# Patient Record
Sex: MALE | Race: WHITE | NOT HISPANIC OR LATINO | Employment: OTHER | URBAN - METROPOLITAN AREA
[De-identification: names, ages, dates, MRNs, and addresses within clinical notes are randomized per-mention and may not be internally consistent; named-entity substitution may affect disease eponyms.]

---

## 2017-01-13 RX ORDER — DIPHENHYDRAMINE HCL 25 MG
50 TABLET ORAL ONCE AS NEEDED
Status: DISCONTINUED | OUTPATIENT
Start: 2017-01-16 | End: 2017-01-19 | Stop reason: HOSPADM

## 2017-01-13 RX ORDER — DIPHENHYDRAMINE HCL 25 MG
50 TABLET ORAL EVERY 6 HOURS PRN
Status: DISCONTINUED | OUTPATIENT
Start: 2017-01-16 | End: 2017-01-13

## 2017-01-13 RX ORDER — ACETAMINOPHEN 325 MG/1
650 TABLET ORAL EVERY 4 HOURS PRN
Status: DISCONTINUED | OUTPATIENT
Start: 2017-01-16 | End: 2017-01-19 | Stop reason: HOSPADM

## 2017-01-13 RX ORDER — SODIUM CHLORIDE 9 MG/ML
20 INJECTION, SOLUTION INTRAVENOUS CONTINUOUS
Status: DISCONTINUED | OUTPATIENT
Start: 2017-01-16 | End: 2017-01-19 | Stop reason: HOSPADM

## 2017-01-16 ENCOUNTER — HOSPITAL ENCOUNTER (OUTPATIENT)
Dept: INFUSION CENTER | Facility: CLINIC | Age: 72
Discharge: HOME | End: 2017-01-16
Payer: MEDICARE

## 2017-01-16 RX ORDER — ACETAMINOPHEN 325 MG/1
650 TABLET ORAL EVERY 4 HOURS PRN
Status: DISCONTINUED | OUTPATIENT
Start: 2017-01-17 | End: 2017-01-20 | Stop reason: HOSPADM

## 2017-01-16 RX ORDER — SODIUM CHLORIDE 9 MG/ML
20 INJECTION, SOLUTION INTRAVENOUS CONTINUOUS
Status: DISCONTINUED | OUTPATIENT
Start: 2017-01-17 | End: 2017-01-20 | Stop reason: HOSPADM

## 2017-01-16 RX ORDER — DIPHENHYDRAMINE HCL 25 MG
50 TABLET ORAL ONCE AS NEEDED
Status: DISCONTINUED | OUTPATIENT
Start: 2017-01-17 | End: 2017-01-20 | Stop reason: HOSPADM

## 2017-01-17 ENCOUNTER — HOSPITAL ENCOUNTER (OUTPATIENT)
Dept: INFUSION CENTER | Facility: CLINIC | Age: 72
Discharge: HOME | End: 2017-01-17
Payer: MEDICARE

## 2017-01-17 VITALS
HEART RATE: 75 BPM | SYSTOLIC BLOOD PRESSURE: 152 MMHG | TEMPERATURE: 98.6 F | RESPIRATION RATE: 20 BRPM | DIASTOLIC BLOOD PRESSURE: 73 MMHG

## 2017-01-17 PROCEDURE — 96366 THER/PROPH/DIAG IV INF ADDON: CPT

## 2017-01-17 PROCEDURE — 96365 THER/PROPH/DIAG IV INF INIT: CPT

## 2017-01-17 PROCEDURE — 96361 HYDRATE IV INFUSION ADD-ON: CPT

## 2017-01-17 RX ORDER — ACETAMINOPHEN 325 MG/1
650 TABLET ORAL EVERY 4 HOURS PRN
Status: DISCONTINUED | OUTPATIENT
Start: 2017-01-18 | End: 2017-01-18

## 2017-01-17 RX ORDER — DIPHENHYDRAMINE HCL 25 MG
50 TABLET ORAL ONCE AS NEEDED
Status: DISCONTINUED | OUTPATIENT
Start: 2017-01-18 | End: 2017-01-18

## 2017-01-17 RX ORDER — SODIUM CHLORIDE 9 MG/ML
20 INJECTION, SOLUTION INTRAVENOUS CONTINUOUS
Status: DISCONTINUED | OUTPATIENT
Start: 2017-01-18 | End: 2017-01-18

## 2017-01-17 RX ADMIN — IMMUNE GLOBULIN (HUMAN) 26 G: 10 INJECTION INTRAVENOUS; SUBCUTANEOUS at 11:42

## 2017-01-17 RX ADMIN — SODIUM CHLORIDE 20 ML/HR: 900 INJECTION, SOLUTION INTRAVENOUS at 11:43

## 2017-01-17 RX ADMIN — SODIUM CHLORIDE 500 ML: 0.9 INJECTION, SOLUTION INTRAVENOUS at 11:05

## 2017-01-17 NOTE — PLAN OF CARE
Problem: Potential for Falls  Goal: Patient will remain free of falls  INTERVENTIONS:  - Assess patient frequently for physical needs  - Identify cognitive and physical deficits and behaviors that affect risk of falls    - [place] fall precautions as indicated by assessment   - Educate patient/family on patient safety including physical limitations  - Instruct patient to call for assistance with activity based on assessment  - Modify environment to reduce risk of injury  - Consider OT/PT consult to assist with strengthening/mobility   Outcome: Progressing

## 2017-01-18 ENCOUNTER — HOSPITAL ENCOUNTER (OUTPATIENT)
Dept: INFUSION CENTER | Facility: CLINIC | Age: 72
Discharge: HOME | End: 2017-01-18
Payer: MEDICARE

## 2017-01-18 VITALS
HEART RATE: 80 BPM | SYSTOLIC BLOOD PRESSURE: 148 MMHG | DIASTOLIC BLOOD PRESSURE: 66 MMHG | RESPIRATION RATE: 20 BRPM | TEMPERATURE: 97.4 F

## 2017-01-18 PROCEDURE — 96366 THER/PROPH/DIAG IV INF ADDON: CPT

## 2017-01-18 PROCEDURE — 96365 THER/PROPH/DIAG IV INF INIT: CPT

## 2017-01-18 RX ORDER — SODIUM CHLORIDE 9 MG/ML
20 INJECTION, SOLUTION INTRAVENOUS CONTINUOUS
Status: DISCONTINUED | OUTPATIENT
Start: 2017-01-19 | End: 2017-01-22 | Stop reason: HOSPADM

## 2017-01-18 RX ORDER — PREDNISONE 1 MG/1
7.5 TABLET ORAL DAILY
Status: ON HOLD | COMMUNITY
End: 2017-11-12 | Stop reason: ALTCHOICE

## 2017-01-18 RX ORDER — LISINOPRIL 10 MG/1
40 TABLET ORAL DAILY
COMMUNITY
End: 2020-01-01 | Stop reason: HOSPADM

## 2017-01-18 RX ORDER — ACETAMINOPHEN 325 MG/1
650 TABLET ORAL EVERY 4 HOURS PRN
Status: DISCONTINUED | OUTPATIENT
Start: 2017-01-18 | End: 2017-01-21 | Stop reason: HOSPADM

## 2017-01-18 RX ORDER — DIPHENHYDRAMINE HCL 25 MG
50 TABLET ORAL ONCE AS NEEDED
Status: DISCONTINUED | OUTPATIENT
Start: 2017-01-19 | End: 2017-01-22 | Stop reason: HOSPADM

## 2017-01-18 RX ORDER — DIPHENHYDRAMINE HCL 25 MG
50 TABLET ORAL ONCE AS NEEDED
Status: DISCONTINUED | OUTPATIENT
Start: 2017-01-18 | End: 2017-01-21 | Stop reason: HOSPADM

## 2017-01-18 RX ORDER — ACETAMINOPHEN 325 MG/1
650 TABLET ORAL EVERY 4 HOURS PRN
Status: DISCONTINUED | OUTPATIENT
Start: 2017-01-19 | End: 2017-01-22 | Stop reason: HOSPADM

## 2017-01-18 RX ORDER — SODIUM CHLORIDE 9 MG/ML
20 INJECTION, SOLUTION INTRAVENOUS CONTINUOUS
Status: DISCONTINUED | OUTPATIENT
Start: 2017-01-18 | End: 2017-01-21 | Stop reason: HOSPADM

## 2017-01-18 RX ADMIN — SODIUM CHLORIDE 500 ML: 0.9 INJECTION, SOLUTION INTRAVENOUS at 08:50

## 2017-01-18 RX ADMIN — SODIUM CHLORIDE 20 ML/HR: 0.9 INJECTION, SOLUTION INTRAVENOUS at 09:22

## 2017-01-18 RX ADMIN — IMMUNE GLOBULIN (HUMAN) 26 G: 10 INJECTION INTRAVENOUS; SUBCUTANEOUS at 09:28

## 2017-01-18 NOTE — PROGRESS NOTES
Patient tolerated day 2 of IVIG without complication  IV site left in place for day 3 of treatment tomorrow  No signs of redness, tenderness, or swelling

## 2017-01-18 NOTE — PLAN OF CARE
Problem: Potential for Falls  Goal: Patient will remain free of falls  INTERVENTIONS:  - Assess patient frequently for physical needs  - Identify cognitive and physical deficits and behaviors that affect risk of falls  - [place] fall precautions as indicated by assessment   - Educate patient/family on patient safety including physical limitations  - Instruct patient to call for assistance with activity based on assessment  - Modify environment to reduce risk of injury  - Consider OT/PT consult to assist with strengthening/mobility   Outcome: Progressing    Problem: SAFETY ADULT  Goal: Patient will remain free of falls  INTERVENTIONS:  - Assess patient frequently for physical needs  - Identify cognitive and physical deficits and behaviors that affect risk of falls  - [place] fall precautions as indicated by assessment   - Educate patient/family on patient safety including physical limitations  - Instruct patient to call for assistance with activity based on assessment  - Modify environment to reduce risk of injury  - Consider OT/PT consult to assist with strengthening/mobility   Outcome: Progressing    Problem: Knowledge Deficit  Goal: Patient/family/caregiver demonstrates understanding of disease process, treatment plan, medications, and discharge instructions  Complete learning assessment and assess knowledge base    Interventions:  - Provide teaching at level of understanding  - Provide teaching via preferred learning methods  Outcome: Progressing

## 2017-01-18 NOTE — PROGRESS NOTES
Patient arrived to [facility] with IV site in place from yesterdays treatment  No signs of redness, tenderness or swelling

## 2017-01-19 ENCOUNTER — HOSPITAL ENCOUNTER (OUTPATIENT)
Dept: INFUSION CENTER | Facility: CLINIC | Age: 72
Discharge: HOME | End: 2017-01-19
Payer: MEDICARE

## 2017-01-19 VITALS
TEMPERATURE: 97.5 F | HEART RATE: 77 BPM | SYSTOLIC BLOOD PRESSURE: 140 MMHG | DIASTOLIC BLOOD PRESSURE: 80 MMHG | OXYGEN SATURATION: 97 % | RESPIRATION RATE: 18 BRPM

## 2017-01-19 PROCEDURE — 96365 THER/PROPH/DIAG IV INF INIT: CPT

## 2017-01-19 PROCEDURE — 96366 THER/PROPH/DIAG IV INF ADDON: CPT

## 2017-01-19 RX ORDER — DIPHENHYDRAMINE HCL 25 MG
50 TABLET ORAL ONCE
Status: DISCONTINUED | OUTPATIENT
Start: 2017-01-20 | End: 2017-01-23 | Stop reason: HOSPADM

## 2017-01-19 RX ORDER — SODIUM CHLORIDE 9 MG/ML
20 INJECTION, SOLUTION INTRAVENOUS CONTINUOUS
Status: DISCONTINUED | OUTPATIENT
Start: 2017-01-20 | End: 2017-01-23 | Stop reason: HOSPADM

## 2017-01-19 RX ORDER — ACETAMINOPHEN 325 MG/1
650 TABLET ORAL EVERY 4 HOURS PRN
Status: DISCONTINUED | OUTPATIENT
Start: 2017-01-20 | End: 2017-01-23 | Stop reason: HOSPADM

## 2017-01-19 RX ADMIN — IMMUNE GLOBULIN (HUMAN) 26 G: 10 INJECTION INTRAVENOUS; SUBCUTANEOUS at 09:48

## 2017-01-19 RX ADMIN — SODIUM CHLORIDE 20 ML/HR: 0.9 INJECTION, SOLUTION INTRAVENOUS at 09:00

## 2017-01-19 NOTE — PROGRESS NOTES
Pt completed infusion without iincident -- ptrequested to have PIV left in for rerturn tomorrow  Ptdcdstable and ambulatory

## 2017-01-20 ENCOUNTER — HOSPITAL ENCOUNTER (OUTPATIENT)
Dept: INFUSION CENTER | Facility: CLINIC | Age: 72
Discharge: HOME | End: 2017-01-20
Payer: MEDICARE

## 2017-01-20 VITALS
DIASTOLIC BLOOD PRESSURE: 87 MMHG | HEART RATE: 72 BPM | SYSTOLIC BLOOD PRESSURE: 143 MMHG | RESPIRATION RATE: 18 BRPM | TEMPERATURE: 97.9 F

## 2017-01-20 PROCEDURE — 96365 THER/PROPH/DIAG IV INF INIT: CPT

## 2017-01-20 PROCEDURE — 96361 HYDRATE IV INFUSION ADD-ON: CPT

## 2017-01-20 PROCEDURE — 96366 THER/PROPH/DIAG IV INF ADDON: CPT

## 2017-01-20 RX ADMIN — IMMUNE GLOBULIN (HUMAN) 26 G: 10 INJECTION INTRAVENOUS; SUBCUTANEOUS at 09:20

## 2017-01-20 RX ADMIN — SODIUM CHLORIDE 500 ML: 0.9 INJECTION, SOLUTION INTRAVENOUS at 08:38

## 2017-01-20 RX ADMIN — SODIUM CHLORIDE 20 ML/HR: 0.9 INJECTION, SOLUTION INTRAVENOUS at 09:19

## 2017-01-23 ENCOUNTER — ALLSCRIPTS OFFICE VISIT (OUTPATIENT)
Dept: OTHER | Facility: OTHER | Age: 72
End: 2017-01-23

## 2017-01-23 DIAGNOSIS — G70.00 MYASTHENIA GRAVIS WITHOUT (ACUTE) EXACERBATION (HCC): ICD-10-CM

## 2017-05-03 ENCOUNTER — TRANSCRIBE ORDERS (OUTPATIENT)
Dept: LAB | Facility: CLINIC | Age: 72
End: 2017-05-03

## 2017-05-03 ENCOUNTER — APPOINTMENT (OUTPATIENT)
Dept: LAB | Facility: CLINIC | Age: 72
End: 2017-05-03
Payer: MEDICARE

## 2017-05-03 DIAGNOSIS — G70.00 MYASTHENIA GRAVIS WITHOUT (ACUTE) EXACERBATION (HCC): ICD-10-CM

## 2017-05-03 LAB
ALBUMIN SERPL BCP-MCNC: 3.8 G/DL (ref 3.5–5)
ALP SERPL-CCNC: 86 U/L (ref 46–116)
ALT SERPL W P-5'-P-CCNC: 19 U/L (ref 12–78)
ANION GAP SERPL CALCULATED.3IONS-SCNC: 12 MMOL/L (ref 4–13)
AST SERPL W P-5'-P-CCNC: 28 U/L (ref 5–45)
BASOPHILS # BLD AUTO: 0.04 THOUSANDS/ΜL (ref 0–0.1)
BASOPHILS NFR BLD AUTO: 1 % (ref 0–1)
BILIRUB SERPL-MCNC: 0.6 MG/DL (ref 0.2–1)
BUN SERPL-MCNC: 17 MG/DL (ref 5–25)
CALCIUM SERPL-MCNC: 9.4 MG/DL (ref 8.3–10.1)
CHLORIDE SERPL-SCNC: 105 MMOL/L (ref 100–108)
CO2 SERPL-SCNC: 25 MMOL/L (ref 21–32)
CREAT SERPL-MCNC: 1.1 MG/DL (ref 0.6–1.3)
EOSINOPHIL # BLD AUTO: 0.08 THOUSAND/ΜL (ref 0–0.61)
EOSINOPHIL NFR BLD AUTO: 1 % (ref 0–6)
ERYTHROCYTE [DISTWIDTH] IN BLOOD BY AUTOMATED COUNT: 13.8 % (ref 11.6–15.1)
GFR SERPL CREATININE-BSD FRML MDRD: >60 ML/MIN/1.73SQ M
GLUCOSE SERPL-MCNC: 97 MG/DL (ref 65–140)
HCT VFR BLD AUTO: 40.1 % (ref 36.5–49.3)
HGB BLD-MCNC: 12.1 G/DL (ref 12–17)
LYMPHOCYTES # BLD AUTO: 1.12 THOUSANDS/ΜL (ref 0.6–4.47)
LYMPHOCYTES NFR BLD AUTO: 19 % (ref 14–44)
MCH RBC QN AUTO: 24.6 PG (ref 26.8–34.3)
MCHC RBC AUTO-ENTMCNC: 30.2 G/DL (ref 31.4–37.4)
MCV RBC AUTO: 82 FL (ref 82–98)
MONOCYTES # BLD AUTO: 0.48 THOUSAND/ΜL (ref 0.17–1.22)
MONOCYTES NFR BLD AUTO: 8 % (ref 4–12)
NEUTROPHILS # BLD AUTO: 4.19 THOUSANDS/ΜL (ref 1.85–7.62)
NEUTS SEG NFR BLD AUTO: 71 % (ref 43–75)
PLATELET # BLD AUTO: 181 THOUSANDS/UL (ref 149–390)
PMV BLD AUTO: 10.4 FL (ref 8.9–12.7)
POTASSIUM SERPL-SCNC: 4.7 MMOL/L (ref 3.5–5.3)
PROT SERPL-MCNC: 6.9 G/DL (ref 6.4–8.2)
RBC # BLD AUTO: 4.92 MILLION/UL (ref 3.88–5.62)
SODIUM SERPL-SCNC: 142 MMOL/L (ref 136–145)
WBC # BLD AUTO: 5.91 THOUSAND/UL (ref 4.31–10.16)

## 2017-05-03 PROCEDURE — 36415 COLL VENOUS BLD VENIPUNCTURE: CPT

## 2017-05-03 PROCEDURE — 80053 COMPREHEN METABOLIC PANEL: CPT

## 2017-05-03 PROCEDURE — 85025 COMPLETE CBC W/AUTO DIFF WBC: CPT

## 2017-05-15 ENCOUNTER — ALLSCRIPTS OFFICE VISIT (OUTPATIENT)
Dept: OTHER | Facility: OTHER | Age: 72
End: 2017-05-15

## 2017-05-15 ENCOUNTER — TRANSCRIBE ORDERS (OUTPATIENT)
Dept: ADMINISTRATIVE | Facility: HOSPITAL | Age: 72
End: 2017-05-15

## 2017-05-15 DIAGNOSIS — I50.42 CHRONIC COMBINED SYSTOLIC AND DIASTOLIC HEART FAILURE (HCC): ICD-10-CM

## 2017-05-15 DIAGNOSIS — I65.29 OCCLUSION AND STENOSIS OF UNSPECIFIED CAROTID ARTERY: ICD-10-CM

## 2017-05-15 DIAGNOSIS — G70.00 MYASTHENIA GRAVIS WITHOUT (ACUTE) EXACERBATION (HCC): ICD-10-CM

## 2017-05-15 DIAGNOSIS — I25.10 ATHEROSCLEROTIC HEART DISEASE OF NATIVE CORONARY ARTERY WITHOUT ANGINA PECTORIS: ICD-10-CM

## 2017-05-15 DIAGNOSIS — I25.5 ISCHEMIC CARDIOMYOPATHY: ICD-10-CM

## 2017-05-15 DIAGNOSIS — E78.5 HYPERLIPIDEMIA: ICD-10-CM

## 2017-05-15 DIAGNOSIS — I65.29 STENOSIS OF CAROTID ARTERY, UNSPECIFIED LATERALITY: Primary | ICD-10-CM

## 2017-05-15 DIAGNOSIS — I10 ESSENTIAL (PRIMARY) HYPERTENSION: ICD-10-CM

## 2017-05-22 ENCOUNTER — ALLSCRIPTS OFFICE VISIT (OUTPATIENT)
Dept: OTHER | Facility: OTHER | Age: 72
End: 2017-05-22

## 2017-05-29 DIAGNOSIS — I50.42 CHRONIC COMBINED SYSTOLIC AND DIASTOLIC HEART FAILURE (HCC): ICD-10-CM

## 2017-06-01 ENCOUNTER — APPOINTMENT (OUTPATIENT)
Dept: LAB | Facility: CLINIC | Age: 72
End: 2017-06-01
Payer: MEDICARE

## 2017-06-01 ENCOUNTER — HOSPITAL ENCOUNTER (OUTPATIENT)
Dept: NON INVASIVE DIAGNOSTICS | Facility: CLINIC | Age: 72
Discharge: HOME | End: 2017-06-01
Payer: MEDICARE

## 2017-06-01 DIAGNOSIS — E78.5 HYPERLIPIDEMIA: ICD-10-CM

## 2017-06-01 DIAGNOSIS — I50.42 CHRONIC COMBINED SYSTOLIC AND DIASTOLIC HEART FAILURE (HCC): ICD-10-CM

## 2017-06-01 DIAGNOSIS — I65.29 OCCLUSION AND STENOSIS OF UNSPECIFIED CAROTID ARTERY: ICD-10-CM

## 2017-06-01 DIAGNOSIS — I25.5 ISCHEMIC CARDIOMYOPATHY: ICD-10-CM

## 2017-06-01 DIAGNOSIS — I10 ESSENTIAL (PRIMARY) HYPERTENSION: ICD-10-CM

## 2017-06-01 DIAGNOSIS — I25.10 ATHEROSCLEROTIC HEART DISEASE OF NATIVE CORONARY ARTERY WITHOUT ANGINA PECTORIS: ICD-10-CM

## 2017-06-01 LAB
ANION GAP SERPL CALCULATED.3IONS-SCNC: 12 MMOL/L (ref 4–13)
BUN SERPL-MCNC: 17 MG/DL (ref 5–25)
CALCIUM SERPL-MCNC: 9.5 MG/DL (ref 8.3–10.1)
CHLORIDE SERPL-SCNC: 105 MMOL/L (ref 100–108)
CO2 SERPL-SCNC: 26 MMOL/L (ref 21–32)
CREAT SERPL-MCNC: 1.21 MG/DL (ref 0.6–1.3)
GFR SERPL CREATININE-BSD FRML MDRD: 58.9 ML/MIN/1.73SQ M
GLUCOSE SERPL-MCNC: 96 MG/DL (ref 65–140)
POTASSIUM SERPL-SCNC: 3.7 MMOL/L (ref 3.5–5.3)
SODIUM SERPL-SCNC: 143 MMOL/L (ref 136–145)

## 2017-06-01 PROCEDURE — 80048 BASIC METABOLIC PNL TOTAL CA: CPT

## 2017-06-01 PROCEDURE — 93306 TTE W/DOPPLER COMPLETE: CPT

## 2017-06-01 PROCEDURE — 36415 COLL VENOUS BLD VENIPUNCTURE: CPT

## 2017-09-28 ENCOUNTER — APPOINTMENT (OUTPATIENT)
Dept: LAB | Facility: CLINIC | Age: 72
End: 2017-09-28
Payer: MEDICARE

## 2017-09-28 ENCOUNTER — ALLSCRIPTS OFFICE VISIT (OUTPATIENT)
Dept: OTHER | Facility: OTHER | Age: 72
End: 2017-09-28

## 2017-09-28 DIAGNOSIS — I50.42 CHRONIC COMBINED SYSTOLIC AND DIASTOLIC HEART FAILURE (HCC): ICD-10-CM

## 2017-09-28 LAB
ANION GAP SERPL CALCULATED.3IONS-SCNC: 8 MMOL/L (ref 4–13)
BUN SERPL-MCNC: 18 MG/DL (ref 5–25)
CALCIUM SERPL-MCNC: 9 MG/DL (ref 8.3–10.1)
CHLORIDE SERPL-SCNC: 105 MMOL/L (ref 100–108)
CO2 SERPL-SCNC: 28 MMOL/L (ref 21–32)
CREAT SERPL-MCNC: 1.17 MG/DL (ref 0.6–1.3)
GFR SERPL CREATININE-BSD FRML MDRD: 61 ML/MIN/1.73SQ M
GLUCOSE SERPL-MCNC: 106 MG/DL (ref 65–140)
POTASSIUM SERPL-SCNC: 4 MMOL/L (ref 3.5–5.3)
SODIUM SERPL-SCNC: 141 MMOL/L (ref 136–145)

## 2017-09-28 PROCEDURE — 36415 COLL VENOUS BLD VENIPUNCTURE: CPT

## 2017-09-28 PROCEDURE — 80048 BASIC METABOLIC PNL TOTAL CA: CPT

## 2017-10-31 NOTE — PROGRESS NOTES
Assessment  Assessed    1  Carotid artery stenosis, asymptomatic (433 10) (I65 29)   2  Chronic combined systolic and diastolic CHF (congestive heart failure) (428 42,428 0)   (I50 42)   3  Coronary artery disease (414 00) (I25 10)   4  Hyperlipidemia (272 4) (E78 5)   5  Hypertension (401 9) (I10)   6  Ischemic cardiomyopathy (414 8) (I25 5)    Plan  Carotid artery stenosis, asymptomatic, Health Maintenance, Hypertension    · EKG/ECG- POC; Status:Complete;   Done: [id number]   Perform: In Office; [phone]; Ordered; For:Carotid artery stenosis, asymptomatic, Health Maintenance, Hypertension; Ordered By:Nic Xavier;  Chronic combined systolic and diastolic CHF (congestive heart failure)    · Follow-up visit in 6 months Evaluation and Treatment  Follow-up  Status: Complete   Done: 53EXM4052   Ordered; For: Chronic combined systolic and diastolic CHF (congestive heart failure); Ordered By: Barb Holloway Performed:  Due: 59KQM6699; Last Updated By: Deepti Barrios; 9/28/2017 3:44:31 PM   · (1) BASIC METABOLIC PROFILE; Status:Complete;   Done: 13QKB3682 02:58PM   MMT:[id number]; Ordered; For:Chronic combined systolic and diastolic CHF (congestive heart failure); Ordered By:Yolande Xavier;    Discussion/Summary  Cardiology Discussion Summary Free Text Note Form [facility]:   Columba Christensen returns to see me at the [facility] office  Today he is stable  is much better  artery disease - 3 vessel CAD diagnosed in 2013, no intervention at that time  Medical therapy was prescribed and his ischemic cardiomyopathy at 45% improved  No echo suggest preserved LVEF again but with LVH  - previously controlled in the setting of high-dose atorvastatin therapy  Chief Complaint  Chief Complaint Free Text Note Form: Patient is here for 3 mo f/u  Patient c/o Sob with exertion, occ Swelling in LE, EKG today  Chief Complaint Chronic Condition [facility]: Patient is here today for follow up of chronic conditions described in HPI        History of Present Illness  Cardiology HPI Free Text Note Form [facility]: Lazara Pulido has multivessel CAD, was reffered for CABG in the past but not felt a good candidate and he refused  he has MG  He has HL  He is on statin Rx with excellent lipid control  His HTN is under much better control  His volume sttaus has improved  No significant edema today  Active Problems  Problems    1  Acute gastric ulcer (531 30) (K25 3)   2  Carotid artery stenosis, asymptomatic (433 10) (I65 29)   3  Chronic combined systolic and diastolic CHF (congestive heart failure) (428 42,428 0)   (I50 42)   4  Coronary artery disease (414 00) (I25 10)   5  Dysarthria (784 51) (R47 1)   6  Dysphagia (787 20) (R13 10)   7  Dysuria (788 1) (R30 0)   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)   9  Hyperlipidemia (272 4) (E78 5)   10  Hypertension (401 9) (I10)   11  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)   12  Ischemic cardiomyopathy (414 8) (I25 5)   13  Midline low back pain without sciatica (724 2) (M54 5)   14  Myasthenia gravis (358 00) (G70 00)   15  Pain around PEG tube site (948 44,671 55) (E66 949S)    Past Medical History  Problems    1  Old myocardial infarction (12) (I25 2)  Active Problems And Past Medical History Reviewed: The active problems and past medical history were reviewed and updated today  Surgical History  Problems    1  History of Percutaneous Injection Of Extremity Pseudoaneurysm   2  History of Previous Balloon Angioplasty   3  History of Previous Stent Placement    Family History  Mother    1  Family history of Prior Myocardial Infarction  Father    2  Family history of Prior Myocardial Infarction    Social History  Problems    · Being A Social Drinker   · Denied: History of Drug Use   · Former smoker (V15 82) (F32 103)   · Marital History - Currently   Social History Reviewed: The social history was reviewed and updated today  The social history was reviewed and is unchanged  Current Meds   1   Aspirin 81 MG TABS; Take 1 tablet daily; Therapy: (Recorded:31Oct2016) to Recorded   2  Furosemide 40 MG Oral Tablet; TAKE 1 TABLET DAILY  Requested for: 88VKW0231; Last   Rx:[id number] Ordered   3  Isosorbide Mononitrate ER 30 MG Oral Tablet Extended Release 24 Hour; TAKE 1   TABLET DAILY; Therapy: (Romario Motley) to Recorded   4  Klor-Con M20 20 MEQ Oral Tablet Extended Release; TAKE 1 TABLET DAILY; Therapy: (Romario Motley) to Recorded   5  Levothyroxine Sodium 50 MCG Oral Tablet; TAKE 1 TABLET DAILY; Therapy: (Recorded:12Oct2016) to Recorded   6  Lipitor 80 MG Oral Tablet; TAKE 1 TABLET DAILY; Therapy: (Recorded:12Oct2016) to Recorded   7  Lisinopril 10 MG Oral Tablet; TAKE 1 TABLET TWICE DAILY; Therapy: 51QHX3446 to (Evaluate:11Mar2018)  Requested for: 78VJF3821; Last   Rx:49Chn8884 Ordered   8  Metoprolol Tartrate 25 MG Oral Tablet; TAKE 1 TABLET TWICE DAILY; Therapy: (Romario Motley) to Recorded   9  Mycophenolate Mofetil 250 MG Oral Capsule; TAKE 3 CAPSULES BY MOUTH TWICE   DAILY   increased dose; Therapy: 99MMD4912 to (Annette Warner)  Requested for: 07JBN9686; Last   Rx:07Mar2017 Ordered   10  Pantoprazole Sodium 40 MG Oral Tablet Delayed Release; Take 1 tablet twice daily; Therapy: 11NSL9664 to (Gavino Leavitt)  Requested for: 11YHW1845; Last    Rx:17Rri3711 Ordered  Medication List Reviewed: The medication list was reviewed and updated today  Allergies  Medication    1  No Known Drug Allergies    Vitals  Vital Signs    Recorded: 51WVW5861 02:15PM   Heart Rate 60, Apical   Systolic 454, LUE, Sitting   Diastolic 72, LUE, Sitting   Height 5 ft 9 in   Weight 145 lb 5 oz   BMI Calculated 21 46   BSA Calculated 1 8     Physical Exam    Constitutional   General appearance: No acute distress, well appearing and well nourished  Eyes   Conjunctiva and Sclera examination: Conjunctiva pink, sclera anicteric      Ears, Nose, Mouth, and Throat - Oropharynx: Clear, nares are clear, mucous membranes are moist    Neck   Neck and thyroid: Normal, supple, trachea midline, no thyromegaly  Pulmonary   Respiratory effort: No increased work of breathing or signs of respiratory distress  Auscultation of lungs: Clear to auscultation  Cardiovascular   Auscultation of heart: Normal rate and rhythm, normal S1 and S2, no murmurs  Carotid pulses: Normal, 2+ bilaterally  Pedal pulses: Normal, 2+ bilaterally  -- 1+ bilateral pitting edema  Musculoskeletal Gait and station: Normal gait  Skin - Skin and subcutaneous tissue: Normal without rashes or lesions  Skin is warm and well perfused, normal turgor  Neurologic - Cranial nerves: II - XII intact  Psychiatric - Orientation to person, place, and time: Normal -- Mood and affect: Normal       Results/Data  (1) BASIC METABOLIC PROFILE 10GCS4336 02:58PM Daisy VILLA Order Number: [id number]     Test Name Result Flag Reference   GLUCOSE,RANDM 106 mg/dL     If the patient is fasting, the ADA then defines impaired fasting glucose as > 100 mg/dL and diabetes as > or equal to 123 mg/dL  Specimen collection should occur prior to Sulfasalazine administration due to the potential for falsely depressed results  Specimen collection should occur prior to Sulfapyridine administration due to the potential for falsely elevated results  SODIUM 141 mmol/L  136-145   POTASSIUM 4 0 mmol/L  3 5-5 3   CHLORIDE 105 mmol/L  100-108   CARBON DIOXIDE 28 mmol/L  21-32   ANION GAP (CALC) 8 mmol/L  4-13   BLOOD UREA NITROGEN 18 mg/dL  5-25   CREATININE 1 17 mg/dL  0 60-1 30   Standardized to IDMS reference method   CALCIUM 9 0 mg/dL  8 3-10 1   eGFR 61 ml/min/1 73sq [facility] Disease Education Program recommendations are as follows:  GFR calculation is accurate only with a steady state creatinine  Chronic Kidney disease less than 60 ml/min/1 73 sq  meters  Kidney failure less than 15 ml/min/1 73 sq  meters       Diagnostic Studies Reviewed Cardio:   Echocardiogram/RODRIGUEZ: LVEF 65%, mid inferior wall akinesis  ECG Report: Sinus rhythm, cannot rule out inferior or anterior infarct  (1) BASIC METABOLIC PROFILE 81GIA5044 12:29PM Rafi Zepeda Order Number: [id number]     Test Name Result Flag Reference   GLUCOSE,RANDM 96 mg/dL     If the patient is fasting, the ADA then defines impaired fasting glucose as > 100 mg/dL and diabetes as > or equal to 123 mg/dL  SODIUM 143 mmol/L  136-145   POTASSIUM 3 7 mmol/L  3 5-5 3   CHLORIDE 105 mmol/L  100-108   CARBON DIOXIDE 26 mmol/L  21-32   ANION GAP (CALC) 12 mmol/L  4-13   BLOOD UREA NITROGEN 17 mg/dL  5-25   CREATININE 1 21 mg/dL  0 60-1 30   Standardized to IDMS reference method   CALCIUM 9 5 mg/dL  8 3-10 1   eGFR Non-African American 58 9 ml/min/1 73sq [facility] Disease Education Program recommendations are as follows:  GFR calculation is accurate only with a steady state creatinine  Chronic Kidney disease less than 60 ml/min/1 73 sq  meters  Kidney failure less than 15 ml/min/1 73 sq  meters  Future Appointments    Date/Time Provider Specialty Site   11/17/2017 10:40 AM SUMANTH Rubio   Cardiology [address]   11/27/2017 01:00 PM Fara Councilman, DO Neurology [address] [facility]     Signatures   Electronically signed by : Negrito Abrams MD; Oct 30 2017 10:49PM EST                       (Author)

## 2017-11-01 ENCOUNTER — GENERIC CONVERSION - ENCOUNTER (OUTPATIENT)
Dept: OTHER | Facility: OTHER | Age: 72
End: 2017-11-01

## 2017-11-12 ENCOUNTER — HOSPITAL ENCOUNTER (INPATIENT)
Facility: HOSPITAL | Age: 72
LOS: 4 days | Discharge: HOME HEALTH | DRG: 394 | End: 2017-11-16
Attending: EMERGENCY MEDICINE | Admitting: SURGERY
Payer: MEDICARE

## 2017-11-12 ENCOUNTER — APPOINTMENT (EMERGENCY)
Dept: CT IMAGING | Facility: HOSPITAL | Age: 72
DRG: 394 | End: 2017-11-12
Payer: MEDICARE

## 2017-11-12 DIAGNOSIS — Z86.69 HX OF MYASTHENIA GRAVIS: ICD-10-CM

## 2017-11-12 DIAGNOSIS — I10 ESSENTIAL HYPERTENSION: ICD-10-CM

## 2017-11-12 DIAGNOSIS — K56.600 SMALL BOWEL OBSTRUCTION, PARTIAL (HCC): Primary | ICD-10-CM

## 2017-11-12 LAB
ALBUMIN SERPL BCP-MCNC: 4 G/DL (ref 3.5–5)
ALP SERPL-CCNC: 93 U/L (ref 46–116)
ALT SERPL W P-5'-P-CCNC: 17 U/L (ref 12–78)
ANION GAP SERPL CALCULATED.3IONS-SCNC: 11 MMOL/L (ref 4–13)
APTT PPP: 30 SECONDS (ref 23–35)
AST SERPL W P-5'-P-CCNC: 15 U/L (ref 5–45)
BASOPHILS # BLD AUTO: 0.02 THOUSANDS/ΜL (ref 0–0.1)
BASOPHILS NFR BLD AUTO: 0 % (ref 0–1)
BILIRUB SERPL-MCNC: 0.7 MG/DL (ref 0.2–1)
BUN SERPL-MCNC: 18 MG/DL (ref 5–25)
CALCIUM SERPL-MCNC: 9.6 MG/DL (ref 8.3–10.1)
CHLORIDE SERPL-SCNC: 103 MMOL/L (ref 100–108)
CO2 SERPL-SCNC: 28 MMOL/L (ref 21–32)
CREAT SERPL-MCNC: 1.1 MG/DL (ref 0.6–1.3)
EOSINOPHIL # BLD AUTO: 0.02 THOUSAND/ΜL (ref 0–0.61)
EOSINOPHIL NFR BLD AUTO: 0 % (ref 0–6)
ERYTHROCYTE [DISTWIDTH] IN BLOOD BY AUTOMATED COUNT: 13.6 % (ref 11.6–15.1)
GFR SERPL CREATININE-BSD FRML MDRD: 66 ML/MIN/1.73SQ M
GLUCOSE SERPL-MCNC: 105 MG/DL (ref 65–140)
HCT VFR BLD AUTO: 38.8 % (ref 36.5–49.3)
HGB BLD-MCNC: 12.2 G/DL (ref 12–17)
INR PPP: 0.97 (ref 0.86–1.16)
LACTATE SERPL-SCNC: 1.1 MMOL/L (ref 0.5–2)
LIPASE SERPL-CCNC: 74 U/L (ref 73–393)
LYMPHOCYTES # BLD AUTO: 0.98 THOUSANDS/ΜL (ref 0.6–4.47)
LYMPHOCYTES NFR BLD AUTO: 12 % (ref 14–44)
MCH RBC QN AUTO: 26.2 PG (ref 26.8–34.3)
MCHC RBC AUTO-ENTMCNC: 31.4 G/DL (ref 31.4–37.4)
MCV RBC AUTO: 83 FL (ref 82–98)
MONOCYTES # BLD AUTO: 0.55 THOUSAND/ΜL (ref 0.17–1.22)
MONOCYTES NFR BLD AUTO: 7 % (ref 4–12)
NEUTROPHILS # BLD AUTO: 6.93 THOUSANDS/ΜL (ref 1.85–7.62)
NEUTS SEG NFR BLD AUTO: 81 % (ref 43–75)
PLATELET # BLD AUTO: 202 THOUSANDS/UL (ref 149–390)
PMV BLD AUTO: 10 FL (ref 8.9–12.7)
POTASSIUM SERPL-SCNC: 3.9 MMOL/L (ref 3.5–5.3)
PROT SERPL-MCNC: 6.9 G/DL (ref 6.4–8.2)
PROTHROMBIN TIME: 13.2 SECONDS (ref 12.1–14.4)
RBC # BLD AUTO: 4.66 MILLION/UL (ref 3.88–5.62)
SODIUM SERPL-SCNC: 142 MMOL/L (ref 136–145)
WBC # BLD AUTO: 8.5 THOUSAND/UL (ref 4.31–10.16)

## 2017-11-12 PROCEDURE — 96360 HYDRATION IV INFUSION INIT: CPT

## 2017-11-12 PROCEDURE — 85610 PROTHROMBIN TIME: CPT | Performed by: EMERGENCY MEDICINE

## 2017-11-12 PROCEDURE — 36415 COLL VENOUS BLD VENIPUNCTURE: CPT | Performed by: EMERGENCY MEDICINE

## 2017-11-12 PROCEDURE — 83690 ASSAY OF LIPASE: CPT | Performed by: EMERGENCY MEDICINE

## 2017-11-12 PROCEDURE — 85730 THROMBOPLASTIN TIME PARTIAL: CPT | Performed by: EMERGENCY MEDICINE

## 2017-11-12 PROCEDURE — 74177 CT ABD & PELVIS W/CONTRAST: CPT

## 2017-11-12 PROCEDURE — 83605 ASSAY OF LACTIC ACID: CPT | Performed by: EMERGENCY MEDICINE

## 2017-11-12 PROCEDURE — 99285 EMERGENCY DEPT VISIT HI MDM: CPT

## 2017-11-12 PROCEDURE — 80053 COMPREHEN METABOLIC PANEL: CPT | Performed by: EMERGENCY MEDICINE

## 2017-11-12 PROCEDURE — 85025 COMPLETE CBC W/AUTO DIFF WBC: CPT | Performed by: EMERGENCY MEDICINE

## 2017-11-12 RX ORDER — ONDANSETRON 2 MG/ML
4 INJECTION INTRAMUSCULAR; INTRAVENOUS ONCE AS NEEDED
Status: DISCONTINUED | OUTPATIENT
Start: 2017-11-12 | End: 2017-11-12 | Stop reason: SDUPTHER

## 2017-11-12 RX ORDER — PREDNISOLONE ACETATE 10 MG/ML
1 SUSPENSION/ DROPS OPHTHALMIC 4 TIMES DAILY
Status: DISCONTINUED | OUTPATIENT
Start: 2017-11-12 | End: 2017-11-16 | Stop reason: HOSPADM

## 2017-11-12 RX ORDER — ISOSORBIDE MONONITRATE 30 MG/1
30 TABLET, EXTENDED RELEASE ORAL DAILY
Status: DISCONTINUED | OUTPATIENT
Start: 2017-11-13 | End: 2017-11-16 | Stop reason: HOSPADM

## 2017-11-12 RX ORDER — PANTOPRAZOLE SODIUM 40 MG/1
40 INJECTION, POWDER, FOR SOLUTION INTRAVENOUS
Status: DISCONTINUED | OUTPATIENT
Start: 2017-11-13 | End: 2017-11-16 | Stop reason: HOSPADM

## 2017-11-12 RX ORDER — HEPARIN SODIUM 5000 [USP'U]/ML
5000 INJECTION, SOLUTION INTRAVENOUS; SUBCUTANEOUS EVERY 8 HOURS SCHEDULED
Status: DISCONTINUED | OUTPATIENT
Start: 2017-11-12 | End: 2017-11-16 | Stop reason: HOSPADM

## 2017-11-12 RX ORDER — PANTOPRAZOLE SODIUM 40 MG/1
40 TABLET, DELAYED RELEASE ORAL DAILY
Status: DISCONTINUED | OUTPATIENT
Start: 2017-11-13 | End: 2017-11-12 | Stop reason: SDUPTHER

## 2017-11-12 RX ORDER — DEXTROSE, SODIUM CHLORIDE, AND POTASSIUM CHLORIDE 5; .45; .15 G/100ML; G/100ML; G/100ML
100 INJECTION INTRAVENOUS CONTINUOUS
Status: DISCONTINUED | OUTPATIENT
Start: 2017-11-12 | End: 2017-11-13

## 2017-11-12 RX ORDER — MYCOPHENOLATE MOFETIL 250 MG/1
750 CAPSULE ORAL 2 TIMES DAILY
Status: DISCONTINUED | OUTPATIENT
Start: 2017-11-12 | End: 2017-11-16 | Stop reason: HOSPADM

## 2017-11-12 RX ORDER — SODIUM CHLORIDE, SODIUM LACTATE, POTASSIUM CHLORIDE, CALCIUM CHLORIDE 600; 310; 30; 20 MG/100ML; MG/100ML; MG/100ML; MG/100ML
100 INJECTION, SOLUTION INTRAVENOUS CONTINUOUS
Status: DISCONTINUED | OUTPATIENT
Start: 2017-11-12 | End: 2017-11-13

## 2017-11-12 RX ORDER — LEVOTHYROXINE SODIUM 0.05 MG/1
50 TABLET ORAL DAILY
Status: DISCONTINUED | OUTPATIENT
Start: 2017-11-13 | End: 2017-11-16 | Stop reason: HOSPADM

## 2017-11-12 RX ORDER — OFLOXACIN 3 MG/ML
1 SOLUTION/ DROPS OPHTHALMIC 4 TIMES DAILY
Status: DISCONTINUED | OUTPATIENT
Start: 2017-11-12 | End: 2017-11-16 | Stop reason: HOSPADM

## 2017-11-12 RX ORDER — ONDANSETRON 2 MG/ML
4 INJECTION INTRAMUSCULAR; INTRAVENOUS EVERY 4 HOURS PRN
Status: DISCONTINUED | OUTPATIENT
Start: 2017-11-12 | End: 2017-11-16 | Stop reason: HOSPADM

## 2017-11-12 RX ORDER — LISINOPRIL 20 MG/1
40 TABLET ORAL DAILY
Status: DISCONTINUED | OUTPATIENT
Start: 2017-11-13 | End: 2017-11-16 | Stop reason: HOSPADM

## 2017-11-12 RX ADMIN — METOPROLOL TARTRATE 25 MG: 25 TABLET ORAL at 21:43

## 2017-11-12 RX ADMIN — SODIUM CHLORIDE, SODIUM LACTATE, POTASSIUM CHLORIDE, AND CALCIUM CHLORIDE 100 ML/HR: .6; .31; .03; .02 INJECTION, SOLUTION INTRAVENOUS at 16:17

## 2017-11-12 RX ADMIN — SODIUM CHLORIDE 500 ML: 0.9 INJECTION, SOLUTION INTRAVENOUS at 14:32

## 2017-11-12 RX ADMIN — OFLOXACIN 1 DROP: 3 SOLUTION/ DROPS OPHTHALMIC at 21:46

## 2017-11-12 RX ADMIN — MYCOPHENOLATE MOFETIL 750 MG: 250 CAPSULE ORAL at 18:15

## 2017-11-12 RX ADMIN — ONDANSETRON 4 MG: 2 INJECTION INTRAMUSCULAR; INTRAVENOUS at 22:05

## 2017-11-12 RX ADMIN — PREDNISOLONE ACETATE 1 DROP: 10 SUSPENSION/ DROPS OPHTHALMIC at 21:46

## 2017-11-12 RX ADMIN — HEPARIN SODIUM 5000 UNITS: 5000 INJECTION, SOLUTION INTRAVENOUS; SUBCUTANEOUS at 21:44

## 2017-11-12 RX ADMIN — DEXTROSE, SODIUM CHLORIDE, AND POTASSIUM CHLORIDE 100 ML/HR: 5; .45; .15 INJECTION INTRAVENOUS at 18:09

## 2017-11-12 RX ADMIN — IOHEXOL 100 ML: 350 INJECTION, SOLUTION INTRAVENOUS at 15:10

## 2017-11-12 NOTE — ED PROVIDER NOTES
History  Chief Complaint   Patient presents with    Abdominal Pain     "bowel problems"  pt has colostomy  pt has pain and nausea with vomiting  pt unable to eat  78-year-old male presents to the emergency department for evaluation of abdominal pain  Patient's symptoms started last evening with nausea and progressed throughout the night with vomiting and dry heaving  Patient has pain in the upper abdomen  Patient has a history of perforated diverticulitis and had a colostomy 3 years ago  This was not reversed due to patient having other complicated medical problems  Patient reports normal filling of his colostomy bag  Denies fevers or chills  No chest pain or shortness of breath  P         History provided by:  Patient, spouse and medical records   used: No    Abdominal Pain   Pain location: Upper abdomen  Pain quality: cramping and dull    Pain radiates to:  Does not radiate  Pain severity:  Severe  Onset quality:  Gradual  Duration:  1 day  Timing:  Intermittent  Progression:  Waxing and waning  Chronicity:  New  Context: previous surgery    Context: not laxative use and not recent illness    Relieved by:  Nothing  Worsened by:  Nothing  Ineffective treatments:  None tried  Associated symptoms: anorexia, nausea and vomiting    Associated symptoms: no chest pain, no constipation, no cough, no diarrhea, no dysuria, no fever, no melena and no sore throat    Risk factors: multiple surgeries    Risk factors: no recent hospitalization        Prior to Admission Medications   Prescriptions Last Dose Informant Patient Reported? Taking? Calcium Citrate-Vitamin D (CALCIUM CITRATE +D PO) 11/12/2017 at Unknown time  Yes Yes   Sig: Take 1 tablet by mouth  aspirin 81 MG tablet 11/11/2017 at Unknown time  Yes Yes   Sig: Take 81 mg by mouth daily  atorvastatin (LIPITOR) 80 mg tablet 11/12/2017 at Unknown time  Yes Yes   Sig: Take 80 mg by mouth daily     furosemide (LASIX) 20 mg tablet 11/11/2017 at Unknown time  Yes Yes   Sig: Take 40 mg by mouth daily     isosorbide dinitrate (ISORDIL) 30 mg tablet 11/12/2017 at Unknown time  Yes Yes   Sig: Take 30 mg by mouth daily  levothyroxine 25 mcg tablet   Yes No   Sig: Take 50 mcg by mouth daily  lisinopril (ZESTRIL) 10 mg tablet 11/12/2017 at Unknown time  Yes Yes   Sig: Take 40 mg by mouth daily     metoprolol tartrate (LOPRESSOR) 25 mg tablet 11/12/2017 at Unknown time  Yes Yes   Sig: Take 25 mg by mouth every 12 (twelve) hours     mycophenolate (CELLCEPT) 250 mg capsule 11/12/2017 at Unknown time  Yes Yes   Sig: Take 750 mg by mouth 2 (two) times a day  3 tabs bid    pantoprazole (PROTONIX) 40 mg tablet 11/11/2017 at Unknown time  Yes Yes   Sig: Take 40 mg by mouth daily     potassium chloride (KLOR-CON) 20 mEq packet 11/12/2017 at Unknown time  Yes Yes   Sig: Take 20 mEq by mouth daily  Facility-Administered Medications: None       Past Medical History:   Diagnosis Date    Cardiac disease     Carotid stenosis     [facility])     Compression fracture of lumbar vertebra (HCC)     Coronary artery disease     Disease of thyroid gland     Diverticulitis     Hernia, diaphragmatic, without obstruction     Hyperlipidemia     Hypertension     Inguinal hernia     Right    Ischemic cardiomyopathy     MI (myocardial infarction)     Myasthenia gravis ([facility] [address] )        Past Surgical History:   Procedure Laterality Date    ANGIOPLASTY / STENTING FEMORAL      CATARACT EXTRACTION      COLON SURGERY      colostomy    COLOSTOMY      ESOPHAGOGASTRODUODENOSCOPY N/A 3/29/2016    Procedure: ESOPHAGOGASTRODUODENOSCOPY (EGD); Surgeon: Colleen Orozco MD;  [facility]; Service:     GASTROSTOMY TUBE PLACEMENT N/A 3/29/2016    Procedure: PEG CHANGE-LOW PROFILE TUBE ;  Surgeon: Colleen Orozco MD;  [facility];   Service:     LAPAROTOMY N/A 5/17/2016    Procedure: LAPAROTOMY EXPLORATORY; RESSECTION OF GASTRO-CUTANEOUS FISTULA ;  Surgeon: Soheila Quintanilla DO;  [facility];  Service:     PEG TUBE PLACEMENT      PEG TUBE REMOVAL      [place] ESOPHAGOGASTRODUODENOSCOPY TRANSORAL DIAGNOSTIC N/A 5/12/2016    Procedure: ESOPHAGOGASTRODUODENOSCOPY w 12-6 gc clip,anchor ;  Surgeon: Elio Worthy MD;  [facility]; Service: Gastroenterology       Family History   Problem Relation Age of Onset    Heart disease Mother     Hypertension Mother      I have reviewed and agree with the history as documented  Social History   Substance Use Topics    Smoking status: Former Smoker     Quit date: 2/4/1973    Smokeless tobacco: Never Used    Alcohol use Yes      Comment: 1 per month        Review of Systems   Constitutional: Positive for appetite change  Negative for fever  HENT: Negative for sore throat  Respiratory: Negative for cough and chest tightness  Cardiovascular: Positive for leg swelling  Negative for chest pain and palpitations  Gastrointestinal: Positive for abdominal pain, anorexia, nausea and vomiting  Negative for constipation, diarrhea and melena  Genitourinary: Negative for dysuria and flank pain  Musculoskeletal: Negative for back pain  All other systems reviewed and are negative        Physical Exam  ED Triage Vitals   Temperature Pulse Respirations Blood Pressure SpO2   11/12/17 1253 11/12/17 1252 11/12/17 1252 11/12/17 1252 11/12/17 1252   97 6 °F (36 4 °C) 70 18 (!) 185/83 98 %      Temp Source Heart Rate Source Patient Position - Orthostatic VS BP Location FiO2 (%)   11/12/17 1252 11/12/17 1252 11/12/17 1252 11/12/17 1252 --   Oral Monitor Sitting Left arm       Pain Score       11/12/17 1252       8           Orthostatic Vital Signs  Vitals:    11/13/17 2312 11/14/17 0754 11/14/17 1525 11/14/17 2107   BP: 132/65 168/72 160/75 165/78   Pulse: 65 60 68    Patient Position - Orthostatic VS: Lying Lying Lying Lying       Physical Exam   Constitutional: He is oriented to person, place, and time  Vital signs are normal  He appears well-developed and well-nourished  HENT:   Head: Normocephalic and atraumatic  Eyes: Conjunctivae and EOM are normal  Pupils are equal, round, and reactive to light  Neck: Normal range of motion  Neck supple  Cardiovascular: Normal rate, regular rhythm, normal heart sounds and intact distal pulses  Pulmonary/Chest: Effort normal and breath sounds normal  No accessory muscle usage  No respiratory distress  He exhibits no tenderness  Abdominal: Soft  Normal appearance  He exhibits no distension  Bowel sounds are decreased  There is tenderness in the right upper quadrant, epigastric area and periumbilical area  There is no rebound and no guarding  A hernia is present  Hernia confirmed positive in the right inguinal area  Musculoskeletal: Normal range of motion  He exhibits no edema, tenderness or deformity  Lymphadenopathy:     He has no cervical adenopathy  Neurological: He is alert and oriented to person, place, and time  He has normal strength and normal reflexes  Coordination normal    Skin: Skin is warm, dry and intact  No rash noted  Psychiatric: He has a normal mood and affect  His behavior is normal  Judgment and thought content normal    Nursing note and vitals reviewed        ED Medications  Medications   isosorbide mononitrate (IMDUR) 24 hr tablet 30 mg (30 mg Oral Given 11/14/17 0911)   levothyroxine tablet 50 mcg (50 mcg Oral Given 11/14/17 0623)   metoprolol tartrate (LOPRESSOR) tablet 25 mg (25 mg Oral Given 11/14/17 0910)   mycophenolate (CELLCEPT) capsule 750 mg (750 mg Oral Given 11/14/17 1758)   lisinopril (ZESTRIL) tablet 40 mg (40 mg Oral Given 11/14/17 0910)   pantoprazole (PROTONIX) injection 40 mg (40 mg Intravenous Given 11/14/17 0910)   ondansetron (ZOFRAN) injection 4 mg (4 mg Intravenous Given 11/12/17 8835)   HYDROmorphone (DILAUDID) 1 mg/mL injection 0 5 mg (0 5 mg Intravenous Given 11/14/17 7431)   heparin (porcine) subcutaneous injection 5,000 Units (5,000 Units Subcutaneous Not Given 11/14/17 1430)   prednisoLONE acetate (PRED FORTE) 1 % ophthalmic suspension 1 drop (1 drop Right Eye Given 11/14/17 1757)   ofloxacin (OCUFLOX) 0 3 % ophthalmic solution 1 drop (1 drop Right Eye Given 11/14/17 1758)   lactated ringers infusion (100 mL/hr Intravenous New Bag 11/14/17 1658)   sodium chloride 0 9 % bolus 500 mL (0 mL Intravenous Stopped 11/12/17 1545)   iohexol (OMNIPAQUE) 350 MG/ML injection (MULTI-DOSE) 100 mL (100 mL Intravenous Given 11/12/17 1510)       Diagnostic Studies  Results Reviewed     Procedure Component Value Units Date/Time    Lactic acid, plasma [[id number]]  (Normal) Collected:  11/12/17 1428    Lab Status:  Final result Specimen:  Blood from Arm, Left Updated:  11/12/17 1458     LACTIC ACID 1 1 mmol/L     Narrative:         Result may be elevated if tourniquet was used during collection  Comprehensive metabolic panel [[id number]] Collected:  11/12/17 1428    Lab Status:  Final result Specimen:  Blood from Arm, Left Updated:  11/12/17 1452     Sodium 142 mmol/L      Potassium 3 9 mmol/L      Chloride 103 mmol/L      CO2 28 mmol/L      Anion Gap 11 mmol/L      BUN 18 mg/dL      Creatinine 1 10 mg/dL      Glucose 105 mg/dL      Calcium 9 6 mg/dL      AST 15 U/L      ALT 17 U/L      Alkaline Phosphatase 93 U/L      Total Protein 6 9 g/dL      Albumin 4 0 g/dL      Total Bilirubin 0 70 mg/dL      eGFR 66 ml/min/1 73sq m     Narrative:         National Kidney Disease Education Program recommendations are as follows:  GFR calculation is accurate only with a steady state creatinine  Chronic Kidney disease less than 60 ml/min/1 73 sq  meters  Kidney failure less than 15 ml/min/1 73 sq  meters      Lipase [[id number]]  (Normal) Collected:  11/12/17 1428    Lab Status:  Final result Specimen:  Blood from Arm, Left Updated:  11/12/17 1452     Lipase 74 u/L     Protime-INR [[id number]]  (Normal) Collected:  11/12/17 1428 Lab Status:  Final result Specimen:  Blood from Arm, Left Updated:  11/12/17 1448     Protime 13 2 seconds      INR 0 97    APTT [[id number]]  (Normal) Collected:  11/12/17 1428    Lab Status:  Final result Specimen:  Blood from Arm, Left Updated:  11/12/17 1448     PTT 30 seconds     Narrative: Therapeutic Heparin Range = 60-90 seconds    CBC and differential [[id number]]  (Abnormal) Collected:  11/12/17 1428    Lab Status:  Final result Specimen:  Blood from Arm, Left Updated:  11/12/17 1438     WBC 8 50 Thousand/uL      RBC 4 66 Million/uL      Hemoglobin 12 2 g/dL      Hematocrit 38 8 %      MCV 83 fL      MCH 26 2 (L) pg      MCHC 31 4 g/dL      RDW 13 6 %      MPV 10 0 fL      Platelets 587 Thousands/uL      Neutrophils Relative 81 (H) %      Lymphocytes Relative 12 (L) %      Monocytes Relative 7 %      Eosinophils Relative 0 %      Basophils Relative 0 %      Neutrophils Absolute 6 93 Thousands/µL      Lymphocytes Absolute 0 98 Thousands/µL      Monocytes Absolute 0 55 Thousand/µL      Eosinophils Absolute 0 02 Thousand/µL      Basophils Absolute 0 02 Thousands/µL                  XR abdomen obstruction series   Final Result by Pola Marino MD (11/14 0943)         1  Large right inguinal/scrotal hernia containing numerous segments of air-filled, mildly distended bowel  2   Persistently dilated air-filled loops of bowel in the central upper pelvis, similar to the prior CT, compatible with small bowel obstruction or perhaps partial or intermittent small bowel obstruction     Please refer to prior CT  Workstation performed: [id number]         CT abdomen pelvis with contrast   Final Result by Clemente De Leon MD (11/12 8864)      Small bowel dilatation proximal to the parastomal hernia suggesting some degree of partial obstruction  Large nonobstructing right inguinal hernia           Workstation performed: [id number]                    Procedures  Procedures       Phone Contacts  ED Phone Contact    ED Course  ED Course                                MDM  Number of Diagnoses or Management Options  Small bowel obstruction, partial: new and requires workup     Amount and/or Complexity of Data Reviewed  Clinical lab tests: ordered and reviewed  Tests in the radiology section of CPT®: ordered and reviewed  Decide to obtain previous medical records or to obtain history from someone other than the patient: yes  Obtain history from someone other than the patient: yes  Discuss the patient with other providers: yes    Patient Progress  Patient progress: stable    CritCare Time    Disposition  Final diagnoses:   Small bowel obstruction, partial     Time reflects when diagnosis was documented in both MDM as applicable and the Disposition within this note     Time User Action Codes Description Comment    11/12/2017  3:59 PM Mary Lou Lagunas Add [K56 600] Small bowel obstruction, partial     11/12/2017  5:23 PM Duaine Quill Add [I10] Essential hypertension     11/12/2017  5:23 PM Duaine Quill Modify [I10] Essential hypertension     11/12/2017  5:23 PM Terri Ruiz Add [Z86 69] Hx of myasthenia gravis       ED Disposition     ED Disposition Condition Comment    Admit  Case was discussed with Dr Khari James and the patient's admission status was agreed to be Admission Status: inpatient status to the service of Dr Khari James   Follow-up Information    None       Current Discharge Medication List      CONTINUE these medications which have NOT CHANGED    Details   aspirin 81 MG tablet Take 81 mg by mouth daily  atorvastatin (LIPITOR) 80 mg tablet Take 80 mg by mouth daily  Calcium Citrate-Vitamin D (CALCIUM CITRATE +D PO) Take 1 tablet by mouth  furosemide (LASIX) 20 mg tablet Take 40 mg by mouth daily        isosorbide dinitrate (ISORDIL) 30 mg tablet Take 30 mg by mouth daily        lisinopril (ZESTRIL) 10 mg tablet Take 40 mg by mouth daily        metoprolol tartrate (LOPRESSOR) 25 mg tablet Take 25 mg by mouth every 12 (twelve) hours        mycophenolate (CELLCEPT) 250 mg capsule Take 750 mg by mouth 2 (two) times a day  3 tabs bid       pantoprazole (PROTONIX) 40 mg tablet Take 40 mg by mouth daily        potassium chloride (KLOR-CON) 20 mEq packet Take 20 mEq by mouth daily  levothyroxine 25 mcg tablet Take 50 mcg by mouth daily  No discharge procedures on file      ED Provider  Electronically Signed by           Dionne Monae DO  11/14/17 9986

## 2017-11-12 NOTE — PLAN OF CARE
Problem: Potential for Falls  Goal: Patient will remain free of falls  INTERVENTIONS:  - Assess patient frequently for physical needs  -  Identify cognitive and physical deficits and behaviors that affect risk of falls    -  [place] fall precautions as indicated by assessment   - Educate patient/family on patient safety including physical limitations  - Instruct patient to call for assistance with activity based on assessment  - Modify environment to reduce risk of injury  - Consider OT/PT consult to assist with strengthening/mobility   Outcome: Progressing      Problem: PAIN - ADULT  Goal: Verbalizes/displays adequate comfort level or baseline comfort level  Interventions:  - Encourage patient to monitor pain and request assistance  - Assess pain using appropriate pain scale  - Administer analgesics based on type and severity of pain and evaluate response  - Implement non-pharmacological measures as appropriate and evaluate response  - Consider cultural and social influences on pain and pain management  - Notify physician/advanced practitioner if interventions unsuccessful or patient reports new pain  Outcome: Progressing

## 2017-11-13 PROBLEM — K56.600 PARTIAL SMALL BOWEL OBSTRUCTION (HCC): Status: ACTIVE | Noted: 2017-11-13

## 2017-11-13 LAB
ANION GAP SERPL CALCULATED.3IONS-SCNC: 8 MMOL/L (ref 4–13)
BASOPHILS # BLD AUTO: 0.02 THOUSANDS/ΜL (ref 0–0.1)
BASOPHILS NFR BLD AUTO: 0 % (ref 0–1)
BUN SERPL-MCNC: 15 MG/DL (ref 5–25)
CALCIUM SERPL-MCNC: 8.8 MG/DL (ref 8.3–10.1)
CHLORIDE SERPL-SCNC: 104 MMOL/L (ref 100–108)
CO2 SERPL-SCNC: 25 MMOL/L (ref 21–32)
CREAT SERPL-MCNC: 0.94 MG/DL (ref 0.6–1.3)
EOSINOPHIL # BLD AUTO: 0.04 THOUSAND/ΜL (ref 0–0.61)
EOSINOPHIL NFR BLD AUTO: 1 % (ref 0–6)
ERYTHROCYTE [DISTWIDTH] IN BLOOD BY AUTOMATED COUNT: 13.7 % (ref 11.6–15.1)
GFR SERPL CREATININE-BSD FRML MDRD: 80 ML/MIN/1.73SQ M
GLUCOSE SERPL-MCNC: 121 MG/DL (ref 65–140)
HCT VFR BLD AUTO: 38.3 % (ref 36.5–49.3)
HGB BLD-MCNC: 11.9 G/DL (ref 12–17)
LYMPHOCYTES # BLD AUTO: 1.18 THOUSANDS/ΜL (ref 0.6–4.47)
LYMPHOCYTES NFR BLD AUTO: 16 % (ref 14–44)
MCH RBC QN AUTO: 26 PG (ref 26.8–34.3)
MCHC RBC AUTO-ENTMCNC: 31.1 G/DL (ref 31.4–37.4)
MCV RBC AUTO: 84 FL (ref 82–98)
MONOCYTES # BLD AUTO: 0.6 THOUSAND/ΜL (ref 0.17–1.22)
MONOCYTES NFR BLD AUTO: 8 % (ref 4–12)
NEUTROPHILS # BLD AUTO: 5.58 THOUSANDS/ΜL (ref 1.85–7.62)
NEUTS SEG NFR BLD AUTO: 75 % (ref 43–75)
PLATELET # BLD AUTO: 188 THOUSANDS/UL (ref 149–390)
PMV BLD AUTO: 9.7 FL (ref 8.9–12.7)
POTASSIUM SERPL-SCNC: 4 MMOL/L (ref 3.5–5.3)
RBC # BLD AUTO: 4.57 MILLION/UL (ref 3.88–5.62)
SODIUM SERPL-SCNC: 137 MMOL/L (ref 136–145)
WBC # BLD AUTO: 7.42 THOUSAND/UL (ref 4.31–10.16)

## 2017-11-13 PROCEDURE — 85025 COMPLETE CBC W/AUTO DIFF WBC: CPT | Performed by: STUDENT IN AN ORGANIZED HEALTH CARE EDUCATION/TRAINING PROGRAM

## 2017-11-13 PROCEDURE — C9113 INJ PANTOPRAZOLE SODIUM, VIA: HCPCS | Performed by: SURGERY

## 2017-11-13 PROCEDURE — 80048 BASIC METABOLIC PNL TOTAL CA: CPT | Performed by: STUDENT IN AN ORGANIZED HEALTH CARE EDUCATION/TRAINING PROGRAM

## 2017-11-13 RX ORDER — SODIUM CHLORIDE, SODIUM LACTATE, POTASSIUM CHLORIDE, CALCIUM CHLORIDE 600; 310; 30; 20 MG/100ML; MG/100ML; MG/100ML; MG/100ML
100 INJECTION, SOLUTION INTRAVENOUS CONTINUOUS
Status: DISCONTINUED | OUTPATIENT
Start: 2017-11-13 | End: 2017-11-15

## 2017-11-13 RX ADMIN — PREDNISOLONE ACETATE 1 DROP: 10 SUSPENSION/ DROPS OPHTHALMIC at 21:48

## 2017-11-13 RX ADMIN — OFLOXACIN 1 DROP: 3 SOLUTION/ DROPS OPHTHALMIC at 08:14

## 2017-11-13 RX ADMIN — SODIUM CHLORIDE, POTASSIUM CHLORIDE, SODIUM LACTATE AND CALCIUM CHLORIDE 100 ML/HR: 600; 310; 30; 20 INJECTION, SOLUTION INTRAVENOUS at 08:18

## 2017-11-13 RX ADMIN — METOPROLOL TARTRATE 25 MG: 25 TABLET ORAL at 21:46

## 2017-11-13 RX ADMIN — HEPARIN SODIUM 5000 UNITS: 5000 INJECTION, SOLUTION INTRAVENOUS; SUBCUTANEOUS at 21:46

## 2017-11-13 RX ADMIN — HEPARIN SODIUM 5000 UNITS: 5000 INJECTION, SOLUTION INTRAVENOUS; SUBCUTANEOUS at 13:54

## 2017-11-13 RX ADMIN — OFLOXACIN 1 DROP: 3 SOLUTION/ DROPS OPHTHALMIC at 21:48

## 2017-11-13 RX ADMIN — HYDROMORPHONE HYDROCHLORIDE 0.5 MG: 1 INJECTION, SOLUTION INTRAMUSCULAR; INTRAVENOUS; SUBCUTANEOUS at 21:05

## 2017-11-13 RX ADMIN — OFLOXACIN 1 DROP: 3 SOLUTION/ DROPS OPHTHALMIC at 17:12

## 2017-11-13 RX ADMIN — METOPROLOL TARTRATE 25 MG: 25 TABLET ORAL at 08:12

## 2017-11-13 RX ADMIN — PREDNISOLONE ACETATE 1 DROP: 10 SUSPENSION/ DROPS OPHTHALMIC at 08:14

## 2017-11-13 RX ADMIN — HEPARIN SODIUM 5000 UNITS: 5000 INJECTION, SOLUTION INTRAVENOUS; SUBCUTANEOUS at 06:47

## 2017-11-13 RX ADMIN — MYCOPHENOLATE MOFETIL 750 MG: 250 CAPSULE ORAL at 08:12

## 2017-11-13 RX ADMIN — MYCOPHENOLATE MOFETIL 750 MG: 250 CAPSULE ORAL at 17:11

## 2017-11-13 RX ADMIN — ISOSORBIDE MONONITRATE 30 MG: 30 TABLET, EXTENDED RELEASE ORAL at 08:11

## 2017-11-13 RX ADMIN — DEXTROSE, SODIUM CHLORIDE, AND POTASSIUM CHLORIDE 100 ML/HR: 5; .45; .15 INJECTION INTRAVENOUS at 02:30

## 2017-11-13 RX ADMIN — LISINOPRIL 40 MG: 20 TABLET ORAL at 08:39

## 2017-11-13 RX ADMIN — PANTOPRAZOLE SODIUM 40 MG: 40 INJECTION, POWDER, FOR SOLUTION INTRAVENOUS at 08:13

## 2017-11-13 RX ADMIN — PREDNISOLONE ACETATE 1 DROP: 10 SUSPENSION/ DROPS OPHTHALMIC at 17:12

## 2017-11-13 RX ADMIN — PREDNISOLONE ACETATE 1 DROP: 10 SUSPENSION/ DROPS OPHTHALMIC at 12:48

## 2017-11-13 RX ADMIN — HYDROMORPHONE HYDROCHLORIDE 0.5 MG: 1 INJECTION, SOLUTION INTRAMUSCULAR; INTRAVENOUS; SUBCUTANEOUS at 15:33

## 2017-11-13 RX ADMIN — LEVOTHYROXINE SODIUM 50 MCG: 50 TABLET ORAL at 08:12

## 2017-11-13 RX ADMIN — OFLOXACIN 1 DROP: 3 SOLUTION/ DROPS OPHTHALMIC at 12:48

## 2017-11-13 RX ADMIN — SODIUM CHLORIDE, POTASSIUM CHLORIDE, SODIUM LACTATE AND CALCIUM CHLORIDE 100 ML/HR: 600; 310; 30; 20 INJECTION, SOLUTION INTRAVENOUS at 18:28

## 2017-11-13 NOTE — H&P
H&P Exam - General Surgery   Kamila Stuart 67 y o  male MRN: [id number]  Unit/Bed#: -01 Encounter: [id number]    Assessment/Plan     Assessment:  72M w/significant PMH including 3 vessel CAD, myasthenia gravis (on cellcept), perforated diverticulitis s/p Villeda's procedure 2014, now presents w/abd pain, nausea/vomiting, and decrease in colostomy output  Seen to have pSBO on CTAP  Plan:  - NPO, NGT if vomiting  - LR @ 100  - home meds, including cellcept  - prn pain control/anti emetic  - PT/OT  - SQH/SCDs      History of Present Illness     HPI:  Kamila Stuart is a 67 y o  male who presents with, abdominal pain, nausea/vomiting, and decreased colostomy output  Patient states that symptoms started 2d ago, on 11/11  He describes the abdominal pain as a crampy periumbilical pain that worsens with movement  His last stool from his ostomy was on 11/11  He denies any other symptoms at home including fevers/chills, lightheadedness, dizziness, chest pain/palpitations, shortness of breath, blood in stool  Past medical history is significant for perforated diverticulitis status post Padmini's procedure in 2014  In 2016 the patient did have a gastric perforation which was repaired  He also has a history of myasthenia gravis for which he is maintained on CellCept and prednisone; he follows up regularly with his neurologist   CT scan 11/12 showed small bowel dilatation proximal to parastomal hernia suggesting partial small bowel obstruction  Last episode of nausea/vomiting was yesterday afternoon; this morning the patient denies nausea and says overall pain is improved  He still is not having any ostomy function  Review of Systems   Constitutional: Negative for chills and fever  Eyes: Negative for visual disturbance  Respiratory: Negative for cough and shortness of breath  Cardiovascular: Negative for chest pain and palpitations  Gastrointestinal: Positive for abdominal pain, nausea and vomiting  Negative for abdominal distention and blood in stool  Genitourinary: Negative for difficulty urinating and dysuria  Skin: Negative  Neurological: Negative for dizziness, light-headedness and headaches  Historical Information   Past Medical History:   Diagnosis Date    Cardiac disease     Carotid stenosis     CHF (congestive heart failure) (HCC)     Compression fracture of lumbar vertebra (HCC)     Coronary artery disease     Disease of thyroid gland     Diverticulitis     Hernia, diaphragmatic, without obstruction     Hyperlipidemia     Hypertension     Inguinal hernia     Right    Ischemic cardiomyopathy     MI (myocardial infarction)     [facility]asthenia gravis ([facility] [address] )      Past Surgical History:   Procedure Laterality Date    ANGIOPLASTY / STENTING FEMORAL      CATARACT EXTRACTION      COLON SURGERY      colostomy    COLOSTOMY      ESOPHAGOGASTRODUODENOSCOPY N/A 3/29/2016    Procedure: ESOPHAGOGASTRODUODENOSCOPY (EGD); Surgeon: Melvin Duque MD;  [facility]; Service:     GASTROSTOMY TUBE PLACEMENT N/A 3/29/2016    Procedure: PEG CHANGE-LOW PROFILE TUBE ;  Surgeon: Melvin Duque MD;  [facility]; Service:     LAPAROTOMY N/A 5/17/2016    Procedure: LAPAROTOMY EXPLORATORY; RESSECTION OF GASTRO-CUTANEOUS FISTULA ;  Surgeon: Jaycee Whitley DO;  [facility];  Service:     PEG TUBE PLACEMENT      PEG TUBE REMOVAL      [place] ESOPHAGOGASTRODUODENOSCOPY TRANSORAL DIAGNOSTIC N/A 5/12/2016    Procedure: ESOPHAGOGASTRODUODENOSCOPY w 12-6 gc clip,anchor ;  Surgeon: Melvin Duque MD;  [facility];   Service: Gastroenterology     Social History   History   Alcohol Use    Yes     Comment: 1 per month     History   Drug Use No     History   Smoking Status    Former Smoker    Quit date: 2/4/1973   Smokeless Tobacco    Never Used     Family History: non-contributory    Meds/Allergies   all medications and allergies reviewed  No Known Allergies    Objective   First Vitals:   Blood Pressure: (!) 185/83 (11/12/17 1252)  Pulse: 70 (11/12/17 1252)  Temperature: 97 6 °F (36 4 °C) (11/12/17 1253)  Temp Source: Oral (11/12/17 1252)  Respirations: 18 (11/12/17 1252)  Height: 5' 9" (175 3 cm) (11/12/17 1633)  Weight - Scale: 68 kg (150 lb) (11/12/17 1252)  SpO2: 98 % (11/12/17 1252)    Current Vitals:   Blood Pressure: 160/81 (11/12/17 2143)  Pulse: 73 (11/12/17 2143)  Temperature: 98 4 °F (36 9 °C) (11/12/17 2143)  Temp Source: Oral (11/12/17 1633)  Respirations: 18 (11/12/17 1633)  Height: 5' 9" (175 3 cm) (11/12/17 1633)  Weight - Scale: 67 8 kg (149 lb 7 6 oz) (11/12/17 1633)  SpO2: 95 % (11/12/17 1633)      Intake/Output Summary (Last 24 hours) at 11/13/17 0714  Last data filed at 11/13/17 0230   Gross per 24 hour   Intake             1335 ml   Output              400 ml   Net              935 ml       Invasive Devices     Peripheral Intravenous Line            Peripheral IV 11/12/17 Left Antecubital less than 1 day          Drain            Colostomy  -- days    Colostomy  days                Physical Exam   Constitutional: He is oriented to person, place, and time  He appears well-developed and well-nourished  No distress  HENT:   Head: Normocephalic and atraumatic  Eyes: EOM are normal  Pupils are equal, round, and reactive to light  Neck: No JVD present  Cardiovascular: Normal rate, regular rhythm, normal heart sounds and intact distal pulses  Exam reveals no gallop and no friction rub  No murmur heard  Pulmonary/Chest: Effort normal and breath sounds normal  No respiratory distress  Abdominal: Soft  He exhibits no distension  There is no tenderness  There is no rebound and no guarding  Ostomy with no output in bag, stoma pink   Neurological: He is alert and oriented to person, place, and time  No cranial nerve deficit  Skin: Skin is warm and dry  He is not diaphoretic         Lab Results:   CBC:   Lab Results   Component Value Date    WBC 8 50 11/12/2017    HGB 12 2 11/12/2017    HCT 38 8 11/12/2017    MCV 83 11/12/2017     11/12/2017    MCH 26 2 (L) 11/12/2017    MCHC 31 4 11/12/2017    RDW 13 6 11/12/2017    MPV 10 0 11/12/2017   , CMP:   Lab Results   Component Value Date     11/12/2017    K 3 9 11/12/2017     11/12/2017    CO2 28 11/12/2017    ANIONGAP 11 11/12/2017    BUN 18 11/12/2017    CREATININE 1 10 11/12/2017    GLUCOSE 105 11/12/2017    CALCIUM 9 6 11/12/2017    AST 15 11/12/2017    ALT 17 11/12/2017    ALKPHOS 93 11/12/2017    PROT 6 9 11/12/2017    ALBUMIN 4 0 11/12/2017    BILITOT 0 70 11/12/2017    EGFR 66 11/12/2017     Imaging: I have personally reviewed pertinent reports  11/12 CTAP:  EKG, Pathology, and Other Studies: I have personally reviewed pertinent reports  Code Status: Level 1 - Full Code  Advance Directive and Living Will:      Power of :    POLST:      Counseling / Coordination of Care  Total floor / unit time spent today 30 minutes  Greater than 50% of total time was spent with the patient and / or family counseling and / or coordination of care  Lavell Rasmussen

## 2017-11-13 NOTE — CASE MANAGEMENT
Initial Clinical Review    Admission: Date/Time/Statement: 11/12/17 @ 1600     Orders Placed This Encounter   Procedures    Inpatient Admission (expected length of stay for this patient is greater than two midnights)     Standing Status:   Standing     Number of Occurrences:   1     Order Specific Question:   Admitting Physician     Answer:   RADHA Zavala [[id number]]     Order Specific Question:   Level of Care     Answer:   Med Surg [[id number]]     Order Specific Question:   Estimated length of stay     Answer:   More than 2 Midnights     Order Specific Question:   Certification     Answer:   I certify that inpatient services are medically necessary for this patient for a duration of greater than two midnights  See H&P and MD Progress Notes for additional information about the patient's course of treatment  ED: Date/Time/Mode of Arrival:   ED Arrival Information     Expected Arrival Acuity Means of Arrival Escorted By Service Admission Type    - 11/12/2017 12:23 Urgent Walk-In Family [facility] Surgery-General Urgent    Arrival Complaint    abdominal pain          Chief Complaint:   Chief Complaint   Patient presents with    Abdominal Pain     "bowel problems"  pt has colostomy  pt has pain and nausea with vomiting  pt unable to eat  History of Illness:  67 y o  male who presents with abdominal pain, nausea/vomiting, and decreased colostomy output  Patient states that symptoms started 2d ago, on 11/11  He describes the abdominal pain as a crampy periumbilical pain that worsens with movement  His last stool from his ostomy was on 11/11  He denies any other symptoms at home including fevers/chills, lightheadedness, dizziness, chest pain/palpitations, shortness of breath, blood in stool  Past medical history is significant for perforated diverticulitis status post Padmini's procedure in 2014  In 2016 the patient did have a gastric perforation which was repaired    He also has a history of myasthenia gravis for which he is maintained on CellCept and prednisone; he follows up regularly with his neurologist   CT scan 11/12 showed small bowel dilatation proximal to parastomal hernia suggesting partial small bowel obstruction  Last episode of nausea/vomiting was yesterday afternoon; this morning the patient denies nausea and says overall pain is improved  He still is not having any ostomy function  ED Vital Signs:   ED Triage Vitals   Temperature Pulse Respirations Blood Pressure SpO2   11/12/17 1253 11/12/17 1252 11/12/17 1252 11/12/17 1252 11/12/17 1252   97 6 °F (36 4 °C) 70 18 (!) 185/83 98 %      Temp Source Heart Rate Source Patient Position - Orthostatic VS BP Location FiO2 (%)   11/12/17 1252 11/12/17 1252 11/12/17 1252 11/12/17 1252 --   Oral Monitor Sitting Left arm       Pain Score       11/12/17 1252       8        Wt Readings from Last 1 Encounters:   11/12/17 67 8 kg (149 lb 7 6 oz)       Vital Signs:  11/12 0701  11/13 0700 11/13 0701  11/13 1551  Most Recent     Temperature (°F) 97 698 5 98 4  98 4 (36 9)    Pulse 6277 58  58    Respirations 1618 18  18    Blood Pressure 140/75186/82 173/77176/82  176/82    SpO2 (%) 95100 98  98        Abnormal Labs/Diagnostic Test Results:   CT a/p -- Small bowel dilatation proximal to the parastomal hernia suggesting some degree of partial obstruction  Large nonobstructing right inguinal hernia      ED Treatment:   Medication Administration from 11/12/2017 1223 to 11/12/2017 1622       Date/Time Order Dose Route Action Action by Comments                11/12/2017 1432 sodium chloride 0 9 % bolus 500 mL 500 mL Intravenous [address] [facility], [address] [place] [address]      11/12/2017 1510 iohexol (OMNIPAQUE) 350 MG/ML injection (MULTI-DOSE) 100 mL 100 mL Intravenous Given Kev Ponce Jr       11/12/2017 1617 lactated ringers infusion 100 mL/hr Intravenous New Bag Stephen Calderon RN           Past Medical/Surgical History:   Past Medical History:   Diagnosis Date    Cardiac disease     Carotid stenosis     CHF (congestive heart failure) (HCC)     Compression fracture of lumbar vertebra (HCC)     Coronary artery disease     Disease of thyroid gland     Diverticulitis     Hernia, diaphragmatic, without obstruction     Hyperlipidemia     Hypertension     Inguinal hernia     Ischemic cardiomyopathy     MI (myocardial infarction)     Myasthenia gravis ([facility] [address] )        Admitting Diagnosis: Abdominal pain [R10 9]  Small bowel obstruction, partial [K56 600]    Age/Sex: 67 y o  male    Assessment/Plan:   Assessment:  72M w/significant PMH including 3 vessel CAD, myasthenia gravis (on cellcept), perforated diverticulitis s/p Villeda's procedure 2014, now presents w/abd pain, nausea/vomiting, and decrease in colostomy output   Seen to have pSBO on CTAP      Plan:  - NPO, NGT if vomiting  - LR @ 100  - home meds, including cellcept  - prn pain control/anti emetic  - PT/OT  - SQH/SCDs      Admission Orders:  M/S unit  Npo  NGT if pt vomits  Ostomy care  Monitor I&O's  Up as tolerated  venodynes b/l le    Scheduled Meds:   heparin (porcine) 5,000 Units Subcutaneous Q8H [address]   isosorbide mononitrate 30 mg Oral Daily   levothyroxine 50 mcg Oral Daily   lisinopril 40 mg Oral Daily   metoprolol tartrate 25 mg Oral Q12H [address]   mycophenolate 750 mg Oral BID   ofloxacin 1 drop Right Eye 4x Daily   pantoprazole 40 mg Intravenous Q24H [address]   prednisoLONE acetate 1 drop Right Eye 4x Daily     Continuous Infusions:   lactated ringers 100 mL/hr Last Rate: 100 mL/hr (11/13/17 0818)     PRN Meds: HYDROmorphone    ondansetron

## 2017-11-13 NOTE — PROGRESS NOTES
Patient c/o nausea, vomitted 100 cc green bile  Zofran given as ordered  Dr Khari James notified  Order obtained to place NG tube if patient continues to vomit  Will continue to monitor

## 2017-11-14 ENCOUNTER — APPOINTMENT (INPATIENT)
Dept: RADIOLOGY | Facility: HOSPITAL | Age: 72
DRG: 394 | End: 2017-11-14
Payer: MEDICARE

## 2017-11-14 PROCEDURE — 74022 RADEX COMPL AQT ABD SERIES: CPT

## 2017-11-14 PROCEDURE — C9113 INJ PANTOPRAZOLE SODIUM, VIA: HCPCS | Performed by: SURGERY

## 2017-11-14 RX ADMIN — OFLOXACIN 1 DROP: 3 SOLUTION/ DROPS OPHTHALMIC at 13:30

## 2017-11-14 RX ADMIN — PREDNISOLONE ACETATE 1 DROP: 10 SUSPENSION/ DROPS OPHTHALMIC at 09:11

## 2017-11-14 RX ADMIN — HYDROMORPHONE HYDROCHLORIDE 0.5 MG: 1 INJECTION, SOLUTION INTRAMUSCULAR; INTRAVENOUS; SUBCUTANEOUS at 04:19

## 2017-11-14 RX ADMIN — HEPARIN SODIUM 5000 UNITS: 5000 INJECTION, SOLUTION INTRAVENOUS; SUBCUTANEOUS at 06:23

## 2017-11-14 RX ADMIN — PREDNISOLONE ACETATE 1 DROP: 10 SUSPENSION/ DROPS OPHTHALMIC at 13:30

## 2017-11-14 RX ADMIN — MYCOPHENOLATE MOFETIL 750 MG: 250 CAPSULE ORAL at 09:11

## 2017-11-14 RX ADMIN — LEVOTHYROXINE SODIUM 50 MCG: 50 TABLET ORAL at 06:23

## 2017-11-14 RX ADMIN — OFLOXACIN 1 DROP: 3 SOLUTION/ DROPS OPHTHALMIC at 17:58

## 2017-11-14 RX ADMIN — HEPARIN SODIUM 5000 UNITS: 5000 INJECTION, SOLUTION INTRAVENOUS; SUBCUTANEOUS at 21:42

## 2017-11-14 RX ADMIN — PANTOPRAZOLE SODIUM 40 MG: 40 INJECTION, POWDER, FOR SOLUTION INTRAVENOUS at 09:10

## 2017-11-14 RX ADMIN — METOPROLOL TARTRATE 25 MG: 25 TABLET ORAL at 09:10

## 2017-11-14 RX ADMIN — PREDNISOLONE ACETATE 1 DROP: 10 SUSPENSION/ DROPS OPHTHALMIC at 21:42

## 2017-11-14 RX ADMIN — MYCOPHENOLATE MOFETIL 750 MG: 250 CAPSULE ORAL at 17:58

## 2017-11-14 RX ADMIN — PREDNISOLONE ACETATE 1 DROP: 10 SUSPENSION/ DROPS OPHTHALMIC at 17:57

## 2017-11-14 RX ADMIN — LISINOPRIL 40 MG: 20 TABLET ORAL at 09:10

## 2017-11-14 RX ADMIN — SODIUM CHLORIDE, POTASSIUM CHLORIDE, SODIUM LACTATE AND CALCIUM CHLORIDE 100 ML/HR: 600; 310; 30; 20 INJECTION, SOLUTION INTRAVENOUS at 04:15

## 2017-11-14 RX ADMIN — ISOSORBIDE MONONITRATE 30 MG: 30 TABLET, EXTENDED RELEASE ORAL at 09:11

## 2017-11-14 RX ADMIN — OFLOXACIN 1 DROP: 3 SOLUTION/ DROPS OPHTHALMIC at 09:11

## 2017-11-14 RX ADMIN — OFLOXACIN 1 DROP: 3 SOLUTION/ DROPS OPHTHALMIC at 21:42

## 2017-11-14 RX ADMIN — SODIUM CHLORIDE, POTASSIUM CHLORIDE, SODIUM LACTATE AND CALCIUM CHLORIDE 100 ML/HR: 600; 310; 30; 20 INJECTION, SOLUTION INTRAVENOUS at 16:58

## 2017-11-14 RX ADMIN — METOPROLOL TARTRATE 25 MG: 25 TABLET ORAL at 21:42

## 2017-11-14 NOTE — CONSULTS
Inpatient Medical Consultation - [facility] Internal Medicine    Patient Information: Lilian Castillo 67 y o  male MRN: [id number]  Unit/Bed#: -01 Encounter: [id number]  PCP: Yuliana Cuellar MD  Date of Admission:  11/12/2017  Date of Consultation: 11/14/17    Inpatient consult to Internal Medicine  Consult performed by: Charley Donaldson ordered by: Evangelist Sánchez        Reason For Consultation:     Hypertension, history of myasthenia gravis, medical management    of Present Illness:    Lilian Castillo is a 67 y o  male who is originally admitted to the general surgery service on 11/12/2017 due to partial small bowel obstruction  He presented to the ED on 11/13/17 with nausea vomiting, abdominal pain and no output from colostomy since 11/11/17  He has a history of perforated diverticulitis status post Padmini's procedure in 2014  CT abdomen/pelvis showed partial small bowel obstruction  He was admitted to the surgical service and is being treated conservatively  His nausea, vomiting and abdominal pain have resolved and his colostomy showed output this afternoon  We are consulted for medical management  No fever or chills  No chest pain or shortness of breath  Review of Systems:    Review of Systems   Gastrointestinal: Positive for abdominal pain, nausea and vomiting  No colostomy output   All other systems reviewed and are negative        Past Medical and Surgical History:     Past Medical History:   Diagnosis Date    Cardiac disease     Carotid stenosis     CHF (congestive heart failure) (HCC)     Compression fracture of lumbar vertebra (HCC)     Coronary artery disease     Disease of thyroid gland     Diverticulitis     Hernia, diaphragmatic, without obstruction     Hyperlipidemia     Hypertension     Inguinal hernia     Right    Ischemic cardiomyopathy     MI (myocardial infarction)     Myasthenia gravis ([facility] [address] )        Past Surgical History:   Procedure Laterality Date    ANGIOPLASTY / STENTING FEMORAL      CATARACT EXTRACTION      COLON SURGERY      colostomy    COLOSTOMY      ESOPHAGOGASTRODUODENOSCOPY N/A 3/29/2016    Procedure: ESOPHAGOGASTRODUODENOSCOPY (EGD); Surgeon: Melvin Duque MD;  [facility]; Service:     GASTROSTOMY TUBE PLACEMENT N/A 3/29/2016    Procedure: PEG CHANGE-LOW PROFILE TUBE ;  Surgeon: Melvin Duque MD;  [facility]; Service:     LAPAROTOMY N/A 5/17/2016    Procedure: LAPAROTOMY EXPLORATORY; RESSECTION OF GASTRO-CUTANEOUS FISTULA ;  Surgeon: Jaycee Whitley DO;  [facility];  Service:     PEG TUBE PLACEMENT      PEG TUBE REMOVAL      NM ESOPHAGOGASTRODUODENOSCOPY TRANSORAL DIAGNOSTIC N/A 5/12/2016    Procedure: ESOPHAGOGASTRODUODENOSCOPY w 12-6 gc clip,anchor ;  Surgeon: Melvin Duque MD;  [facility]; Service: Gastroenterology       Meds/Allergies:    all medications and allergies reviewed    Allergies: No Known Allergies  History:     Marital Status: /Civil Union    Substance Use History:   History   Alcohol Use    Yes     Comment: 1 per month     History   Smoking Status    Former Smoker    Quit date: 2/4/1973   Smokeless Tobacco    Never Used     History   Drug Use No       Family History:    Family History   Problem Relation Age of Onset    Heart disease Mother     Hypertension Mother        Physical Exam:     Vitals:   Blood Pressure: 160/75 (11/14/17 1525)  Pulse: 68 (11/14/17 1525)  Temperature: 98 3 °F (36 8 °C) (11/14/17 1525)  Temp Source: Oral (11/14/17 1525)  Respirations: 18 (11/14/17 1525)  Height: 5' 9" (175 3 cm) (11/12/17 1633)  Weight - Scale: 67 8 kg (149 lb 7 6 oz) (11/12/17 1633)  SpO2: 98 % (11/14/17 1525)    Physical Exam   Constitutional: He is oriented to person, place, and time  HENT:   Head: Atraumatic  Eyes: EOM are normal    Neck: Neck supple  No JVD present  No tracheal deviation present  No thyromegaly present     Cardiovascular: Normal rate, regular rhythm and normal heart sounds  Pulmonary/Chest: Effort normal and breath sounds normal  No respiratory distress  He has no wheezes  He has no rales  Abdominal: Soft  He exhibits no distension  There is no tenderness  There is no rebound  Brown stool in the colostomy bag   Musculoskeletal: He exhibits no edema  Neurological: He is alert and oriented to person, place, and time  Skin: Skin is warm and dry  Psychiatric: He has a normal mood and affect  Lab Results: I Have Reviewed All Lab Data Below:      Results from last 7 days  Lab Units 11/13/17  0836   WBC Thousand/uL 7 42   HEMOGLOBIN g/dL 11 9*   HEMATOCRIT % 38 3   PLATELETS Thousands/uL 188   NEUTROS PCT % 75   LYMPHS PCT % 16   MONOS PCT % 8   EOS PCT % 1       Results from last 7 days  Lab Units 11/13/17  0836 11/12/17  1428   SODIUM mmol/L 137 142   POTASSIUM mmol/L 4 0 3 9   CHLORIDE mmol/L 104 103   CO2 mmol/L 25 28   BUN mg/dL 15 18   CREATININE mg/dL 0 94 1 10   CALCIUM mg/dL 8 8 9 6   TOTAL PROTEIN g/dL  --  6 9   BILIRUBIN TOTAL mg/dL  --  0 70   ALK PHOS U/L  --  93   ALT U/L  --  17   AST U/L  --  15   GLUCOSE RANDOM mg/dL 121 105       Results from last 7 days  Lab Units 11/12/17  1428   INR  0 97       * Additional Pertinent Lab Tests Reviewed: Geetha Purdy Admission Reviewed    Imaging: I have personally reviewed pertinent reports  Xr Abdomen Obstruction Series    Result Date: 11/14/2017  Narrative: OBSTRUCTION SERIES INDICATION:  Abdominal pain  Small bowel obstruction  Abdominal pain for last few days  History of colostomy  COMPARISON: 11/17/2015; 11/12/2017 VIEWS:  (Supine, erect abdomen and upright chest) IMAGES:  4 FINDINGS: There are a few top normal to mildly dilated air-filled loops of small bowel in the left lower quadrant    Additionally there are abnormally dilated loops of air-filled small bowel projecting below the pubic symphysis into the scrotum compatible with a large hernia sac containing segments of bowel   Colostomy noted in the left lower quadrant  A paucity of colonic gas noted  No free air beneath the hemidiaphragms  Atherosclerotic calcifications noted  Moderate to advanced spondylotic changes noted  Examination of the chest reveals a enlarged cardiomediastinal silhouette  Aorta atherosclerotic  Costophrenic sulcus blunting of the left compatible with a small effusion  Impression: 1  Large right inguinal/scrotal hernia containing numerous segments of air-filled, mildly distended bowel  2   Persistently dilated air-filled loops of bowel in the central upper pelvis, similar to the prior CT, compatible with small bowel obstruction or perhaps partial or intermittent small bowel obstruction     Please refer to prior CT  Workstation performed: [id number]     Ct Abdomen Pelvis With Contrast    Result Date: 11/12/2017  Narrative: CT ABDOMEN AND PELVIS WITH IV CONTRAST INDICATION:  Abdominal pain  COMPARISON: 7/7/2016  TECHNIQUE:  CT examination of the abdomen and pelvis was performed  Reformatted images were created in axial, sagittal, and coronal planes  Radiation dose length product (DLP) for this visit:  440 mGy-cm   This examination, like all CT scans performed in the [facility], was performed utilizing techniques to minimize radiation dose exposure, including the use of iterative reconstruction and automated exposure control  IV Contrast:  100 mL of iohexol (OMNIPAQUE)     Enteric Contrast:  Enteric contrast was not administered  FINDINGS: ABDOMEN LOWER CHEST:  No significant abnormalities identified in the lower chest  LIVER/BILIARY TREE:  Unremarkable  GALLBLADDER:  No calcified gallstones  No pericholecystic inflammatory change  SPLEEN:  Unremarkable  PANCREAS:  Unremarkable  ADRENAL GLANDS:  Unremarkable  KIDNEYS/URETERS:  Unremarkable  No hydronephrosis  STOMACH AND BOWEL:  Patient is status post partial colectomy and left lower quadrant colostomy    Small bowel is dilated proximal to the parastomal hernia suggesting some degree of partial small bowel obstruction  APPENDIX:  No findings to suggest appendicitis  ABDOMINOPELVIC CAVITY:  No ascites or free intraperitoneal air  No lymphadenopathy  VESSELS:  Unremarkable for patient's age  PELVIS REPRODUCTIVE ORGANS:  Unremarkable for patient's age  URINARY BLADDER:  Unremarkable  ABDOMINAL WALL/INGUINAL REGIONS:  Large nonobstructing right inguinal hernia is again identified  OSSEOUS STRUCTURES:  Unchanged multilevel thoracolumbar compression fractures are noted  Impression: Small bowel dilatation proximal to the parastomal hernia suggesting some degree of partial obstruction  Large nonobstructing right inguinal hernia  Workstation performed: [id number]       Assessment/Plan:  1  Partial small bowel obstruction - improved  Treatment per surgery  NPO with sips with meds  2  CAD - ct BB, Imdur  3  Chronic combined systolic/diastolic CHF - EF 99%  Was on lasix 40 mg daily - held due to hypovolemia  Ct BB, lisinopril  Possibly restart Lasix on 11/15  4  Myasthenia gravis - ct Mycophenolate 750 mg BID  5  Recent right eye cataract surgery - ct home eye drops  6  Hypothyroidism - ct levothyroxine 50 mcg  7  H/o gastric ulcer - PPI      VTE Prophylaxis: Heparin  / sequential compression device     Counseling / Coordination of Care Time: 45 minutes  Greater than 50% of total time spent on patient counseling and coordination of care      ** Please Note: Dragon 360 Dictation speech to text software may have been used in the creation of this document **

## 2017-11-14 NOTE — PROGRESS NOTES
Progress Note -Surgery PA  Michael Estrada 67 y o  male MRN: [id number]  Unit/Bed#: -01 Encounter: [id number]    ASSESSMENT/PLAN:  Problem List     * (Principal)Partial small bowel obstruction       Assessment: N/V improved and does not have flatus or stool in ostomy bag     Plan:   Continue NPO and sips with meds  OOB and ambulate  IS   Pain meds PRN  IVF   SLIM consult pending     Chronic combined systolic and diastolic congestive heart failure in the setting of ischemic cardiomyopathy:                    Assessment: Evidenced by 6/1/2017 echo result of EF of 50% and grade 1 diastolic dysfunction  Plan:   Continuation of home meds lisinopril and Lopressor  VTE Pharmacologic Prophylaxis: Sequential compression device (Venodyne)  and Heparin    Subjective/Objective     Subjective:   No N/V  No abdominal pain  Denies flatus or stool in ostomy bag    Objective/Physical Exam: Blood pressure 132/65, pulse 65, temperature 98 4 °F (36 9 °C), temperature source Oral, resp  rate 18, height 5' 9" (1 753 m), weight 67 8 kg (149 lb 7 6 oz), SpO2 97 %  ,Body mass index is 22 07 kg/m²      General appearance: alert, appears stated age and cooperative  Heart: regular rate and rhythm, S1, S2 normal, no murmur, click, rub or gallop  Lungs: clear to auscultation bilaterally  Abdomen: soft and nontender, no stool or gas in ostomy bag  no BS  Neurological: normal without focal findings      Current Facility-Administered Medications:     heparin (porcine) subcutaneous injection 5,000 Units, 5,000 Units, Subcutaneous, Q8H [address], 5,000 Units at 11/14/17 0623 **AND** Platelet count, , , Once, Melissa Gallegos MD    HYDROmorphone (DILAUDID) 1 mg/mL injection 0 5 mg, 0 5 mg, Intravenous, Q1H PRN, Melissa Gallegos MD, 0 5 mg at 11/14/17 0784    isosorbide mononitrate (IMDUR) 24 hr tablet 30 mg, 30 mg, Oral, Daily, Melissa Gallegos MD, 30 mg at 11/13/17 6952    lactated ringers infusion, 100 mL/hr, Intravenous, Continuous, Shalini Glez MD, Last Rate: 100 mL/hr at 11/14/17 0415, 100 mL/hr at 11/14/17 0415    levothyroxine tablet 50 mcg, 50 mcg, Oral, Daily, Daniel Celestin MD, 50 mcg at 11/14/17 2361    lisinopril (ZESTRIL) tablet 40 mg, 40 mg, Oral, Daily, Daniel Celestin MD, 40 mg at 11/13/17 0839    metoprolol tartrate (LOPRESSOR) tablet 25 mg, 25 mg, Oral, Q12H [address], Daniel Celestin MD, 25 mg at 11/13/17 2146    mycophenolate (CELLCEPT) capsule 750 mg, 750 mg, Oral, BID, Daniel Celestin MD, 750 mg at 11/13/17 1711    ofloxacin (OCUFLOX) 0 3 % ophthalmic solution 1 drop, 1 drop, Right Eye, 4x Daily, Diaz Apple PA-C, 1 drop at 11/13/17 2148    ondansetron (ZOFRAN) injection 4 mg, 4 mg, Intravenous, Q4H PRN, Daniel Celestin MD, 4 mg at 11/12/17 2205    pantoprazole (PROTONIX) injection 40 mg, 40 mg, Intravenous, Q24H [address], Daniel Celestin MD, 40 mg at 11/13/17 0813    prednisoLONE acetate (PRED FORTE) 1 % ophthalmic suspension 1 drop, 1 drop, Right Eye, 4x Daily, Diaz Apple PA-C, 1 drop at 11/13/17 2148      Intake/Output Summary (Last 24 hours) at 11/14/17 0714  Last data filed at 11/14/17 0422   Gross per 24 hour   Intake                0 ml   Output              800 ml   Net             -800 ml

## 2017-11-15 PROCEDURE — C9113 INJ PANTOPRAZOLE SODIUM, VIA: HCPCS | Performed by: SURGERY

## 2017-11-15 RX ORDER — SODIUM CHLORIDE, SODIUM LACTATE, POTASSIUM CHLORIDE, CALCIUM CHLORIDE 600; 310; 30; 20 MG/100ML; MG/100ML; MG/100ML; MG/100ML
100 INJECTION, SOLUTION INTRAVENOUS CONTINUOUS
Status: DISCONTINUED | OUTPATIENT
Start: 2017-11-15 | End: 2017-11-16

## 2017-11-15 RX ORDER — FUROSEMIDE 40 MG/1
40 TABLET ORAL DAILY
Status: DISCONTINUED | OUTPATIENT
Start: 2017-11-15 | End: 2017-11-16 | Stop reason: HOSPADM

## 2017-11-15 RX ORDER — DEXTROSE, SODIUM CHLORIDE, AND POTASSIUM CHLORIDE 5; .45; .15 G/100ML; G/100ML; G/100ML
75 INJECTION INTRAVENOUS CONTINUOUS
Status: DISCONTINUED | OUTPATIENT
Start: 2017-11-15 | End: 2017-11-15

## 2017-11-15 RX ORDER — POTASSIUM CHLORIDE 750 MG/1
20 TABLET, EXTENDED RELEASE ORAL DAILY
Status: DISCONTINUED | OUTPATIENT
Start: 2017-11-15 | End: 2017-11-16 | Stop reason: HOSPADM

## 2017-11-15 RX ORDER — B-COMPLEX WITH VITAMIN C
1 TABLET ORAL
Status: DISCONTINUED | OUTPATIENT
Start: 2017-11-15 | End: 2017-11-16 | Stop reason: HOSPADM

## 2017-11-15 RX ORDER — ASPIRIN 81 MG/1
81 TABLET, CHEWABLE ORAL DAILY
Status: DISCONTINUED | OUTPATIENT
Start: 2017-11-15 | End: 2017-11-16 | Stop reason: HOSPADM

## 2017-11-15 RX ORDER — ATORVASTATIN CALCIUM 40 MG/1
80 TABLET, FILM COATED ORAL DAILY
Status: DISCONTINUED | OUTPATIENT
Start: 2017-11-15 | End: 2017-11-16 | Stop reason: HOSPADM

## 2017-11-15 RX ADMIN — SODIUM CHLORIDE, SODIUM LACTATE, POTASSIUM CHLORIDE, AND CALCIUM CHLORIDE 100 ML/HR: .6; .31; .03; .02 INJECTION, SOLUTION INTRAVENOUS at 21:42

## 2017-11-15 RX ADMIN — PREDNISOLONE ACETATE 1 DROP: 10 SUSPENSION/ DROPS OPHTHALMIC at 17:07

## 2017-11-15 RX ADMIN — OFLOXACIN 1 DROP: 3 SOLUTION/ DROPS OPHTHALMIC at 17:07

## 2017-11-15 RX ADMIN — OFLOXACIN 1 DROP: 3 SOLUTION/ DROPS OPHTHALMIC at 13:17

## 2017-11-15 RX ADMIN — SODIUM CHLORIDE, SODIUM LACTATE, POTASSIUM CHLORIDE, AND CALCIUM CHLORIDE 100 ML/HR: .6; .31; .03; .02 INJECTION, SOLUTION INTRAVENOUS at 10:29

## 2017-11-15 RX ADMIN — ISOSORBIDE MONONITRATE 30 MG: 30 TABLET, EXTENDED RELEASE ORAL at 08:55

## 2017-11-15 RX ADMIN — METOPROLOL TARTRATE 25 MG: 25 TABLET ORAL at 08:57

## 2017-11-15 RX ADMIN — MYCOPHENOLATE MOFETIL 750 MG: 250 CAPSULE ORAL at 17:06

## 2017-11-15 RX ADMIN — OFLOXACIN 1 DROP: 3 SOLUTION/ DROPS OPHTHALMIC at 21:41

## 2017-11-15 RX ADMIN — SODIUM CHLORIDE, POTASSIUM CHLORIDE, SODIUM LACTATE AND CALCIUM CHLORIDE 100 ML/HR: 600; 310; 30; 20 INJECTION, SOLUTION INTRAVENOUS at 03:13

## 2017-11-15 RX ADMIN — LEVOTHYROXINE SODIUM 50 MCG: 50 TABLET ORAL at 08:55

## 2017-11-15 RX ADMIN — POTASSIUM CHLORIDE 20 MEQ: 750 TABLET, EXTENDED RELEASE ORAL at 10:25

## 2017-11-15 RX ADMIN — METOPROLOL TARTRATE 25 MG: 25 TABLET ORAL at 21:38

## 2017-11-15 RX ADMIN — PREDNISOLONE ACETATE 1 DROP: 10 SUSPENSION/ DROPS OPHTHALMIC at 21:41

## 2017-11-15 RX ADMIN — PANTOPRAZOLE SODIUM 40 MG: 40 INJECTION, POWDER, FOR SOLUTION INTRAVENOUS at 09:00

## 2017-11-15 RX ADMIN — FUROSEMIDE 40 MG: 40 TABLET ORAL at 10:25

## 2017-11-15 RX ADMIN — PREDNISOLONE ACETATE 1 DROP: 10 SUSPENSION/ DROPS OPHTHALMIC at 08:55

## 2017-11-15 RX ADMIN — PREDNISOLONE ACETATE 1 DROP: 10 SUSPENSION/ DROPS OPHTHALMIC at 13:17

## 2017-11-15 RX ADMIN — LISINOPRIL 40 MG: 20 TABLET ORAL at 08:55

## 2017-11-15 RX ADMIN — ASPIRIN 81 MG 81 MG: 81 TABLET ORAL at 10:25

## 2017-11-15 RX ADMIN — MYCOPHENOLATE MOFETIL 750 MG: 250 CAPSULE ORAL at 08:55

## 2017-11-15 RX ADMIN — ATORVASTATIN CALCIUM 80 MG: 40 TABLET, FILM COATED ORAL at 21:38

## 2017-11-15 RX ADMIN — OYSTER SHELL CALCIUM WITH VITAMIN D 1 TABLET: 500; 200 TABLET, FILM COATED ORAL at 10:25

## 2017-11-15 RX ADMIN — OFLOXACIN 1 DROP: 3 SOLUTION/ DROPS OPHTHALMIC at 08:55

## 2017-11-15 NOTE — PROGRESS NOTES
Progress Note -Surgery PA  Terra Alex 67 y o  male MRN: [id number]  Unit/Bed#: -01 Encounter: [id number]    ASSESSMENT/PLAN:  Problem List     * (Principal)Partial small bowel obstruction       Assessment: now with bowel function  No N/V     Plan:   OOB and ambulate  IS   Clears diet today  Ok to restart home meds           VTE Pharmacologic Prophylaxis: Sequential compression device (Venodyne)  and Heparin    Subjective/Objective     Subjective:  " I am hungry"  Denies N/V  Occasional slight abd pain    Objective/Physical Exam: Blood pressure 148/76, pulse 68, temperature 98 4 °F (36 9 °C), temperature source Oral, resp  rate 18, height 5' 9" (1 753 m), weight 67 8 kg (149 lb 7 6 oz), SpO2 98 %  ,Body mass index is 22 07 kg/m²      General appearance: alert and no distress  Heart: regular rate and rhythm, S1, S2 normal, no murmur, click, rub or gallop  Lungs: clear to auscultation bilaterally  Abdomen: stool in ostomy bag, nontender, soft +BS  Neurological: normal without focal findings      Current Facility-Administered Medications:     heparin (porcine) subcutaneous injection 5,000 Units, 5,000 Units, Subcutaneous, Q8H [address], 5,000 Units at 11/14/17 2142 **AND** Platelet count, , , Once, Jose Dykes MD    HYDROmorphone (DILAUDID) 1 mg/mL injection 0 5 mg, 0 5 mg, Intravenous, Q1H PRN, Jose Dykes MD, 0 5 mg at 11/14/17 0419    isosorbide mononitrate (IMDUR) 24 hr tablet 30 mg, 30 mg, Oral, Daily, Jose Dykes MD, 30 mg at 11/14/17 0911    lactated ringers infusion, 100 mL/hr, Intravenous, Continuous, Chucho Nagy MD, Last Rate: 100 mL/hr at 11/15/17 0313, 100 mL/hr at 11/15/17 0313    levothyroxine tablet 50 mcg, 50 mcg, Oral, Daily, Jose Dykes MD, 50 mcg at 11/14/17 4313    lisinopril (ZESTRIL) tablet 40 mg, 40 mg, Oral, Daily, Jose Dykes MD, 40 mg at 11/14/17 0971    metoprolol tartrate (LOPRESSOR) tablet 25 mg, 25 mg, Oral, Q12H [address], Jose Dykes MD, 25 mg at 11/14/17 1233   mycophenolate (CELLCEPT) capsule 750 mg, 750 mg, Oral, BID, Eamon Terrell MD, 750 mg at 11/14/17 1758    ofloxacin (OCUFLOX) 0 3 % ophthalmic solution 1 drop, 1 drop, Right Eye, 4x Daily, Jhonny Paul PA-C, 1 drop at 11/14/17 2142    ondansetron (ZOFRAN) injection 4 mg, 4 mg, Intravenous, Q4H PRN, Eamon Terrell MD, 4 mg at 11/12/17 2205    pantoprazole (PROTONIX) injection 40 mg, 40 mg, Intravenous, Q24H [address], Eamon Terrell MD, 40 mg at 11/14/17 0910    prednisoLONE acetate (PRED FORTE) 1 % ophthalmic suspension 1 drop, 1 drop, Right Eye, 4x Daily, Jhonny Paul PA-C, 1 drop at 11/14/17 2142      Intake/Output Summary (Last 24 hours) at 11/15/17 0736  Last data filed at 11/15/17 0532   Gross per 24 hour   Intake             3260 ml   Output              800 ml   Net             2460 ml

## 2017-11-15 NOTE — PLAN OF CARE
PAIN - ADULT     Verbalizes/displays adequate comfort level or baseline comfort level Progressing        Potential for Falls     Patient will remain free of falls Progressing

## 2017-11-15 NOTE — PROGRESS NOTES
[facility] Progress Note  Patient: Malou Wise 67 y o  male   MRN: [id number]  PCP: Jacklyn Crowe MD  Unit/Bed#: MS [facility]-01 Encounter: [id number]  Date Of Visit: 11/15/17    Assessment/plan:  1  Partial small bowel obstruction - improving  Treatment per surgery  Diet advanced to clears with toast and crackers  2  CAD - ct BB, Imdur  3  Chronic combined systolic/diastolic CHF - EF 53%  Lasix 40 mg daily restarted  4  Myasthenia gravis - ct Mycophenolate 750 mg BID  5  Recent right eye cataract surgery - ct home eye drops  6  Hypothyroidism - ct levothyroxine 50 mcg  7  H/o gastric ulcer - PPI    VTE Pharmacologic Prophylaxis:   Pharmacologic: Heparin  Mechanical: Mechanical VTE prophylaxis in place  Time Spent for Care: 20 minutes  More than 50% of total time spent on counseling and coordination of care as described above  Current Length of Stay: 3 day(s)    Current Patient Status: Inpatient     Code Status: Level 1 - Full Code    Subjective:   Occasional abdominal pain  Frequent watery stools in colostomy  No nausea, vomiting    Objective:     Vitals:   Temp (24hrs), Av 2 °F (36 8 °C), Min:98 °F (36 7 °C), Max:98 4 °F (36 9 °C)    HR:  [61-70] 61  Resp:  [18] 18  BP: (148-169)/(76-84) 165/84  SpO2:  [97 %-98 %] 98 %  Body mass index is 22 07 kg/m²  Physical Exam:     Physical Exam   Constitutional: He is oriented to person, place, and time  HENT:   Head: Atraumatic  Eyes: EOM are normal    Neck: Neck supple  No JVD present  No tracheal deviation present  No thyromegaly present  Cardiovascular: Normal rate, regular rhythm and normal heart sounds  Pulmonary/Chest: Effort normal and breath sounds normal  No respiratory distress  He has no wheezes  He has no rales  Abdominal: Soft  Bowel sounds are normal  He exhibits no distension  There is no tenderness  Brown watery stools in colostomy bag   Musculoskeletal: He exhibits no edema     Neurological: He is alert and oriented to person, place, and time  Skin: Skin is warm and dry  Psychiatric: He has a normal mood and affect  Additional Data:     Labs:      Results from last 7 days  Lab Units 11/13/17  0836   WBC Thousand/uL 7 42   HEMOGLOBIN g/dL 11 9*   HEMATOCRIT % 38 3   PLATELETS Thousands/uL 188   NEUTROS PCT % 75   LYMPHS PCT % 16   MONOS PCT % 8   EOS PCT % 1       Results from last 7 days  Lab Units 11/13/17  0836 11/12/17  1428   SODIUM mmol/L 137 142   POTASSIUM mmol/L 4 0 3 9   CHLORIDE mmol/L 104 103   CO2 mmol/L 25 28   BUN mg/dL 15 18   CREATININE mg/dL 0 94 1 10   CALCIUM mg/dL 8 8 9 6   TOTAL PROTEIN g/dL  --  6 9   BILIRUBIN TOTAL mg/dL  --  0 70   ALK PHOS U/L  --  93   ALT U/L  --  17   AST U/L  --  15   GLUCOSE RANDOM mg/dL 121 105       Results from last 7 days  Lab Units 11/12/17  1428   INR  0 97       * I Have Reviewed All Lab Data Listed Above  * Additional Pertinent Lab Tests Reviewed: [facility]      Last 24 Hours Medication List:     aspirin 81 mg Oral Daily   atorvastatin 80 mg Oral Daily   calcium carbonate-vitamin D 1 tablet Oral Daily With Breakfast   furosemide 40 mg Oral Daily   heparin (porcine) 5,000 Units Subcutaneous Q8H [address]   isosorbide mononitrate 30 mg Oral Daily   levothyroxine 50 mcg Oral Daily   lisinopril 40 mg Oral Daily   metoprolol tartrate 25 mg Oral Q12H MIKEY   mycophenolate 750 mg Oral BID   ofloxacin 1 drop Right Eye 4x Daily   pantoprazole 40 mg Intravenous Q24H MIKEY   potassium chloride 20 mEq Oral Daily   prednisoLONE acetate 1 drop Right Eye 4x Daily        Today, Patient Was Seen By: Ana Herrera MD    ** Please Note: Dragon 360 Dictation voice to text software may have been used in the creation of this document   **

## 2017-11-16 VITALS
SYSTOLIC BLOOD PRESSURE: 150 MMHG | BODY MASS INDEX: 22.14 KG/M2 | WEIGHT: 149.47 LBS | DIASTOLIC BLOOD PRESSURE: 80 MMHG | HEART RATE: 51 BPM | HEIGHT: 69 IN | TEMPERATURE: 97.7 F | OXYGEN SATURATION: 96 % | RESPIRATION RATE: 18 BRPM

## 2017-11-16 PROCEDURE — C9113 INJ PANTOPRAZOLE SODIUM, VIA: HCPCS | Performed by: SURGERY

## 2017-11-16 RX ORDER — PREDNISOLONE ACETATE 10 MG/ML
1 SUSPENSION/ DROPS OPHTHALMIC 4 TIMES DAILY
Qty: 5 ML | Refills: 0 | Status: SHIPPED | OUTPATIENT
Start: 2017-11-16 | End: 2020-01-01 | Stop reason: HOSPADM

## 2017-11-16 RX ORDER — COLOSTOMY-ILEOST.SET,NONSTERIL 12"
EACH MISCELLANEOUS
Qty: 1 KIT | Refills: 4 | Status: SHIPPED | OUTPATIENT
Start: 2017-11-16

## 2017-11-16 RX ORDER — OFLOXACIN 3 MG/ML
1 SOLUTION/ DROPS OPHTHALMIC 4 TIMES DAILY
Qty: 5 ML | Refills: 0 | Status: SHIPPED | OUTPATIENT
Start: 2017-11-16 | End: 2020-01-01 | Stop reason: HOSPADM

## 2017-11-16 RX ADMIN — OFLOXACIN 1 DROP: 3 SOLUTION/ DROPS OPHTHALMIC at 12:52

## 2017-11-16 RX ADMIN — FUROSEMIDE 40 MG: 40 TABLET ORAL at 07:39

## 2017-11-16 RX ADMIN — OYSTER SHELL CALCIUM WITH VITAMIN D 1 TABLET: 500; 200 TABLET, FILM COATED ORAL at 07:41

## 2017-11-16 RX ADMIN — POTASSIUM CHLORIDE 20 MEQ: 750 TABLET, EXTENDED RELEASE ORAL at 08:59

## 2017-11-16 RX ADMIN — LISINOPRIL 40 MG: 20 TABLET ORAL at 07:38

## 2017-11-16 RX ADMIN — METOPROLOL TARTRATE 25 MG: 25 TABLET ORAL at 07:40

## 2017-11-16 RX ADMIN — OFLOXACIN 1 DROP: 3 SOLUTION/ DROPS OPHTHALMIC at 09:05

## 2017-11-16 RX ADMIN — OFLOXACIN 1 DROP: 3 SOLUTION/ DROPS OPHTHALMIC at 18:36

## 2017-11-16 RX ADMIN — PREDNISOLONE ACETATE 1 DROP: 10 SUSPENSION/ DROPS OPHTHALMIC at 18:36

## 2017-11-16 RX ADMIN — PANTOPRAZOLE SODIUM 40 MG: 40 INJECTION, POWDER, FOR SOLUTION INTRAVENOUS at 08:59

## 2017-11-16 RX ADMIN — LEVOTHYROXINE SODIUM 50 MCG: 50 TABLET ORAL at 06:07

## 2017-11-16 RX ADMIN — ASPIRIN 81 MG 81 MG: 81 TABLET ORAL at 08:59

## 2017-11-16 RX ADMIN — PREDNISOLONE ACETATE 1 DROP: 10 SUSPENSION/ DROPS OPHTHALMIC at 12:52

## 2017-11-16 RX ADMIN — SODIUM CHLORIDE, SODIUM LACTATE, POTASSIUM CHLORIDE, AND CALCIUM CHLORIDE 100 ML/HR: .6; .31; .03; .02 INJECTION, SOLUTION INTRAVENOUS at 06:12

## 2017-11-16 RX ADMIN — ISOSORBIDE MONONITRATE 30 MG: 30 TABLET, EXTENDED RELEASE ORAL at 07:38

## 2017-11-16 RX ADMIN — PREDNISOLONE ACETATE 1 DROP: 10 SUSPENSION/ DROPS OPHTHALMIC at 09:05

## 2017-11-16 RX ADMIN — MYCOPHENOLATE MOFETIL 750 MG: 250 CAPSULE ORAL at 08:58

## 2017-11-16 RX ADMIN — MYCOPHENOLATE MOFETIL 750 MG: 250 CAPSULE ORAL at 18:34

## 2017-11-16 NOTE — DISCHARGE SUMMARY
Discharge Summary - Arnulfo Mc 67 y o  male MRN: [id number]    Unit/Bed#: -01 Encounter: [id number]    Admission Date: 11/12/2017   Discharge Date: 11/16/17    Admitting Diagnosis:   Abdominal pain [R10 9]  Small bowel obstruction, partial [K56 600]    Discharge Diagnoses: Principal Problem:    Partial small bowel obstruction  Right inguinal hernia    Consultations: SLIM    Procedures Performed: none    Hospital Course: Arnulfo Mc is a 67 y o  male  With history of CAD, myasthenia gravis, s/p Villeda's procedure for a perforated diverticulum admitted for partial SBO  He present to ED complianing of abdominal pain, N/V and decreased output from his ostomy  Cat scan showed small bowel dilatation proximal to parastomal hernia suggesting partial small bowel obstruction  He was treated conservatively with NPO and IVF  No NGT was inserted  On the second hospital day his bowel function started to improve  Yesterday he was transitioned to a clear diet and today a surgical soft  He is tolerating diet, has stool and gas in ostomy and is cleared for discharge home  [facility] followed him medically due to him being NPO and having medications for stable heart failure, myasthenia gravis that needed to be transitioned  His blood pressure is better controlled since he is back on his home medications and SLIM has cleared him for discharge  He was given a prescription for ostomy supplies  He has been asked to follow up with Dr Diaz Browning for [place] reversal and right inguinal hernia repair if he would like  He was provided with Dr Shaun Plata phone number  Condition at Discharge: good     Discharge instructions/Information to patient and family:   See after visit summary for information provided to patient and family  Provisions for Follow-Up Care:  See after visit summary for information related to follow-up care and any pertinent home health orders        Disposition: Home    Planned Readmission: Yes, for [employer] reversal and right inguinal hernia repair    Discharge Statement   I spent 20 minutes discharging the patient  This time was spent on the day of discharge  I had direct contact with the patient on the day of discharge  Additional documentation is required if more than 30 minutes were spent on discharge  Discharge Medications:  See after visit summary for reconciled discharge medications provided to patient and family

## 2017-11-16 NOTE — PLAN OF CARE
GASTROINTESTINAL - ADULT     Minimal or absence of nausea and/or vomiting Progressing     Maintains or returns to baseline bowel function Progressing     Maintains adequate nutritional intake Progressing     Establish and maintain optimal ostomy function Progressing        PAIN - ADULT     Verbalizes/displays adequate comfort level or baseline comfort level Progressing        Potential for Falls     Patient will remain free of falls Progressing

## 2017-11-16 NOTE — PROGRESS NOTES
Progress Note - General Surgery   Thu Nichols 67 y o  male MRN: [id number]  Unit/Bed#: -01 Encounter: [id number]    Assessment:  72M w/pSBO and parastomal hernia    Plan:  - advance to surg soft diet  - continue IVF  - prn pain control  - home meds  - SQH/SCDs  - OOB/ambulate    Subjective/Objective   Subjective: no acute events overnight, having stomy output  Feels hungry, denies n/v/f/c  Objective:    Blood pressure (!) 179/86, pulse 76, temperature 97 8 °F (36 6 °C), temperature source Oral, resp  rate 18, height 5' 9" (1 753 m), weight 67 8 kg (149 lb 7 6 oz), SpO2 94 %  ,Body mass index is 22 07 kg/m²  I/O last 24 hours:   In: 1968 3 [I V :1968 3]  Out: 4446 [Urine:2575]    Invasive Devices     Peripheral Intravenous Line            Peripheral IV 11/12/17 Left Antecubital 3 days          Drain            Colostomy  -- days    Colostomy  days                Physical Exam:   NAD  Norm resp effort  RRR  Abd soft, NT/ND, ostomy with air and stool in bag, stoma pink and viable    Lab, Imaging and other studies:  Lab Results   Component Value Date    WBC 7 42 11/13/2017    HGB 11 9 (L) 11/13/2017    HCT 38 3 11/13/2017    MCV 84 11/13/2017     11/13/2017      Lab Results   Component Value Date    GLUCOSE 121 11/13/2017    CALCIUM 8 8 11/13/2017     11/13/2017    K 4 0 11/13/2017    CO2 25 11/13/2017     11/13/2017    BUN 15 11/13/2017    CREATININE 0 94 11/13/2017       VTE Pharmacologic Prophylaxis: Heparin  VTE Mechanical Prophylaxis: sequential compression device

## 2017-11-17 NOTE — PROGRESS NOTES
[facility] Progress Note  Patient: Malou Wise 67 y o  male   MRN: [id number]  PCP: Jacklyn Crowe MD  Unit/Bed#: [facility] Encounter: [id number]  Date Of Visit: 17    Assessment/plan:  1  Partial small bowel obstruction - improved  Good colostomy output  Diet advanced to surgical soft  Discharge today per surgery   2  CAD - ct BB, Imdur  3  Chronic combined systolic/diastolic CHF - EF 40%  Lasix 40 mg daily restarted  4  Myasthenia gravis - ct Mycophenolate 750 mg BID  5  Recent right eye cataract surgery - ct home eye drops  6  Hypothyroidism - ct levothyroxine 50 mcg  7  H/o gastric ulcer - PPI  8  HTN - BP increased this morning but improved now to 150/80  Discharge on home meds    VTE Pharmacologic Prophylaxis:   Pharmacologic: Heparin  Mechanical: Mechanical VTE prophylaxis in place  Discussions with Specialists or Other Care Team Provider: Discussed with surgery    Time Spent for Care: 20 minutes  More than 50% of total time spent on counseling and coordination of care as described above  Current Length of Stay: 4 day(s)    Current Patient Status: Inpatient     Code Status: Level 1 - Full Code    Subjective:   Normal colostomy output  No abdominal pain, nausea or vomiting  Objective:     Vitals:   Temp (24hrs), Av 8 °F (36 6 °C), Min:97 7 °F (36 5 °C), Max:97 8 °F (36 6 °C)    HR:  [51-76] 51  Resp:  [18] 18  BP: (150-198)/(76-86) 150/80  SpO2:  [94 %-96 %] 96 %  Body mass index is 22 07 kg/m²  Physical Exam:     Physical Exam   Constitutional: He is oriented to person, place, and time  HENT:   Head: Atraumatic  Eyes: EOM are normal    Neck: Neck supple  No JVD present  No tracheal deviation present  No thyromegaly present  Cardiovascular: Normal rate, regular rhythm and normal heart sounds  Pulmonary/Chest: Effort normal and breath sounds normal  No respiratory distress  He has no wheezes  He has no rales  Abdominal: Soft   Bowel sounds are normal  He exhibits no distension  There is no tenderness  There is no rebound  Colostomy with brown stools   Musculoskeletal: He exhibits no edema  Neurological: He is alert and oriented to person, place, and time  Skin: Skin is warm and dry  Psychiatric: He has a normal mood and affect  Additional Data:     Labs:      Results from last 7 days  Lab Units 11/13/17  0836   WBC Thousand/uL 7 42   HEMOGLOBIN g/dL 11 9*   HEMATOCRIT % 38 3   PLATELETS Thousands/uL 188   NEUTROS PCT % 75   LYMPHS PCT % 16   MONOS PCT % 8   EOS PCT % 1       Results from last 7 days  Lab Units 11/13/17  0836 11/12/17  1428   SODIUM mmol/L 137 142   POTASSIUM mmol/L 4 0 3 9   CHLORIDE mmol/L 104 103   CO2 mmol/L 25 28   BUN mg/dL 15 18   CREATININE mg/dL 0 94 1 10   CALCIUM mg/dL 8 8 9 6   TOTAL PROTEIN g/dL  --  6 9   BILIRUBIN TOTAL mg/dL  --  0 70   ALK PHOS U/L  --  93   ALT U/L  --  17   AST U/L  --  15   GLUCOSE RANDOM mg/dL 121 105       Results from last 7 days  Lab Units 11/12/17  1428   INR  0 97       * I Have Reviewed All Lab Data Listed Above  * Additional Pertinent Lab Tests Reviewed: [facility]      Last 24 Hours Medication List:     aspirin 81 mg Oral Daily   atorvastatin 80 mg Oral Daily   calcium carbonate-vitamin D 1 tablet Oral Daily With Breakfast   furosemide 40 mg Oral Daily   heparin (porcine) 5,000 Units Subcutaneous Q8H [address]   isosorbide mononitrate 30 mg Oral Daily   levothyroxine 50 mcg Oral Daily   lisinopril 40 mg Oral Daily   metoprolol tartrate 25 mg Oral Q12H MIKEY   mycophenolate 750 mg Oral BID   ofloxacin 1 drop Right Eye 4x Daily   pantoprazole 40 mg Intravenous Q24H MIKEY   potassium chloride 20 mEq Oral Daily   prednisoLONE acetate 1 drop Right Eye 4x Daily        Today, Patient Was Seen By: Nohemi Pederson MD    ** Please Note: Dragon 360 Dictation voice to text software may have been used in the creation of this document   **

## 2017-11-27 ENCOUNTER — ALLSCRIPTS OFFICE VISIT (OUTPATIENT)
Dept: OTHER | Facility: OTHER | Age: 72
End: 2017-11-27

## 2017-11-28 NOTE — PROGRESS NOTES
Assessment    1  Myasthenia gravis (358 00) (G70 00)    Plan  Myasthenia gravis    · Follow-up visit in 6 months Evaluation and Treatment  Follow-up  Status: Hold For -Scheduling  Requested for: [id number]   Ordered;Myasthenia gravis; Ordered By: Alena Frank Performed:  Due: [id number]    Discussion/Summary  Discussion Summary:   1  No bulbar symptoms and MG well controlled , cellcept 750mg bid , tapered off of prednisone ,calcuim bidf/u in 6mthspt to check with Gi and orthro for possible surgery,mg is stable and off prednisone he may need ivgg prior depending on extent of surgerylabs reviewed from 11/17     Chief Complaint  Chief Complaint Free Text Note Form: Patient present for follow up for MG      History of Present Illness  HPI: this is a 67 yo/o right handed male who presents in a follow up visit   he has generalized MG and is on CellCept 750 mg bid , no problems with speech and swallow   he is off of prednisone   he major questions today include possible surgery  He was admitted to [facility] in November 11/12 to 11/16/17 for a sbo   cmp and cbc were stable  No weakness in his legs   was diagnosed in 2013 after he presented with on 1/15/13 with complaints of slurred speech, difficulty swallowing pieces of food, neck weakness and problems chewing  he described problems keeping his head up associated with lower back pain  It is mild in the morning but progresses throughout the day  it began in the end of December/ Jan and did progress prompting an er visit   In the er he was noted to have elevated blood pressure and was admitted for a workup  He underwent normal MRI of brain, MRA with mild right carotid stenosis and atherosclerosis disease  After an extensive workup  Since his discharge he noted droopiness of the facial muscles, right eyelid ptosis and tearing of the right eye, progressive weakness of the neck muscles but no generalized fatigue, loss vision  Sob or double vision    his discharge he did return to work until Jan 31 2013 where he was asked to stop working and to be evaluated due to persistent difficulty with swallowing and speech  He was seen by his family physician today where his Blood pressure Meds were increased  He was started on Mention 60 mg po tid and Imuran 25 mg bid  The right sided ptosis and difficulty swallowing improved but 2 weeks after the initiation of Imuran he developed side effects on Imuran and it was stopped  The dose of Mention and prednisone was variable   he was being treated with frequent doses of IVGG  In October 2015, he developed difficulty with sob and was admitted to [facility] with pneumonia  He was started on IVGG for 5 treatments and improved  He was transferred to the floor where he was diagnosed with pleural effusions  He was transferred back to icu and treated with 7 treatments of plasmaphoresss  he was discharged on Tuesday nov 3 on Mestinon 180mg er qid, prednisone 40 mg daily and CellCept 750 mg bid   He was doing well till Friday nov 6 when the excessive secretions returned with inability to swallow and speech issues  no weakness or double vision The dose of prednisone was increased to 50 mg daily on November 5  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  The Mestinon was decreased but the sob persisted   IVGG was scheduled for the following week  By November 15, the sob worsened prompting another admission to [facility]  He was admitted to hospital, placed on Biped and started on IVGG with discontinuation of Mestinon  but with recent diagnosis of a compression fracture, and stomach ulcer  He was placed on Protonix 40 mg bid  He has persistent facial swelling, but no dysarthria, dysphagia or neck weakness  He was treated for a cellulitis with antibiotic and cardic cath was postponed   He was admitted in may of 2016 [facility] for abdominal pain and 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    prednisone has been decreased to 15 mg  he received IVGG in June and in august for 5 days with benefit in 2016   he is without bulbar complaints and is without shortness of breath   is more effective than plex    ivgg in 2016      Review of Systems  Neurological ROS:  Constitutional: recent weight loss-- and-- fatigue  HEENT:  no sinus problems, not feeling congested, no blurred vision, no dryness of the eyes, no eye pain, no hearing loss, no tinnitus, no mouth sores, no sore throat, no hoarseness, no dysphagia, no masses, no bleeding  Cardiovascular:  no chest pain or pressure, no palpitations present, the heart rate was not rapid or irregular, no swelling in the arms or legs, no poor circulation  Respiratory:  no unusual or persistant cough, no shortness of breath with or without exertion  Gastrointestinal: diarrhea-- and-- ostomy  Genitourinary:  no incontinence, no feelings of urinary urgency, no increase in frequency, no urinary hesitancy, no dysuria, no hematuria  Musculoskeletal: arthralgias  Integumentary  no masses, no rash, no skin lesions, no livedo reticularis  Psychiatric:  no anxiety, no depression, no mood swings, no psychiatric hospitalizations, no sleep problems  Endocrine  no unusual weight loss or gain, no excessive urination, no excessive thirst, no hair loss or gain, no hot or cold intolerance, no menstrual period change or irregularity, no loss of sexual ability or drive, no erection difficulty, no nipple discharge  Neurological Mental Status:  no confusion, no mood swings, no alteration or loss of consciousness, no difficulty expressing/understanding speech, no memory problems  Neurological Cranial Nerves:  no blurry or double vision, no loss of vision, no face drooping, no facial numbness or weakness, no taste or smell loss/changes, no hearing loss or ringing, no vertigo or dizziness, no dysphagia, no slurred speech  Neurological Motor findings include:  no tremor, no twitching, no cramping(pre/post exercise), no atrophy  Neurological Coordination: unsteadiness  Neurological Sensory:  no numbness, no pain, no tingling, does not fall when eyes closed or taking a shower  Neurological Gait:  no difficulty walking, not falling to one side, no sensation of being pushed, has not had falls  Active Problems    1  Acute gastric ulcer (531 30) (K25 3)   2  Carotid artery stenosis, asymptomatic (433 10) (I65 29)   3  Chronic combined systolic and diastolic CHF (congestive heart failure) (428 42,428 0) (I50 42)   4  Coronary artery disease (414 00) (I25 10)   5  Dysarthria (784 51) (R47 1)   6  Dysphagia (787 20) (R13 10)   7  Dysuria (788 1) (R30 0)   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)   9  Hyperlipidemia (272 4) (E78 5)   10  Hypertension (401 9) (I10)   11  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)   12  Ischemic cardiomyopathy (414 8) (I25 5)   13  Midline low back pain without sciatica (724 2) (M54 5)   14  Myasthenia gravis (358 00) (G70 00)   15  Pain around PEG tube site (464 66,021 67) (P33 181I)    Past Medical History  1  Old myocardial infarction (412) (I25 2)    Surgical History  1  History of Percutaneous Injection Of Extremity Pseudoaneurysm   2  History of Previous Balloon Angioplasty   3  History of Previous Stent Placement    Family History  Mother    1  Family history of Prior Myocardial Infarction  Father    2  Family history of Prior Myocardial Infarction    Social History     · Being A Social Drinker   · Denied: History of Drug Use   · Former smoker (V15 82) (A93 562)   · Marital History - Currently     Current Meds   1  Aspirin 81 MG TABS; Take 1 tablet daily; Therapy: (Recorded:31Oct2016) to Recorded   2  Furosemide 40 MG Oral Tablet; TAKE 1 TABLET DAILY  Requested for: 20EAY3148; Last Rx:[id number] Ordered   3  Isosorbide Mononitrate ER 30 MG Oral Tablet Extended Release 24 Hour; TAKE 1 TABLET DAILY; Therapy: (Kalpesh Vasquez) to Recorded   4   Klor-Con M20 20 MEQ Oral Tablet Extended Release; TAKE 1 TABLET DAILY; Therapy: ([facility]) to Recorded   5  Levothyroxine Sodium 50 MCG Oral Tablet; TAKE 1 TABLET DAILY; Therapy: (Recorded:12Oct2016) to Recorded   6  Lipitor 80 MG Oral Tablet; TAKE 1 TABLET DAILY; Therapy: (Recorded:12Oct2016) to Recorded   7  Lisinopril 10 MG Oral Tablet; TAKE 1 TABLET TWICE DAILY; Therapy: [id number] to (Evaluate:11Mar2018)  Requested for: 51KMC6301; Last Rx:16Mar2017 Ordered   8  Metoprolol Tartrate 25 MG Oral Tablet; TAKE 1 TABLET TWICE DAILY; Therapy: ([facility]) to Recorded   9  Mycophenolate Mofetil 250 MG Oral Capsule; TAKE 3 CAPSULES BY MOUTH TWICE DAILY increased dose; Therapy: [id number] to (Xena Ferrer)  Requested for: 35YYS1951; Last Rx:07Mar2017 Ordered   10  Pantoprazole Sodium 40 MG Oral Tablet Delayed Release; Take 1 tablet twice daily; Therapy: 35HBX4471 to (Omar Ahumada)  Requested for: 80AZT5676; Last  Rx:[id number] Ordered    Allergies    1  No Known Drug Allergies    Vitals  Signs   Recorded: 27Nov2017 01:08PM   Heart Rate: 74  Systolic: 175  Diastolic: 80  Height: 5 ft 9 in  Weight: 152 lb 1 oz  BMI Calculated: 22 46  BSA Calculated: 1 84    Physical Exam   Constitutional  General appearance: No acute distress, well appearing and well nourished  Musculoskeletal  Gait and station: Normal gait, stance and balance  Muscle strength: Normal strength throughout  -- neck flexion/ extension 5/5  Muscle tone: No atrophy, abnormal movements, flaccidity, cogwheeling or spasticity  -- strength normal, no change with sustained muscle activity  Involuntary movements: None observed  Neurologic  Orientation to person, place, and time: Normal    Attention span and concentration: Normal thought process and attention span  Fund of knowledge: Normal vocabulary with appropriate knowledge of current events and past history  2nd cranial nerve: Normal    3rd, 4th, and 6th cranial nerves: Normal  -- no ptosis , no fatigable weakness    5th cranial nerve: Normal    7th cranial nerve: Normal    9th cranial nerve: Abnormal   normal palate elevation,-- gag was not diminished on the right-- and-- gag was not diminished on the left  10th cranial nerve: Abnormal    11th cranial nerve: Normal    12th cranial nerve: Normal  -- no dysarthria  Sensation: Normal    Reflexes: Normal    Coordination: Normal    Cortical function: Normal    Judgment and insight: Abnormal   Judgment: impaired judgment  Mood and affect: Normal        Signatures   Electronically signed by :  Luke Arriola DO; Nov 27 2017  1:35PM EST                       (Author)

## 2018-01-12 VITALS
BODY MASS INDEX: 22.57 KG/M2 | DIASTOLIC BLOOD PRESSURE: 70 MMHG | HEART RATE: 70 BPM | RESPIRATION RATE: 14 BRPM | SYSTOLIC BLOOD PRESSURE: 138 MMHG | WEIGHT: 152.38 LBS | HEIGHT: 69 IN

## 2018-01-12 VITALS
BODY MASS INDEX: 21.52 KG/M2 | DIASTOLIC BLOOD PRESSURE: 72 MMHG | HEART RATE: 60 BPM | SYSTOLIC BLOOD PRESSURE: 136 MMHG | HEIGHT: 69 IN | WEIGHT: 145.31 LBS

## 2018-01-13 VITALS
SYSTOLIC BLOOD PRESSURE: 166 MMHG | HEART RATE: 82 BPM | WEIGHT: 154.06 LBS | HEIGHT: 69 IN | BODY MASS INDEX: 22.82 KG/M2 | DIASTOLIC BLOOD PRESSURE: 64 MMHG

## 2018-01-13 VITALS
WEIGHT: 146.38 LBS | HEART RATE: 60 BPM | DIASTOLIC BLOOD PRESSURE: 80 MMHG | HEIGHT: 69 IN | OXYGEN SATURATION: 98 % | SYSTOLIC BLOOD PRESSURE: 138 MMHG | RESPIRATION RATE: 14 BRPM | BODY MASS INDEX: 21.68 KG/M2

## 2018-01-13 VITALS
HEIGHT: 69 IN | WEIGHT: 152.06 LBS | DIASTOLIC BLOOD PRESSURE: 80 MMHG | SYSTOLIC BLOOD PRESSURE: 142 MMHG | HEART RATE: 74 BPM | BODY MASS INDEX: 22.52 KG/M2

## 2018-01-14 NOTE — RESULT NOTES
Message   Please inform him that biopsies from his stomach were negative for H  pylori  Please find out if his PEG site is still leaking  Verified Results  (1) TISSUE EXAM [id number] 10:04AM Jose Armando Mark     Test Name Result Flag Reference   LAB AP CASE REPORT (Report)     Surgical Pathology Report             Case: [id number]                   Authorizing Provider: Augustus Hahn MD      Collected:      03/29/2016 1004        Ordering Location:   [facility]    Received:      03/29/2016 5947 [facility]                               Pathologist:      Dav Cr MD                                 Specimen:  Stomach, gastric body r/o h pylori   LAB AP FINAL DIAGNOSIS      A  Gastric body (biopsy):    - Gastric oxyntic mucosa with no significant pathologic abnormality      - Immunostain for H  pylori (with appropriate positive control) is   negative  - No intestinal metaplasia, dysplasia or neoplasia identified  LAB AP NOTE      Intradepartmental consultation concurs with the diagnosis  Interpretation performed at [place] [id number]  LAB AP SURGICAL ADDITIONAL INFORMATION (Report)     These tests were developed and their performance characteristics   determined by [address] [place] [address] [facility] or [address] [place] [address]  They may not be cleared or approved by the [place]  Food and   Drug Administration  The FDA has determined that such clearance or   approval is not necessary  These tests are used for clinical purposes  They should not be regarded as investigational or for research  This   laboratory has been approved by [place] [id number], designated as a high-complexity   laboratory and is qualified to perform these tests  LAB AP GROSS DESCRIPTION (Report)     A  The specimen is received in formalin, labeled with the patient's name   and hospital number, and is designated gastric body biopsy   The   specimen consists of 3 tan-pink soft tissue fragments measuring 0 5   centimeters, 0 5 centimeters, and 0 7 centimeters in greatest dimension  Entirely submitted  One cassette  Note: The estimated total formalin fixation time based upon information   provided by the submitting clinician and the standard processing schedule   is 18 5 hours  MAS   LAB AP CLINICAL INFORMATION      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

## 2018-02-05 DIAGNOSIS — G70.00 MYASTHENIA GRAVIS (HCC): Primary | ICD-10-CM

## 2018-02-05 RX ORDER — MYCOPHENOLATE MOFETIL 250 MG/1
750 CAPSULE ORAL 2 TIMES DAILY
Qty: 540 CAPSULE | Refills: 3 | Status: SHIPPED | OUTPATIENT
Start: 2018-02-05 | End: 2018-11-19 | Stop reason: SDUPTHER

## 2018-04-23 ENCOUNTER — APPOINTMENT (OUTPATIENT)
Dept: LAB | Facility: CLINIC | Age: 73
End: 2018-04-23
Payer: MEDICARE

## 2018-04-23 ENCOUNTER — TRANSCRIBE ORDERS (OUTPATIENT)
Dept: LAB | Facility: CLINIC | Age: 73
End: 2018-04-23

## 2018-04-23 DIAGNOSIS — I10 ESSENTIAL HYPERTENSION, MALIGNANT: ICD-10-CM

## 2018-04-23 DIAGNOSIS — E03.9 MYXEDEMA HEART DISEASE: ICD-10-CM

## 2018-04-23 DIAGNOSIS — Z12.5 SPECIAL SCREENING FOR MALIGNANT NEOPLASM OF PROSTATE: ICD-10-CM

## 2018-04-23 DIAGNOSIS — E78.00 PURE HYPERCHOLESTEROLEMIA: ICD-10-CM

## 2018-04-23 DIAGNOSIS — I51.9 MYXEDEMA HEART DISEASE: ICD-10-CM

## 2018-04-23 DIAGNOSIS — I10 ESSENTIAL HYPERTENSION, MALIGNANT: Primary | ICD-10-CM

## 2018-04-23 LAB
ALBUMIN SERPL BCP-MCNC: 4.3 G/DL (ref 3.5–5)
ALP SERPL-CCNC: 96 U/L (ref 46–116)
ALT SERPL W P-5'-P-CCNC: 28 U/L (ref 12–78)
ANION GAP SERPL CALCULATED.3IONS-SCNC: 11 MMOL/L (ref 4–13)
AST SERPL W P-5'-P-CCNC: 20 U/L (ref 5–45)
BASOPHILS # BLD AUTO: 0.02 THOUSANDS/ΜL (ref 0–0.1)
BASOPHILS NFR BLD AUTO: 0 % (ref 0–1)
BILIRUB DIRECT SERPL-MCNC: 0.17 MG/DL (ref 0–0.2)
BILIRUB SERPL-MCNC: 0.6 MG/DL (ref 0.2–1)
BUN SERPL-MCNC: 24 MG/DL (ref 5–25)
CALCIUM SERPL-MCNC: 9 MG/DL (ref 8.3–10.1)
CHLORIDE SERPL-SCNC: 106 MMOL/L (ref 100–108)
CHOLEST SERPL-MCNC: 121 MG/DL (ref 50–200)
CO2 SERPL-SCNC: 26 MMOL/L (ref 21–32)
CREAT SERPL-MCNC: 0.96 MG/DL (ref 0.6–1.3)
EOSINOPHIL # BLD AUTO: 0.11 THOUSAND/ΜL (ref 0–0.61)
EOSINOPHIL NFR BLD AUTO: 2 % (ref 0–6)
ERYTHROCYTE [DISTWIDTH] IN BLOOD BY AUTOMATED COUNT: 15 % (ref 11.6–15.1)
GFR SERPL CREATININE-BSD FRML MDRD: 78 ML/MIN/1.73SQ M
GLUCOSE P FAST SERPL-MCNC: 96 MG/DL (ref 65–99)
HCT VFR BLD AUTO: 41.3 % (ref 36.5–49.3)
HDLC SERPL-MCNC: 49 MG/DL (ref 40–60)
HGB BLD-MCNC: 12.5 G/DL (ref 12–17)
LDLC SERPL CALC-MCNC: 57 MG/DL (ref 0–100)
LYMPHOCYTES # BLD AUTO: 1.49 THOUSANDS/ΜL (ref 0.6–4.47)
LYMPHOCYTES NFR BLD AUTO: 27 % (ref 14–44)
MCH RBC QN AUTO: 24.7 PG (ref 26.8–34.3)
MCHC RBC AUTO-ENTMCNC: 30.3 G/DL (ref 31.4–37.4)
MCV RBC AUTO: 82 FL (ref 82–98)
MONOCYTES # BLD AUTO: 0.42 THOUSAND/ΜL (ref 0.17–1.22)
MONOCYTES NFR BLD AUTO: 8 % (ref 4–12)
NEUTROPHILS # BLD AUTO: 3.47 THOUSANDS/ΜL (ref 1.85–7.62)
NEUTS SEG NFR BLD AUTO: 63 % (ref 43–75)
NONHDLC SERPL-MCNC: 72 MG/DL
PLATELET # BLD AUTO: 173 THOUSANDS/UL (ref 149–390)
PMV BLD AUTO: 10.3 FL (ref 8.9–12.7)
POTASSIUM SERPL-SCNC: 4.8 MMOL/L (ref 3.5–5.3)
PROT SERPL-MCNC: 7.4 G/DL (ref 6.4–8.2)
PSA SERPL-MCNC: 4.3 NG/ML (ref 0–4)
RBC # BLD AUTO: 5.07 MILLION/UL (ref 3.88–5.62)
SODIUM SERPL-SCNC: 143 MMOL/L (ref 136–145)
T4 FREE SERPL-MCNC: 0.95 NG/DL (ref 0.76–1.46)
TRIGL SERPL-MCNC: 77 MG/DL
TSH SERPL DL<=0.05 MIU/L-ACNC: 3.59 UIU/ML (ref 0.36–3.74)
WBC # BLD AUTO: 5.51 THOUSAND/UL (ref 4.31–10.16)

## 2018-04-23 PROCEDURE — 80061 LIPID PANEL: CPT

## 2018-04-23 PROCEDURE — 80048 BASIC METABOLIC PNL TOTAL CA: CPT

## 2018-04-23 PROCEDURE — 84439 ASSAY OF FREE THYROXINE: CPT

## 2018-04-23 PROCEDURE — G0103 PSA SCREENING: HCPCS

## 2018-04-23 PROCEDURE — 36415 COLL VENOUS BLD VENIPUNCTURE: CPT

## 2018-04-23 PROCEDURE — 85025 COMPLETE CBC W/AUTO DIFF WBC: CPT

## 2018-04-23 PROCEDURE — 80076 HEPATIC FUNCTION PANEL: CPT

## 2018-04-23 PROCEDURE — 84443 ASSAY THYROID STIM HORMONE: CPT

## 2018-05-14 ENCOUNTER — OFFICE VISIT (OUTPATIENT)
Dept: NEUROLOGY | Facility: CLINIC | Age: 73
End: 2018-05-14
Payer: MEDICARE

## 2018-05-14 VITALS
DIASTOLIC BLOOD PRESSURE: 80 MMHG | BODY MASS INDEX: 23.85 KG/M2 | HEIGHT: 69 IN | WEIGHT: 161 LBS | SYSTOLIC BLOOD PRESSURE: 142 MMHG | HEART RATE: 75 BPM

## 2018-05-14 DIAGNOSIS — G70.00 MYASTHENIA GRAVIS (HCC): Primary | ICD-10-CM

## 2018-05-14 PROCEDURE — 99213 OFFICE O/P EST LOW 20 MIN: CPT | Performed by: PSYCHIATRY & NEUROLOGY

## 2018-05-14 RX ORDER — AMLODIPINE BESYLATE 2.5 MG/1
2.5 TABLET ORAL DAILY
Refills: 5 | COMMUNITY
Start: 2018-04-28 | End: 2020-01-01 | Stop reason: HOSPADM

## 2018-05-14 NOTE — PROGRESS NOTES
Patient ID: Opal Corbett is a 67 y o  male  Assessment/Plan:    Myasthenia gravis ([address] )   MG is now stable ,  cellcept 750 mg bid      He need to d/w surgeons about pursuing surgery   They will contact me if any additional information is required    f/u in 6 mths     reviwed the labs , stable            Subjective:     this is a 66 yo/o right handed male who presents in a follow up visit   he has generalized MG and is on CellCept 750 mg bid , no problems with speech and swallow   he has been off of predinsone for months   he questions today include possible surgery  No side effect on cellcept   Recent cmp , cbc was normal  Last ivgg was in 2107             He was admitted to [facility] in November 11/12 to 11/16/17 for a sbo   cmp and cbc were stable  No weakness in his legs   He was diagnosed in 2013 after he presented with on 1/15/13 with complaints of slurred speech, difficulty swallowing pieces of food, neck weakness and problems chewing   [place] In the er he was noted to have elevated blood pressure and was admitted for a workup  He underwent normal MRI of brain, MRA with mild right carotid stenosis and atherosclerosis disease  After an extensive workup h Since his discharge he noted droopiness of the facial muscles, right eyelid ptosis and tearing of the right eye, progressive weakness of the neck muscles but no generalized fatigue, loss vision  Sob or double vision  his discharge he did return to work until Jan 31 2013 where he was asked to stop working and to be evaluated due to persistent difficulty with swallowing and speech  He was seen by his family physician today where his Blood pressure Meds were increased  He was started on Mention 60 mg po tid and Imuran 25 mg bid  The right sided ptosis and difficulty swallowing improved but 2 weeks after the initiation of Imuran he developed side effects on Imuran and it was stopped  The dose of Mention and prednisone was variable   he was being treated with frequent doses of IVGG  In October 2015, he developed difficulty with sob and was admitted to [facility] with pneumonia  He was started on IVGG for 5 treatments and improved  He was transferred to the floor where he was diagnosed with pleural effusions  He was transferred back to icu and treated with 7 treatments of plasmaphoresss  he was discharged on Tuesday nov 3 on Mestinon 180mg er qid, prednisone 40 mg daily and CellCept 750 mg bid   He was doing well till Friday nov 6 when the excessive secretions returned with inability to swallow and speech issues  no weakness or double vision The dose of prednisone was increased to 50 mg daily on November 5  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  He was admitted to hospital, placed on Biped and started on IVGG with discontinuation of Mestinon  but with recent diagnosis of a compression fracture, and stomach ulcer  He was placed on Protonix 40 mg bid  He has persistent facial swelling, but no dysarthria, dysphagia or neck weakness  He was treated for a cellulitis with antibiotic and cardic cath was postponed   He was admitted in may of 2016 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   prednisone has been decreased to 15 mg  he received IVGG in June and in august for 5 days with benefit in 2016   last ivgg was in 2017          he is without bulbar complaints and is without shortness of breath          IVGG was more effective than plex     i  Labs 4/23/18 cmp , cbc stable     He has gained weight           The following portions of the patient's history were reviewed and updated as appropriate:   He  has a past medical history of Cardiac disease; Carotid stenosis; CHF (congestive heart failure) ([address] ); Compression fracture of lumbar vertebra ([address] );  Coronary artery disease; Disease of thyroid gland; Diverticulitis; Hernia, diaphragmatic, without obstruction; Hyperlipidemia; Hypertension; Inguinal hernia; Ischemic cardiomyopathy; MI (myocardial infarction) ([facility] ); and Myasthenia gravis ([facility] )  He  has a past surgical history that includes Angioplasty / stenting femoral; PEG tube placement; Colostomy; PEG tube removal; Colon surgery; LAPAROTOMY (N/A, 5/17/2016); pr esophagogastroduodenoscopy transoral diagnostic (N/A, 5/12/2016); Gastrostomy tube placement (N/A, 3/29/2016); Esophagogastroduodenoscopy (N/A, 3/29/2016); and Cataract extraction  His family history includes Heart disease in his mother; Hypertension in his mother  He  reports that he quit smoking about 45 years ago  He has never used smokeless tobacco  He reports that he drinks alcohol  He reports that he does not use drugs  Current Outpatient Prescriptions   Medication Sig Dispense Refill    aspirin 81 MG tablet Take 81 mg by mouth daily   atorvastatin (LIPITOR) 80 mg tablet Take 80 mg by mouth daily   Calcium Citrate-Vitamin D (CALCIUM CITRATE +D PO) Take 1 tablet by mouth   furosemide (LASIX) 20 mg tablet Take 40 mg by mouth daily        isosorbide dinitrate (ISORDIL) 30 mg tablet Take 30 mg by mouth daily   levothyroxine 25 mcg tablet Take 50 mcg by mouth daily          lisinopril (ZESTRIL) 10 mg tablet Take 40 mg by mouth daily        metoprolol tartrate (LOPRESSOR) 25 mg tablet Take 25 mg by mouth every 12 (twelve) hours        mycophenolate (CELLCEPT) 250 mg capsule Take 3 capsules (750 mg total) by mouth 2 (two) times a day 3 tabs bid 540 capsule 3    ofloxacin (OCUFLOX) 0 3 % ophthalmic solution Administer 1 drop to the right eye 4 (four) times a day 5 mL 0    Ostomy Supplies (PREMIER COLOSTOMY/ILEOSTOMY) KIT Change as needed    Sensura 2 piece coloplast  2 1/4   Box of 5 1 kit 4    pantoprazole (PROTONIX) 40 mg tablet Take 40 mg by mouth daily        potassium chloride (KLOR-CON) 20 mEq packet Take 20 mEq by mouth daily   prednisoLONE acetate (PRED FORTE) 1 % ophthalmic suspension Administer 1 drop to the right eye 4 (four) times a day 5 mL 0    amLODIPine (NORVASC) 2 5 mg tablet Take 2 5 mg by mouth daily  5     No current facility-administered medications for this visit  He has No Known Allergies            Objective:    Blood pressure 142/80, pulse 75, height 5' 9" (1 753 m), weight 73 kg (161 lb)  Physical Exam   Constitutional: He appears well-developed  HENT:   Head: Normocephalic  Eyes: EOM are normal  Pupils are equal, round, and reactive to light  Neck: Normal range of motion  Neurological: He has normal strength  Gait normal    Psychiatric: His speech is normal        Neurological Exam    Mental Status  The patient is alert and oriented to person, place, time, and situation  He has no visuospatial neglect  His speech is normal  He has normal attention span and concentration  He has a normal fund of knowledge  Cranial Nerves    CN II: The patient's visual acuity and visual fields are normal   CN III, IV, VI: The patient's pupils are equally round and reactive to light and ocular movements are normal   CN V: The patient has normal facial sensation  CN VII:  The patient has symmetric facial movement  CN VIII:  The patient's hearing is normal   CN IX, X: The patient has symmetric palate movement and normal gag reflex  CN XI: The patient's shoulder shrug strength is normal   CN XII: The patient's tongue is midline without atrophy or fasciculations  No ptosis     Motor   His strength is 5/5 throughout all four extremities  Neck flexion/ extension 5/5     Sensory  The patient's sensation is to light touch and pinprick  Gait and Coordination  The patient has normal gait and station  He has normal right finger to nose and normal left finger to nose coordination  ROS:    Review of Systems   Constitutional: Positive for fatigue   Negative for appetite change and fever  HENT: Negative  Negative for hearing loss, tinnitus, trouble swallowing and voice change  Eyes: Negative  Negative for photophobia and pain  Respiratory: Negative  Negative for shortness of breath  Cardiovascular: Negative  Negative for palpitations  Gastrointestinal: Negative  Negative for nausea and vomiting  Endocrine: Negative  Negative for cold intolerance and heat intolerance  Genitourinary: Negative  Negative for dysuria, frequency and urgency  Musculoskeletal: Negative  Negative for myalgias and neck pain  Skin: Negative  Negative for rash  Neurological: Negative  Negative for dizziness, tremors, seizures, syncope, facial asymmetry, speech difficulty, weakness, light-headedness, numbness and headaches  Hematological: Negative  Does not bruise/bleed easily  Psychiatric/Behavioral: Negative  Negative for confusion, hallucinations and sleep disturbance

## 2018-05-14 NOTE — ASSESSMENT & PLAN NOTE
MG is now stable ,  cellcept 750 mg bid      He need to d/w surgeons about pursuing surgery    They will contact me if any additional information is required    f/u in 6 mths     reviwed the labs , stable

## 2018-10-20 DIAGNOSIS — I42.9 CARDIOMYOPATHY, UNSPECIFIED TYPE (HCC): Primary | ICD-10-CM

## 2018-10-22 RX ORDER — FUROSEMIDE 40 MG/1
TABLET ORAL
Qty: 40 TABLET | Refills: 6 | Status: SHIPPED | OUTPATIENT
Start: 2018-10-22 | End: 2020-01-01 | Stop reason: HOSPADM

## 2018-11-19 ENCOUNTER — OFFICE VISIT (OUTPATIENT)
Dept: NEUROLOGY | Facility: CLINIC | Age: 73
End: 2018-11-19
Payer: MEDICARE

## 2018-11-19 VITALS — WEIGHT: 171 LBS | SYSTOLIC BLOOD PRESSURE: 142 MMHG | DIASTOLIC BLOOD PRESSURE: 78 MMHG | BODY MASS INDEX: 25.25 KG/M2

## 2018-11-19 DIAGNOSIS — G70.00 MYASTHENIA GRAVIS (HCC): Primary | ICD-10-CM

## 2018-11-19 PROCEDURE — 99213 OFFICE O/P EST LOW 20 MIN: CPT | Performed by: PSYCHIATRY & NEUROLOGY

## 2018-11-19 RX ORDER — MYCOPHENOLATE MOFETIL 250 MG/1
750 CAPSULE ORAL 2 TIMES DAILY
Qty: 540 CAPSULE | Refills: 1 | Status: SHIPPED | OUTPATIENT
Start: 2018-11-19 | End: 2019-01-01 | Stop reason: SDUPTHER

## 2018-11-19 NOTE — PROGRESS NOTES
Patient ID: Monica Mejias is a 75 y o  male  Assessment/Plan:    Myasthenia gravis [facility])  This is a 78-year-old patient with antibody positive myasthenia gravis  This was diagnosed several years ago with the onset of bulbar symptoms  He is currently doing extremely well on CellCept monotherapy of 750 mg twice a day  His mg ADLs score was  0/ 24  We will continue him on the current dose  I have ordered repeat laboratory studies including acetylcholine receptor antibodies  I have asked him to return to our offices in six or seven months however he has any other questions or problems in the interim he is to call                   Diagnoses and all orders for this visit:    Myasthenia gravis ([facility] )  -     CBC and Platelet; Future  -     Comprehensive metabolic panel; Future  -     mycophenolate (CELLCEPT) 250 mg capsule; Take 3 capsules (750 mg total) by mouth 2 (two) times a day 3 tabs bid  -     Acetylcholine receptor, binding; Future  -     Acetylcholine receptor, blocking; Future  -     Acetylcholine receptor, modulating; Future         Subjective:     this is a 68 yo/o right handed male who presents in a follow up visit   he has antibody positive generalized MG and is on CellCept 750 mg bid ,he is doing well    no problems with dysathria, dysphagia difficulty swallowing of double vision or droopiness of his eyes  He denies any recent hospitalizations  No side effect on cellcept   Last laboratory studies were performed months ago    Last ivgg was in 2017  He was admitted to [facility] in November 11/12 to 11/16/17 for a small-bowel obstruction  He is no longer on prednisone or Mestinon  The past IVIG was more active than plasmapheresis  Today he was discussing about potential surgeries hernia and/or heart surgery  He does need to touch base with these physicians about potential future surgeries      Prior history :  He was diagnosed in 2013 after he presented with on 1/15/13 with complaints of slurred speech, difficulty swallowing pieces of food, neck weakness and problems chewing   [place] In the er he was noted to have elevated blood pressure and was admitted for a workup  He underwent normal MRI of brain, MRA with mild right carotid stenosis and atherosclerosis disease  After an extensive workup h Since his discharge he noted droopiness of the facial muscles, right eyelid ptosis and tearing of the right eye, progressive weakness of the neck muscles but no generalized fatigue, loss vision  Sob or double vision  his discharge he did return to work until Jan 31 2013 where he was asked to stop working and to be evaluated due to persistent difficulty with swallowing and speech  He was seen by his family physician today where his Blood pressure Meds were increased  He was started on Mention 60 mg po tid and Imuran 25 mg bid  The right sided ptosis and difficulty swallowing improved but 2 weeks after the initiation of Imuran he developed side effects on Imuran and it was stopped  The dose of Mention and prednisone was variable   he was being treated with frequent doses of IVGG  In October 2015, he developed difficulty with sob and was admitted to [facility] with pneumonia  He was started on IVGG for 5 treatments and improved  He was transferred to the floor where he was diagnosed with pleural effusions  He was transferred back to icu and treated with 7 treatments of plasmaphoresss  he was discharged on Tuesday nov 3 on Mestinon 180mg er qid, prednisone 40 mg daily and CellCept 750 mg bid   He was doing well till Friday nov 6 when the excessive secretions returned with inability to swallow and speech issues  no weakness or double vision The dose of prednisone was increased to 50 mg daily on November 5  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  The Mestinon was decreased but the sob persisted   IVGG was scheduled for the following week   By November 15, the sob worsened prompting another admission to [facility]  He was admitted to hospital, placed on Biped and started on IVGG with discontinuation of Mestinon  but with recent diagnosis of a compression fracture, and stomach ulcer  He was placed on Protonix 40 mg bid  He has persistent facial swelling, but no dysarthria, dysphagia or neck weakness  He was treated for a cellulitis with antibiotic and cardic cath was postponed   He was admitted in may of 2016 [facility] for abdominal pain and 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                  MG-adl  0/24             The following portions of the patient's history were reviewed and updated as appropriate:   He  has a past medical history of Cardiac disease; Carotid stenosis; CHF (congestive heart failure) ([facility] ); Compression fracture of lumbar vertebra ([facility] ); Coronary artery disease; Disease of thyroid gland; Diverticulitis; Hernia, diaphragmatic, without obstruction; Hyperlipidemia; Hypertension; Inguinal hernia; Ischemic cardiomyopathy; MI (myocardial infarction) ([facility] ); and Myasthenia gravis ([facility] )  He  has a past surgical history that includes Angioplasty / stenting femoral; PEG tube placement; Colostomy; PEG tube removal; Colon surgery; LAPAROTOMY (N/A, 5/17/2016); pr esophagogastroduodenoscopy transoral diagnostic (N/A, 5/12/2016); Gastrostomy tube placement (N/A, 3/29/2016); Esophagogastroduodenoscopy (N/A, 3/29/2016); and Cataract extraction  His family history includes Heart disease in his mother; Hypertension in his mother  He  reports that he quit smoking about 45 years ago  He has never used smokeless tobacco  He reports that he drinks alcohol  He reports that he does not use drugs  Current Outpatient Prescriptions   Medication Sig Dispense Refill    aspirin 81 MG tablet Take 81 mg by mouth daily   atorvastatin (LIPITOR) 80 mg tablet Take 80 mg by mouth daily   Calcium Citrate-Vitamin D (CALCIUM CITRATE +D PO) Take 1 tablet by mouth        furosemide (LASIX) 40 mg tablet TAKE 1 TABLET DAILY  40 tablet 6    isosorbide dinitrate (ISORDIL) 30 mg tablet Take 30 mg by mouth daily   levothyroxine 25 mcg tablet Take 50 mcg by mouth daily   metoprolol tartrate (LOPRESSOR) 25 mg tablet Take 25 mg by mouth every 12 (twelve) hours        mycophenolate (CELLCEPT) 250 mg capsule Take 3 capsules (750 mg total) by mouth 2 (two) times a day 3 tabs bid 540 capsule 1    Ostomy Supplies (PREMIER COLOSTOMY/ILEOSTOMY) KIT Change as needed    Sensura 2 piece coloplast  2 1/4   Box of 5 1 kit 4    amLODIPine (NORVASC) 2 5 mg tablet Take 2 5 mg by mouth daily  5    furosemide (LASIX) 20 mg tablet Take 40 mg by mouth daily        lisinopril (ZESTRIL) 10 mg tablet Take 40 mg by mouth daily        ofloxacin (OCUFLOX) 0 3 % ophthalmic solution Administer 1 drop to the right eye 4 (four) times a day (Patient not taking: Reported on 11/19/2018 ) 5 mL 0    pantoprazole (PROTONIX) 40 mg tablet Take 40 mg by mouth daily        potassium chloride (KLOR-CON) 20 mEq packet Take 20 mEq by mouth daily   prednisoLONE acetate (PRED FORTE) 1 % ophthalmic suspension Administer 1 drop to the right eye 4 (four) times a day (Patient not taking: Reported on 11/19/2018 ) 5 mL 0     No current facility-administered medications for this visit  He has No Known Allergies            Objective:    Blood pressure 142/78, weight 77 6 kg (171 lb)  Physical Exam   Constitutional: He appears well-developed  HENT:   Head: Normocephalic  Eyes: Pupils are equal, round, and reactive to light  Lids are normal    Neck: Normal range of motion  Cardiovascular: Normal rate  Neurological:   Reflex Scores:       Bicep reflexes are 1+ on the right side and 1+ on the left side  Patellar reflexes are 1+ on the right side and 1+ on the left side  Achilles reflexes are Tr on the right side and Tr on the left side  Psychiatric: He has a normal mood and affect         Neurological Exam  Mental Status Oriented to person, place, time and situation  Language is fluent with no aphasia  Attention and concentration are normal     Cranial Nerves  CN II: Visual acuity is normal  Visual fields full to confrontation  CN III, IV, VI: Extraocular movements intact bilaterally  Normal lids and orbits bilaterally  Pupils equal round and reactive to light bilaterally  CN V: Facial sensation is normal   CN VII: Full and symmetric facial movement  CN VIII: Hearing is normal   CN IX, X: Palate elevates symmetrically  Normal gag reflex  CN XI: Shoulder shrug strength is normal   CN XII: Tongue midline without atrophy or fasciculations  Flexion and neck extension were 5/5 feet  There is no fatigability with strength testing  There was no  ptosis and no double vision with superior gaze  There is no change was superior gaze in ptosis or eyelid size       Motor  Normal muscle bulk throughout  Strength is 5/5 in all four extremities except as noted  Sensory  Light touch is normal in upper and lower extremities  Temperature is normal in upper and lower extremities  Reflexes                                           Right                      Left  Biceps                                 1+                         1+  Patellar                                1+                         1+  Achilles                                Tr                         Tr  Plantar                           Downgoing                Downgoing    Coordination  Right: Finger-to-nose normal  Heel-to-shin normal   Left: Finger-to-nose normal  Heel-to-shin normal     Gait  Normal casual, toe, heel and tandem gait  ROS:    Review of Systems   Constitutional: Negative  Negative for appetite change and fever  HENT: Positive for trouble swallowing  Negative for hearing loss, tinnitus and voice change  Eyes: Negative  Negative for photophobia and pain  Respiratory: Negative  Negative for shortness of breath  Cardiovascular: Negative  Negative for palpitations  Gastrointestinal: Negative  Negative for nausea and vomiting  Endocrine: Negative  Negative for cold intolerance and heat intolerance  Genitourinary: Negative  Negative for dysuria, frequency and urgency  Musculoskeletal: Positive for back pain  Negative for myalgias and neck pain  Skin: Negative  Negative for rash  Neurological: Negative  Negative for dizziness, tremors, seizures, syncope, facial asymmetry, speech difficulty, weakness, light-headedness, numbness and headaches  Hematological: Negative  Does not bruise/bleed easily  Psychiatric/Behavioral: Negative  Negative for confusion, hallucinations and sleep disturbance

## 2018-11-19 NOTE — ASSESSMENT & PLAN NOTE
This is a 54-year-old patient with antibody positive myasthenia gravis  This was diagnosed several years ago with the onset of bulbar symptoms  He is currently doing extremely well on CellCept monotherapy of 750 mg twice a day  His mg ADLs score was  0/ 24  We will continue him on the current dose  I have ordered repeat laboratory studies including acetylcholine receptor antibodies     I have asked him to return to our offices in six or seven months however he has any other questions or problems in the interim he is to call

## 2019-01-01 ENCOUNTER — OFFICE VISIT (OUTPATIENT)
Dept: NEUROLOGY | Facility: CLINIC | Age: 74
End: 2019-01-01
Payer: MEDICARE

## 2019-01-01 VITALS
DIASTOLIC BLOOD PRESSURE: 62 MMHG | SYSTOLIC BLOOD PRESSURE: 122 MMHG | WEIGHT: 174.6 LBS | HEART RATE: 63 BPM | RESPIRATION RATE: 12 BRPM | BODY MASS INDEX: 25.78 KG/M2

## 2019-01-01 DIAGNOSIS — I42.9 CARDIOMYOPATHY, UNSPECIFIED TYPE (HCC): ICD-10-CM

## 2019-01-01 DIAGNOSIS — G70.00 MYASTHENIA GRAVIS (HCC): ICD-10-CM

## 2019-01-01 PROCEDURE — 99213 OFFICE O/P EST LOW 20 MIN: CPT | Performed by: PSYCHIATRY & NEUROLOGY

## 2019-01-01 RX ORDER — MYCOPHENOLATE MOFETIL 250 MG/1
750 CAPSULE ORAL 2 TIMES DAILY
Qty: 540 CAPSULE | Refills: 1 | Status: SHIPPED | OUTPATIENT
Start: 2019-01-01 | End: 2019-01-01

## 2019-01-01 RX ORDER — MYCOPHENOLATE MOFETIL 250 MG/1
CAPSULE ORAL
Qty: 540 CAPSULE | Refills: 1 | Status: SHIPPED | OUTPATIENT
Start: 2019-01-01 | End: 2020-01-01 | Stop reason: HOSPADM

## 2020-01-01 ENCOUNTER — HOSPITAL ENCOUNTER (INPATIENT)
Facility: HOSPITAL | Age: 75
LOS: 9 days | Discharge: NURSING, REHAB OR LONG-TERM CARE | DRG: 280 | End: 2020-03-07
Attending: INTERNAL MEDICINE | Admitting: INTERNAL MEDICINE
Payer: MEDICARE

## 2020-01-01 ENCOUNTER — TELEPHONE (OUTPATIENT)
Dept: OTHER | Facility: HOSPITAL | Age: 75
End: 2020-01-01

## 2020-01-01 ENCOUNTER — TELEPHONE (OUTPATIENT)
Dept: FAMILY MEDICINE CLINIC | Facility: CLINIC | Age: 75
End: 2020-01-01

## 2020-01-01 ENCOUNTER — ANTICOAG VISIT (OUTPATIENT)
Dept: CARDIOLOGY CLINIC | Facility: CLINIC | Age: 75
End: 2020-01-01

## 2020-01-01 ENCOUNTER — APPOINTMENT (INPATIENT)
Dept: RADIOLOGY | Facility: HOSPITAL | Age: 75
DRG: 871 | End: 2020-01-01
Payer: MEDICARE

## 2020-01-01 ENCOUNTER — PATIENT OUTREACH (OUTPATIENT)
Dept: CASE MANAGEMENT | Facility: HOSPITAL | Age: 75
End: 2020-01-01

## 2020-01-01 ENCOUNTER — TELEPHONE (OUTPATIENT)
Dept: UROLOGY | Facility: CLINIC | Age: 75
End: 2020-01-01

## 2020-01-01 ENCOUNTER — APPOINTMENT (INPATIENT)
Dept: RADIOLOGY | Facility: HOSPITAL | Age: 75
DRG: 813 | End: 2020-01-01
Payer: MEDICARE

## 2020-01-01 ENCOUNTER — EPISODE CHANGES (OUTPATIENT)
Dept: CASE MANAGEMENT | Facility: HOSPITAL | Age: 75
End: 2020-01-01

## 2020-01-01 ENCOUNTER — TELEPHONE (OUTPATIENT)
Dept: NEPHROLOGY | Facility: CLINIC | Age: 75
End: 2020-01-01

## 2020-01-01 ENCOUNTER — APPOINTMENT (OUTPATIENT)
Dept: LAB | Facility: CLINIC | Age: 75
End: 2020-01-01
Payer: COMMERCIAL

## 2020-01-01 ENCOUNTER — TELEPHONE (OUTPATIENT)
Dept: CARDIOLOGY CLINIC | Facility: CLINIC | Age: 75
End: 2020-01-01

## 2020-01-01 ENCOUNTER — APPOINTMENT (OUTPATIENT)
Dept: LAB | Facility: CLINIC | Age: 75
End: 2020-01-01
Payer: MEDICARE

## 2020-01-01 ENCOUNTER — APPOINTMENT (EMERGENCY)
Dept: CT IMAGING | Facility: HOSPITAL | Age: 75
DRG: 871 | End: 2020-01-01
Payer: MEDICARE

## 2020-01-01 ENCOUNTER — OFFICE VISIT (OUTPATIENT)
Dept: UROLOGY | Facility: CLINIC | Age: 75
End: 2020-01-01
Payer: MEDICARE

## 2020-01-01 ENCOUNTER — TRANSCRIBE ORDERS (OUTPATIENT)
Dept: LAB | Facility: CLINIC | Age: 75
End: 2020-01-01

## 2020-01-01 ENCOUNTER — TELEPHONE (OUTPATIENT)
Dept: UROLOGY | Facility: MEDICAL CENTER | Age: 75
End: 2020-01-01

## 2020-01-01 ENCOUNTER — APPOINTMENT (EMERGENCY)
Dept: RADIOLOGY | Facility: HOSPITAL | Age: 75
DRG: 280 | End: 2020-01-01
Payer: MEDICARE

## 2020-01-01 ENCOUNTER — TELEMEDICINE (OUTPATIENT)
Dept: NEPHROLOGY | Facility: CLINIC | Age: 75
End: 2020-01-01
Payer: MEDICARE

## 2020-01-01 ENCOUNTER — APPOINTMENT (INPATIENT)
Dept: NON INVASIVE DIAGNOSTICS | Facility: HOSPITAL | Age: 75
DRG: 280 | End: 2020-01-01
Attending: INTERNAL MEDICINE
Payer: MEDICARE

## 2020-01-01 ENCOUNTER — APPOINTMENT (EMERGENCY)
Dept: RADIOLOGY | Facility: HOSPITAL | Age: 75
DRG: 871 | End: 2020-01-01
Payer: MEDICARE

## 2020-01-01 ENCOUNTER — APPOINTMENT (EMERGENCY)
Dept: RADIOLOGY | Facility: HOSPITAL | Age: 75
DRG: 813 | End: 2020-01-01
Payer: MEDICARE

## 2020-01-01 ENCOUNTER — TELEMEDICINE (OUTPATIENT)
Dept: CARDIOLOGY CLINIC | Facility: CLINIC | Age: 75
End: 2020-01-01
Payer: MEDICARE

## 2020-01-01 ENCOUNTER — APPOINTMENT (INPATIENT)
Dept: NON INVASIVE DIAGNOSTICS | Facility: HOSPITAL | Age: 75
DRG: 280 | End: 2020-01-01
Payer: MEDICARE

## 2020-01-01 ENCOUNTER — HOSPITAL ENCOUNTER (INPATIENT)
Facility: HOSPITAL | Age: 75
LOS: 7 days | DRG: 280 | End: 2020-02-27
Attending: EMERGENCY MEDICINE | Admitting: INTERNAL MEDICINE
Payer: MEDICARE

## 2020-01-01 ENCOUNTER — HOSPITAL ENCOUNTER (INPATIENT)
Facility: HOSPITAL | Age: 75
LOS: 8 days | Discharge: HOME HEALTH | DRG: 813 | End: 2020-04-28
Attending: EMERGENCY MEDICINE | Admitting: INTERNAL MEDICINE
Payer: MEDICARE

## 2020-01-01 ENCOUNTER — TELEPHONE (OUTPATIENT)
Dept: OTHER | Facility: OTHER | Age: 75
End: 2020-01-01

## 2020-01-01 ENCOUNTER — APPOINTMENT (INPATIENT)
Dept: ULTRASOUND IMAGING | Facility: HOSPITAL | Age: 75
DRG: 871 | End: 2020-01-01
Payer: MEDICARE

## 2020-01-01 ENCOUNTER — APPOINTMENT (EMERGENCY)
Dept: CT IMAGING | Facility: HOSPITAL | Age: 75
DRG: 813 | End: 2020-01-01
Payer: MEDICARE

## 2020-01-01 ENCOUNTER — APPOINTMENT (INPATIENT)
Dept: CT IMAGING | Facility: HOSPITAL | Age: 75
DRG: 871 | End: 2020-01-01
Payer: MEDICARE

## 2020-01-01 ENCOUNTER — TELEMEDICINE (OUTPATIENT)
Dept: UROLOGY | Facility: CLINIC | Age: 75
End: 2020-01-01
Payer: MEDICARE

## 2020-01-01 ENCOUNTER — PROCEDURE VISIT (OUTPATIENT)
Dept: UROLOGY | Facility: CLINIC | Age: 75
End: 2020-01-01
Payer: MEDICARE

## 2020-01-01 ENCOUNTER — TELEMEDICINE (OUTPATIENT)
Dept: FAMILY MEDICINE CLINIC | Facility: CLINIC | Age: 75
End: 2020-01-01
Payer: MEDICARE

## 2020-01-01 ENCOUNTER — APPOINTMENT (INPATIENT)
Dept: NON INVASIVE DIAGNOSTICS | Facility: HOSPITAL | Age: 75
DRG: 813 | End: 2020-01-01
Payer: MEDICARE

## 2020-01-01 ENCOUNTER — HOSPITAL ENCOUNTER (INPATIENT)
Facility: HOSPITAL | Age: 75
LOS: 6 days | Discharge: HOME HEALTH | DRG: 871 | End: 2020-02-17
Attending: EMERGENCY MEDICINE | Admitting: HOSPITALIST
Payer: MEDICARE

## 2020-01-01 ENCOUNTER — APPOINTMENT (INPATIENT)
Dept: MRI IMAGING | Facility: HOSPITAL | Age: 75
DRG: 280 | End: 2020-01-01
Payer: MEDICARE

## 2020-01-01 ENCOUNTER — APPOINTMENT (INPATIENT)
Dept: RADIOLOGY | Facility: HOSPITAL | Age: 75
DRG: 280 | End: 2020-01-01
Payer: MEDICARE

## 2020-01-01 ENCOUNTER — HOSPITAL ENCOUNTER (INPATIENT)
Facility: HOSPITAL | Age: 75
LOS: 4 days | DRG: 871 | End: 2020-06-11
Attending: EMERGENCY MEDICINE | Admitting: INTERNAL MEDICINE
Payer: MEDICARE

## 2020-01-01 ENCOUNTER — OFFICE VISIT (OUTPATIENT)
Dept: CARDIOLOGY CLINIC | Facility: CLINIC | Age: 75
End: 2020-01-01
Payer: MEDICARE

## 2020-01-01 ENCOUNTER — TELEPHONE (OUTPATIENT)
Dept: NEUROLOGY | Facility: CLINIC | Age: 75
End: 2020-01-01

## 2020-01-01 ENCOUNTER — APPOINTMENT (INPATIENT)
Dept: ULTRASOUND IMAGING | Facility: HOSPITAL | Age: 75
DRG: 280 | End: 2020-01-01
Payer: MEDICARE

## 2020-01-01 ENCOUNTER — EPISODE CHANGES (OUTPATIENT)
Dept: CASE MANAGEMENT | Facility: OTHER | Age: 75
End: 2020-01-01

## 2020-01-01 VITALS
DIASTOLIC BLOOD PRESSURE: 66 MMHG | RESPIRATION RATE: 20 BRPM | TEMPERATURE: 97.9 F | WEIGHT: 173.28 LBS | SYSTOLIC BLOOD PRESSURE: 103 MMHG | HEART RATE: 98 BPM | HEIGHT: 69 IN | OXYGEN SATURATION: 96 % | BODY MASS INDEX: 25.67 KG/M2

## 2020-01-01 VITALS
OXYGEN SATURATION: 96 % | WEIGHT: 166.23 LBS | SYSTOLIC BLOOD PRESSURE: 101 MMHG | TEMPERATURE: 98.1 F | BODY MASS INDEX: 24.62 KG/M2 | DIASTOLIC BLOOD PRESSURE: 73 MMHG | HEIGHT: 69 IN | HEART RATE: 81 BPM | RESPIRATION RATE: 17 BRPM

## 2020-01-01 VITALS
RESPIRATION RATE: 16 BRPM | SYSTOLIC BLOOD PRESSURE: 156 MMHG | OXYGEN SATURATION: 96 % | WEIGHT: 160.4 LBS | DIASTOLIC BLOOD PRESSURE: 85 MMHG | BODY MASS INDEX: 23.69 KG/M2 | TEMPERATURE: 97.9 F | HEART RATE: 84 BPM

## 2020-01-01 VITALS
WEIGHT: 158 LBS | BODY MASS INDEX: 23.4 KG/M2 | DIASTOLIC BLOOD PRESSURE: 50 MMHG | SYSTOLIC BLOOD PRESSURE: 86 MMHG | HEIGHT: 69 IN | OXYGEN SATURATION: 97 % | HEART RATE: 76 BPM

## 2020-01-01 VITALS
HEART RATE: 80 BPM | BODY MASS INDEX: 23.33 KG/M2 | DIASTOLIC BLOOD PRESSURE: 60 MMHG | WEIGHT: 158 LBS | SYSTOLIC BLOOD PRESSURE: 90 MMHG

## 2020-01-01 VITALS
DIASTOLIC BLOOD PRESSURE: 86 MMHG | OXYGEN SATURATION: 99 % | SYSTOLIC BLOOD PRESSURE: 118 MMHG | TEMPERATURE: 97.7 F | RESPIRATION RATE: 20 BRPM | WEIGHT: 183.64 LBS | HEART RATE: 104 BPM | HEIGHT: 69 IN | BODY MASS INDEX: 27.2 KG/M2

## 2020-01-01 VITALS
SYSTOLIC BLOOD PRESSURE: 80 MMHG | BODY MASS INDEX: 23.99 KG/M2 | HEIGHT: 69 IN | WEIGHT: 162 LBS | DIASTOLIC BLOOD PRESSURE: 56 MMHG | TEMPERATURE: 97.6 F | HEART RATE: 78 BPM

## 2020-01-01 VITALS
HEIGHT: 69 IN | BODY MASS INDEX: 23.33 KG/M2 | HEART RATE: 119 BPM | DIASTOLIC BLOOD PRESSURE: 58 MMHG | SYSTOLIC BLOOD PRESSURE: 78 MMHG

## 2020-01-01 VITALS
BODY MASS INDEX: 25.78 KG/M2 | HEART RATE: 106 BPM | WEIGHT: 174.6 LBS | DIASTOLIC BLOOD PRESSURE: 56 MMHG | OXYGEN SATURATION: 89 % | RESPIRATION RATE: 29 BRPM | TEMPERATURE: 96.4 F | SYSTOLIC BLOOD PRESSURE: 94 MMHG

## 2020-01-01 VITALS
WEIGHT: 161 LBS | BODY MASS INDEX: 23.78 KG/M2 | HEART RATE: 79 BPM | DIASTOLIC BLOOD PRESSURE: 54 MMHG | SYSTOLIC BLOOD PRESSURE: 84 MMHG

## 2020-01-01 VITALS — WEIGHT: 155.8 LBS | TEMPERATURE: 97.1 F | HEIGHT: 69 IN | BODY MASS INDEX: 23.08 KG/M2

## 2020-01-01 DIAGNOSIS — I25.5 ISCHEMIC CARDIOMYOPATHY: ICD-10-CM

## 2020-01-01 DIAGNOSIS — I51.3 LV (LEFT VENTRICULAR) MURAL THROMBUS: Primary | ICD-10-CM

## 2020-01-01 DIAGNOSIS — N39.0 URINARY TRACT INFECTION WITH HEMATURIA, SITE UNSPECIFIED: Primary | ICD-10-CM

## 2020-01-01 DIAGNOSIS — D68.9 COAGULOPATHY (HCC): Chronic | ICD-10-CM

## 2020-01-01 DIAGNOSIS — N17.9 ACUTE RENAL FAILURE SUPERIMPOSED ON STAGE 3 CHRONIC KIDNEY DISEASE, UNSPECIFIED ACUTE RENAL FAILURE TYPE: Primary | ICD-10-CM

## 2020-01-01 DIAGNOSIS — N39.0 URINARY TRACT INFECTION WITH HEMATURIA, SITE UNSPECIFIED: ICD-10-CM

## 2020-01-01 DIAGNOSIS — N17.9 AKI (ACUTE KIDNEY INJURY) (HCC): Primary | ICD-10-CM

## 2020-01-01 DIAGNOSIS — N19 RENAL FAILURE: ICD-10-CM

## 2020-01-01 DIAGNOSIS — R31.9 URINARY TRACT INFECTION WITH HEMATURIA, SITE UNSPECIFIED: Primary | ICD-10-CM

## 2020-01-01 DIAGNOSIS — N39.0 UTI (URINARY TRACT INFECTION): Primary | ICD-10-CM

## 2020-01-01 DIAGNOSIS — R78.81 GRAM-NEGATIVE BACTEREMIA: ICD-10-CM

## 2020-01-01 DIAGNOSIS — I51.3 LV (LEFT VENTRICULAR) MURAL THROMBUS: ICD-10-CM

## 2020-01-01 DIAGNOSIS — N13.9 URINARY OBSTRUCTION: ICD-10-CM

## 2020-01-01 DIAGNOSIS — G47.00 INSOMNIA: ICD-10-CM

## 2020-01-01 DIAGNOSIS — I50.22 CHRONIC HFREF (HEART FAILURE WITH REDUCED EJECTION FRACTION) (HCC): Primary | ICD-10-CM

## 2020-01-01 DIAGNOSIS — R79.1 ELEVATED INR (INTERNATIONAL NORMALIZED RATIO) DUE TO PRIOR ANTICOAGULANT MEDICATION INGESTION: ICD-10-CM

## 2020-01-01 DIAGNOSIS — N17.9 ACUTE RENAL FAILURE, UNSPECIFIED ACUTE RENAL FAILURE TYPE (HCC): ICD-10-CM

## 2020-01-01 DIAGNOSIS — I50.22 CHRONIC SYSTOLIC CHF (CONGESTIVE HEART FAILURE), NYHA CLASS 3 (HCC): ICD-10-CM

## 2020-01-01 DIAGNOSIS — N39.0 URINARY TRACT INFECTION WITHOUT HEMATURIA, SITE UNSPECIFIED: Primary | ICD-10-CM

## 2020-01-01 DIAGNOSIS — E78.00 PURE HYPERCHOLESTEROLEMIA: ICD-10-CM

## 2020-01-01 DIAGNOSIS — N17.9 AKI (ACUTE KIDNEY INJURY) (HCC): ICD-10-CM

## 2020-01-01 DIAGNOSIS — E78.5 HYPERLIPIDEMIA LDL GOAL <70: ICD-10-CM

## 2020-01-01 DIAGNOSIS — I50.22 CHRONIC SYSTOLIC CONGESTIVE HEART FAILURE (HCC): ICD-10-CM

## 2020-01-01 DIAGNOSIS — B37.9 CANDIDA INFECTION: ICD-10-CM

## 2020-01-01 DIAGNOSIS — I95.9 HYPOTENSION: ICD-10-CM

## 2020-01-01 DIAGNOSIS — R33.9 URINARY RETENTION: ICD-10-CM

## 2020-01-01 DIAGNOSIS — N17.9 ACUTE RENAL FAILURE SUPERIMPOSED ON STAGE 3 CHRONIC KIDNEY DISEASE, UNSPECIFIED ACUTE RENAL FAILURE TYPE: ICD-10-CM

## 2020-01-01 DIAGNOSIS — G70.00 MYASTHENIA GRAVIS (HCC): ICD-10-CM

## 2020-01-01 DIAGNOSIS — R57.0 CARDIOGENIC SHOCK (HCC): Primary | ICD-10-CM

## 2020-01-01 DIAGNOSIS — I25.10 MULTIPLE VESSEL CORONARY ARTERY DISEASE: ICD-10-CM

## 2020-01-01 DIAGNOSIS — R77.8 ELEVATED TROPONIN: ICD-10-CM

## 2020-01-01 DIAGNOSIS — I21.4 NSTEMI (NON-ST ELEVATED MYOCARDIAL INFARCTION) (HCC): ICD-10-CM

## 2020-01-01 DIAGNOSIS — Z86.69 H/O MYASTHENIA GRAVIS: ICD-10-CM

## 2020-01-01 DIAGNOSIS — K31.6 GASTROCUTANEOUS FISTULA: ICD-10-CM

## 2020-01-01 DIAGNOSIS — K40.90 RIGHT INGUINAL HERNIA: ICD-10-CM

## 2020-01-01 DIAGNOSIS — R30.0 DYSURIA: ICD-10-CM

## 2020-01-01 DIAGNOSIS — E78.5 HYPERLIPIDEMIA, UNSPECIFIED HYPERLIPIDEMIA TYPE: ICD-10-CM

## 2020-01-01 DIAGNOSIS — Z86.73 OLD CEREBROVASCULAR ACCIDENT (CVA) WITHOUT LATE EFFECT: ICD-10-CM

## 2020-01-01 DIAGNOSIS — N17.9 ACUTE KIDNEY INJURY (HCC): ICD-10-CM

## 2020-01-01 DIAGNOSIS — N18.3 ACUTE RENAL FAILURE SUPERIMPOSED ON STAGE 3 CHRONIC KIDNEY DISEASE, UNSPECIFIED ACUTE RENAL FAILURE TYPE: Primary | ICD-10-CM

## 2020-01-01 DIAGNOSIS — I10 ESSENTIAL HYPERTENSION: Chronic | ICD-10-CM

## 2020-01-01 DIAGNOSIS — N18.30 STAGE 3 CHRONIC KIDNEY DISEASE (HCC): ICD-10-CM

## 2020-01-01 DIAGNOSIS — N18.3 ACUTE RENAL FAILURE SUPERIMPOSED ON STAGE 3 CHRONIC KIDNEY DISEASE, UNSPECIFIED ACUTE RENAL FAILURE TYPE: ICD-10-CM

## 2020-01-01 DIAGNOSIS — R33.9 URINARY RETENTION: Primary | ICD-10-CM

## 2020-01-01 DIAGNOSIS — E87.2 METABOLIC ACIDOSIS: ICD-10-CM

## 2020-01-01 DIAGNOSIS — R31.9 URINARY TRACT INFECTION WITH HEMATURIA, SITE UNSPECIFIED: ICD-10-CM

## 2020-01-01 DIAGNOSIS — R57.9 SHOCK (HCC): ICD-10-CM

## 2020-01-01 DIAGNOSIS — I50.43 ACUTE ON CHRONIC COMBINED SYSTOLIC AND DIASTOLIC HEART FAILURE (HCC): ICD-10-CM

## 2020-01-01 DIAGNOSIS — R06.00 DYSPNEA: Primary | ICD-10-CM

## 2020-01-01 DIAGNOSIS — I25.10 MULTIPLE VESSEL CORONARY ARTERY DISEASE: Primary | ICD-10-CM

## 2020-01-01 DIAGNOSIS — R34 ANURIA: ICD-10-CM

## 2020-01-01 DIAGNOSIS — I50.9 CHF (CONGESTIVE HEART FAILURE) (HCC): ICD-10-CM

## 2020-01-01 DIAGNOSIS — I10 ESSENTIAL HYPERTENSION: ICD-10-CM

## 2020-01-01 DIAGNOSIS — E78.2 MIXED HYPERLIPIDEMIA: Primary | ICD-10-CM

## 2020-01-01 DIAGNOSIS — N17.9 ACUTE RENAL FAILURE SUPERIMPOSED ON CHRONIC KIDNEY DISEASE, UNSPECIFIED CKD STAGE, UNSPECIFIED ACUTE RENAL FAILURE TYPE (HCC): ICD-10-CM

## 2020-01-01 DIAGNOSIS — S90.829A BLISTER OF FOOT: ICD-10-CM

## 2020-01-01 DIAGNOSIS — A41.9 SEVERE SEPSIS (HCC): ICD-10-CM

## 2020-01-01 DIAGNOSIS — N39.0 UTI (URINARY TRACT INFECTION): ICD-10-CM

## 2020-01-01 DIAGNOSIS — R65.20 SEVERE SEPSIS (HCC): ICD-10-CM

## 2020-01-01 DIAGNOSIS — N13.30 HYDRONEPHROSIS: ICD-10-CM

## 2020-01-01 DIAGNOSIS — K59.00 CONSTIPATION: ICD-10-CM

## 2020-01-01 DIAGNOSIS — N18.9 ACUTE RENAL FAILURE SUPERIMPOSED ON CHRONIC KIDNEY DISEASE, UNSPECIFIED CKD STAGE, UNSPECIFIED ACUTE RENAL FAILURE TYPE (HCC): ICD-10-CM

## 2020-01-01 DIAGNOSIS — A41.9 SEPSIS (HCC): ICD-10-CM

## 2020-01-01 DIAGNOSIS — L30.4 INTERTRIGO: ICD-10-CM

## 2020-01-01 DIAGNOSIS — R82.90 CLOUDY URINE: ICD-10-CM

## 2020-01-01 DIAGNOSIS — D64.9 ANEMIA, UNSPECIFIED TYPE: ICD-10-CM

## 2020-01-01 DIAGNOSIS — E03.9 HYPOTHYROID: ICD-10-CM

## 2020-01-01 DIAGNOSIS — R35.0 URINARY FREQUENCY: ICD-10-CM

## 2020-01-01 DIAGNOSIS — M54.50 LOW BACK PAIN, UNSPECIFIED BACK PAIN LATERALITY, UNSPECIFIED CHRONICITY, UNSPECIFIED WHETHER SCIATICA PRESENT: Primary | ICD-10-CM

## 2020-01-01 LAB
ABO GROUP BLD BPU: NORMAL
ABO GROUP BLD: NORMAL
ACANTHOCYTES BLD QL SMEAR: PRESENT
ALBUMIN SERPL BCP-MCNC: 2.3 G/DL (ref 3.5–5)
ALBUMIN SERPL BCP-MCNC: 2.5 G/DL (ref 3.5–5)
ALBUMIN SERPL BCP-MCNC: 2.6 G/DL (ref 3.5–5)
ALBUMIN SERPL BCP-MCNC: 2.8 G/DL (ref 3.5–5)
ALBUMIN SERPL BCP-MCNC: 3.6 G/DL (ref 3.5–5)
ALBUMIN SERPL BCP-MCNC: 3.9 G/DL (ref 3.5–5)
ALP SERPL-CCNC: 62 U/L (ref 46–116)
ALP SERPL-CCNC: 76 U/L (ref 46–116)
ALP SERPL-CCNC: 76 U/L (ref 46–116)
ALP SERPL-CCNC: 80 U/L (ref 46–116)
ALP SERPL-CCNC: 82 U/L (ref 46–116)
ALP SERPL-CCNC: 86 U/L (ref 46–116)
ALP SERPL-CCNC: 86 U/L (ref 46–116)
ALP SERPL-CCNC: 87 U/L (ref 46–116)
ALT SERPL W P-5'-P-CCNC: 10 U/L (ref 12–78)
ALT SERPL W P-5'-P-CCNC: 115 U/L (ref 12–78)
ALT SERPL W P-5'-P-CCNC: 21 U/L (ref 12–78)
ALT SERPL W P-5'-P-CCNC: 22 U/L (ref 12–78)
ALT SERPL W P-5'-P-CCNC: 29 U/L (ref 12–78)
ALT SERPL W P-5'-P-CCNC: 32 U/L (ref 12–78)
ALT SERPL W P-5'-P-CCNC: 33 U/L (ref 12–78)
ALT SERPL W P-5'-P-CCNC: 68 U/L (ref 12–78)
ANION GAP SERPL CALCULATED.3IONS-SCNC: 10 MMOL/L (ref 4–13)
ANION GAP SERPL CALCULATED.3IONS-SCNC: 11 MMOL/L (ref 4–13)
ANION GAP SERPL CALCULATED.3IONS-SCNC: 12 MMOL/L (ref 4–13)
ANION GAP SERPL CALCULATED.3IONS-SCNC: 12 MMOL/L (ref 4–13)
ANION GAP SERPL CALCULATED.3IONS-SCNC: 13 MMOL/L (ref 4–13)
ANION GAP SERPL CALCULATED.3IONS-SCNC: 14 MMOL/L (ref 4–13)
ANION GAP SERPL CALCULATED.3IONS-SCNC: 15 MMOL/L (ref 4–13)
ANION GAP SERPL CALCULATED.3IONS-SCNC: 16 MMOL/L (ref 4–13)
ANION GAP SERPL CALCULATED.3IONS-SCNC: 17 MMOL/L (ref 4–13)
ANION GAP SERPL CALCULATED.3IONS-SCNC: 17 MMOL/L (ref 4–13)
ANION GAP SERPL CALCULATED.3IONS-SCNC: 18 MMOL/L (ref 4–13)
ANION GAP SERPL CALCULATED.3IONS-SCNC: 19 MMOL/L (ref 4–13)
ANION GAP SERPL CALCULATED.3IONS-SCNC: 19 MMOL/L (ref 4–13)
ANION GAP SERPL CALCULATED.3IONS-SCNC: 20 MMOL/L (ref 4–13)
ANION GAP SERPL CALCULATED.3IONS-SCNC: 20 MMOL/L (ref 4–13)
ANION GAP SERPL CALCULATED.3IONS-SCNC: 24 MMOL/L (ref 4–13)
ANION GAP SERPL CALCULATED.3IONS-SCNC: 3 MMOL/L (ref 4–13)
ANION GAP SERPL CALCULATED.3IONS-SCNC: 4 MMOL/L (ref 4–13)
ANION GAP SERPL CALCULATED.3IONS-SCNC: 5 MMOL/L (ref 4–13)
ANION GAP SERPL CALCULATED.3IONS-SCNC: 6 MMOL/L (ref 4–13)
ANION GAP SERPL CALCULATED.3IONS-SCNC: 7 MMOL/L (ref 4–13)
ANION GAP SERPL CALCULATED.3IONS-SCNC: 8 MMOL/L (ref 4–13)
ANION GAP SERPL CALCULATED.3IONS-SCNC: 9 MMOL/L (ref 4–13)
APTT PPP: 100 SECONDS (ref 23–37)
APTT PPP: 103 SECONDS (ref 23–37)
APTT PPP: 120 SECONDS (ref 23–37)
APTT PPP: 175 SECONDS (ref 23–37)
APTT PPP: 29 SECONDS (ref 23–37)
APTT PPP: 30 SECONDS (ref 23–37)
APTT PPP: 32 SECONDS (ref 23–37)
APTT PPP: 35 SECONDS (ref 23–37)
APTT PPP: 35 SECONDS (ref 23–37)
APTT PPP: 41 SECONDS (ref 23–37)
APTT PPP: 41 SECONDS (ref 23–37)
APTT PPP: 44 SECONDS (ref 23–37)
APTT PPP: 46 SECONDS (ref 23–37)
APTT PPP: 47 SECONDS (ref 23–37)
APTT PPP: 55 SECONDS (ref 23–37)
APTT PPP: 59 SECONDS (ref 23–37)
APTT PPP: 59 SECONDS (ref 23–37)
APTT PPP: 60 SECONDS (ref 23–37)
APTT PPP: 60 SECONDS (ref 23–37)
APTT PPP: 61 SECONDS (ref 23–37)
APTT PPP: 62 SECONDS (ref 23–37)
APTT PPP: 63 SECONDS (ref 23–37)
APTT PPP: 65 SECONDS (ref 23–37)
APTT PPP: 67 SECONDS (ref 23–37)
APTT PPP: 70 SECONDS (ref 23–37)
APTT PPP: 72 SECONDS (ref 23–37)
APTT PPP: 73 SECONDS (ref 23–37)
APTT PPP: 74 SECONDS (ref 23–37)
APTT PPP: 77 SECONDS (ref 23–37)
APTT PPP: 77 SECONDS (ref 23–37)
APTT PPP: 79 SECONDS (ref 23–37)
APTT PPP: 80 SECONDS (ref 23–37)
APTT PPP: 82 SECONDS (ref 23–37)
APTT PPP: 82 SECONDS (ref 23–37)
APTT PPP: 86 SECONDS (ref 23–37)
APTT PPP: 88 SECONDS (ref 23–37)
APTT PPP: 89 SECONDS (ref 23–37)
APTT PPP: 89 SECONDS (ref 23–37)
APTT PPP: 90 SECONDS (ref 23–37)
APTT PPP: 91 SECONDS (ref 23–37)
APTT PPP: 99 SECONDS (ref 23–37)
APTT PPP: 99 SECONDS (ref 23–37)
ARTERIAL PATENCY WRIST A: NO
ARTERIAL PATENCY WRIST A: YES
ARTERIAL PATENCY WRIST A: YES
AST SERPL W P-5'-P-CCNC: 27 U/L (ref 5–45)
AST SERPL W P-5'-P-CCNC: 298 U/L (ref 5–45)
AST SERPL W P-5'-P-CCNC: 31 U/L (ref 5–45)
AST SERPL W P-5'-P-CCNC: 36 U/L (ref 5–45)
AST SERPL W P-5'-P-CCNC: 41 U/L (ref 5–45)
AST SERPL W P-5'-P-CCNC: 42 U/L (ref 5–45)
AST SERPL W P-5'-P-CCNC: 441 U/L (ref 5–45)
AST SERPL W P-5'-P-CCNC: 45 U/L (ref 5–45)
ATRIAL RATE: 103 BPM
ATRIAL RATE: 110 BPM
ATRIAL RATE: 136 BPM
ATRIAL RATE: 74 BPM
ATRIAL RATE: 84 BPM
ATRIAL RATE: 87 BPM
ATRIAL RATE: 87 BPM
BACTERIA BLD CULT: ABNORMAL
BACTERIA BLD CULT: ABNORMAL
BACTERIA BLD CULT: NORMAL
BACTERIA UR CULT: ABNORMAL
BACTERIA UR QL AUTO: ABNORMAL /HPF
BASE EX.OXY STD BLDV CALC-SCNC: 45.2 % (ref 60–80)
BASE EX.OXY STD BLDV CALC-SCNC: 48.5 % (ref 60–80)
BASE EX.OXY STD BLDV CALC-SCNC: 48.9 % (ref 60–80)
BASE EX.OXY STD BLDV CALC-SCNC: 49.9 % (ref 60–80)
BASE EX.OXY STD BLDV CALC-SCNC: 54.4 % (ref 60–80)
BASE EX.OXY STD BLDV CALC-SCNC: 54.6 % (ref 60–80)
BASE EX.OXY STD BLDV CALC-SCNC: 60.2 % (ref 60–80)
BASE EX.OXY STD BLDV CALC-SCNC: 63.4 % (ref 60–80)
BASE EX.OXY STD BLDV CALC-SCNC: 64.9 % (ref 60–80)
BASE EX.OXY STD BLDV CALC-SCNC: 65 % (ref 60–80)
BASE EX.OXY STD BLDV CALC-SCNC: 65.9 % (ref 60–80)
BASE EX.OXY STD BLDV CALC-SCNC: 66 % (ref 60–80)
BASE EX.OXY STD BLDV CALC-SCNC: 82 % (ref 60–80)
BASE EX.OXY STD BLDV CALC-SCNC: 85.3 % (ref 60–80)
BASE EX.OXY STD BLDV CALC-SCNC: 85.5 % (ref 60–80)
BASE EX.OXY STD BLDV CALC-SCNC: 91.8 % (ref 60–80)
BASE EXCESS BLDA CALC-SCNC: -10.3 MMOL/L
BASE EXCESS BLDA CALC-SCNC: -13.8 MMOL/L
BASE EXCESS BLDA CALC-SCNC: -15.5 MMOL/L
BASE EXCESS BLDA CALC-SCNC: -2.4 MMOL/L
BASE EXCESS BLDV CALC-SCNC: -11.1 MMOL/L
BASE EXCESS BLDV CALC-SCNC: -14.8 MMOL/L
BASE EXCESS BLDV CALC-SCNC: -16 MMOL/L
BASE EXCESS BLDV CALC-SCNC: -25.1 MMOL/L
BASE EXCESS BLDV CALC-SCNC: -4.6 MMOL/L
BASE EXCESS BLDV CALC-SCNC: -5.6 MMOL/L
BASE EXCESS BLDV CALC-SCNC: -6.3 MMOL/L
BASE EXCESS BLDV CALC-SCNC: -6.9 MMOL/L
BASE EXCESS BLDV CALC-SCNC: -6.9 MMOL/L
BASE EXCESS BLDV CALC-SCNC: -7.2 MMOL/L
BASE EXCESS BLDV CALC-SCNC: -9.6 MMOL/L
BASE EXCESS BLDV CALC-SCNC: 0.4 MMOL/L
BASE EXCESS BLDV CALC-SCNC: 11.1 MMOL/L
BASE EXCESS BLDV CALC-SCNC: 11.5 MMOL/L
BASE EXCESS BLDV CALC-SCNC: 3.9 MMOL/L
BASE EXCESS BLDV CALC-SCNC: 6.8 MMOL/L
BASOPHILS # BLD AUTO: 0.01 THOUSANDS/ΜL (ref 0–0.1)
BASOPHILS # BLD AUTO: 0.01 THOUSANDS/ΜL (ref 0–0.1)
BASOPHILS # BLD AUTO: 0.02 THOUSANDS/ΜL (ref 0–0.1)
BASOPHILS # BLD AUTO: 0.03 THOUSANDS/ΜL (ref 0–0.1)
BASOPHILS # BLD AUTO: 0.04 THOUSANDS/ΜL (ref 0–0.1)
BASOPHILS # BLD AUTO: 0.05 THOUSANDS/ΜL (ref 0–0.1)
BASOPHILS # BLD MANUAL: 0 THOUSAND/UL (ref 0–0.1)
BASOPHILS NFR BLD AUTO: 0 % (ref 0–1)
BASOPHILS NFR MAR MANUAL: 0 % (ref 0–1)
BILIRUB SERPL-MCNC: 0.23 MG/DL (ref 0.2–1)
BILIRUB SERPL-MCNC: 0.27 MG/DL (ref 0.2–1)
BILIRUB SERPL-MCNC: 0.42 MG/DL (ref 0.2–1)
BILIRUB SERPL-MCNC: 0.7 MG/DL (ref 0.2–1)
BILIRUB SERPL-MCNC: 0.85 MG/DL (ref 0.2–1)
BILIRUB SERPL-MCNC: 1.07 MG/DL (ref 0.2–1)
BILIRUB SERPL-MCNC: 1.36 MG/DL (ref 0.2–1)
BILIRUB SERPL-MCNC: 1.75 MG/DL (ref 0.2–1)
BILIRUB UR QL STRIP: ABNORMAL
BILIRUB UR QL STRIP: NEGATIVE
BLD GP AB SCN SERPL QL: NEGATIVE
BLD GP AB SCN SERPL QL: NEGATIVE
BODY TEMPERATURE: 97.6 DEGREES FEHRENHEIT
BPU ID: NORMAL
BUN SERPL-MCNC: 102 MG/DL (ref 5–25)
BUN SERPL-MCNC: 105 MG/DL (ref 5–25)
BUN SERPL-MCNC: 106 MG/DL (ref 5–25)
BUN SERPL-MCNC: 25 MG/DL (ref 5–25)
BUN SERPL-MCNC: 28 MG/DL (ref 5–25)
BUN SERPL-MCNC: 28 MG/DL (ref 5–25)
BUN SERPL-MCNC: 29 MG/DL (ref 5–25)
BUN SERPL-MCNC: 30 MG/DL (ref 5–25)
BUN SERPL-MCNC: 30 MG/DL (ref 5–25)
BUN SERPL-MCNC: 31 MG/DL (ref 5–25)
BUN SERPL-MCNC: 31 MG/DL (ref 5–25)
BUN SERPL-MCNC: 32 MG/DL (ref 5–25)
BUN SERPL-MCNC: 36 MG/DL (ref 5–25)
BUN SERPL-MCNC: 37 MG/DL (ref 5–25)
BUN SERPL-MCNC: 38 MG/DL (ref 5–25)
BUN SERPL-MCNC: 39 MG/DL (ref 5–25)
BUN SERPL-MCNC: 40 MG/DL (ref 5–25)
BUN SERPL-MCNC: 41 MG/DL (ref 5–25)
BUN SERPL-MCNC: 42 MG/DL (ref 5–25)
BUN SERPL-MCNC: 43 MG/DL (ref 5–25)
BUN SERPL-MCNC: 44 MG/DL (ref 5–25)
BUN SERPL-MCNC: 46 MG/DL (ref 5–25)
BUN SERPL-MCNC: 48 MG/DL (ref 5–25)
BUN SERPL-MCNC: 48 MG/DL (ref 5–25)
BUN SERPL-MCNC: 51 MG/DL (ref 5–25)
BUN SERPL-MCNC: 52 MG/DL (ref 5–25)
BUN SERPL-MCNC: 54 MG/DL (ref 5–25)
BUN SERPL-MCNC: 56 MG/DL (ref 5–25)
BUN SERPL-MCNC: 56 MG/DL (ref 5–25)
BUN SERPL-MCNC: 57 MG/DL (ref 5–25)
BUN SERPL-MCNC: 57 MG/DL (ref 5–25)
BUN SERPL-MCNC: 59 MG/DL (ref 5–25)
BUN SERPL-MCNC: 59 MG/DL (ref 5–25)
BUN SERPL-MCNC: 60 MG/DL (ref 5–25)
BUN SERPL-MCNC: 60 MG/DL (ref 5–25)
BUN SERPL-MCNC: 61 MG/DL (ref 5–25)
BUN SERPL-MCNC: 63 MG/DL (ref 5–25)
BUN SERPL-MCNC: 70 MG/DL (ref 5–25)
BUN SERPL-MCNC: 76 MG/DL (ref 5–25)
BUN SERPL-MCNC: 78 MG/DL (ref 5–25)
BUN SERPL-MCNC: 79 MG/DL (ref 5–25)
BUN SERPL-MCNC: 80 MG/DL (ref 5–25)
BUN SERPL-MCNC: 81 MG/DL (ref 5–25)
BUN SERPL-MCNC: 92 MG/DL (ref 5–25)
BUN SERPL-MCNC: 97 MG/DL (ref 5–25)
BURR CELLS BLD QL SMEAR: PRESENT
CA-I BLD-SCNC: 0.92 MMOL/L (ref 1.12–1.32)
CA-I BLD-SCNC: 0.97 MMOL/L (ref 1.12–1.32)
CA-I BLD-SCNC: 1.01 MMOL/L (ref 1.12–1.32)
CA-I BLD-SCNC: 1.06 MMOL/L (ref 1.12–1.32)
CALCIUM SERPL-MCNC: 7 MG/DL (ref 8.3–10.1)
CALCIUM SERPL-MCNC: 7 MG/DL (ref 8.3–10.1)
CALCIUM SERPL-MCNC: 7.1 MG/DL (ref 8.3–10.1)
CALCIUM SERPL-MCNC: 7.2 MG/DL (ref 8.3–10.1)
CALCIUM SERPL-MCNC: 7.2 MG/DL (ref 8.3–10.1)
CALCIUM SERPL-MCNC: 7.3 MG/DL (ref 8.3–10.1)
CALCIUM SERPL-MCNC: 7.4 MG/DL (ref 8.3–10.1)
CALCIUM SERPL-MCNC: 7.5 MG/DL (ref 8.3–10.1)
CALCIUM SERPL-MCNC: 7.6 MG/DL (ref 8.3–10.1)
CALCIUM SERPL-MCNC: 7.7 MG/DL (ref 8.3–10.1)
CALCIUM SERPL-MCNC: 7.7 MG/DL (ref 8.3–10.1)
CALCIUM SERPL-MCNC: 7.8 MG/DL (ref 8.3–10.1)
CALCIUM SERPL-MCNC: 7.9 MG/DL (ref 8.3–10.1)
CALCIUM SERPL-MCNC: 8 MG/DL (ref 8.3–10.1)
CALCIUM SERPL-MCNC: 8 MG/DL (ref 8.3–10.1)
CALCIUM SERPL-MCNC: 8.1 MG/DL (ref 8.3–10.1)
CALCIUM SERPL-MCNC: 8.2 MG/DL (ref 8.3–10.1)
CALCIUM SERPL-MCNC: 8.2 MG/DL (ref 8.3–10.1)
CALCIUM SERPL-MCNC: 8.3 MG/DL (ref 8.3–10.1)
CALCIUM SERPL-MCNC: 8.4 MG/DL (ref 8.3–10.1)
CALCIUM SERPL-MCNC: 8.5 MG/DL (ref 8.3–10.1)
CALCIUM SERPL-MCNC: 8.6 MG/DL (ref 8.3–10.1)
CALCIUM SERPL-MCNC: 8.7 MG/DL (ref 8.3–10.1)
CALCIUM SERPL-MCNC: 8.7 MG/DL (ref 8.3–10.1)
CALCIUM SERPL-MCNC: 8.8 MG/DL (ref 8.3–10.1)
CALCIUM SERPL-MCNC: 8.9 MG/DL (ref 8.3–10.1)
CALCIUM SERPL-MCNC: 9.2 MG/DL (ref 8.3–10.1)
CHLORIDE SERPL-SCNC: 100 MMOL/L (ref 100–108)
CHLORIDE SERPL-SCNC: 101 MMOL/L (ref 100–108)
CHLORIDE SERPL-SCNC: 102 MMOL/L (ref 100–108)
CHLORIDE SERPL-SCNC: 103 MMOL/L (ref 100–108)
CHLORIDE SERPL-SCNC: 104 MMOL/L (ref 100–108)
CHLORIDE SERPL-SCNC: 105 MMOL/L (ref 100–108)
CHLORIDE SERPL-SCNC: 106 MMOL/L (ref 100–108)
CHLORIDE SERPL-SCNC: 107 MMOL/L (ref 100–108)
CHLORIDE SERPL-SCNC: 108 MMOL/L (ref 100–108)
CHLORIDE SERPL-SCNC: 108 MMOL/L (ref 100–108)
CHLORIDE SERPL-SCNC: 109 MMOL/L (ref 100–108)
CHLORIDE SERPL-SCNC: 98 MMOL/L (ref 100–108)
CHLORIDE SERPL-SCNC: 99 MMOL/L (ref 100–108)
CHOLEST SERPL-MCNC: 95 MG/DL (ref 50–200)
CLARITY UR: ABNORMAL
CO2 SERPL-SCNC: 10 MMOL/L (ref 21–32)
CO2 SERPL-SCNC: 10 MMOL/L (ref 21–32)
CO2 SERPL-SCNC: 12 MMOL/L (ref 21–32)
CO2 SERPL-SCNC: 12 MMOL/L (ref 21–32)
CO2 SERPL-SCNC: 13 MMOL/L (ref 21–32)
CO2 SERPL-SCNC: 13 MMOL/L (ref 21–32)
CO2 SERPL-SCNC: 14 MMOL/L (ref 21–32)
CO2 SERPL-SCNC: 17 MMOL/L (ref 21–32)
CO2 SERPL-SCNC: 17 MMOL/L (ref 21–32)
CO2 SERPL-SCNC: 18 MMOL/L (ref 21–32)
CO2 SERPL-SCNC: 19 MMOL/L (ref 21–32)
CO2 SERPL-SCNC: 20 MMOL/L (ref 21–32)
CO2 SERPL-SCNC: 21 MMOL/L (ref 21–32)
CO2 SERPL-SCNC: 22 MMOL/L (ref 21–32)
CO2 SERPL-SCNC: 22 MMOL/L (ref 21–32)
CO2 SERPL-SCNC: 23 MMOL/L (ref 21–32)
CO2 SERPL-SCNC: 24 MMOL/L (ref 21–32)
CO2 SERPL-SCNC: 25 MMOL/L (ref 21–32)
CO2 SERPL-SCNC: 25 MMOL/L (ref 21–32)
CO2 SERPL-SCNC: 26 MMOL/L (ref 21–32)
CO2 SERPL-SCNC: 27 MMOL/L (ref 21–32)
CO2 SERPL-SCNC: 27 MMOL/L (ref 21–32)
CO2 SERPL-SCNC: 28 MMOL/L (ref 21–32)
CO2 SERPL-SCNC: 30 MMOL/L (ref 21–32)
CO2 SERPL-SCNC: 31 MMOL/L (ref 21–32)
CO2 SERPL-SCNC: 32 MMOL/L (ref 21–32)
CO2 SERPL-SCNC: 33 MMOL/L (ref 21–32)
CO2 SERPL-SCNC: 34 MMOL/L (ref 21–32)
CO2 SERPL-SCNC: 34 MMOL/L (ref 21–32)
CO2 SERPL-SCNC: 35 MMOL/L (ref 21–32)
CO2 SERPL-SCNC: 35 MMOL/L (ref 21–32)
CO2 SERPL-SCNC: 36 MMOL/L (ref 21–32)
CO2 SERPL-SCNC: 37 MMOL/L (ref 21–32)
CO2 SERPL-SCNC: 38 MMOL/L (ref 21–32)
CO2 SERPL-SCNC: 38 MMOL/L (ref 21–32)
COLOR UR: ABNORMAL
COLOR UR: ABNORMAL
COLOR UR: YELLOW
CORTIS SERPL-MCNC: 14 UG/DL
CORTIS SERPL-MCNC: 34.6 UG/DL
CORTIS SERPL-MCNC: 43.8 UG/DL
CREAT SERPL-MCNC: 1.06 MG/DL (ref 0.6–1.3)
CREAT SERPL-MCNC: 1.1 MG/DL (ref 0.6–1.3)
CREAT SERPL-MCNC: 1.14 MG/DL (ref 0.6–1.3)
CREAT SERPL-MCNC: 1.17 MG/DL (ref 0.6–1.3)
CREAT SERPL-MCNC: 1.19 MG/DL (ref 0.6–1.3)
CREAT SERPL-MCNC: 1.22 MG/DL (ref 0.6–1.3)
CREAT SERPL-MCNC: 1.24 MG/DL (ref 0.6–1.3)
CREAT SERPL-MCNC: 1.25 MG/DL (ref 0.6–1.3)
CREAT SERPL-MCNC: 1.25 MG/DL (ref 0.6–1.3)
CREAT SERPL-MCNC: 1.26 MG/DL (ref 0.6–1.3)
CREAT SERPL-MCNC: 1.26 MG/DL (ref 0.6–1.3)
CREAT SERPL-MCNC: 1.3 MG/DL (ref 0.6–1.3)
CREAT SERPL-MCNC: 1.32 MG/DL (ref 0.6–1.3)
CREAT SERPL-MCNC: 1.43 MG/DL (ref 0.6–1.3)
CREAT SERPL-MCNC: 1.43 MG/DL (ref 0.6–1.3)
CREAT SERPL-MCNC: 1.47 MG/DL (ref 0.6–1.3)
CREAT SERPL-MCNC: 1.55 MG/DL (ref 0.6–1.3)
CREAT SERPL-MCNC: 1.57 MG/DL (ref 0.6–1.3)
CREAT SERPL-MCNC: 1.58 MG/DL (ref 0.6–1.3)
CREAT SERPL-MCNC: 1.59 MG/DL (ref 0.6–1.3)
CREAT SERPL-MCNC: 1.7 MG/DL (ref 0.6–1.3)
CREAT SERPL-MCNC: 1.75 MG/DL (ref 0.6–1.3)
CREAT SERPL-MCNC: 1.75 MG/DL (ref 0.6–1.3)
CREAT SERPL-MCNC: 1.76 MG/DL (ref 0.6–1.3)
CREAT SERPL-MCNC: 1.83 MG/DL (ref 0.6–1.3)
CREAT SERPL-MCNC: 1.87 MG/DL (ref 0.6–1.3)
CREAT SERPL-MCNC: 1.94 MG/DL (ref 0.6–1.3)
CREAT SERPL-MCNC: 2 MG/DL (ref 0.6–1.3)
CREAT SERPL-MCNC: 2.01 MG/DL (ref 0.6–1.3)
CREAT SERPL-MCNC: 2.03 MG/DL (ref 0.6–1.3)
CREAT SERPL-MCNC: 2.04 MG/DL (ref 0.6–1.3)
CREAT SERPL-MCNC: 2.06 MG/DL (ref 0.6–1.3)
CREAT SERPL-MCNC: 2.15 MG/DL (ref 0.6–1.3)
CREAT SERPL-MCNC: 2.16 MG/DL (ref 0.6–1.3)
CREAT SERPL-MCNC: 2.39 MG/DL (ref 0.6–1.3)
CREAT SERPL-MCNC: 2.44 MG/DL (ref 0.6–1.3)
CREAT SERPL-MCNC: 2.74 MG/DL (ref 0.6–1.3)
CREAT SERPL-MCNC: 3.58 MG/DL (ref 0.6–1.3)
CREAT SERPL-MCNC: 4.92 MG/DL (ref 0.6–1.3)
CREAT SERPL-MCNC: 5.39 MG/DL (ref 0.6–1.3)
CREAT SERPL-MCNC: 6 MG/DL (ref 0.6–1.3)
CREAT SERPL-MCNC: 6.13 MG/DL (ref 0.6–1.3)
CREAT SERPL-MCNC: 6.23 MG/DL (ref 0.6–1.3)
CREAT SERPL-MCNC: 6.24 MG/DL (ref 0.6–1.3)
CREAT SERPL-MCNC: 6.31 MG/DL (ref 0.6–1.3)
CREAT SERPL-MCNC: 6.33 MG/DL (ref 0.6–1.3)
CREAT SERPL-MCNC: 6.34 MG/DL (ref 0.6–1.3)
CREAT SERPL-MCNC: 6.34 MG/DL (ref 0.6–1.3)
CREAT SERPL-MCNC: 6.36 MG/DL (ref 0.6–1.3)
CREAT SERPL-MCNC: 6.37 MG/DL (ref 0.6–1.3)
CREAT SERPL-MCNC: 6.4 MG/DL (ref 0.6–1.3)
CREAT SERPL-MCNC: 6.41 MG/DL (ref 0.6–1.3)
CREAT SERPL-MCNC: 6.46 MG/DL (ref 0.6–1.3)
CREAT SERPL-MCNC: 6.57 MG/DL (ref 0.6–1.3)
CREAT SERPL-MCNC: 7.01 MG/DL (ref 0.6–1.3)
CREAT UR-MCNC: 118 MG/DL
EOSINOPHIL # BLD AUTO: 0 THOUSAND/ΜL (ref 0–0.61)
EOSINOPHIL # BLD AUTO: 0.01 THOUSAND/ΜL (ref 0–0.61)
EOSINOPHIL # BLD AUTO: 0.03 THOUSAND/ΜL (ref 0–0.61)
EOSINOPHIL # BLD AUTO: 0.03 THOUSAND/ΜL (ref 0–0.61)
EOSINOPHIL # BLD AUTO: 0.06 THOUSAND/ΜL (ref 0–0.61)
EOSINOPHIL # BLD AUTO: 0.07 THOUSAND/ΜL (ref 0–0.61)
EOSINOPHIL # BLD AUTO: 0.09 THOUSAND/ΜL (ref 0–0.61)
EOSINOPHIL # BLD AUTO: 0.11 THOUSAND/ΜL (ref 0–0.61)
EOSINOPHIL # BLD AUTO: 0.16 THOUSAND/ΜL (ref 0–0.61)
EOSINOPHIL # BLD AUTO: 0.17 THOUSAND/ΜL (ref 0–0.61)
EOSINOPHIL # BLD AUTO: 0.2 THOUSAND/ΜL (ref 0–0.61)
EOSINOPHIL # BLD MANUAL: 0 THOUSAND/UL (ref 0–0.4)
EOSINOPHIL NFR BLD AUTO: 0 % (ref 0–6)
EOSINOPHIL NFR BLD AUTO: 1 % (ref 0–6)
EOSINOPHIL NFR BLD AUTO: 2 % (ref 0–6)
EOSINOPHIL NFR BLD MANUAL: 0 % (ref 0–6)
ERYTHROCYTE [DISTWIDTH] IN BLOOD BY AUTOMATED COUNT: 13.9 % (ref 11.6–15.1)
ERYTHROCYTE [DISTWIDTH] IN BLOOD BY AUTOMATED COUNT: 14 % (ref 11.6–15.1)
ERYTHROCYTE [DISTWIDTH] IN BLOOD BY AUTOMATED COUNT: 14.1 % (ref 11.6–15.1)
ERYTHROCYTE [DISTWIDTH] IN BLOOD BY AUTOMATED COUNT: 14.1 % (ref 11.6–15.1)
ERYTHROCYTE [DISTWIDTH] IN BLOOD BY AUTOMATED COUNT: 14.7 % (ref 11.6–15.1)
ERYTHROCYTE [DISTWIDTH] IN BLOOD BY AUTOMATED COUNT: 14.9 % (ref 11.6–15.1)
ERYTHROCYTE [DISTWIDTH] IN BLOOD BY AUTOMATED COUNT: 15 % (ref 11.6–15.1)
ERYTHROCYTE [DISTWIDTH] IN BLOOD BY AUTOMATED COUNT: 15.2 % (ref 11.6–15.1)
ERYTHROCYTE [DISTWIDTH] IN BLOOD BY AUTOMATED COUNT: 15.3 % (ref 11.6–15.1)
ERYTHROCYTE [DISTWIDTH] IN BLOOD BY AUTOMATED COUNT: 15.5 % (ref 11.6–15.1)
ERYTHROCYTE [DISTWIDTH] IN BLOOD BY AUTOMATED COUNT: 15.5 % (ref 11.6–15.1)
ERYTHROCYTE [DISTWIDTH] IN BLOOD BY AUTOMATED COUNT: 15.6 % (ref 11.6–15.1)
ERYTHROCYTE [DISTWIDTH] IN BLOOD BY AUTOMATED COUNT: 15.8 % (ref 11.6–15.1)
ERYTHROCYTE [DISTWIDTH] IN BLOOD BY AUTOMATED COUNT: 16.2 % (ref 11.6–15.1)
ERYTHROCYTE [DISTWIDTH] IN BLOOD BY AUTOMATED COUNT: 16.3 % (ref 11.6–15.1)
ERYTHROCYTE [DISTWIDTH] IN BLOOD BY AUTOMATED COUNT: 16.3 % (ref 11.6–15.1)
ERYTHROCYTE [DISTWIDTH] IN BLOOD BY AUTOMATED COUNT: 16.4 % (ref 11.6–15.1)
ERYTHROCYTE [DISTWIDTH] IN BLOOD BY AUTOMATED COUNT: 16.6 % (ref 11.6–15.1)
EST. AVERAGE GLUCOSE BLD GHB EST-MCNC: 114 MG/DL
GFR SERPL CREATININE-BSD FRML MDRD: 10 ML/MIN/1.73SQ M
GFR SERPL CREATININE-BSD FRML MDRD: 11 ML/MIN/1.73SQ M
GFR SERPL CREATININE-BSD FRML MDRD: 16 ML/MIN/1.73SQ M
GFR SERPL CREATININE-BSD FRML MDRD: 22 ML/MIN/1.73SQ M
GFR SERPL CREATININE-BSD FRML MDRD: 25 ML/MIN/1.73SQ M
GFR SERPL CREATININE-BSD FRML MDRD: 26 ML/MIN/1.73SQ M
GFR SERPL CREATININE-BSD FRML MDRD: 29 ML/MIN/1.73SQ M
GFR SERPL CREATININE-BSD FRML MDRD: 29 ML/MIN/1.73SQ M
GFR SERPL CREATININE-BSD FRML MDRD: 31 ML/MIN/1.73SQ M
GFR SERPL CREATININE-BSD FRML MDRD: 32 ML/MIN/1.73SQ M
GFR SERPL CREATININE-BSD FRML MDRD: 32 ML/MIN/1.73SQ M
GFR SERPL CREATININE-BSD FRML MDRD: 33 ML/MIN/1.73SQ M
GFR SERPL CREATININE-BSD FRML MDRD: 34 ML/MIN/1.73SQ M
GFR SERPL CREATININE-BSD FRML MDRD: 35 ML/MIN/1.73SQ M
GFR SERPL CREATININE-BSD FRML MDRD: 37 ML/MIN/1.73SQ M
GFR SERPL CREATININE-BSD FRML MDRD: 39 ML/MIN/1.73SQ M
GFR SERPL CREATININE-BSD FRML MDRD: 42 ML/MIN/1.73SQ M
GFR SERPL CREATININE-BSD FRML MDRD: 43 ML/MIN/1.73SQ M
GFR SERPL CREATININE-BSD FRML MDRD: 46 ML/MIN/1.73SQ M
GFR SERPL CREATININE-BSD FRML MDRD: 48 ML/MIN/1.73SQ M
GFR SERPL CREATININE-BSD FRML MDRD: 48 ML/MIN/1.73SQ M
GFR SERPL CREATININE-BSD FRML MDRD: 52 ML/MIN/1.73SQ M
GFR SERPL CREATININE-BSD FRML MDRD: 53 ML/MIN/1.73SQ M
GFR SERPL CREATININE-BSD FRML MDRD: 55 ML/MIN/1.73SQ M
GFR SERPL CREATININE-BSD FRML MDRD: 55 ML/MIN/1.73SQ M
GFR SERPL CREATININE-BSD FRML MDRD: 56 ML/MIN/1.73SQ M
GFR SERPL CREATININE-BSD FRML MDRD: 56 ML/MIN/1.73SQ M
GFR SERPL CREATININE-BSD FRML MDRD: 57 ML/MIN/1.73SQ M
GFR SERPL CREATININE-BSD FRML MDRD: 58 ML/MIN/1.73SQ M
GFR SERPL CREATININE-BSD FRML MDRD: 59 ML/MIN/1.73SQ M
GFR SERPL CREATININE-BSD FRML MDRD: 61 ML/MIN/1.73SQ M
GFR SERPL CREATININE-BSD FRML MDRD: 63 ML/MIN/1.73SQ M
GFR SERPL CREATININE-BSD FRML MDRD: 65 ML/MIN/1.73SQ M
GFR SERPL CREATININE-BSD FRML MDRD: 68 ML/MIN/1.73SQ M
GFR SERPL CREATININE-BSD FRML MDRD: 7 ML/MIN/1.73SQ M
GFR SERPL CREATININE-BSD FRML MDRD: 8 ML/MIN/1.73SQ M
GLUCOSE P FAST SERPL-MCNC: 118 MG/DL (ref 65–99)
GLUCOSE SERPL-MCNC: 100 MG/DL (ref 65–140)
GLUCOSE SERPL-MCNC: 101 MG/DL (ref 65–140)
GLUCOSE SERPL-MCNC: 103 MG/DL (ref 65–140)
GLUCOSE SERPL-MCNC: 104 MG/DL (ref 65–140)
GLUCOSE SERPL-MCNC: 105 MG/DL (ref 65–140)
GLUCOSE SERPL-MCNC: 106 MG/DL (ref 65–140)
GLUCOSE SERPL-MCNC: 106 MG/DL (ref 65–140)
GLUCOSE SERPL-MCNC: 108 MG/DL (ref 65–140)
GLUCOSE SERPL-MCNC: 109 MG/DL (ref 65–140)
GLUCOSE SERPL-MCNC: 110 MG/DL (ref 65–140)
GLUCOSE SERPL-MCNC: 111 MG/DL (ref 65–140)
GLUCOSE SERPL-MCNC: 111 MG/DL (ref 65–140)
GLUCOSE SERPL-MCNC: 112 MG/DL (ref 65–140)
GLUCOSE SERPL-MCNC: 112 MG/DL (ref 65–140)
GLUCOSE SERPL-MCNC: 113 MG/DL (ref 65–140)
GLUCOSE SERPL-MCNC: 115 MG/DL (ref 65–140)
GLUCOSE SERPL-MCNC: 116 MG/DL (ref 65–140)
GLUCOSE SERPL-MCNC: 116 MG/DL (ref 65–140)
GLUCOSE SERPL-MCNC: 118 MG/DL (ref 65–140)
GLUCOSE SERPL-MCNC: 119 MG/DL (ref 65–140)
GLUCOSE SERPL-MCNC: 119 MG/DL (ref 65–140)
GLUCOSE SERPL-MCNC: 120 MG/DL (ref 65–140)
GLUCOSE SERPL-MCNC: 121 MG/DL (ref 65–140)
GLUCOSE SERPL-MCNC: 123 MG/DL (ref 65–140)
GLUCOSE SERPL-MCNC: 124 MG/DL (ref 65–140)
GLUCOSE SERPL-MCNC: 128 MG/DL (ref 65–140)
GLUCOSE SERPL-MCNC: 131 MG/DL (ref 65–140)
GLUCOSE SERPL-MCNC: 133 MG/DL (ref 65–140)
GLUCOSE SERPL-MCNC: 136 MG/DL (ref 65–140)
GLUCOSE SERPL-MCNC: 137 MG/DL (ref 65–140)
GLUCOSE SERPL-MCNC: 141 MG/DL (ref 65–140)
GLUCOSE SERPL-MCNC: 146 MG/DL (ref 65–140)
GLUCOSE SERPL-MCNC: 162 MG/DL (ref 65–140)
GLUCOSE SERPL-MCNC: 165 MG/DL (ref 65–140)
GLUCOSE SERPL-MCNC: 167 MG/DL (ref 65–140)
GLUCOSE SERPL-MCNC: 168 MG/DL (ref 65–140)
GLUCOSE SERPL-MCNC: 172 MG/DL (ref 65–140)
GLUCOSE SERPL-MCNC: 188 MG/DL (ref 65–140)
GLUCOSE SERPL-MCNC: 73 MG/DL (ref 65–140)
GLUCOSE SERPL-MCNC: 75 MG/DL (ref 65–140)
GLUCOSE SERPL-MCNC: 76 MG/DL (ref 65–140)
GLUCOSE SERPL-MCNC: 77 MG/DL (ref 65–140)
GLUCOSE SERPL-MCNC: 79 MG/DL (ref 65–140)
GLUCOSE SERPL-MCNC: 84 MG/DL (ref 65–140)
GLUCOSE SERPL-MCNC: 86 MG/DL (ref 65–140)
GLUCOSE SERPL-MCNC: 93 MG/DL (ref 65–140)
GLUCOSE SERPL-MCNC: 94 MG/DL (ref 65–140)
GLUCOSE SERPL-MCNC: 97 MG/DL (ref 65–140)
GLUCOSE SERPL-MCNC: 98 MG/DL (ref 65–140)
GLUCOSE SERPL-MCNC: 99 MG/DL (ref 65–140)
GLUCOSE UR STRIP-MCNC: NEGATIVE MG/DL
GRAM STN SPEC: ABNORMAL
GRAM STN SPEC: ABNORMAL
HBA1C MFR BLD: 5.6 %
HBV SURFACE AG SER QL: NORMAL
HCO3 BLDA-SCNC: 10.8 MMOL/L (ref 22–28)
HCO3 BLDA-SCNC: 12.8 MMOL/L (ref 22–28)
HCO3 BLDA-SCNC: 21 MMOL/L (ref 22–28)
HCO3 BLDA-SCNC: 9.7 MMOL/L (ref 22–28)
HCO3 BLDV-SCNC: 11.5 MMOL/L (ref 24–30)
HCO3 BLDV-SCNC: 11.8 MMOL/L (ref 24–30)
HCO3 BLDV-SCNC: 15.3 MMOL/L (ref 24–30)
HCO3 BLDV-SCNC: 16.6 MMOL/L (ref 24–30)
HCO3 BLDV-SCNC: 18.1 MMOL/L (ref 24–30)
HCO3 BLDV-SCNC: 18.9 MMOL/L (ref 24–30)
HCO3 BLDV-SCNC: 19.4 MMOL/L (ref 24–30)
HCO3 BLDV-SCNC: 19.7 MMOL/L (ref 24–30)
HCO3 BLDV-SCNC: 20.3 MMOL/L (ref 24–30)
HCO3 BLDV-SCNC: 20.9 MMOL/L (ref 24–30)
HCO3 BLDV-SCNC: 26.7 MMOL/L (ref 24–30)
HCO3 BLDV-SCNC: 29.5 MMOL/L (ref 24–30)
HCO3 BLDV-SCNC: 33.2 MMOL/L (ref 24–30)
HCO3 BLDV-SCNC: 38 MMOL/L (ref 24–30)
HCO3 BLDV-SCNC: 38.6 MMOL/L (ref 24–30)
HCO3 BLDV-SCNC: 6.2 MMOL/L (ref 24–30)
HCT VFR BLD AUTO: 29.8 % (ref 36.5–49.3)
HCT VFR BLD AUTO: 32.3 % (ref 36.5–49.3)
HCT VFR BLD AUTO: 34.6 % (ref 36.5–49.3)
HCT VFR BLD AUTO: 34.9 % (ref 36.5–49.3)
HCT VFR BLD AUTO: 35.4 % (ref 36.5–49.3)
HCT VFR BLD AUTO: 35.7 % (ref 36.5–49.3)
HCT VFR BLD AUTO: 36.4 % (ref 36.5–49.3)
HCT VFR BLD AUTO: 36.6 % (ref 36.5–49.3)
HCT VFR BLD AUTO: 37.5 % (ref 36.5–49.3)
HCT VFR BLD AUTO: 37.5 % (ref 36.5–49.3)
HCT VFR BLD AUTO: 37.8 % (ref 36.5–49.3)
HCT VFR BLD AUTO: 38.9 % (ref 36.5–49.3)
HCT VFR BLD AUTO: 39.5 % (ref 36.5–49.3)
HCT VFR BLD AUTO: 40.2 % (ref 36.5–49.3)
HCT VFR BLD AUTO: 41.9 % (ref 36.5–49.3)
HCT VFR BLD AUTO: 42.9 % (ref 36.5–49.3)
HCT VFR BLD AUTO: 43 % (ref 36.5–49.3)
HCT VFR BLD AUTO: 43.1 % (ref 36.5–49.3)
HCT VFR BLD AUTO: 43.6 % (ref 36.5–49.3)
HCT VFR BLD AUTO: 44.9 % (ref 36.5–49.3)
HCT VFR BLD AUTO: 45.4 % (ref 36.5–49.3)
HCT VFR BLD AUTO: 46.8 % (ref 36.5–49.3)
HCT VFR BLD AUTO: 47.5 % (ref 36.5–49.3)
HCT VFR BLD AUTO: 48 % (ref 36.5–49.3)
HCT VFR BLD AUTO: 53.2 % (ref 36.5–49.3)
HCV AB SER QL: NORMAL
HDLC SERPL-MCNC: 23 MG/DL
HGB BLD-MCNC: 10.1 G/DL (ref 12–17)
HGB BLD-MCNC: 10.6 G/DL (ref 12–17)
HGB BLD-MCNC: 10.7 G/DL (ref 12–17)
HGB BLD-MCNC: 10.8 G/DL (ref 12–17)
HGB BLD-MCNC: 11 G/DL (ref 12–17)
HGB BLD-MCNC: 11.2 G/DL (ref 12–17)
HGB BLD-MCNC: 11.2 G/DL (ref 12–17)
HGB BLD-MCNC: 11.3 G/DL (ref 12–17)
HGB BLD-MCNC: 11.5 G/DL (ref 12–17)
HGB BLD-MCNC: 11.9 G/DL (ref 12–17)
HGB BLD-MCNC: 12 G/DL (ref 12–17)
HGB BLD-MCNC: 12.3 G/DL (ref 12–17)
HGB BLD-MCNC: 12.6 G/DL (ref 12–17)
HGB BLD-MCNC: 12.6 G/DL (ref 12–17)
HGB BLD-MCNC: 13 G/DL (ref 12–17)
HGB BLD-MCNC: 13.1 G/DL (ref 12–17)
HGB BLD-MCNC: 13.2 G/DL (ref 12–17)
HGB BLD-MCNC: 13.3 G/DL (ref 12–17)
HGB BLD-MCNC: 13.4 G/DL (ref 12–17)
HGB BLD-MCNC: 13.6 G/DL (ref 12–17)
HGB BLD-MCNC: 13.9 G/DL (ref 12–17)
HGB BLD-MCNC: 14.3 G/DL (ref 12–17)
HGB BLD-MCNC: 14.6 G/DL (ref 12–17)
HGB BLD-MCNC: 14.9 G/DL (ref 12–17)
HGB BLD-MCNC: 9.8 G/DL (ref 12–17)
HGB UR QL STRIP.AUTO: ABNORMAL
HIV 1+2 AB+HIV1 P24 AG SERPL QL IA: NORMAL
HIV1 P24 AG SER QL: NORMAL
IMM GRANULOCYTES # BLD AUTO: 0.02 THOUSAND/UL (ref 0–0.2)
IMM GRANULOCYTES # BLD AUTO: 0.03 THOUSAND/UL (ref 0–0.2)
IMM GRANULOCYTES # BLD AUTO: 0.04 THOUSAND/UL (ref 0–0.2)
IMM GRANULOCYTES # BLD AUTO: 0.05 THOUSAND/UL (ref 0–0.2)
IMM GRANULOCYTES # BLD AUTO: 0.05 THOUSAND/UL (ref 0–0.2)
IMM GRANULOCYTES # BLD AUTO: 0.06 THOUSAND/UL (ref 0–0.2)
IMM GRANULOCYTES # BLD AUTO: 0.08 THOUSAND/UL (ref 0–0.2)
IMM GRANULOCYTES # BLD AUTO: 0.09 THOUSAND/UL (ref 0–0.2)
IMM GRANULOCYTES # BLD AUTO: 0.12 THOUSAND/UL (ref 0–0.2)
IMM GRANULOCYTES # BLD AUTO: 0.16 THOUSAND/UL (ref 0–0.2)
IMM GRANULOCYTES # BLD AUTO: 0.18 THOUSAND/UL (ref 0–0.2)
IMM GRANULOCYTES # BLD AUTO: 0.3 THOUSAND/UL (ref 0–0.2)
IMM GRANULOCYTES NFR BLD AUTO: 0 % (ref 0–2)
IMM GRANULOCYTES NFR BLD AUTO: 1 % (ref 0–2)
IMM GRANULOCYTES NFR BLD AUTO: 2 % (ref 0–2)
INR PPP: 1.1 (ref 0.84–1.19)
INR PPP: 1.14 (ref 0.84–1.19)
INR PPP: 1.14 (ref 0.84–1.19)
INR PPP: 1.2 (ref 0.84–1.19)
INR PPP: 1.24 (ref 0.84–1.19)
INR PPP: 1.26 (ref 0.84–1.19)
INR PPP: 1.34 (ref 0.84–1.19)
INR PPP: 1.34 (ref 0.84–1.19)
INR PPP: 1.35 (ref 0.84–1.19)
INR PPP: 1.39 (ref 0.84–1.19)
INR PPP: 1.47 (ref 0.84–1.19)
INR PPP: 1.5 (ref 0.84–1.19)
INR PPP: 1.64 (ref 0.84–1.19)
INR PPP: 1.79 (ref 0.84–1.19)
INR PPP: 14.32 (ref 0.84–1.19)
INR PPP: 2 (ref 0.84–1.19)
INR PPP: 2.39 (ref 0.84–1.19)
INR PPP: 2.39 (ref 0.84–1.19)
INR PPP: 2.5 (ref 0.84–1.19)
INR PPP: 2.6 (ref 0.84–1.19)
INR PPP: 2.6 (ref 0.84–1.19)
INR PPP: 2.79 (ref 0.84–1.19)
INR PPP: 2.87 (ref 0.84–1.19)
INR PPP: 2.95 (ref 0.84–1.19)
INR PPP: 3.2 (ref 0.84–1.19)
INR PPP: 3.5 (ref 0.84–1.19)
INR PPP: 5.7 (ref 0.84–1.19)
INR PPP: 7.76 (ref 0.84–1.19)
INR PPP: >15 (ref 0.84–1.19)
INR PPP: >15 (ref 0.84–1.19)
KETONES UR STRIP-MCNC: ABNORMAL MG/DL
KETONES UR STRIP-MCNC: NEGATIVE MG/DL
LACTATE SERPL-SCNC: 0.8 MMOL/L (ref 0.5–2)
LACTATE SERPL-SCNC: 1.4 MMOL/L (ref 0.5–2)
LACTATE SERPL-SCNC: 1.6 MMOL/L (ref 0.5–2)
LACTATE SERPL-SCNC: 2 MMOL/L (ref 0.5–2)
LACTATE SERPL-SCNC: 2.5 MMOL/L (ref 0.5–2)
LACTATE SERPL-SCNC: 3.2 MMOL/L (ref 0.5–2)
LACTATE SERPL-SCNC: 3.5 MMOL/L (ref 0.5–2)
LACTATE SERPL-SCNC: 6 MMOL/L (ref 0.5–2)
LACTATE SERPL-SCNC: 6.3 MMOL/L (ref 0.5–2)
LDLC SERPL CALC-MCNC: 53 MG/DL (ref 0–100)
LEUKOCYTE ESTERASE UR QL STRIP: ABNORMAL
LIPASE SERPL-CCNC: 159 U/L (ref 73–393)
LYMPHOCYTES # BLD AUTO: 0.5 THOUSANDS/ΜL (ref 0.6–4.47)
LYMPHOCYTES # BLD AUTO: 0.57 THOUSANDS/ΜL (ref 0.6–4.47)
LYMPHOCYTES # BLD AUTO: 0.6 THOUSAND/UL (ref 0.6–4.47)
LYMPHOCYTES # BLD AUTO: 0.65 THOUSANDS/ΜL (ref 0.6–4.47)
LYMPHOCYTES # BLD AUTO: 0.68 THOUSANDS/ΜL (ref 0.6–4.47)
LYMPHOCYTES # BLD AUTO: 0.75 THOUSANDS/ΜL (ref 0.6–4.47)
LYMPHOCYTES # BLD AUTO: 0.76 THOUSANDS/ΜL (ref 0.6–4.47)
LYMPHOCYTES # BLD AUTO: 0.78 THOUSANDS/ΜL (ref 0.6–4.47)
LYMPHOCYTES # BLD AUTO: 0.81 THOUSANDS/ΜL (ref 0.6–4.47)
LYMPHOCYTES # BLD AUTO: 0.9 THOUSANDS/ΜL (ref 0.6–4.47)
LYMPHOCYTES # BLD AUTO: 0.99 THOUSANDS/ΜL (ref 0.6–4.47)
LYMPHOCYTES # BLD AUTO: 1.04 THOUSANDS/ΜL (ref 0.6–4.47)
LYMPHOCYTES # BLD AUTO: 1.15 THOUSANDS/ΜL (ref 0.6–4.47)
LYMPHOCYTES # BLD AUTO: 1.2 THOUSANDS/ΜL (ref 0.6–4.47)
LYMPHOCYTES # BLD AUTO: 1.36 THOUSANDS/ΜL (ref 0.6–4.47)
LYMPHOCYTES # BLD AUTO: 1.45 THOUSANDS/ΜL (ref 0.6–4.47)
LYMPHOCYTES # BLD AUTO: 1.86 THOUSANDS/ΜL (ref 0.6–4.47)
LYMPHOCYTES # BLD AUTO: 5 % (ref 14–44)
LYMPHOCYTES NFR BLD AUTO: 12 % (ref 14–44)
LYMPHOCYTES NFR BLD AUTO: 13 % (ref 14–44)
LYMPHOCYTES NFR BLD AUTO: 17 % (ref 14–44)
LYMPHOCYTES NFR BLD AUTO: 18 % (ref 14–44)
LYMPHOCYTES NFR BLD AUTO: 4 % (ref 14–44)
LYMPHOCYTES NFR BLD AUTO: 5 % (ref 14–44)
LYMPHOCYTES NFR BLD AUTO: 5 % (ref 14–44)
LYMPHOCYTES NFR BLD AUTO: 6 % (ref 14–44)
LYMPHOCYTES NFR BLD AUTO: 6 % (ref 14–44)
LYMPHOCYTES NFR BLD AUTO: 7 % (ref 14–44)
LYMPHOCYTES NFR BLD AUTO: 8 % (ref 14–44)
LYMPHOCYTES NFR BLD AUTO: 9 % (ref 14–44)
LYMPHOCYTES NFR BLD AUTO: 9 % (ref 14–44)
MAGNESIUM SERPL-MCNC: 1.6 MG/DL (ref 1.6–2.6)
MAGNESIUM SERPL-MCNC: 1.7 MG/DL (ref 1.6–2.6)
MAGNESIUM SERPL-MCNC: 1.7 MG/DL (ref 1.6–2.6)
MAGNESIUM SERPL-MCNC: 1.8 MG/DL (ref 1.6–2.6)
MAGNESIUM SERPL-MCNC: 1.9 MG/DL (ref 1.6–2.6)
MAGNESIUM SERPL-MCNC: 2 MG/DL (ref 1.6–2.6)
MAGNESIUM SERPL-MCNC: 2.1 MG/DL (ref 1.6–2.6)
MAGNESIUM SERPL-MCNC: 2.2 MG/DL (ref 1.6–2.6)
MAGNESIUM SERPL-MCNC: 2.2 MG/DL (ref 1.6–2.6)
MAGNESIUM SERPL-MCNC: 2.3 MG/DL (ref 1.6–2.6)
MAGNESIUM SERPL-MCNC: 2.4 MG/DL (ref 1.6–2.6)
MAGNESIUM SERPL-MCNC: 2.4 MG/DL (ref 1.6–2.6)
MAGNESIUM SERPL-MCNC: 2.5 MG/DL (ref 1.6–2.6)
MAGNESIUM SERPL-MCNC: 2.6 MG/DL (ref 1.6–2.6)
MAGNESIUM SERPL-MCNC: 2.8 MG/DL (ref 1.6–2.6)
MCH RBC QN AUTO: 26.7 PG (ref 26.8–34.3)
MCH RBC QN AUTO: 26.8 PG (ref 26.8–34.3)
MCH RBC QN AUTO: 27 PG (ref 26.8–34.3)
MCH RBC QN AUTO: 27 PG (ref 26.8–34.3)
MCH RBC QN AUTO: 27.4 PG (ref 26.8–34.3)
MCH RBC QN AUTO: 27.4 PG (ref 26.8–34.3)
MCH RBC QN AUTO: 27.6 PG (ref 26.8–34.3)
MCH RBC QN AUTO: 27.6 PG (ref 26.8–34.3)
MCH RBC QN AUTO: 27.8 PG (ref 26.8–34.3)
MCH RBC QN AUTO: 27.9 PG (ref 26.8–34.3)
MCH RBC QN AUTO: 27.9 PG (ref 26.8–34.3)
MCH RBC QN AUTO: 28 PG (ref 26.8–34.3)
MCH RBC QN AUTO: 28.1 PG (ref 26.8–34.3)
MCH RBC QN AUTO: 28.2 PG (ref 26.8–34.3)
MCH RBC QN AUTO: 28.4 PG (ref 26.8–34.3)
MCH RBC QN AUTO: 28.4 PG (ref 26.8–34.3)
MCH RBC QN AUTO: 28.5 PG (ref 26.8–34.3)
MCH RBC QN AUTO: 28.6 PG (ref 26.8–34.3)
MCH RBC QN AUTO: 28.8 PG (ref 26.8–34.3)
MCH RBC QN AUTO: 28.8 PG (ref 26.8–34.3)
MCHC RBC AUTO-ENTMCNC: 27.4 G/DL (ref 31.4–37.4)
MCHC RBC AUTO-ENTMCNC: 28.6 G/DL (ref 31.4–37.4)
MCHC RBC AUTO-ENTMCNC: 29.7 G/DL (ref 31.4–37.4)
MCHC RBC AUTO-ENTMCNC: 29.8 G/DL (ref 31.4–37.4)
MCHC RBC AUTO-ENTMCNC: 30.1 G/DL (ref 31.4–37.4)
MCHC RBC AUTO-ENTMCNC: 30.3 G/DL (ref 31.4–37.4)
MCHC RBC AUTO-ENTMCNC: 30.4 G/DL (ref 31.4–37.4)
MCHC RBC AUTO-ENTMCNC: 30.5 G/DL (ref 31.4–37.4)
MCHC RBC AUTO-ENTMCNC: 30.6 G/DL (ref 31.4–37.4)
MCHC RBC AUTO-ENTMCNC: 30.7 G/DL (ref 31.4–37.4)
MCHC RBC AUTO-ENTMCNC: 30.7 G/DL (ref 31.4–37.4)
MCHC RBC AUTO-ENTMCNC: 30.8 G/DL (ref 31.4–37.4)
MCHC RBC AUTO-ENTMCNC: 31 G/DL (ref 31.4–37.4)
MCHC RBC AUTO-ENTMCNC: 31 G/DL (ref 31.4–37.4)
MCHC RBC AUTO-ENTMCNC: 31.3 G/DL (ref 31.4–37.4)
MCHC RBC AUTO-ENTMCNC: 31.3 G/DL (ref 31.4–37.4)
MCHC RBC AUTO-ENTMCNC: 31.6 G/DL (ref 31.4–37.4)
MCHC RBC AUTO-ENTMCNC: 31.7 G/DL (ref 31.4–37.4)
MCHC RBC AUTO-ENTMCNC: 32.1 G/DL (ref 31.4–37.4)
MCHC RBC AUTO-ENTMCNC: 32.9 G/DL (ref 31.4–37.4)
MCV RBC AUTO: 84 FL (ref 82–98)
MCV RBC AUTO: 87 FL (ref 82–98)
MCV RBC AUTO: 88 FL (ref 82–98)
MCV RBC AUTO: 88 FL (ref 82–98)
MCV RBC AUTO: 89 FL (ref 82–98)
MCV RBC AUTO: 90 FL (ref 82–98)
MCV RBC AUTO: 91 FL (ref 82–98)
MCV RBC AUTO: 92 FL (ref 82–98)
MCV RBC AUTO: 93 FL (ref 82–98)
MCV RBC AUTO: 94 FL (ref 82–98)
MCV RBC AUTO: 98 FL (ref 82–98)
MCV RBC AUTO: 99 FL (ref 82–98)
MONOCYTES # BLD AUTO: 0.12 THOUSAND/UL (ref 0–1.22)
MONOCYTES # BLD AUTO: 0.33 THOUSAND/ΜL (ref 0.17–1.22)
MONOCYTES # BLD AUTO: 0.37 THOUSAND/ΜL (ref 0.17–1.22)
MONOCYTES # BLD AUTO: 0.44 THOUSAND/ΜL (ref 0.17–1.22)
MONOCYTES # BLD AUTO: 0.44 THOUSAND/ΜL (ref 0.17–1.22)
MONOCYTES # BLD AUTO: 0.51 THOUSAND/ΜL (ref 0.17–1.22)
MONOCYTES # BLD AUTO: 0.62 THOUSAND/ΜL (ref 0.17–1.22)
MONOCYTES # BLD AUTO: 0.62 THOUSAND/ΜL (ref 0.17–1.22)
MONOCYTES # BLD AUTO: 0.65 THOUSAND/ΜL (ref 0.17–1.22)
MONOCYTES # BLD AUTO: 0.65 THOUSAND/ΜL (ref 0.17–1.22)
MONOCYTES # BLD AUTO: 0.67 THOUSAND/ΜL (ref 0.17–1.22)
MONOCYTES # BLD AUTO: 0.72 THOUSAND/ΜL (ref 0.17–1.22)
MONOCYTES # BLD AUTO: 0.78 THOUSAND/ΜL (ref 0.17–1.22)
MONOCYTES # BLD AUTO: 0.81 THOUSAND/ΜL (ref 0.17–1.22)
MONOCYTES # BLD AUTO: 0.84 THOUSAND/ΜL (ref 0.17–1.22)
MONOCYTES # BLD AUTO: 1.44 THOUSAND/ΜL (ref 0.17–1.22)
MONOCYTES # BLD AUTO: 1.48 THOUSAND/ΜL (ref 0.17–1.22)
MONOCYTES NFR BLD AUTO: 10 % (ref 4–12)
MONOCYTES NFR BLD AUTO: 4 % (ref 4–12)
MONOCYTES NFR BLD AUTO: 5 % (ref 4–12)
MONOCYTES NFR BLD AUTO: 5 % (ref 4–12)
MONOCYTES NFR BLD AUTO: 6 % (ref 4–12)
MONOCYTES NFR BLD AUTO: 6 % (ref 4–12)
MONOCYTES NFR BLD AUTO: 8 % (ref 4–12)
MONOCYTES NFR BLD AUTO: 9 % (ref 4–12)
MONOCYTES NFR BLD AUTO: 9 % (ref 4–12)
MONOCYTES NFR BLD: 1 % (ref 4–12)
MYCOPHENOLATE SERPL-MCNC: 1 UG/ML (ref 1–3.5)
MYCOPHENOLATE-G SERPL-MCNC: 94 UG/ML (ref 15–125)
NASAL CANNULA: 1
NASAL CANNULA: 1
NASAL CANNULA: 2
NASAL CANNULA: 3
NEUTROPHILS # BLD AUTO: 10 THOUSANDS/ΜL (ref 1.85–7.62)
NEUTROPHILS # BLD AUTO: 11.78 THOUSANDS/ΜL (ref 1.85–7.62)
NEUTROPHILS # BLD AUTO: 11.95 THOUSANDS/ΜL (ref 1.85–7.62)
NEUTROPHILS # BLD AUTO: 13.3 THOUSANDS/ΜL (ref 1.85–7.62)
NEUTROPHILS # BLD AUTO: 13.95 THOUSANDS/ΜL (ref 1.85–7.62)
NEUTROPHILS # BLD AUTO: 14.28 THOUSANDS/ΜL (ref 1.85–7.62)
NEUTROPHILS # BLD AUTO: 14.34 THOUSANDS/ΜL (ref 1.85–7.62)
NEUTROPHILS # BLD AUTO: 5.9 THOUSANDS/ΜL (ref 1.85–7.62)
NEUTROPHILS # BLD AUTO: 5.96 THOUSANDS/ΜL (ref 1.85–7.62)
NEUTROPHILS # BLD AUTO: 6.56 THOUSANDS/ΜL (ref 1.85–7.62)
NEUTROPHILS # BLD AUTO: 6.63 THOUSANDS/ΜL (ref 1.85–7.62)
NEUTROPHILS # BLD AUTO: 7.12 THOUSANDS/ΜL (ref 1.85–7.62)
NEUTROPHILS # BLD AUTO: 7.41 THOUSANDS/ΜL (ref 1.85–7.62)
NEUTROPHILS # BLD AUTO: 8.56 THOUSANDS/ΜL (ref 1.85–7.62)
NEUTROPHILS # BLD AUTO: 9.72 THOUSANDS/ΜL (ref 1.85–7.62)
NEUTROPHILS # BLD AUTO: 9.94 THOUSANDS/ΜL (ref 1.85–7.62)
NEUTROPHILS # BLD MANUAL: 11.22 THOUSAND/UL (ref 1.85–7.62)
NEUTS SEG NFR BLD AUTO: 73 % (ref 43–75)
NEUTS SEG NFR BLD AUTO: 76 % (ref 43–75)
NEUTS SEG NFR BLD AUTO: 77 % (ref 43–75)
NEUTS SEG NFR BLD AUTO: 79 % (ref 43–75)
NEUTS SEG NFR BLD AUTO: 82 % (ref 43–75)
NEUTS SEG NFR BLD AUTO: 83 % (ref 43–75)
NEUTS SEG NFR BLD AUTO: 85 % (ref 43–75)
NEUTS SEG NFR BLD AUTO: 86 % (ref 43–75)
NEUTS SEG NFR BLD AUTO: 87 % (ref 43–75)
NEUTS SEG NFR BLD AUTO: 88 % (ref 43–75)
NEUTS SEG NFR BLD AUTO: 92 % (ref 43–75)
NEUTS SEG NFR BLD AUTO: 94 % (ref 43–75)
NITRITE UR QL STRIP: NEGATIVE
NITRITE UR QL STRIP: POSITIVE
NITRITE UR QL STRIP: POSITIVE
NON VENT ROOM AIR: ABNORMAL %
NON-SQ EPI CELLS URNS QL MICRO: ABNORMAL /HPF
NRBC BLD AUTO-RTO: 0 /100 WBCS
NRBC BLD AUTO-RTO: 1 /100 WBCS
NT-PROBNP SERPL-MCNC: ABNORMAL PG/ML
O2 CT BLDA-SCNC: 16.2 ML/DL (ref 16–23)
O2 CT BLDA-SCNC: 16.7 ML/DL (ref 16–23)
O2 CT BLDA-SCNC: 16.9 ML/DL (ref 16–23)
O2 CT BLDA-SCNC: 18.9 ML/DL (ref 16–23)
O2 CT BLDV-SCNC: 10.2 ML/DL
O2 CT BLDV-SCNC: 10.6 ML/DL
O2 CT BLDV-SCNC: 11.7 ML/DL
O2 CT BLDV-SCNC: 12.1 ML/DL
O2 CT BLDV-SCNC: 12.5 ML/DL
O2 CT BLDV-SCNC: 13 ML/DL
O2 CT BLDV-SCNC: 13.2 ML/DL
O2 CT BLDV-SCNC: 13.5 ML/DL
O2 CT BLDV-SCNC: 15.7 ML/DL
O2 CT BLDV-SCNC: 20 ML/DL
O2 CT BLDV-SCNC: 8.2 ML/DL
O2 CT BLDV-SCNC: 9.1 ML/DL
O2 CT BLDV-SCNC: 9.1 ML/DL
O2 CT BLDV-SCNC: 9.2 ML/DL
O2 CT BLDV-SCNC: 9.3 ML/DL
O2 CT BLDV-SCNC: 9.9 ML/DL
OXYHGB MFR BLDA: 96.3 % (ref 94–97)
OXYHGB MFR BLDA: 96.4 % (ref 94–97)
OXYHGB MFR BLDA: 98 % (ref 94–97)
OXYHGB MFR BLDA: 98.2 % (ref 94–97)
P AXIS: 104 DEGREES
P AXIS: 32 DEGREES
P AXIS: 42 DEGREES
P AXIS: 51 DEGREES
P AXIS: 64 DEGREES
P AXIS: 97 DEGREES
PCO2 BLDA: 22 MM HG (ref 36–44)
PCO2 BLDA: 22.3 MM HG (ref 36–44)
PCO2 BLDA: 22.5 MM HG (ref 36–44)
PCO2 BLDA: 32.5 MM HG (ref 36–44)
PCO2 BLDV: 28.6 MM HG (ref 42–50)
PCO2 BLDV: 28.9 MM HG (ref 42–50)
PCO2 BLDV: 34.7 MM HG (ref 42–50)
PCO2 BLDV: 36 MM HG (ref 42–50)
PCO2 BLDV: 36.1 MM HG (ref 42–50)
PCO2 BLDV: 37.1 MM HG (ref 42–50)
PCO2 BLDV: 38.8 MM HG (ref 42–50)
PCO2 BLDV: 40.3 MM HG (ref 42–50)
PCO2 BLDV: 40.9 MM HG (ref 42–50)
PCO2 BLDV: 41.2 MM HG (ref 42–50)
PCO2 BLDV: 41.6 MM HG (ref 42–50)
PCO2 BLDV: 48.6 MM HG (ref 42–50)
PCO2 BLDV: 49.2 MM HG (ref 42–50)
PCO2 BLDV: 54.5 MM HG (ref 42–50)
PCO2 BLDV: 60 MM HG (ref 42–50)
PCO2 BLDV: 61.8 MM HG (ref 42–50)
PH BLDA: 7.26 [PH] (ref 7.35–7.45)
PH BLDA: 7.3 [PH] (ref 7.35–7.45)
PH BLDA: 7.38 [PH] (ref 7.35–7.45)
PH BLDA: 7.43 [PH] (ref 7.35–7.45)
PH BLDV: 6.95 [PH] (ref 7.3–7.4)
PH BLDV: 7.15 [PH] (ref 7.3–7.4)
PH BLDV: 7.22 [PH] (ref 7.3–7.4)
PH BLDV: 7.25 [PH] (ref 7.3–7.4)
PH BLDV: 7.27 [PH] (ref 7.3–7.4)
PH BLDV: 7.29 [PH] (ref 7.3–7.4)
PH BLDV: 7.3 [PH] (ref 7.3–7.4)
PH BLDV: 7.3 [PH] (ref 7.3–7.4)
PH BLDV: 7.31 [PH] (ref 7.3–7.4)
PH BLDV: 7.32 [PH] (ref 7.3–7.4)
PH BLDV: 7.33 [PH] (ref 7.3–7.4)
PH BLDV: 7.35 [PH] (ref 7.3–7.4)
PH BLDV: 7.4 [PH] (ref 7.3–7.4)
PH BLDV: 7.4 [PH] (ref 7.3–7.4)
PH BLDV: 7.41 [PH] (ref 7.3–7.4)
PH BLDV: 7.42 [PH] (ref 7.3–7.4)
PH UR STRIP.AUTO: 5 [PH]
PH UR STRIP.AUTO: 6 [PH]
PH UR STRIP.AUTO: 6.5 [PH]
PH UR STRIP.AUTO: 6.5 [PH]
PHOSPHATE SERPL-MCNC: 2 MG/DL (ref 2.3–4.1)
PHOSPHATE SERPL-MCNC: 3 MG/DL (ref 2.3–4.1)
PHOSPHATE SERPL-MCNC: 3.1 MG/DL (ref 2.3–4.1)
PHOSPHATE SERPL-MCNC: 3.5 MG/DL (ref 2.3–4.1)
PHOSPHATE SERPL-MCNC: 6 MG/DL (ref 2.3–4.1)
PLATELET # BLD AUTO: 107 THOUSANDS/UL (ref 149–390)
PLATELET # BLD AUTO: 119 THOUSANDS/UL (ref 149–390)
PLATELET # BLD AUTO: 129 THOUSANDS/UL (ref 149–390)
PLATELET # BLD AUTO: 130 THOUSANDS/UL (ref 149–390)
PLATELET # BLD AUTO: 134 THOUSANDS/UL (ref 149–390)
PLATELET # BLD AUTO: 135 THOUSANDS/UL (ref 149–390)
PLATELET # BLD AUTO: 144 THOUSANDS/UL (ref 149–390)
PLATELET # BLD AUTO: 152 THOUSANDS/UL (ref 149–390)
PLATELET # BLD AUTO: 154 THOUSANDS/UL (ref 149–390)
PLATELET # BLD AUTO: 157 THOUSANDS/UL (ref 149–390)
PLATELET # BLD AUTO: 162 THOUSANDS/UL (ref 149–390)
PLATELET # BLD AUTO: 165 THOUSANDS/UL (ref 149–390)
PLATELET # BLD AUTO: 166 THOUSANDS/UL (ref 149–390)
PLATELET # BLD AUTO: 170 THOUSANDS/UL (ref 149–390)
PLATELET # BLD AUTO: 170 THOUSANDS/UL (ref 149–390)
PLATELET # BLD AUTO: 179 THOUSANDS/UL (ref 149–390)
PLATELET # BLD AUTO: 196 THOUSANDS/UL (ref 149–390)
PLATELET # BLD AUTO: 201 THOUSANDS/UL (ref 149–390)
PLATELET # BLD AUTO: 202 THOUSANDS/UL (ref 149–390)
PLATELET # BLD AUTO: 207 THOUSANDS/UL (ref 149–390)
PLATELET # BLD AUTO: 212 THOUSANDS/UL (ref 149–390)
PLATELET # BLD AUTO: 214 THOUSANDS/UL (ref 149–390)
PLATELET # BLD AUTO: 236 THOUSANDS/UL (ref 149–390)
PLATELET # BLD AUTO: 253 THOUSANDS/UL (ref 149–390)
PLATELET BLD QL SMEAR: ABNORMAL
PMV BLD AUTO: 10.1 FL (ref 8.9–12.7)
PMV BLD AUTO: 10.2 FL (ref 8.9–12.7)
PMV BLD AUTO: 10.3 FL (ref 8.9–12.7)
PMV BLD AUTO: 10.3 FL (ref 8.9–12.7)
PMV BLD AUTO: 10.4 FL (ref 8.9–12.7)
PMV BLD AUTO: 10.4 FL (ref 8.9–12.7)
PMV BLD AUTO: 10.5 FL (ref 8.9–12.7)
PMV BLD AUTO: 10.8 FL (ref 8.9–12.7)
PMV BLD AUTO: 11 FL (ref 8.9–12.7)
PMV BLD AUTO: 11.6 FL (ref 8.9–12.7)
PMV BLD AUTO: 11.8 FL (ref 8.9–12.7)
PMV BLD AUTO: 9 FL (ref 8.9–12.7)
PMV BLD AUTO: 9.5 FL (ref 8.9–12.7)
PMV BLD AUTO: 9.5 FL (ref 8.9–12.7)
PMV BLD AUTO: 9.7 FL (ref 8.9–12.7)
PMV BLD AUTO: 9.9 FL (ref 8.9–12.7)
PMV BLD AUTO: 9.9 FL (ref 8.9–12.7)
PO2 BLDA: 110.3 MM HG (ref 75–129)
PO2 BLDA: 117.8 MM HG (ref 75–129)
PO2 BLDA: 134.1 MM HG (ref 75–129)
PO2 BLDA: 93.7 MM HG (ref 75–129)
PO2 BLDV: 29.7 MM HG (ref 35–45)
PO2 BLDV: 31.1 MM HG (ref 35–45)
PO2 BLDV: 31.3 MM HG (ref 35–45)
PO2 BLDV: 31.4 MM HG (ref 35–45)
PO2 BLDV: 32.5 MM HG (ref 35–45)
PO2 BLDV: 32.9 MM HG (ref 35–45)
PO2 BLDV: 33.1 MM HG (ref 35–45)
PO2 BLDV: 34.4 MM HG (ref 35–45)
PO2 BLDV: 35.6 MM HG (ref 35–45)
PO2 BLDV: 36.9 MM HG (ref 35–45)
PO2 BLDV: 37.2 MM HG (ref 35–45)
PO2 BLDV: 39 MM HG (ref 35–45)
PO2 BLDV: 52.8 MM HG (ref 35–45)
PO2 BLDV: 53.4 MM HG (ref 35–45)
PO2 BLDV: 58.8 MM HG (ref 35–45)
PO2 BLDV: 95.8 MM HG (ref 35–45)
POIKILOCYTOSIS BLD QL SMEAR: PRESENT
POST-VOID RESIDUAL VOLUME, ML POC: 15 ML
POTASSIUM SERPL-SCNC: 2.8 MMOL/L (ref 3.5–5.3)
POTASSIUM SERPL-SCNC: 3.1 MMOL/L (ref 3.5–5.3)
POTASSIUM SERPL-SCNC: 3.3 MMOL/L (ref 3.5–5.3)
POTASSIUM SERPL-SCNC: 3.3 MMOL/L (ref 3.5–5.3)
POTASSIUM SERPL-SCNC: 3.4 MMOL/L (ref 3.5–5.3)
POTASSIUM SERPL-SCNC: 3.5 MMOL/L (ref 3.5–5.3)
POTASSIUM SERPL-SCNC: 3.6 MMOL/L (ref 3.5–5.3)
POTASSIUM SERPL-SCNC: 3.6 MMOL/L (ref 3.5–5.3)
POTASSIUM SERPL-SCNC: 3.7 MMOL/L (ref 3.5–5.3)
POTASSIUM SERPL-SCNC: 3.8 MMOL/L (ref 3.5–5.3)
POTASSIUM SERPL-SCNC: 3.9 MMOL/L (ref 3.5–5.3)
POTASSIUM SERPL-SCNC: 4 MMOL/L (ref 3.5–5.3)
POTASSIUM SERPL-SCNC: 4.1 MMOL/L (ref 3.5–5.3)
POTASSIUM SERPL-SCNC: 4.2 MMOL/L (ref 3.5–5.3)
POTASSIUM SERPL-SCNC: 4.3 MMOL/L (ref 3.5–5.3)
POTASSIUM SERPL-SCNC: 4.3 MMOL/L (ref 3.5–5.3)
POTASSIUM SERPL-SCNC: 4.4 MMOL/L (ref 3.5–5.3)
POTASSIUM SERPL-SCNC: 4.4 MMOL/L (ref 3.5–5.3)
POTASSIUM SERPL-SCNC: 4.5 MMOL/L (ref 3.5–5.3)
POTASSIUM SERPL-SCNC: 4.5 MMOL/L (ref 3.5–5.3)
POTASSIUM SERPL-SCNC: 4.6 MMOL/L (ref 3.5–5.3)
POTASSIUM SERPL-SCNC: 4.9 MMOL/L (ref 3.5–5.3)
POTASSIUM SERPL-SCNC: 5 MMOL/L (ref 3.5–5.3)
POTASSIUM SERPL-SCNC: 5 MMOL/L (ref 3.5–5.3)
POTASSIUM SERPL-SCNC: 5.2 MMOL/L (ref 3.5–5.3)
POTASSIUM SERPL-SCNC: 5.8 MMOL/L (ref 3.5–5.3)
PR INTERVAL: 112 MS
PR INTERVAL: 174 MS
PR INTERVAL: 176 MS
PR INTERVAL: 194 MS
PR INTERVAL: 212 MS
PR INTERVAL: 228 MS
PROCALCITONIN SERPL-MCNC: 1.33 NG/ML
PROCALCITONIN SERPL-MCNC: 1.44 NG/ML
PROCALCITONIN SERPL-MCNC: 1.44 NG/ML
PROCALCITONIN SERPL-MCNC: 1.86 NG/ML
PROCALCITONIN SERPL-MCNC: 2.19 NG/ML
PROCALCITONIN SERPL-MCNC: 2.21 NG/ML
PROCALCITONIN SERPL-MCNC: 2.46 NG/ML
PROCALCITONIN SERPL-MCNC: 4.37 NG/ML
PROT SERPL-MCNC: 5.1 G/DL (ref 6.4–8.2)
PROT SERPL-MCNC: 5.4 G/DL (ref 6.4–8.2)
PROT SERPL-MCNC: 5.6 G/DL (ref 6.4–8.2)
PROT SERPL-MCNC: 6.4 G/DL (ref 6.4–8.2)
PROT SERPL-MCNC: 6.4 G/DL (ref 6.4–8.2)
PROT SERPL-MCNC: 6.5 G/DL (ref 6.4–8.2)
PROT SERPL-MCNC: 7.4 G/DL (ref 6.4–8.2)
PROT SERPL-MCNC: 8 G/DL (ref 6.4–8.2)
PROT UR STRIP-MCNC: >=300 MG/DL
PROT UR STRIP-MCNC: >=300 MG/DL
PROT UR STRIP-MCNC: ABNORMAL MG/DL
PROT UR-MCNC: 382 MG/DL
PROT/CREAT UR: 3.24 MG/G{CREAT} (ref 0–0.1)
PROTHROMBIN TIME: 104.2 SECONDS (ref 11.6–14.5)
PROTHROMBIN TIME: 13.8 SECONDS (ref 11.6–14.5)
PROTHROMBIN TIME: 14.2 SECONDS (ref 11.6–14.5)
PROTHROMBIN TIME: 14.2 SECONDS (ref 11.6–14.5)
PROTHROMBIN TIME: 14.6 SECONDS (ref 11.6–14.5)
PROTHROMBIN TIME: 14.9 SECONDS (ref 11.6–14.5)
PROTHROMBIN TIME: 15.4 SECONDS (ref 11.6–14.5)
PROTHROMBIN TIME: 15.9 SECONDS (ref 11.6–14.5)
PROTHROMBIN TIME: 16.1 SECONDS (ref 11.6–14.5)
PROTHROMBIN TIME: 16.2 SECONDS (ref 11.6–14.5)
PROTHROMBIN TIME: 16.4 SECONDS (ref 11.6–14.5)
PROTHROMBIN TIME: 17.1 SECONDS (ref 11.6–14.5)
PROTHROMBIN TIME: 17.4 SECONDS (ref 11.6–14.5)
PROTHROMBIN TIME: 19 SECONDS (ref 11.6–14.5)
PROTHROMBIN TIME: 20 SECONDS (ref 11.6–14.5)
PROTHROMBIN TIME: 22.2 SECONDS (ref 11.6–14.5)
PROTHROMBIN TIME: 25.2 SECONDS (ref 11.6–14.5)
PROTHROMBIN TIME: 25.2 SECONDS (ref 11.6–14.5)
PROTHROMBIN TIME: 28.5 SECONDS (ref 11.6–14.5)
PROTHROMBIN TIME: 29.1 SECONDS (ref 11.6–14.5)
PROTHROMBIN TIME: 29.7 SECONDS (ref 11.6–14.5)
PROTHROMBIN TIME: 64.1 SECONDS (ref 11.6–14.5)
PROTHROMBIN TIME: >120 SECONDS (ref 11.6–14.5)
PROTHROMBIN TIME: >120 SECONDS (ref 11.6–14.5)
QRS AXIS: -16 DEGREES
QRS AXIS: -27 DEGREES
QRS AXIS: 100 DEGREES
QRS AXIS: 109 DEGREES
QRS AXIS: 109 DEGREES
QRS AXIS: 51 DEGREES
QRS AXIS: 91 DEGREES
QRSD INTERVAL: 100 MS
QRSD INTERVAL: 116 MS
QRSD INTERVAL: 116 MS
QRSD INTERVAL: 128 MS
QRSD INTERVAL: 140 MS
QRSD INTERVAL: 146 MS
QRSD INTERVAL: 98 MS
QT INTERVAL: 324 MS
QT INTERVAL: 364 MS
QT INTERVAL: 374 MS
QT INTERVAL: 374 MS
QT INTERVAL: 382 MS
QT INTERVAL: 400 MS
QT INTERVAL: 424 MS
QTC INTERVAL: 438 MS
QTC INTERVAL: 438 MS
QTC INTERVAL: 459 MS
QTC INTERVAL: 461 MS
QTC INTERVAL: 472 MS
QTC INTERVAL: 489 MS
QTC INTERVAL: 562 MS
RBC # BLD AUTO: 3.55 MILLION/UL (ref 3.88–5.62)
RBC # BLD AUTO: 3.69 MILLION/UL (ref 3.88–5.62)
RBC # BLD AUTO: 3.81 MILLION/UL (ref 3.88–5.62)
RBC # BLD AUTO: 3.89 MILLION/UL (ref 3.88–5.62)
RBC # BLD AUTO: 4.02 MILLION/UL (ref 3.88–5.62)
RBC # BLD AUTO: 4.04 MILLION/UL (ref 3.88–5.62)
RBC # BLD AUTO: 4.11 MILLION/UL (ref 3.88–5.62)
RBC # BLD AUTO: 4.17 MILLION/UL (ref 3.88–5.62)
RBC # BLD AUTO: 4.17 MILLION/UL (ref 3.88–5.62)
RBC # BLD AUTO: 4.18 MILLION/UL (ref 3.88–5.62)
RBC # BLD AUTO: 4.22 MILLION/UL (ref 3.88–5.62)
RBC # BLD AUTO: 4.32 MILLION/UL (ref 3.88–5.62)
RBC # BLD AUTO: 4.4 MILLION/UL (ref 3.88–5.62)
RBC # BLD AUTO: 4.49 MILLION/UL (ref 3.88–5.62)
RBC # BLD AUTO: 4.61 MILLION/UL (ref 3.88–5.62)
RBC # BLD AUTO: 4.63 MILLION/UL (ref 3.88–5.62)
RBC # BLD AUTO: 4.66 MILLION/UL (ref 3.88–5.62)
RBC # BLD AUTO: 4.73 MILLION/UL (ref 3.88–5.62)
RBC # BLD AUTO: 4.77 MILLION/UL (ref 3.88–5.62)
RBC # BLD AUTO: 4.78 MILLION/UL (ref 3.88–5.62)
RBC # BLD AUTO: 5.04 MILLION/UL (ref 3.88–5.62)
RBC # BLD AUTO: 5.07 MILLION/UL (ref 3.88–5.62)
RBC # BLD AUTO: 5.3 MILLION/UL (ref 3.88–5.62)
RBC # BLD AUTO: 5.4 MILLION/UL (ref 3.88–5.62)
RBC #/AREA URNS AUTO: ABNORMAL /HPF
RH BLD: POSITIVE
SARS-COV-2 RNA RESP QL NAA+PROBE: NEGATIVE
SARS-COV-2 RNA RESP QL NAA+PROBE: NEGATIVE
SODIUM SERPL-SCNC: 134 MMOL/L (ref 136–145)
SODIUM SERPL-SCNC: 135 MMOL/L (ref 136–145)
SODIUM SERPL-SCNC: 136 MMOL/L (ref 136–145)
SODIUM SERPL-SCNC: 137 MMOL/L (ref 136–145)
SODIUM SERPL-SCNC: 138 MMOL/L (ref 136–145)
SODIUM SERPL-SCNC: 139 MMOL/L (ref 136–145)
SODIUM SERPL-SCNC: 140 MMOL/L (ref 136–145)
SODIUM SERPL-SCNC: 141 MMOL/L (ref 136–145)
SODIUM SERPL-SCNC: 141 MMOL/L (ref 136–145)
SODIUM SERPL-SCNC: 142 MMOL/L (ref 136–145)
SODIUM SERPL-SCNC: 143 MMOL/L (ref 136–145)
SODIUM SERPL-SCNC: 144 MMOL/L (ref 136–145)
SODIUM SERPL-SCNC: 144 MMOL/L (ref 136–145)
SODIUM SERPL-SCNC: 145 MMOL/L (ref 136–145)
SP GR UR STRIP.AUTO: 1.01 (ref 1–1.03)
SP GR UR STRIP.AUTO: 1.02 (ref 1–1.03)
SP GR UR STRIP.AUTO: >=1.03 (ref 1–1.03)
SPECIMEN EXPIRATION DATE: NORMAL
SPECIMEN EXPIRATION DATE: NORMAL
SPECIMEN SOURCE: ABNORMAL
T WAVE AXIS: -1 DEGREES
T WAVE AXIS: -11 DEGREES
T WAVE AXIS: 111 DEGREES
T WAVE AXIS: 148 DEGREES
T WAVE AXIS: 16 DEGREES
T WAVE AXIS: 190 DEGREES
T WAVE AXIS: 52 DEGREES
T4 FREE SERPL-MCNC: 0.66 NG/DL (ref 0.76–1.46)
TOTAL CELLS COUNTED SPEC: 100
TRIGL SERPL-MCNC: 97 MG/DL
TROPONIN I SERPL-MCNC: 11.29 NG/ML
TROPONIN I SERPL-MCNC: 14.15 NG/ML
TROPONIN I SERPL-MCNC: 15.54 NG/ML
TROPONIN I SERPL-MCNC: 16.09 NG/ML
TROPONIN I SERPL-MCNC: 35.92 NG/ML
TROPONIN I SERPL-MCNC: 4.22 NG/ML
TROPONIN I SERPL-MCNC: 5.36 NG/ML
TROPONIN I SERPL-MCNC: 5.85 NG/ML
TROPONIN I SERPL-MCNC: 6.62 NG/ML
TROPONIN I SERPL-MCNC: 7.38 NG/ML
TROPONIN I SERPL-MCNC: >40 NG/ML
TSH SERPL DL<=0.05 MIU/L-ACNC: 8.91 UIU/ML (ref 0.36–3.74)
UNIT DISPENSE STATUS: NORMAL
UNIT PRODUCT CODE: NORMAL
UNIT RH: NORMAL
UROBILINOGEN UR QL STRIP.AUTO: 0.2 E.U./DL
VANCOMYCIN SERPL-MCNC: 14.4 UG/ML
VANCOMYCIN SERPL-MCNC: 21.3 UG/ML
VENTRICULAR RATE: 103 BPM
VENTRICULAR RATE: 110 BPM
VENTRICULAR RATE: 136 BPM
VENTRICULAR RATE: 71 BPM
VENTRICULAR RATE: 84 BPM
VENTRICULAR RATE: 87 BPM
VENTRICULAR RATE: 87 BPM
WBC # BLD AUTO: 10.18 THOUSAND/UL (ref 4.31–10.16)
WBC # BLD AUTO: 10.99 THOUSAND/UL (ref 4.31–10.16)
WBC # BLD AUTO: 11.44 THOUSAND/UL (ref 4.31–10.16)
WBC # BLD AUTO: 11.61 THOUSAND/UL (ref 4.31–10.16)
WBC # BLD AUTO: 11.94 THOUSAND/UL (ref 4.31–10.16)
WBC # BLD AUTO: 13.8 THOUSAND/UL (ref 4.31–10.16)
WBC # BLD AUTO: 14.66 THOUSAND/UL (ref 4.31–10.16)
WBC # BLD AUTO: 15.34 THOUSAND/UL (ref 4.31–10.16)
WBC # BLD AUTO: 15.45 THOUSAND/UL (ref 4.31–10.16)
WBC # BLD AUTO: 16.81 THOUSAND/UL (ref 4.31–10.16)
WBC # BLD AUTO: 17.08 THOUSAND/UL (ref 4.31–10.16)
WBC # BLD AUTO: 6.82 THOUSAND/UL (ref 4.31–10.16)
WBC # BLD AUTO: 6.88 THOUSAND/UL (ref 4.31–10.16)
WBC # BLD AUTO: 7.78 THOUSAND/UL (ref 4.31–10.16)
WBC # BLD AUTO: 7.91 THOUSAND/UL (ref 4.31–10.16)
WBC # BLD AUTO: 8.02 THOUSAND/UL (ref 4.31–10.16)
WBC # BLD AUTO: 8.18 THOUSAND/UL (ref 4.31–10.16)
WBC # BLD AUTO: 8.25 THOUSAND/UL (ref 4.31–10.16)
WBC # BLD AUTO: 8.36 THOUSAND/UL (ref 4.31–10.16)
WBC # BLD AUTO: 8.54 THOUSAND/UL (ref 4.31–10.16)
WBC # BLD AUTO: 8.59 THOUSAND/UL (ref 4.31–10.16)
WBC # BLD AUTO: 9.04 THOUSAND/UL (ref 4.31–10.16)
WBC # BLD AUTO: 9.71 THOUSAND/UL (ref 4.31–10.16)
WBC # BLD AUTO: 9.95 THOUSAND/UL (ref 4.31–10.16)
WBC #/AREA URNS AUTO: ABNORMAL /HPF

## 2020-01-01 PROCEDURE — 83605 ASSAY OF LACTIC ACID: CPT | Performed by: EMERGENCY MEDICINE

## 2020-01-01 PROCEDURE — C1894 INTRO/SHEATH, NON-LASER: HCPCS | Performed by: INTERNAL MEDICINE

## 2020-01-01 PROCEDURE — 85730 THROMBOPLASTIN TIME PARTIAL: CPT | Performed by: INTERNAL MEDICINE

## 2020-01-01 PROCEDURE — 96374 THER/PROPH/DIAG INJ IV PUSH: CPT

## 2020-01-01 PROCEDURE — 87077 CULTURE AEROBIC IDENTIFY: CPT | Performed by: INTERNAL MEDICINE

## 2020-01-01 PROCEDURE — 99231 SBSQ HOSP IP/OBS SF/LOW 25: CPT | Performed by: INTERNAL MEDICINE

## 2020-01-01 PROCEDURE — 83735 ASSAY OF MAGNESIUM: CPT | Performed by: INTERNAL MEDICINE

## 2020-01-01 PROCEDURE — 99232 SBSQ HOSP IP/OBS MODERATE 35: CPT | Performed by: NURSE PRACTITIONER

## 2020-01-01 PROCEDURE — 80053 COMPREHEN METABOLIC PANEL: CPT

## 2020-01-01 PROCEDURE — 82570 ASSAY OF URINE CREATININE: CPT

## 2020-01-01 PROCEDURE — 99292 CRITICAL CARE ADDL 30 MIN: CPT | Performed by: NURSE PRACTITIONER

## 2020-01-01 PROCEDURE — 83735 ASSAY OF MAGNESIUM: CPT | Performed by: PHYSICIAN ASSISTANT

## 2020-01-01 PROCEDURE — 82805 BLOOD GASES W/O2 SATURATION: CPT | Performed by: NURSE PRACTITIONER

## 2020-01-01 PROCEDURE — 85027 COMPLETE CBC AUTOMATED: CPT | Performed by: PHYSICIAN ASSISTANT

## 2020-01-01 PROCEDURE — 82805 BLOOD GASES W/O2 SATURATION: CPT | Performed by: EMERGENCY MEDICINE

## 2020-01-01 PROCEDURE — 03HY32Z INSERTION OF MONITORING DEVICE INTO UPPER ARTERY, PERCUTANEOUS APPROACH: ICD-10-PCS | Performed by: INTERNAL MEDICINE

## 2020-01-01 PROCEDURE — 82805 BLOOD GASES W/O2 SATURATION: CPT | Performed by: INTERNAL MEDICINE

## 2020-01-01 PROCEDURE — 85025 COMPLETE CBC W/AUTO DIFF WBC: CPT | Performed by: PHYSICIAN ASSISTANT

## 2020-01-01 PROCEDURE — 87040 BLOOD CULTURE FOR BACTERIA: CPT | Performed by: EMERGENCY MEDICINE

## 2020-01-01 PROCEDURE — 85610 PROTHROMBIN TIME: CPT | Performed by: NURSE PRACTITIONER

## 2020-01-01 PROCEDURE — NC001 PR NO CHARGE: Performed by: INTERNAL MEDICINE

## 2020-01-01 PROCEDURE — 80048 BASIC METABOLIC PNL TOTAL CA: CPT | Performed by: PHYSICIAN ASSISTANT

## 2020-01-01 PROCEDURE — 99233 SBSQ HOSP IP/OBS HIGH 50: CPT | Performed by: INTERNAL MEDICINE

## 2020-01-01 PROCEDURE — 99213 OFFICE O/P EST LOW 20 MIN: CPT | Performed by: PHYSICIAN ASSISTANT

## 2020-01-01 PROCEDURE — 74176 CT ABD & PELVIS W/O CONTRAST: CPT

## 2020-01-01 PROCEDURE — 85025 COMPLETE CBC W/AUTO DIFF WBC: CPT | Performed by: EMERGENCY MEDICINE

## 2020-01-01 PROCEDURE — 80048 BASIC METABOLIC PNL TOTAL CA: CPT | Performed by: NURSE PRACTITIONER

## 2020-01-01 PROCEDURE — 85025 COMPLETE CBC W/AUTO DIFF WBC: CPT | Performed by: INTERNAL MEDICINE

## 2020-01-01 PROCEDURE — 84145 PROCALCITONIN (PCT): CPT | Performed by: EMERGENCY MEDICINE

## 2020-01-01 PROCEDURE — 85730 THROMBOPLASTIN TIME PARTIAL: CPT | Performed by: PHYSICIAN ASSISTANT

## 2020-01-01 PROCEDURE — 85610 PROTHROMBIN TIME: CPT | Performed by: PHYSICIAN ASSISTANT

## 2020-01-01 PROCEDURE — 93010 ELECTROCARDIOGRAM REPORT: CPT | Performed by: INTERNAL MEDICINE

## 2020-01-01 PROCEDURE — 99223 1ST HOSP IP/OBS HIGH 75: CPT | Performed by: INTERNAL MEDICINE

## 2020-01-01 PROCEDURE — 83735 ASSAY OF MAGNESIUM: CPT | Performed by: NURSE PRACTITIONER

## 2020-01-01 PROCEDURE — 84484 ASSAY OF TROPONIN QUANT: CPT | Performed by: NURSE PRACTITIONER

## 2020-01-01 PROCEDURE — 99232 SBSQ HOSP IP/OBS MODERATE 35: CPT | Performed by: INTERNAL MEDICINE

## 2020-01-01 PROCEDURE — 99233 SBSQ HOSP IP/OBS HIGH 50: CPT | Performed by: FAMILY MEDICINE

## 2020-01-01 PROCEDURE — 99291 CRITICAL CARE FIRST HOUR: CPT | Performed by: INTERNAL MEDICINE

## 2020-01-01 PROCEDURE — 80053 COMPREHEN METABOLIC PANEL: CPT | Performed by: EMERGENCY MEDICINE

## 2020-01-01 PROCEDURE — 80048 BASIC METABOLIC PNL TOTAL CA: CPT | Performed by: INTERNAL MEDICINE

## 2020-01-01 PROCEDURE — 84100 ASSAY OF PHOSPHORUS: CPT | Performed by: PHYSICIAN ASSISTANT

## 2020-01-01 PROCEDURE — 82805 BLOOD GASES W/O2 SATURATION: CPT | Performed by: PHYSICIAN ASSISTANT

## 2020-01-01 PROCEDURE — 36556 INSERT NON-TUNNEL CV CATH: CPT | Performed by: NURSE PRACTITIONER

## 2020-01-01 PROCEDURE — 99152 MOD SED SAME PHYS/QHP 5/>YRS: CPT | Performed by: INTERNAL MEDICINE

## 2020-01-01 PROCEDURE — 36415 COLL VENOUS BLD VENIPUNCTURE: CPT

## 2020-01-01 PROCEDURE — 85730 THROMBOPLASTIN TIME PARTIAL: CPT | Performed by: EMERGENCY MEDICINE

## 2020-01-01 PROCEDURE — 83735 ASSAY OF MAGNESIUM: CPT | Performed by: FAMILY MEDICINE

## 2020-01-01 PROCEDURE — 85610 PROTHROMBIN TIME: CPT

## 2020-01-01 PROCEDURE — 85014 HEMATOCRIT: CPT | Performed by: NURSE PRACTITIONER

## 2020-01-01 PROCEDURE — 85610 PROTHROMBIN TIME: CPT | Performed by: INTERNAL MEDICINE

## 2020-01-01 PROCEDURE — C1769 GUIDE WIRE: HCPCS | Performed by: INTERNAL MEDICINE

## 2020-01-01 PROCEDURE — 97167 OT EVAL HIGH COMPLEX 60 MIN: CPT

## 2020-01-01 PROCEDURE — 81001 URINALYSIS AUTO W/SCOPE: CPT | Performed by: PHYSICIAN ASSISTANT

## 2020-01-01 PROCEDURE — 99291 CRITICAL CARE FIRST HOUR: CPT | Performed by: EMERGENCY MEDICINE

## 2020-01-01 PROCEDURE — 86900 BLOOD TYPING SEROLOGIC ABO: CPT | Performed by: NURSE PRACTITIONER

## 2020-01-01 PROCEDURE — 81001 URINALYSIS AUTO W/SCOPE: CPT

## 2020-01-01 PROCEDURE — 97163 PT EVAL HIGH COMPLEX 45 MIN: CPT

## 2020-01-01 PROCEDURE — 80048 BASIC METABOLIC PNL TOTAL CA: CPT | Performed by: FAMILY MEDICINE

## 2020-01-01 PROCEDURE — 93005 ELECTROCARDIOGRAM TRACING: CPT

## 2020-01-01 PROCEDURE — 82533 TOTAL CORTISOL: CPT | Performed by: PHYSICIAN ASSISTANT

## 2020-01-01 PROCEDURE — 85025 COMPLETE CBC W/AUTO DIFF WBC: CPT | Performed by: FAMILY MEDICINE

## 2020-01-01 PROCEDURE — 85610 PROTHROMBIN TIME: CPT | Performed by: EMERGENCY MEDICINE

## 2020-01-01 PROCEDURE — 87186 SC STD MICRODIL/AGAR DIL: CPT | Performed by: EMERGENCY MEDICINE

## 2020-01-01 PROCEDURE — 86900 BLOOD TYPING SEROLOGIC ABO: CPT | Performed by: PHYSICIAN ASSISTANT

## 2020-01-01 PROCEDURE — 71045 X-RAY EXAM CHEST 1 VIEW: CPT

## 2020-01-01 PROCEDURE — 85730 THROMBOPLASTIN TIME PARTIAL: CPT | Performed by: NURSE PRACTITIONER

## 2020-01-01 PROCEDURE — 87186 SC STD MICRODIL/AGAR DIL: CPT | Performed by: PHYSICIAN ASSISTANT

## 2020-01-01 PROCEDURE — 87147 CULTURE TYPE IMMUNOLOGIC: CPT | Performed by: NURSE PRACTITIONER

## 2020-01-01 PROCEDURE — 80053 COMPREHEN METABOLIC PANEL: CPT | Performed by: NURSE PRACTITIONER

## 2020-01-01 PROCEDURE — 87086 URINE CULTURE/COLONY COUNT: CPT | Performed by: PHYSICIAN ASSISTANT

## 2020-01-01 PROCEDURE — 82533 TOTAL CORTISOL: CPT | Performed by: EMERGENCY MEDICINE

## 2020-01-01 PROCEDURE — 84145 PROCALCITONIN (PCT): CPT | Performed by: NURSE PRACTITIONER

## 2020-01-01 PROCEDURE — 71250 CT THORAX DX C-: CPT

## 2020-01-01 PROCEDURE — 87040 BLOOD CULTURE FOR BACTERIA: CPT | Performed by: NURSE PRACTITIONER

## 2020-01-01 PROCEDURE — 80202 ASSAY OF VANCOMYCIN: CPT | Performed by: NURSE PRACTITIONER

## 2020-01-01 PROCEDURE — 83690 ASSAY OF LIPASE: CPT | Performed by: EMERGENCY MEDICINE

## 2020-01-01 PROCEDURE — 36600 WITHDRAWAL OF ARTERIAL BLOOD: CPT

## 2020-01-01 PROCEDURE — 80048 BASIC METABOLIC PNL TOTAL CA: CPT

## 2020-01-01 PROCEDURE — 83880 ASSAY OF NATRIURETIC PEPTIDE: CPT | Performed by: NURSE PRACTITIONER

## 2020-01-01 PROCEDURE — 85027 COMPLETE CBC AUTOMATED: CPT | Performed by: NURSE PRACTITIONER

## 2020-01-01 PROCEDURE — 99214 OFFICE O/P EST MOD 30 MIN: CPT | Performed by: INTERNAL MEDICINE

## 2020-01-01 PROCEDURE — 80061 LIPID PANEL: CPT | Performed by: INTERNAL MEDICINE

## 2020-01-01 PROCEDURE — 99222 1ST HOSP IP/OBS MODERATE 55: CPT | Performed by: NURSE PRACTITIONER

## 2020-01-01 PROCEDURE — 99223 1ST HOSP IP/OBS HIGH 75: CPT | Performed by: PHYSICIAN ASSISTANT

## 2020-01-01 PROCEDURE — 84484 ASSAY OF TROPONIN QUANT: CPT | Performed by: EMERGENCY MEDICINE

## 2020-01-01 PROCEDURE — A9585 GADOBUTROL INJECTION: HCPCS | Performed by: INTERNAL MEDICINE

## 2020-01-01 PROCEDURE — 81001 URINALYSIS AUTO W/SCOPE: CPT | Performed by: NURSE PRACTITIONER

## 2020-01-01 PROCEDURE — P9017 PLASMA 1 DONOR FRZ W/IN 8 HR: HCPCS

## 2020-01-01 PROCEDURE — 87077 CULTURE AEROBIC IDENTIFY: CPT | Performed by: EMERGENCY MEDICINE

## 2020-01-01 PROCEDURE — 83735 ASSAY OF MAGNESIUM: CPT | Performed by: EMERGENCY MEDICINE

## 2020-01-01 PROCEDURE — C8929 TTE W OR WO FOL WCON,DOPPLER: HCPCS

## 2020-01-01 PROCEDURE — 81001 URINALYSIS AUTO W/SCOPE: CPT | Performed by: INTERNAL MEDICINE

## 2020-01-01 PROCEDURE — 86901 BLOOD TYPING SEROLOGIC RH(D): CPT | Performed by: NURSE PRACTITIONER

## 2020-01-01 PROCEDURE — 51798 US URINE CAPACITY MEASURE: CPT

## 2020-01-01 PROCEDURE — 87086 URINE CULTURE/COLONY COUNT: CPT | Performed by: EMERGENCY MEDICINE

## 2020-01-01 PROCEDURE — 82542 COL CHROMOTOGRAPHY QUAL/QUAN: CPT | Performed by: INTERNAL MEDICINE

## 2020-01-01 PROCEDURE — 84443 ASSAY THYROID STIM HORMONE: CPT | Performed by: NURSE PRACTITIONER

## 2020-01-01 PROCEDURE — 93306 TTE W/DOPPLER COMPLETE: CPT | Performed by: INTERNAL MEDICINE

## 2020-01-01 PROCEDURE — 93321 DOPPLER ECHO F-UP/LMTD STD: CPT | Performed by: INTERNAL MEDICINE

## 2020-01-01 PROCEDURE — 87340 HEPATITIS B SURFACE AG IA: CPT | Performed by: NURSE PRACTITIONER

## 2020-01-01 PROCEDURE — 93308 TTE F-UP OR LMTD: CPT | Performed by: INTERNAL MEDICINE

## 2020-01-01 PROCEDURE — 70450 CT HEAD/BRAIN W/O DYE: CPT

## 2020-01-01 PROCEDURE — 82330 ASSAY OF CALCIUM: CPT | Performed by: PHYSICIAN ASSISTANT

## 2020-01-01 PROCEDURE — 99214 OFFICE O/P EST MOD 30 MIN: CPT | Performed by: NURSE PRACTITIONER

## 2020-01-01 PROCEDURE — 96365 THER/PROPH/DIAG IV INF INIT: CPT

## 2020-01-01 PROCEDURE — 99285 EMERGENCY DEPT VISIT HI MDM: CPT

## 2020-01-01 PROCEDURE — NS001 PR NO SIGNATURE OR ATTESTATION: Performed by: FAMILY MEDICINE

## 2020-01-01 PROCEDURE — 96376 TX/PRO/DX INJ SAME DRUG ADON: CPT

## 2020-01-01 PROCEDURE — 86901 BLOOD TYPING SEROLOGIC RH(D): CPT | Performed by: PHYSICIAN ASSISTANT

## 2020-01-01 PROCEDURE — 99285 EMERGENCY DEPT VISIT HI MDM: CPT | Performed by: EMERGENCY MEDICINE

## 2020-01-01 PROCEDURE — 99291 CRITICAL CARE FIRST HOUR: CPT

## 2020-01-01 PROCEDURE — 97116 GAIT TRAINING THERAPY: CPT

## 2020-01-01 PROCEDURE — 84132 ASSAY OF SERUM POTASSIUM: CPT | Performed by: INTERNAL MEDICINE

## 2020-01-01 PROCEDURE — 36415 COLL VENOUS BLD VENIPUNCTURE: CPT | Performed by: EMERGENCY MEDICINE

## 2020-01-01 PROCEDURE — 82948 REAGENT STRIP/BLOOD GLUCOSE: CPT

## 2020-01-01 PROCEDURE — 86803 HEPATITIS C AB TEST: CPT | Performed by: NURSE PRACTITIONER

## 2020-01-01 PROCEDURE — 87635 SARS-COV-2 COVID-19 AMP PRB: CPT | Performed by: EMERGENCY MEDICINE

## 2020-01-01 PROCEDURE — 99213 OFFICE O/P EST LOW 20 MIN: CPT | Performed by: INTERNAL MEDICINE

## 2020-01-01 PROCEDURE — 99291 CRITICAL CARE FIRST HOUR: CPT | Performed by: NURSE PRACTITIONER

## 2020-01-01 PROCEDURE — 99284 EMERGENCY DEPT VISIT MOD MDM: CPT | Performed by: EMERGENCY MEDICINE

## 2020-01-01 PROCEDURE — 87806 HIV AG W/HIV1&2 ANTB W/OPTIC: CPT | Performed by: NURSE PRACTITIONER

## 2020-01-01 PROCEDURE — 99222 1ST HOSP IP/OBS MODERATE 55: CPT | Performed by: THORACIC SURGERY (CARDIOTHORACIC VASCULAR SURGERY)

## 2020-01-01 PROCEDURE — 96368 THER/DIAG CONCURRENT INF: CPT

## 2020-01-01 PROCEDURE — 82805 BLOOD GASES W/O2 SATURATION: CPT | Performed by: FAMILY MEDICINE

## 2020-01-01 PROCEDURE — 85025 COMPLETE CBC W/AUTO DIFF WBC: CPT | Performed by: NURSE PRACTITIONER

## 2020-01-01 PROCEDURE — 83605 ASSAY OF LACTIC ACID: CPT | Performed by: NURSE PRACTITIONER

## 2020-01-01 PROCEDURE — 87186 SC STD MICRODIL/AGAR DIL: CPT

## 2020-01-01 PROCEDURE — 87086 URINE CULTURE/COLONY COUNT: CPT | Performed by: INTERNAL MEDICINE

## 2020-01-01 PROCEDURE — 93971 EXTREMITY STUDY: CPT

## 2020-01-01 PROCEDURE — 85027 COMPLETE CBC AUTOMATED: CPT | Performed by: INTERNAL MEDICINE

## 2020-01-01 PROCEDURE — 83880 ASSAY OF NATRIURETIC PEPTIDE: CPT | Performed by: EMERGENCY MEDICINE

## 2020-01-01 PROCEDURE — 80053 COMPREHEN METABOLIC PANEL: CPT | Performed by: PHYSICIAN ASSISTANT

## 2020-01-01 PROCEDURE — 87086 URINE CULTURE/COLONY COUNT: CPT | Performed by: NURSE PRACTITIONER

## 2020-01-01 PROCEDURE — 52000 CYSTOURETHROSCOPY: CPT | Performed by: PHYSICIAN ASSISTANT

## 2020-01-01 PROCEDURE — 80202 ASSAY OF VANCOMYCIN: CPT | Performed by: INTERNAL MEDICINE

## 2020-01-01 PROCEDURE — 99239 HOSP IP/OBS DSCHRG MGMT >30: CPT | Performed by: FAMILY MEDICINE

## 2020-01-01 PROCEDURE — 99238 HOSP IP/OBS DSCHRG MGMT 30/<: CPT | Performed by: INTERNAL MEDICINE

## 2020-01-01 PROCEDURE — 86850 RBC ANTIBODY SCREEN: CPT | Performed by: PHYSICIAN ASSISTANT

## 2020-01-01 PROCEDURE — 81001 URINALYSIS AUTO W/SCOPE: CPT | Performed by: EMERGENCY MEDICINE

## 2020-01-01 PROCEDURE — 4A133B1 MONITORING OF ARTERIAL PRESSURE, PERIPHERAL, PERCUTANEOUS APPROACH: ICD-10-PCS | Performed by: INTERNAL MEDICINE

## 2020-01-01 PROCEDURE — 93308 TTE F-UP OR LMTD: CPT

## 2020-01-01 PROCEDURE — 99238 HOSP IP/OBS DSCHRG MGMT 30/<: CPT | Performed by: NURSE PRACTITIONER

## 2020-01-01 PROCEDURE — 87086 URINE CULTURE/COLONY COUNT: CPT

## 2020-01-01 PROCEDURE — 99215 OFFICE O/P EST HI 40 MIN: CPT | Performed by: FAMILY MEDICINE

## 2020-01-01 PROCEDURE — 87186 SC STD MICRODIL/AGAR DIL: CPT | Performed by: INTERNAL MEDICINE

## 2020-01-01 PROCEDURE — 97530 THERAPEUTIC ACTIVITIES: CPT

## 2020-01-01 PROCEDURE — 96366 THER/PROPH/DIAG IV INF ADDON: CPT

## 2020-01-01 PROCEDURE — 94150 VITAL CAPACITY TEST: CPT

## 2020-01-01 PROCEDURE — 84156 ASSAY OF PROTEIN URINE: CPT

## 2020-01-01 PROCEDURE — 51703 INSERT BLADDER CATH COMPLEX: CPT | Performed by: UROLOGY

## 2020-01-01 PROCEDURE — 76770 US EXAM ABDO BACK WALL COMP: CPT

## 2020-01-01 PROCEDURE — 93454 CORONARY ARTERY ANGIO S&I: CPT | Performed by: INTERNAL MEDICINE

## 2020-01-01 PROCEDURE — 84439 ASSAY OF FREE THYROXINE: CPT | Performed by: NURSE PRACTITIONER

## 2020-01-01 PROCEDURE — 99284 EMERGENCY DEPT VISIT MOD MDM: CPT

## 2020-01-01 PROCEDURE — 87077 CULTURE AEROBIC IDENTIFY: CPT

## 2020-01-01 PROCEDURE — 96375 TX/PRO/DX INJ NEW DRUG ADDON: CPT

## 2020-01-01 PROCEDURE — 99239 HOSP IP/OBS DSCHRG MGMT >30: CPT | Performed by: INTERNAL MEDICINE

## 2020-01-01 PROCEDURE — 36620 INSERTION CATHETER ARTERY: CPT | Performed by: EMERGENCY MEDICINE

## 2020-01-01 PROCEDURE — 99222 1ST HOSP IP/OBS MODERATE 55: CPT | Performed by: UROLOGY

## 2020-01-01 PROCEDURE — 83036 HEMOGLOBIN GLYCOSYLATED A1C: CPT | Performed by: NURSE PRACTITIONER

## 2020-01-01 PROCEDURE — 97535 SELF CARE MNGMENT TRAINING: CPT

## 2020-01-01 PROCEDURE — 99292 CRITICAL CARE ADDL 30 MIN: CPT | Performed by: EMERGENCY MEDICINE

## 2020-01-01 PROCEDURE — 97110 THERAPEUTIC EXERCISES: CPT

## 2020-01-01 PROCEDURE — NC001 PR NO CHARGE: Performed by: NURSE PRACTITIONER

## 2020-01-01 PROCEDURE — 93325 DOPPLER ECHO COLOR FLOW MAPG: CPT | Performed by: INTERNAL MEDICINE

## 2020-01-01 PROCEDURE — 97116 GAIT TRAINING THERAPY: CPT | Performed by: PHYSICAL THERAPIST

## 2020-01-01 PROCEDURE — 86850 RBC ANTIBODY SCREEN: CPT | Performed by: NURSE PRACTITIONER

## 2020-01-01 PROCEDURE — 96361 HYDRATE IV INFUSION ADD-ON: CPT

## 2020-01-01 PROCEDURE — 82330 ASSAY OF CALCIUM: CPT | Performed by: NURSE PRACTITIONER

## 2020-01-01 PROCEDURE — 84100 ASSAY OF PHOSPHORUS: CPT | Performed by: NURSE PRACTITIONER

## 2020-01-01 PROCEDURE — 99233 SBSQ HOSP IP/OBS HIGH 50: CPT | Performed by: NURSE PRACTITIONER

## 2020-01-01 PROCEDURE — 94664 DEMO&/EVAL PT USE INHALER: CPT

## 2020-01-01 PROCEDURE — 93971 EXTREMITY STUDY: CPT | Performed by: SURGERY

## 2020-01-01 PROCEDURE — B2111ZZ FLUOROSCOPY OF MULTIPLE CORONARY ARTERIES USING LOW OSMOLAR CONTRAST: ICD-10-PCS | Performed by: INTERNAL MEDICINE

## 2020-01-01 PROCEDURE — 85018 HEMOGLOBIN: CPT | Performed by: NURSE PRACTITIONER

## 2020-01-01 PROCEDURE — 87077 CULTURE AEROBIC IDENTIFY: CPT | Performed by: PHYSICIAN ASSISTANT

## 2020-01-01 PROCEDURE — 99222 1ST HOSP IP/OBS MODERATE 55: CPT | Performed by: PHYSICIAN ASSISTANT

## 2020-01-01 PROCEDURE — 80048 BASIC METABOLIC PNL TOTAL CA: CPT | Performed by: EMERGENCY MEDICINE

## 2020-01-01 PROCEDURE — 85007 BL SMEAR W/DIFF WBC COUNT: CPT | Performed by: NURSE PRACTITIONER

## 2020-01-01 PROCEDURE — 99222 1ST HOSP IP/OBS MODERATE 55: CPT | Performed by: INTERNAL MEDICINE

## 2020-01-01 PROCEDURE — 99223 1ST HOSP IP/OBS HIGH 75: CPT | Performed by: NURSE PRACTITIONER

## 2020-01-01 PROCEDURE — 75561 CARDIAC MRI FOR MORPH W/DYE: CPT

## 2020-01-01 PROCEDURE — 36430 TRANSFUSION BLD/BLD COMPNT: CPT

## 2020-01-01 PROCEDURE — 1124F ACP DISCUSS-NO DSCNMKR DOCD: CPT | Performed by: FAMILY MEDICINE

## 2020-01-01 PROCEDURE — 84145 PROCALCITONIN (PCT): CPT | Performed by: PHYSICIAN ASSISTANT

## 2020-01-01 PROCEDURE — 4A133J1 MONITORING OF ARTERIAL PULSE, PERIPHERAL, PERCUTANEOUS APPROACH: ICD-10-PCS | Performed by: INTERNAL MEDICINE

## 2020-01-01 PROCEDURE — C1760 CLOSURE DEV, VASC: HCPCS | Performed by: INTERNAL MEDICINE

## 2020-01-01 PROCEDURE — 84484 ASSAY OF TROPONIN QUANT: CPT | Performed by: INTERNAL MEDICINE

## 2020-01-01 PROCEDURE — 99232 SBSQ HOSP IP/OBS MODERATE 35: CPT | Performed by: FAMILY MEDICINE

## 2020-01-01 RX ORDER — HEPARIN SODIUM 10000 [USP'U]/100ML
3-30 INJECTION, SOLUTION INTRAVENOUS
Status: DISCONTINUED | OUTPATIENT
Start: 2020-01-01 | End: 2020-01-01

## 2020-01-01 RX ORDER — ALBUMIN (HUMAN) 12.5 G/50ML
12.5 SOLUTION INTRAVENOUS ONCE
Status: COMPLETED | OUTPATIENT
Start: 2020-01-01 | End: 2020-01-01

## 2020-01-01 RX ORDER — LEVOTHYROXINE SODIUM 0.05 MG/1
50 TABLET ORAL
Status: DISCONTINUED | OUTPATIENT
Start: 2020-01-01 | End: 2020-01-01

## 2020-01-01 RX ORDER — LEVOTHYROXINE SODIUM 0.05 MG/1
TABLET ORAL
Status: COMPLETED
Start: 2020-01-01 | End: 2020-01-01

## 2020-01-01 RX ORDER — POTASSIUM CHLORIDE 20 MEQ/1
40 TABLET, EXTENDED RELEASE ORAL ONCE
Status: COMPLETED | OUTPATIENT
Start: 2020-01-01 | End: 2020-01-01

## 2020-01-01 RX ORDER — ISOSORBIDE DINITRATE 10 MG/1
30 TABLET ORAL DAILY
Status: DISCONTINUED | OUTPATIENT
Start: 2020-01-01 | End: 2020-01-01

## 2020-01-01 RX ORDER — ASPIRIN 81 MG/1
81 TABLET, CHEWABLE ORAL DAILY
Status: CANCELLED | OUTPATIENT
Start: 2020-01-01

## 2020-01-01 RX ORDER — LEVOTHYROXINE SODIUM 0.05 MG/1
50 TABLET ORAL
Status: DISCONTINUED | OUTPATIENT
Start: 2020-01-01 | End: 2020-01-01 | Stop reason: HOSPADM

## 2020-01-01 RX ORDER — HEPARIN SODIUM 1000 [USP'U]/ML
2000 INJECTION, SOLUTION INTRAVENOUS; SUBCUTANEOUS AS NEEDED
Status: DISCONTINUED | OUTPATIENT
Start: 2020-01-01 | End: 2020-01-01

## 2020-01-01 RX ORDER — FUROSEMIDE 40 MG/1
40 TABLET ORAL
Status: CANCELLED | OUTPATIENT
Start: 2020-01-01

## 2020-01-01 RX ORDER — OXYCODONE HYDROCHLORIDE 5 MG/1
2.5 TABLET ORAL EVERY 6 HOURS PRN
Status: DISCONTINUED | OUTPATIENT
Start: 2020-01-01 | End: 2020-01-01 | Stop reason: HOSPADM

## 2020-01-01 RX ORDER — LIDOCAINE HYDROCHLORIDE 10 MG/ML
INJECTION, SOLUTION EPIDURAL; INFILTRATION; INTRACAUDAL; PERINEURAL CODE/TRAUMA/SEDATION MEDICATION
Status: COMPLETED | OUTPATIENT
Start: 2020-01-01 | End: 2020-01-01

## 2020-01-01 RX ORDER — LISINOPRIL 2.5 MG/1
2.5 TABLET ORAL 2 TIMES DAILY
Refills: 0
Start: 2020-01-01 | End: 2020-01-01 | Stop reason: HOSPADM

## 2020-01-01 RX ORDER — POTASSIUM CHLORIDE 20MEQ/15ML
40 LIQUID (ML) ORAL ONCE
Status: DISCONTINUED | OUTPATIENT
Start: 2020-01-01 | End: 2020-01-01

## 2020-01-01 RX ORDER — LIDOCAINE HYDROCHLORIDE 10 MG/ML
INJECTION, SOLUTION EPIDURAL; INFILTRATION; INTRACAUDAL; PERINEURAL
Status: COMPLETED
Start: 2020-01-01 | End: 2020-01-01

## 2020-01-01 RX ORDER — FUROSEMIDE 40 MG/1
40 TABLET ORAL
Status: DISCONTINUED | OUTPATIENT
Start: 2020-01-01 | End: 2020-01-01

## 2020-01-01 RX ORDER — ISOSORBIDE DINITRATE 10 MG/1
10 TABLET ORAL
Status: DISCONTINUED | OUTPATIENT
Start: 2020-01-01 | End: 2020-01-01

## 2020-01-01 RX ORDER — VANCOMYCIN HYDROCHLORIDE 1 G/200ML
15 INJECTION, SOLUTION INTRAVENOUS DAILY PRN
Status: DISCONTINUED | OUTPATIENT
Start: 2020-01-01 | End: 2020-01-01

## 2020-01-01 RX ORDER — ASPIRIN 81 MG/1
81 TABLET ORAL DAILY
Status: DISCONTINUED | OUTPATIENT
Start: 2020-01-01 | End: 2020-01-01 | Stop reason: HOSPADM

## 2020-01-01 RX ORDER — ALPRAZOLAM 0.25 MG/1
0.25 TABLET ORAL
Status: COMPLETED | OUTPATIENT
Start: 2020-01-01 | End: 2020-01-01

## 2020-01-01 RX ORDER — ACETAMINOPHEN 325 MG/1
975 TABLET ORAL EVERY 8 HOURS SCHEDULED
Status: DISCONTINUED | OUTPATIENT
Start: 2020-01-01 | End: 2020-01-01

## 2020-01-01 RX ORDER — SODIUM CHLORIDE 9 MG/ML
125 INJECTION, SOLUTION INTRAVENOUS CONTINUOUS
Status: DISCONTINUED | OUTPATIENT
Start: 2020-01-01 | End: 2020-01-01

## 2020-01-01 RX ORDER — CIPROFLOXACIN 500 MG/1
500 TABLET, FILM COATED ORAL EVERY 12 HOURS SCHEDULED
Qty: 14 TABLET | Refills: 0 | Status: SHIPPED | OUTPATIENT
Start: 2020-01-01 | End: 2020-01-01 | Stop reason: HOSPADM

## 2020-01-01 RX ORDER — POTASSIUM CHLORIDE 20 MEQ/1
40 TABLET, EXTENDED RELEASE ORAL ONCE
Status: DISCONTINUED | OUTPATIENT
Start: 2020-01-01 | End: 2020-01-01 | Stop reason: HOSPADM

## 2020-01-01 RX ORDER — MYCOPHENOLATE MOFETIL 250 MG/1
CAPSULE ORAL
Qty: 540 CAPSULE | Refills: 0 | OUTPATIENT
Start: 2020-01-01

## 2020-01-01 RX ORDER — POTASSIUM CHLORIDE 20 MEQ/1
40 TABLET, EXTENDED RELEASE ORAL EVERY 8 HOURS
Status: DISPENSED | OUTPATIENT
Start: 2020-01-01 | End: 2020-01-01

## 2020-01-01 RX ORDER — FENTANYL CITRATE 50 UG/ML
INJECTION, SOLUTION INTRAMUSCULAR; INTRAVENOUS CODE/TRAUMA/SEDATION MEDICATION
Status: COMPLETED | OUTPATIENT
Start: 2020-01-01 | End: 2020-01-01

## 2020-01-01 RX ORDER — ACETAMINOPHEN 325 MG/1
650 TABLET ORAL EVERY 6 HOURS PRN
Status: DISCONTINUED | OUTPATIENT
Start: 2020-01-01 | End: 2020-01-01 | Stop reason: HOSPADM

## 2020-01-01 RX ORDER — WARFARIN SODIUM 7.5 MG/1
TABLET ORAL
Status: COMPLETED
Start: 2020-01-01 | End: 2020-01-01

## 2020-01-01 RX ORDER — CEFPODOXIME PROXETIL 200 MG/1
200 TABLET, FILM COATED ORAL 2 TIMES DAILY
Status: COMPLETED | OUTPATIENT
Start: 2020-01-01 | End: 2020-01-01

## 2020-01-01 RX ORDER — VANCOMYCIN HYDROCHLORIDE 1 G/200ML
15 INJECTION, SOLUTION INTRAVENOUS ONCE
Status: COMPLETED | OUTPATIENT
Start: 2020-01-01 | End: 2020-01-01

## 2020-01-01 RX ORDER — OXYCODONE HYDROCHLORIDE 5 MG/1
2.5 TABLET ORAL EVERY 4 HOURS PRN
Status: DISCONTINUED | OUTPATIENT
Start: 2020-01-01 | End: 2020-01-01 | Stop reason: HOSPADM

## 2020-01-01 RX ORDER — WARFARIN SODIUM 7.5 MG/1
7.5 TABLET ORAL
Refills: 0
Start: 2020-01-01 | End: 2020-01-01 | Stop reason: SDUPTHER

## 2020-01-01 RX ORDER — ASPIRIN 81 MG/1
324 TABLET, CHEWABLE ORAL ONCE
Status: COMPLETED | OUTPATIENT
Start: 2020-01-01 | End: 2020-01-01

## 2020-01-01 RX ORDER — DOCUSATE SODIUM 100 MG/1
100 CAPSULE, LIQUID FILLED ORAL 2 TIMES DAILY
Status: DISCONTINUED | OUTPATIENT
Start: 2020-01-01 | End: 2020-01-01 | Stop reason: HOSPADM

## 2020-01-01 RX ORDER — BUMETANIDE 0.25 MG/ML
2 INJECTION, SOLUTION INTRAMUSCULAR; INTRAVENOUS ONCE
Status: COMPLETED | OUTPATIENT
Start: 2020-01-01 | End: 2020-01-01

## 2020-01-01 RX ORDER — POTASSIUM CHLORIDE 20 MEQ/1
20 TABLET, EXTENDED RELEASE ORAL
Status: COMPLETED | OUTPATIENT
Start: 2020-01-01 | End: 2020-01-01

## 2020-01-01 RX ORDER — HYDROMORPHONE HCL/PF 1 MG/ML
0.5 SYRINGE (ML) INJECTION
Status: DISCONTINUED | OUTPATIENT
Start: 2020-01-01 | End: 2020-01-01

## 2020-01-01 RX ORDER — WARFARIN SODIUM 7.5 MG/1
7.5 TABLET ORAL
Status: DISCONTINUED | OUTPATIENT
Start: 2020-01-01 | End: 2020-01-01

## 2020-01-01 RX ORDER — OXYBUTYNIN CHLORIDE 5 MG/1
5 TABLET ORAL 3 TIMES DAILY
Status: DISCONTINUED | OUTPATIENT
Start: 2020-01-01 | End: 2020-01-01 | Stop reason: HOSPADM

## 2020-01-01 RX ORDER — POTASSIUM CHLORIDE 14.9 MG/ML
20 INJECTION INTRAVENOUS ONCE
Status: COMPLETED | OUTPATIENT
Start: 2020-01-01 | End: 2020-01-01

## 2020-01-01 RX ORDER — CEFUROXIME AXETIL 500 MG/1
500 TABLET ORAL EVERY 12 HOURS SCHEDULED
Qty: 14 TABLET | Refills: 0 | Status: SHIPPED | OUTPATIENT
Start: 2020-01-01 | End: 2020-01-01

## 2020-01-01 RX ORDER — HEPARIN SODIUM 5000 [USP'U]/ML
5000 INJECTION, SOLUTION INTRAVENOUS; SUBCUTANEOUS EVERY 8 HOURS SCHEDULED
Status: DISCONTINUED | OUTPATIENT
Start: 2020-01-01 | End: 2020-01-01

## 2020-01-01 RX ORDER — MIDAZOLAM HYDROCHLORIDE 2 MG/2ML
INJECTION, SOLUTION INTRAMUSCULAR; INTRAVENOUS CODE/TRAUMA/SEDATION MEDICATION
Status: COMPLETED | OUTPATIENT
Start: 2020-01-01 | End: 2020-01-01

## 2020-01-01 RX ORDER — POLYETHYLENE GLYCOL 3350 17 G/17G
17 POWDER, FOR SOLUTION ORAL DAILY PRN
Status: CANCELLED | OUTPATIENT
Start: 2020-01-01

## 2020-01-01 RX ORDER — ASPIRIN 81 MG/1
81 TABLET, CHEWABLE ORAL DAILY
Status: DISCONTINUED | OUTPATIENT
Start: 2020-01-01 | End: 2020-01-01 | Stop reason: HOSPADM

## 2020-01-01 RX ORDER — FUROSEMIDE 10 MG/ML
80 INJECTION INTRAMUSCULAR; INTRAVENOUS
Status: DISCONTINUED | OUTPATIENT
Start: 2020-01-01 | End: 2020-01-01

## 2020-01-01 RX ORDER — SODIUM CHLORIDE 9 MG/ML
100 INJECTION, SOLUTION INTRAVENOUS CONTINUOUS
Status: DISCONTINUED | OUTPATIENT
Start: 2020-01-01 | End: 2020-01-01

## 2020-01-01 RX ORDER — METOPROLOL SUCCINATE 25 MG/1
25 TABLET, EXTENDED RELEASE ORAL DAILY
Status: DISCONTINUED | OUTPATIENT
Start: 2020-01-01 | End: 2020-01-01

## 2020-01-01 RX ORDER — ACETAMINOPHEN 325 MG/1
650 TABLET ORAL EVERY 6 HOURS PRN
Status: DISCONTINUED | OUTPATIENT
Start: 2020-01-01 | End: 2020-01-01

## 2020-01-01 RX ORDER — NYSTATIN 100000 [USP'U]/G
POWDER TOPICAL 2 TIMES DAILY
Qty: 60 G | Refills: 1 | Status: SHIPPED | OUTPATIENT
Start: 2020-01-01 | End: 2020-01-01 | Stop reason: HOSPADM

## 2020-01-01 RX ORDER — CALCIUM GLUCONATE 20 MG/ML
2 INJECTION, SOLUTION INTRAVENOUS ONCE
Status: COMPLETED | OUTPATIENT
Start: 2020-01-01 | End: 2020-01-01

## 2020-01-01 RX ORDER — HEPARIN SODIUM 5000 [USP'U]/ML
5000 INJECTION, SOLUTION INTRAVENOUS; SUBCUTANEOUS EVERY 8 HOURS SCHEDULED
Status: DISCONTINUED | OUTPATIENT
Start: 2020-01-01 | End: 2020-01-01 | Stop reason: HOSPADM

## 2020-01-01 RX ORDER — DOCUSATE SODIUM 100 MG/1
CAPSULE, LIQUID FILLED ORAL
Status: COMPLETED
Start: 2020-01-01 | End: 2020-01-01

## 2020-01-01 RX ORDER — LIDOCAINE HYDROCHLORIDE AND EPINEPHRINE 10; 10 MG/ML; UG/ML
5 INJECTION, SOLUTION INFILTRATION; PERINEURAL ONCE
Status: COMPLETED | OUTPATIENT
Start: 2020-01-01 | End: 2020-01-01

## 2020-01-01 RX ORDER — ACETAMINOPHEN 325 MG/1
975 TABLET ORAL EVERY 8 HOURS SCHEDULED
Status: DISCONTINUED | OUTPATIENT
Start: 2020-01-01 | End: 2020-01-01 | Stop reason: HOSPADM

## 2020-01-01 RX ORDER — WARFARIN SODIUM 5 MG/1
TABLET ORAL
Qty: 30 TABLET | Refills: 0 | Status: CANCELLED | OUTPATIENT
Start: 2020-01-01

## 2020-01-01 RX ORDER — AMLODIPINE BESYLATE 2.5 MG/1
2.5 TABLET ORAL DAILY
Status: DISCONTINUED | OUTPATIENT
Start: 2020-01-01 | End: 2020-01-01

## 2020-01-01 RX ORDER — FUROSEMIDE 10 MG/ML
80 INJECTION INTRAMUSCULAR; INTRAVENOUS ONCE
Status: COMPLETED | OUTPATIENT
Start: 2020-01-01 | End: 2020-01-01

## 2020-01-01 RX ORDER — ATORVASTATIN CALCIUM 40 MG/1
80 TABLET, FILM COATED ORAL
Status: DISCONTINUED | OUTPATIENT
Start: 2020-01-01 | End: 2020-01-01 | Stop reason: HOSPADM

## 2020-01-01 RX ORDER — DEXTROSE MONOHYDRATE 25 G/50ML
25 INJECTION, SOLUTION INTRAVENOUS ONCE
Status: COMPLETED | OUTPATIENT
Start: 2020-01-01 | End: 2020-01-01

## 2020-01-01 RX ORDER — LISINOPRIL 2.5 MG/1
2.5 TABLET ORAL 2 TIMES DAILY
Status: DISCONTINUED | OUTPATIENT
Start: 2020-01-01 | End: 2020-01-01 | Stop reason: HOSPADM

## 2020-01-01 RX ORDER — ACETYLCYSTEINE 200 MG/ML
1200 SOLUTION ORAL; RESPIRATORY (INHALATION) 2 TIMES DAILY
Status: COMPLETED | OUTPATIENT
Start: 2020-01-01 | End: 2020-01-01

## 2020-01-01 RX ORDER — DOCUSATE SODIUM 100 MG/1
100 CAPSULE, LIQUID FILLED ORAL 2 TIMES DAILY
Qty: 10 CAPSULE | Refills: 0
Start: 2020-01-01 | End: 2020-01-01 | Stop reason: ALTCHOICE

## 2020-01-01 RX ORDER — AMLODIPINE BESYLATE 2.5 MG/1
TABLET ORAL
Status: DISPENSED
Start: 2020-01-01 | End: 2020-01-01

## 2020-01-01 RX ORDER — POTASSIUM CHLORIDE 20 MEQ/1
20 TABLET, EXTENDED RELEASE ORAL ONCE
Status: COMPLETED | OUTPATIENT
Start: 2020-01-01 | End: 2020-01-01

## 2020-01-01 RX ORDER — METOPROLOL SUCCINATE 25 MG/1
25 TABLET, EXTENDED RELEASE ORAL EVERY EVENING
Refills: 0
Start: 2020-01-01 | End: 2020-01-01 | Stop reason: HOSPADM

## 2020-01-01 RX ORDER — ALBUMIN (HUMAN) 12.5 G/50ML
25 SOLUTION INTRAVENOUS 3 TIMES DAILY
Status: DISCONTINUED | OUTPATIENT
Start: 2020-01-01 | End: 2020-01-01

## 2020-01-01 RX ORDER — PANTOPRAZOLE SODIUM 40 MG/1
TABLET, DELAYED RELEASE ORAL
Status: COMPLETED
Start: 2020-01-01 | End: 2020-01-01

## 2020-01-01 RX ORDER — NYSTATIN 100000 [USP'U]/G
POWDER TOPICAL 2 TIMES DAILY
Qty: 15 G | Refills: 0 | Status: SHIPPED | OUTPATIENT
Start: 2020-01-01 | End: 2020-01-01 | Stop reason: SDUPTHER

## 2020-01-01 RX ORDER — METOPROLOL SUCCINATE 50 MG/1
50 TABLET, EXTENDED RELEASE ORAL EVERY MORNING
Refills: 0 | Status: ON HOLD
Start: 2020-01-01 | End: 2020-01-01 | Stop reason: CLARIF

## 2020-01-01 RX ORDER — LEVOTHYROXINE SODIUM 0.05 MG/1
50 TABLET ORAL
Status: CANCELLED | OUTPATIENT
Start: 2020-01-01

## 2020-01-01 RX ORDER — FUROSEMIDE 10 MG/ML
40 INJECTION INTRAMUSCULAR; INTRAVENOUS ONCE
Status: COMPLETED | OUTPATIENT
Start: 2020-01-01 | End: 2020-01-01

## 2020-01-01 RX ORDER — HEPARIN SODIUM 1000 [USP'U]/ML
2000 INJECTION, SOLUTION INTRAVENOUS; SUBCUTANEOUS
Status: DISCONTINUED | OUTPATIENT
Start: 2020-01-01 | End: 2020-01-01

## 2020-01-01 RX ORDER — DOCUSATE SODIUM 100 MG/1
100 CAPSULE, LIQUID FILLED ORAL 2 TIMES DAILY
Status: CANCELLED | OUTPATIENT
Start: 2020-01-01

## 2020-01-01 RX ORDER — SODIUM CHLORIDE 9 MG/ML
25 INJECTION, SOLUTION INTRAVENOUS CONTINUOUS
Status: DISCONTINUED | OUTPATIENT
Start: 2020-01-01 | End: 2020-01-01

## 2020-01-01 RX ORDER — MYCOPHENOLATE MOFETIL 250 MG/1
750 CAPSULE ORAL 2 TIMES DAILY
Status: CANCELLED | OUTPATIENT
Start: 2020-01-01

## 2020-01-01 RX ORDER — MAGNESIUM SULFATE HEPTAHYDRATE 40 MG/ML
2 INJECTION, SOLUTION INTRAVENOUS ONCE
Status: COMPLETED | OUTPATIENT
Start: 2020-01-01 | End: 2020-01-01

## 2020-01-01 RX ORDER — TAMSULOSIN HYDROCHLORIDE 0.4 MG/1
0.4 CAPSULE ORAL
Status: DISCONTINUED | OUTPATIENT
Start: 2020-01-01 | End: 2020-01-01

## 2020-01-01 RX ORDER — ATROPA BELLADONNA AND OPIUM 16.2; 3 MG/1; MG/1
30 SUPPOSITORY RECTAL EVERY 8 HOURS PRN
Status: CANCELLED | OUTPATIENT
Start: 2020-01-01

## 2020-01-01 RX ORDER — MYCOPHENOLATE MOFETIL 250 MG/1
750 CAPSULE ORAL 2 TIMES DAILY
Status: DISCONTINUED | OUTPATIENT
Start: 2020-01-01 | End: 2020-01-01 | Stop reason: HOSPADM

## 2020-01-01 RX ORDER — OXYCODONE HYDROCHLORIDE 5 MG/1
TABLET ORAL
Status: COMPLETED
Start: 2020-01-01 | End: 2020-01-01

## 2020-01-01 RX ORDER — CALCIUM GLUCONATE 20 MG/ML
1 INJECTION, SOLUTION INTRAVENOUS ONCE
Status: COMPLETED | OUTPATIENT
Start: 2020-01-01 | End: 2020-01-01

## 2020-01-01 RX ORDER — POTASSIUM CHLORIDE 29.8 MG/ML
40 INJECTION INTRAVENOUS ONCE
Status: DISCONTINUED | OUTPATIENT
Start: 2020-01-01 | End: 2020-01-01

## 2020-01-01 RX ORDER — METOPROLOL SUCCINATE 25 MG/1
25 TABLET, EXTENDED RELEASE ORAL DAILY
Status: DISCONTINUED | OUTPATIENT
Start: 2020-01-01 | End: 2020-01-01 | Stop reason: HOSPADM

## 2020-01-01 RX ORDER — ISOSORBIDE MONONITRATE 30 MG/1
TABLET, EXTENDED RELEASE ORAL
Status: DISPENSED
Start: 2020-01-01 | End: 2020-01-01

## 2020-01-01 RX ORDER — METOPROLOL SUCCINATE 25 MG/1
25 TABLET, EXTENDED RELEASE ORAL ONCE
Status: COMPLETED | OUTPATIENT
Start: 2020-01-01 | End: 2020-01-01

## 2020-01-01 RX ORDER — OXYBUTYNIN CHLORIDE 5 MG/1
5 TABLET ORAL 3 TIMES DAILY
Refills: 0
Start: 2020-01-01 | End: 2020-01-01 | Stop reason: SDUPTHER

## 2020-01-01 RX ORDER — GLYCOPYRROLATE 0.2 MG/ML
0.1 INJECTION INTRAMUSCULAR; INTRAVENOUS EVERY 4 HOURS PRN
Status: DISCONTINUED | OUTPATIENT
Start: 2020-01-01 | End: 2020-01-01 | Stop reason: HOSPADM

## 2020-01-01 RX ORDER — SODIUM CHLORIDE, SODIUM GLUCONATE, SODIUM ACETATE, POTASSIUM CHLORIDE, MAGNESIUM CHLORIDE, SODIUM PHOSPHATE, DIBASIC, AND POTASSIUM PHOSPHATE .53; .5; .37; .037; .03; .012; .00082 G/100ML; G/100ML; G/100ML; G/100ML; G/100ML; G/100ML; G/100ML
60 INJECTION, SOLUTION INTRAVENOUS CONTINUOUS
Status: DISCONTINUED | OUTPATIENT
Start: 2020-01-01 | End: 2020-01-01

## 2020-01-01 RX ORDER — NYSTATIN 100000 [USP'U]/G
POWDER TOPICAL 2 TIMES DAILY
Status: DISCONTINUED | OUTPATIENT
Start: 2020-01-01 | End: 2020-01-01 | Stop reason: HOSPADM

## 2020-01-01 RX ORDER — LANOLIN ALCOHOL/MO/W.PET/CERES
3 CREAM (GRAM) TOPICAL
Refills: 0
Start: 2020-01-01 | End: 2020-01-01 | Stop reason: HOSPADM

## 2020-01-01 RX ORDER — ACETAMINOPHEN 325 MG/1
975 TABLET ORAL EVERY 8 HOURS PRN
Status: DISCONTINUED | OUTPATIENT
Start: 2020-01-01 | End: 2020-01-01 | Stop reason: HOSPADM

## 2020-01-01 RX ORDER — FUROSEMIDE 20 MG/1
20 TABLET ORAL
Status: DISCONTINUED | OUTPATIENT
Start: 2020-01-01 | End: 2020-01-01 | Stop reason: HOSPADM

## 2020-01-01 RX ORDER — SODIUM CHLORIDE, SODIUM LACTATE, POTASSIUM CHLORIDE, CALCIUM CHLORIDE 600; 310; 30; 20 MG/100ML; MG/100ML; MG/100ML; MG/100ML
150 INJECTION, SOLUTION INTRAVENOUS CONTINUOUS
Status: DISCONTINUED | OUTPATIENT
Start: 2020-01-01 | End: 2020-01-01

## 2020-01-01 RX ORDER — ATROPA BELLADONNA AND OPIUM 16.2; 3 MG/1; MG/1
30 SUPPOSITORY RECTAL EVERY 8 HOURS PRN
Status: DISCONTINUED | OUTPATIENT
Start: 2020-01-01 | End: 2020-01-01 | Stop reason: HOSPADM

## 2020-01-01 RX ORDER — LIDOCAINE HYDROCHLORIDE 20 MG/ML
5 INJECTION, SOLUTION EPIDURAL; INFILTRATION; INTRACAUDAL; PERINEURAL ONCE
Status: COMPLETED | OUTPATIENT
Start: 2020-01-01 | End: 2020-01-01

## 2020-01-01 RX ORDER — WARFARIN SODIUM 2.5 MG/1
2.5 TABLET ORAL
Status: DISCONTINUED | OUTPATIENT
Start: 2020-01-01 | End: 2020-01-01 | Stop reason: HOSPADM

## 2020-01-01 RX ORDER — OXYCODONE HYDROCHLORIDE 5 MG/1
2.5 TABLET ORAL ONCE
Status: COMPLETED | OUTPATIENT
Start: 2020-01-01 | End: 2020-01-01

## 2020-01-01 RX ORDER — MAGNESIUM SULFATE HEPTAHYDRATE 40 MG/ML
4 INJECTION, SOLUTION INTRAVENOUS ONCE
Status: COMPLETED | OUTPATIENT
Start: 2020-01-01 | End: 2020-01-01

## 2020-01-01 RX ORDER — BUMETANIDE 0.25 MG/ML
2 INJECTION INTRAMUSCULAR; INTRAVENOUS CONTINUOUS
Status: DISCONTINUED | OUTPATIENT
Start: 2020-01-01 | End: 2020-01-01

## 2020-01-01 RX ORDER — TAMSULOSIN HYDROCHLORIDE 0.4 MG/1
0.4 CAPSULE ORAL
COMMUNITY
End: 2020-01-01 | Stop reason: HOSPADM

## 2020-01-01 RX ORDER — WARFARIN SODIUM 5 MG/1
5 TABLET ORAL
Status: COMPLETED | OUTPATIENT
Start: 2020-01-01 | End: 2020-01-01

## 2020-01-01 RX ORDER — ASPIRIN 81 MG/1
81 TABLET, CHEWABLE ORAL DAILY
Status: DISCONTINUED | OUTPATIENT
Start: 2020-01-01 | End: 2020-01-01

## 2020-01-01 RX ORDER — FUROSEMIDE 10 MG/ML
40 INJECTION INTRAMUSCULAR; INTRAVENOUS
Status: DISCONTINUED | OUTPATIENT
Start: 2020-01-01 | End: 2020-01-01

## 2020-01-01 RX ORDER — ACETAMINOPHEN 325 MG/1
650 TABLET ORAL EVERY 6 HOURS PRN
Status: CANCELLED | OUTPATIENT
Start: 2020-01-01

## 2020-01-01 RX ORDER — CEFPODOXIME PROXETIL 200 MG/1
200 TABLET, FILM COATED ORAL 2 TIMES DAILY
Qty: 6 TABLET | Refills: 0 | Status: SHIPPED | OUTPATIENT
Start: 2020-01-01 | End: 2020-01-01

## 2020-01-01 RX ORDER — WARFARIN SODIUM 5 MG/1
TABLET ORAL
Qty: 30 TABLET | Refills: 11 | Status: ON HOLD | OUTPATIENT
Start: 2020-01-01 | End: 2020-01-01 | Stop reason: SDUPTHER

## 2020-01-01 RX ORDER — OXYCODONE HYDROCHLORIDE 5 MG/1
5 TABLET ORAL EVERY 4 HOURS PRN
Status: DISCONTINUED | OUTPATIENT
Start: 2020-01-01 | End: 2020-01-01 | Stop reason: HOSPADM

## 2020-01-01 RX ORDER — LANOLIN ALCOHOL/MO/W.PET/CERES
3 CREAM (GRAM) TOPICAL
Status: DISCONTINUED | OUTPATIENT
Start: 2020-01-01 | End: 2020-01-01 | Stop reason: HOSPADM

## 2020-01-01 RX ORDER — TRAMADOL HYDROCHLORIDE 50 MG/1
50 TABLET ORAL EVERY 6 HOURS PRN
Qty: 30 TABLET | Refills: 0 | Status: SHIPPED | OUTPATIENT
Start: 2020-01-01 | End: 2020-01-01 | Stop reason: HOSPADM

## 2020-01-01 RX ORDER — WARFARIN SODIUM 5 MG/1
5 TABLET ORAL
Status: DISCONTINUED | OUTPATIENT
Start: 2020-01-01 | End: 2020-01-01

## 2020-01-01 RX ORDER — OXYCODONE HYDROCHLORIDE 5 MG/1
5 TABLET ORAL EVERY 6 HOURS PRN
Status: DISCONTINUED | OUTPATIENT
Start: 2020-01-01 | End: 2020-01-01 | Stop reason: HOSPADM

## 2020-01-01 RX ORDER — MILRINONE LACTATE 0.2 MG/ML
0.25 INJECTION, SOLUTION INTRAVENOUS CONTINUOUS
Status: DISCONTINUED | OUTPATIENT
Start: 2020-01-01 | End: 2020-01-01

## 2020-01-01 RX ORDER — HYDROMORPHONE HCL/PF 1 MG/ML
0.5 SYRINGE (ML) INJECTION
Status: DISCONTINUED | OUTPATIENT
Start: 2020-01-01 | End: 2020-01-01 | Stop reason: HOSPADM

## 2020-01-01 RX ORDER — ISOSORBIDE MONONITRATE 30 MG/1
30 TABLET, EXTENDED RELEASE ORAL DAILY
Status: DISCONTINUED | OUTPATIENT
Start: 2020-01-01 | End: 2020-01-01

## 2020-01-01 RX ORDER — METOPROLOL SUCCINATE 50 MG/1
50 TABLET, EXTENDED RELEASE ORAL DAILY
Status: DISCONTINUED | OUTPATIENT
Start: 2020-01-01 | End: 2020-01-01 | Stop reason: HOSPADM

## 2020-01-01 RX ORDER — LISINOPRIL 2.5 MG/1
TABLET ORAL
Status: COMPLETED
Start: 2020-01-01 | End: 2020-01-01

## 2020-01-01 RX ORDER — LISINOPRIL 2.5 MG/1
2.5 TABLET ORAL 2 TIMES DAILY
Status: DISCONTINUED | OUTPATIENT
Start: 2020-01-01 | End: 2020-01-01

## 2020-01-01 RX ORDER — METOPROLOL SUCCINATE 50 MG/1
50 TABLET, EXTENDED RELEASE ORAL DAILY
Refills: 0
Start: 2020-01-01 | End: 2020-01-01

## 2020-01-01 RX ORDER — POTASSIUM CHLORIDE 1500 MG/1
TABLET, FILM COATED, EXTENDED RELEASE ORAL
COMMUNITY
End: 2020-01-01 | Stop reason: HOSPADM

## 2020-01-01 RX ORDER — FUROSEMIDE 20 MG/1
20 TABLET ORAL DAILY
COMMUNITY
End: 2020-01-01 | Stop reason: HOSPADM

## 2020-01-01 RX ORDER — LISINOPRIL 2.5 MG/1
2.5 TABLET ORAL DAILY
Status: DISCONTINUED | OUTPATIENT
Start: 2020-01-01 | End: 2020-01-01

## 2020-01-01 RX ORDER — WARFARIN SODIUM 5 MG/1
10 TABLET ORAL
Status: DISCONTINUED | OUTPATIENT
Start: 2020-01-01 | End: 2020-01-01

## 2020-01-01 RX ORDER — FUROSEMIDE 20 MG/1
20 TABLET ORAL 2 TIMES DAILY
Refills: 0
Start: 2020-01-01 | End: 2020-01-01 | Stop reason: HOSPADM

## 2020-01-01 RX ORDER — OXYBUTYNIN CHLORIDE 5 MG/1
5 TABLET ORAL 3 TIMES DAILY
Refills: 0
Start: 2020-01-01 | End: 2020-01-01

## 2020-01-01 RX ORDER — ALBUMIN, HUMAN INJ 5% 5 %
25 SOLUTION INTRAVENOUS ONCE
Status: COMPLETED | OUTPATIENT
Start: 2020-01-01 | End: 2020-01-01

## 2020-01-01 RX ORDER — NYSTATIN 100000 [USP'U]/G
POWDER TOPICAL 2 TIMES DAILY
Status: DISCONTINUED | OUTPATIENT
Start: 2020-01-01 | End: 2020-01-01

## 2020-01-01 RX ORDER — NYSTATIN 100000 [USP'U]/G
POWDER TOPICAL 2 TIMES DAILY PRN
Status: DISCONTINUED | OUTPATIENT
Start: 2020-01-01 | End: 2020-01-01 | Stop reason: HOSPADM

## 2020-01-01 RX ORDER — ACETAMINOPHEN 500 MG
1000 TABLET ORAL 3 TIMES DAILY
Qty: 42 TABLET | Refills: 0 | Status: ON HOLD | OUTPATIENT
Start: 2020-01-01 | End: 2020-01-01 | Stop reason: CLARIF

## 2020-01-01 RX ORDER — WARFARIN SODIUM 5 MG/1
2.5 TABLET ORAL
Qty: 30 TABLET | Refills: 0
Start: 2020-01-01 | End: 2020-01-01 | Stop reason: HOSPADM

## 2020-01-01 RX ORDER — ATORVASTATIN CALCIUM 40 MG/1
80 TABLET, FILM COATED ORAL
Status: CANCELLED | OUTPATIENT
Start: 2020-01-01

## 2020-01-01 RX ORDER — HEPARIN SODIUM 1000 [USP'U]/ML
2600 INJECTION, SOLUTION INTRAVENOUS; SUBCUTANEOUS
Status: DISCONTINUED | OUTPATIENT
Start: 2020-01-01 | End: 2020-01-01

## 2020-01-01 RX ORDER — CEFUROXIME AXETIL 500 MG/1
500 TABLET ORAL EVERY 12 HOURS SCHEDULED
Qty: 14 TABLET | Refills: 0 | Status: SHIPPED | OUTPATIENT
Start: 2020-01-01 | End: 2020-01-01 | Stop reason: HOSPADM

## 2020-01-01 RX ORDER — POTASSIUM CHLORIDE 20 MEQ/1
20 TABLET, EXTENDED RELEASE ORAL DAILY
Status: DISCONTINUED | OUTPATIENT
Start: 2020-01-01 | End: 2020-01-01 | Stop reason: HOSPADM

## 2020-01-01 RX ORDER — WARFARIN SODIUM 3 MG/1
3 TABLET ORAL
Status: DISCONTINUED | OUTPATIENT
Start: 2020-01-01 | End: 2020-01-01

## 2020-01-01 RX ORDER — TRAMADOL HYDROCHLORIDE 50 MG/1
50 TABLET ORAL EVERY 6 HOURS PRN
Status: DISCONTINUED | OUTPATIENT
Start: 2020-01-01 | End: 2020-01-01

## 2020-01-01 RX ORDER — POTASSIUM CHLORIDE 20 MEQ/1
20 TABLET, EXTENDED RELEASE ORAL DAILY
Refills: 0
Start: 2020-01-01 | End: 2020-01-01 | Stop reason: HOSPADM

## 2020-01-01 RX ORDER — LORAZEPAM 2 MG/ML
1 INJECTION INTRAMUSCULAR
Status: DISCONTINUED | OUTPATIENT
Start: 2020-01-01 | End: 2020-01-01 | Stop reason: HOSPADM

## 2020-01-01 RX ORDER — FUROSEMIDE 10 MG/ML
10 SYRINGE (ML) INJECTION CONTINUOUS
Status: DISCONTINUED | OUTPATIENT
Start: 2020-01-01 | End: 2020-01-01

## 2020-01-01 RX ORDER — ASPIRIN 81 MG/1
TABLET ORAL
Status: DISPENSED
Start: 2020-01-01 | End: 2020-01-01

## 2020-01-01 RX ORDER — HEPARIN SODIUM 1000 [USP'U]/ML
4000 INJECTION, SOLUTION INTRAVENOUS; SUBCUTANEOUS AS NEEDED
Status: DISCONTINUED | OUTPATIENT
Start: 2020-01-01 | End: 2020-01-01

## 2020-01-01 RX ORDER — HEPARIN SODIUM 10000 [USP'U]/100ML
3-20 INJECTION, SOLUTION INTRAVENOUS
Status: DISCONTINUED | OUTPATIENT
Start: 2020-01-01 | End: 2020-01-01

## 2020-01-01 RX ORDER — PANTOPRAZOLE SODIUM 40 MG/1
40 TABLET, DELAYED RELEASE ORAL DAILY
Status: DISCONTINUED | OUTPATIENT
Start: 2020-01-01 | End: 2020-01-01 | Stop reason: HOSPADM

## 2020-01-01 RX ORDER — LISINOPRIL 5 MG/1
5 TABLET ORAL DAILY
Status: DISCONTINUED | OUTPATIENT
Start: 2020-01-01 | End: 2020-01-01

## 2020-01-01 RX ORDER — SODIUM CHLORIDE, SODIUM GLUCONATE, SODIUM ACETATE, POTASSIUM CHLORIDE, MAGNESIUM CHLORIDE, SODIUM PHOSPHATE, DIBASIC, AND POTASSIUM PHOSPHATE .53; .5; .37; .037; .03; .012; .00082 G/100ML; G/100ML; G/100ML; G/100ML; G/100ML; G/100ML; G/100ML
75 INJECTION, SOLUTION INTRAVENOUS CONTINUOUS
Status: DISCONTINUED | OUTPATIENT
Start: 2020-01-01 | End: 2020-01-01

## 2020-01-01 RX ORDER — POLYETHYLENE GLYCOL 3350 17 G/17G
17 POWDER, FOR SOLUTION ORAL DAILY PRN
Status: DISCONTINUED | OUTPATIENT
Start: 2020-01-01 | End: 2020-01-01 | Stop reason: HOSPADM

## 2020-01-01 RX ORDER — FUROSEMIDE 40 MG/1
40 TABLET ORAL
Status: DISCONTINUED | OUTPATIENT
Start: 2020-01-01 | End: 2020-01-01 | Stop reason: HOSPADM

## 2020-01-01 RX ORDER — HEPARIN SODIUM 1000 [USP'U]/ML
4000 INJECTION, SOLUTION INTRAVENOUS; SUBCUTANEOUS ONCE
Status: COMPLETED | OUTPATIENT
Start: 2020-01-01 | End: 2020-01-01

## 2020-01-01 RX ORDER — ONDANSETRON 2 MG/ML
4 INJECTION INTRAMUSCULAR; INTRAVENOUS EVERY 6 HOURS PRN
Status: DISCONTINUED | OUTPATIENT
Start: 2020-01-01 | End: 2020-01-01 | Stop reason: HOSPADM

## 2020-01-01 RX ORDER — NYSTATIN 100000 U/G
CREAM TOPICAL 2 TIMES DAILY
Status: DISCONTINUED | OUTPATIENT
Start: 2020-01-01 | End: 2020-01-01

## 2020-01-01 RX ORDER — MYCOPHENOLATE MOFETIL 250 MG/1
CAPSULE ORAL
Status: COMPLETED
Start: 2020-01-01 | End: 2020-01-01

## 2020-01-01 RX ORDER — MYCOPHENOLATE MOFETIL 250 MG/1
750 CAPSULE ORAL 2 TIMES DAILY
Status: DISCONTINUED | OUTPATIENT
Start: 2020-01-01 | End: 2020-01-01

## 2020-01-01 RX ORDER — LEVOTHYROXINE SODIUM 0.05 MG/1
50 TABLET ORAL DAILY
Start: 2020-01-01 | End: 2020-01-01 | Stop reason: HOSPADM

## 2020-01-01 RX ORDER — METOPROLOL SUCCINATE 25 MG/1
25 TABLET, EXTENDED RELEASE ORAL DAILY
Refills: 0
Start: 2020-01-01 | End: 2020-01-01

## 2020-01-01 RX ORDER — OXYCODONE HYDROCHLORIDE 5 MG/1
5 TABLET ORAL EVERY 6 HOURS PRN
Status: CANCELLED | OUTPATIENT
Start: 2020-01-01

## 2020-01-01 RX ORDER — NYSTATIN 100000 [USP'U]/G
POWDER TOPICAL 2 TIMES DAILY
Status: CANCELLED | OUTPATIENT
Start: 2020-01-01

## 2020-01-01 RX ORDER — HEPARIN SODIUM 1000 [USP'U]/ML
5200 INJECTION, SOLUTION INTRAVENOUS; SUBCUTANEOUS
Status: DISCONTINUED | OUTPATIENT
Start: 2020-01-01 | End: 2020-01-01

## 2020-01-01 RX ORDER — WARFARIN SODIUM 7.5 MG/1
7.5 TABLET ORAL
Status: DISCONTINUED | OUTPATIENT
Start: 2020-01-01 | End: 2020-01-01 | Stop reason: HOSPADM

## 2020-01-01 RX ORDER — TAMSULOSIN HYDROCHLORIDE 0.4 MG/1
0.4 CAPSULE ORAL
Qty: 30 CAPSULE | Refills: 10 | Status: SHIPPED | OUTPATIENT
Start: 2020-01-01 | End: 2020-01-01 | Stop reason: HOSPADM

## 2020-01-01 RX ORDER — POTASSIUM CHLORIDE 14.9 MG/ML
20 INJECTION INTRAVENOUS
Status: COMPLETED | OUTPATIENT
Start: 2020-01-01 | End: 2020-01-01

## 2020-01-01 RX ORDER — ATORVASTATIN CALCIUM 80 MG/1
80 TABLET, FILM COATED ORAL
Status: DISCONTINUED | OUTPATIENT
Start: 2020-01-01 | End: 2020-01-01 | Stop reason: HOSPADM

## 2020-01-01 RX ORDER — HEPARIN SODIUM 1000 [USP'U]/ML
4000 INJECTION, SOLUTION INTRAVENOUS; SUBCUTANEOUS
Status: DISCONTINUED | OUTPATIENT
Start: 2020-01-01 | End: 2020-01-01

## 2020-01-01 RX ORDER — ATORVASTATIN CALCIUM 40 MG/1
80 TABLET, FILM COATED ORAL
Status: DISCONTINUED | OUTPATIENT
Start: 2020-01-01 | End: 2020-01-01

## 2020-01-01 RX ORDER — MILRINONE LACTATE 0.2 MG/ML
0.13 INJECTION, SOLUTION INTRAVENOUS CONTINUOUS
Status: DISCONTINUED | OUTPATIENT
Start: 2020-01-01 | End: 2020-01-01

## 2020-01-01 RX ADMIN — ACETYLCYSTEINE 1200 MG: 200 SOLUTION ORAL; RESPIRATORY (INHALATION) at 11:54

## 2020-01-01 RX ADMIN — POTASSIUM CHLORIDE 20 MEQ: 1500 TABLET, EXTENDED RELEASE ORAL at 17:56

## 2020-01-01 RX ADMIN — METOPROLOL SUCCINATE 25 MG: 25 TABLET, EXTENDED RELEASE ORAL at 09:16

## 2020-01-01 RX ADMIN — SODIUM CHLORIDE 25 ML/HR: 0.9 INJECTION, SOLUTION INTRAVENOUS at 22:58

## 2020-01-01 RX ADMIN — ATORVASTATIN CALCIUM 80 MG: 80 TABLET, FILM COATED ORAL at 22:28

## 2020-01-01 RX ADMIN — LORAZEPAM 1 MG: 2 INJECTION INTRAMUSCULAR; INTRAVENOUS at 23:02

## 2020-01-01 RX ADMIN — HEPARIN SODIUM 16 UNITS/KG/HR: 10000 INJECTION, SOLUTION INTRAVENOUS at 02:37

## 2020-01-01 RX ADMIN — LEVOTHYROXINE SODIUM 50 MCG: 50 TABLET ORAL at 05:47

## 2020-01-01 RX ADMIN — HEPARIN SODIUM 15.8 UNITS/KG/HR: 10000 INJECTION, SOLUTION INTRAVENOUS at 11:15

## 2020-01-01 RX ADMIN — HEPARIN SODIUM 2000 UNITS: 1000 INJECTION INTRAVENOUS; SUBCUTANEOUS at 02:49

## 2020-01-01 RX ADMIN — CALCIUM GLUCONATE 2 G: 20 INJECTION, SOLUTION INTRAVENOUS at 07:36

## 2020-01-01 RX ADMIN — MYCOPHENOLATE MOFETIL 750 MG: 250 CAPSULE ORAL at 10:19

## 2020-01-01 RX ADMIN — MELATONIN 3 MG: at 21:31

## 2020-01-01 RX ADMIN — NYSTATIN 1 APPLICATION: 100000 POWDER TOPICAL at 18:08

## 2020-01-01 RX ADMIN — ATORVASTATIN CALCIUM 80 MG: 40 TABLET, FILM COATED ORAL at 18:01

## 2020-01-01 RX ADMIN — OXYBUTYNIN CHLORIDE 5 MG: 5 TABLET ORAL at 17:18

## 2020-01-01 RX ADMIN — LEVOTHYROXINE SODIUM 50 MCG: 50 TABLET ORAL at 05:11

## 2020-01-01 RX ADMIN — ASPIRIN 81 MG 81 MG: 81 TABLET ORAL at 17:12

## 2020-01-01 RX ADMIN — ACETAMINOPHEN 975 MG: 325 TABLET, FILM COATED ORAL at 14:26

## 2020-01-01 RX ADMIN — MYCOPHENOLATE MOFETIL 750 MG: 250 CAPSULE ORAL at 09:02

## 2020-01-01 RX ADMIN — NYSTATIN 1 APPLICATION: 100000 POWDER TOPICAL at 08:34

## 2020-01-01 RX ADMIN — ASPIRIN 81 MG 81 MG: 81 TABLET ORAL at 08:01

## 2020-01-01 RX ADMIN — HEPARIN SODIUM 2600 UNITS: 1000 INJECTION INTRAVENOUS; SUBCUTANEOUS at 15:34

## 2020-01-01 RX ADMIN — NYSTATIN: 100000 POWDER TOPICAL at 08:07

## 2020-01-01 RX ADMIN — ASPIRIN 81 MG: 81 TABLET ORAL at 09:07

## 2020-01-01 RX ADMIN — ASPIRIN 81 MG 81 MG: 81 TABLET ORAL at 08:34

## 2020-01-01 RX ADMIN — SODIUM CHLORIDE 1000 ML: 0.9 INJECTION, SOLUTION INTRAVENOUS at 18:55

## 2020-01-01 RX ADMIN — METOPROLOL SUCCINATE 50 MG: 50 TABLET, EXTENDED RELEASE ORAL at 09:07

## 2020-01-01 RX ADMIN — HEPARIN SODIUM 2600 UNITS: 1000 INJECTION INTRAVENOUS; SUBCUTANEOUS at 14:17

## 2020-01-01 RX ADMIN — ATORVASTATIN CALCIUM 80 MG: 40 TABLET, FILM COATED ORAL at 22:11

## 2020-01-01 RX ADMIN — WARFARIN SODIUM 7.5 MG: 7.5 TABLET ORAL at 18:24

## 2020-01-01 RX ADMIN — MYCOPHENOLATE MOFETIL 750 MG: 250 CAPSULE ORAL at 08:45

## 2020-01-01 RX ADMIN — ASPIRIN 81 MG: 81 TABLET ORAL at 09:16

## 2020-01-01 RX ADMIN — MYCOPHENOLATE MOFETIL 750 MG: 250 CAPSULE ORAL at 18:01

## 2020-01-01 RX ADMIN — HEPARIN SODIUM 5000 UNITS: 5000 INJECTION INTRAVENOUS; SUBCUTANEOUS at 14:05

## 2020-01-01 RX ADMIN — MYCOPHENOLATE MOFETIL 750 MG: 250 CAPSULE ORAL at 18:03

## 2020-01-01 RX ADMIN — SODIUM BICARBONATE 125 ML/HR: 84 INJECTION, SOLUTION INTRAVENOUS at 06:02

## 2020-01-01 RX ADMIN — MILRINONE LACTATE 0.13 MCG/KG/MIN: 200 INJECTION, SOLUTION INTRAVENOUS at 08:32

## 2020-01-01 RX ADMIN — SODIUM CHLORIDE 1 MCG/MIN: 0.9 INJECTION, SOLUTION INTRAVENOUS at 19:55

## 2020-01-01 RX ADMIN — PIPERACILLIN SODIUM,TAZOBACTAM SODIUM 2.25 G: 2; .25 INJECTION, POWDER, FOR SOLUTION INTRAVENOUS at 17:12

## 2020-01-01 RX ADMIN — NYSTATIN 1 APPLICATION: 100000 POWDER TOPICAL at 17:24

## 2020-01-01 RX ADMIN — NYSTATIN: 100000 POWDER TOPICAL at 18:14

## 2020-01-01 RX ADMIN — CEFTRIAXONE SODIUM 2000 MG: 10 INJECTION, POWDER, FOR SOLUTION INTRAVENOUS at 10:56

## 2020-01-01 RX ADMIN — HEPARIN SODIUM AND DEXTROSE 18 UNITS/KG/HR: 10000; 5 INJECTION INTRAVENOUS at 13:40

## 2020-01-01 RX ADMIN — ATORVASTATIN CALCIUM 80 MG: 40 TABLET, FILM COATED ORAL at 22:13

## 2020-01-01 RX ADMIN — ASPIRIN 81 MG 81 MG: 81 TABLET ORAL at 09:35

## 2020-01-01 RX ADMIN — PHYTONADIONE 10 MG: 10 INJECTION, EMULSION INTRAMUSCULAR; INTRAVENOUS; SUBCUTANEOUS at 18:23

## 2020-01-01 RX ADMIN — SODIUM CHLORIDE 125 ML/HR: 0.9 INJECTION, SOLUTION INTRAVENOUS at 20:44

## 2020-01-01 RX ADMIN — LEVOTHYROXINE SODIUM 50 MCG: 50 TABLET ORAL at 05:58

## 2020-01-01 RX ADMIN — LEVOTHYROXINE SODIUM 50 MCG: 50 TABLET ORAL at 05:06

## 2020-01-01 RX ADMIN — LEVOTHYROXINE SODIUM 50 MCG: 50 TABLET ORAL at 06:35

## 2020-01-01 RX ADMIN — OXYBUTYNIN CHLORIDE 5 MG: 5 TABLET ORAL at 22:11

## 2020-01-01 RX ADMIN — HEPARIN SODIUM 13.8 UNITS/KG/HR: 10000 INJECTION, SOLUTION INTRAVENOUS at 15:24

## 2020-01-01 RX ADMIN — CEFEPIME HYDROCHLORIDE 1000 MG: 1 INJECTION, POWDER, FOR SOLUTION INTRAMUSCULAR; INTRAVENOUS at 17:32

## 2020-01-01 RX ADMIN — METOPROLOL SUCCINATE 50 MG: 50 TABLET, EXTENDED RELEASE ORAL at 08:36

## 2020-01-01 RX ADMIN — HEPARIN SODIUM 5000 UNITS: 5000 INJECTION INTRAVENOUS; SUBCUTANEOUS at 05:18

## 2020-01-01 RX ADMIN — METOPROLOL SUCCINATE 25 MG: 25 TABLET, EXTENDED RELEASE ORAL at 12:05

## 2020-01-01 RX ADMIN — HEPARIN SODIUM 18 UNITS/KG/HR: 10000 INJECTION, SOLUTION INTRAVENOUS at 05:22

## 2020-01-01 RX ADMIN — FUROSEMIDE 40 MG: 10 INJECTION, SOLUTION INTRAVENOUS at 11:02

## 2020-01-01 RX ADMIN — BUMETANIDE 2 MG: 0.25 INJECTION INTRAMUSCULAR; INTRAVENOUS at 13:27

## 2020-01-01 RX ADMIN — HYDROMORPHONE HYDROCHLORIDE 0.5 MG: 1 INJECTION, SOLUTION INTRAMUSCULAR; INTRAVENOUS; SUBCUTANEOUS at 03:52

## 2020-01-01 RX ADMIN — ACETAMINOPHEN 650 MG: 325 TABLET, FILM COATED ORAL at 15:49

## 2020-01-01 RX ADMIN — OXYBUTYNIN CHLORIDE 5 MG: 5 TABLET ORAL at 16:14

## 2020-01-01 RX ADMIN — PERFLUTREN 0.4 ML/MIN: 6.52 INJECTION, SUSPENSION INTRAVENOUS at 16:04

## 2020-01-01 RX ADMIN — OXYBUTYNIN CHLORIDE 5 MG: 5 TABLET ORAL at 08:14

## 2020-01-01 RX ADMIN — HEPARIN SODIUM 2600 UNITS: 1000 INJECTION INTRAVENOUS; SUBCUTANEOUS at 02:45

## 2020-01-01 RX ADMIN — ASPIRIN 81 MG 81 MG: 81 TABLET ORAL at 08:19

## 2020-01-01 RX ADMIN — NOREPINEPHRINE BITARTRATE 11 MCG/MIN: 1 INJECTION INTRAVENOUS at 07:59

## 2020-01-01 RX ADMIN — HEPARIN SODIUM 4000 UNITS: 1000 INJECTION INTRAVENOUS; SUBCUTANEOUS at 21:26

## 2020-01-01 RX ADMIN — NYSTATIN: 100000 POWDER TOPICAL at 09:16

## 2020-01-01 RX ADMIN — NYSTATIN: 100000 POWDER TOPICAL at 09:07

## 2020-01-01 RX ADMIN — MILRINONE LACTATE IN DEXTROSE 0.25 MCG/KG/MIN: 200 INJECTION, SOLUTION INTRAVENOUS at 15:59

## 2020-01-01 RX ADMIN — SODIUM CHLORIDE, SODIUM LACTATE, POTASSIUM CHLORIDE, AND CALCIUM CHLORIDE 500 ML: .6; .31; .03; .02 INJECTION, SOLUTION INTRAVENOUS at 16:28

## 2020-01-01 RX ADMIN — WARFARIN SODIUM 5 MG: 5 TABLET ORAL at 17:56

## 2020-01-01 RX ADMIN — WARFARIN SODIUM 5 MG: 5 TABLET ORAL at 16:47

## 2020-01-01 RX ADMIN — HEPARIN SODIUM 11.8 UNITS/KG/HR: 10000 INJECTION, SOLUTION INTRAVENOUS at 16:44

## 2020-01-01 RX ADMIN — ATORVASTATIN CALCIUM 80 MG: 40 TABLET, FILM COATED ORAL at 17:12

## 2020-01-01 RX ADMIN — NYSTATIN 1 APPLICATION: 100000 POWDER TOPICAL at 09:00

## 2020-01-01 RX ADMIN — NOREPINEPHRINE BITARTRATE 5 MCG/MIN: 1 INJECTION INTRAVENOUS at 18:55

## 2020-01-01 RX ADMIN — CEFTRIAXONE SODIUM 1000 MG: 10 INJECTION, POWDER, FOR SOLUTION INTRAVENOUS at 11:16

## 2020-01-01 RX ADMIN — DOCUSATE SODIUM 100 MG: 100 CAPSULE, LIQUID FILLED ORAL at 17:19

## 2020-01-01 RX ADMIN — ASPIRIN 81 MG 81 MG: 81 TABLET ORAL at 10:11

## 2020-01-01 RX ADMIN — NYSTATIN 1 APPLICATION: 100000 POWDER TOPICAL at 18:14

## 2020-01-01 RX ADMIN — NYSTATIN 1 APPLICATION: 100000 POWDER TOPICAL at 08:36

## 2020-01-01 RX ADMIN — MYCOPHENOLATE MOFETIL 750 MG: 250 CAPSULE ORAL at 08:07

## 2020-01-01 RX ADMIN — HEPARIN SODIUM AND DEXTROSE 18 UNITS/KG/HR: 10000; 5 INJECTION INTRAVENOUS at 09:26

## 2020-01-01 RX ADMIN — LIDOCAINE HYDROCHLORIDE: 10 INJECTION, SOLUTION EPIDURAL; INFILTRATION; INTRACAUDAL; PERINEURAL at 14:37

## 2020-01-01 RX ADMIN — ACETYLCYSTEINE 1200 MG: 200 SOLUTION ORAL; RESPIRATORY (INHALATION) at 10:33

## 2020-01-01 RX ADMIN — FENTANYL CITRATE 25 MCG: 50 INJECTION INTRAMUSCULAR; INTRAVENOUS at 07:52

## 2020-01-01 RX ADMIN — SODIUM BICARBONATE 125 ML/HR: 84 INJECTION, SOLUTION INTRAVENOUS at 18:46

## 2020-01-01 RX ADMIN — ASPIRIN 81 MG: 81 TABLET ORAL at 08:14

## 2020-01-01 RX ADMIN — PANTOPRAZOLE SODIUM 40 MG: 40 TABLET, DELAYED RELEASE ORAL at 06:06

## 2020-01-01 RX ADMIN — OXYBUTYNIN CHLORIDE 5 MG: 5 TABLET ORAL at 21:21

## 2020-01-01 RX ADMIN — HEPARIN SODIUM AND DEXTROSE 18 UNITS/KG/HR: 10000; 5 INJECTION INTRAVENOUS at 12:15

## 2020-01-01 RX ADMIN — FUROSEMIDE 40 MG: 40 TABLET ORAL at 17:10

## 2020-01-01 RX ADMIN — CEFTRIAXONE SODIUM 1000 MG: 10 INJECTION, POWDER, FOR SOLUTION INTRAVENOUS at 17:32

## 2020-01-01 RX ADMIN — MYCOPHENOLATE MOFETIL 750 MG: 250 CAPSULE ORAL at 08:32

## 2020-01-01 RX ADMIN — POTASSIUM CHLORIDE 20 MEQ: 1500 TABLET, EXTENDED RELEASE ORAL at 15:03

## 2020-01-01 RX ADMIN — PIPERACILLIN SODIUM,TAZOBACTAM SODIUM 2.25 G: 2; .25 INJECTION, POWDER, FOR SOLUTION INTRAVENOUS at 04:27

## 2020-01-01 RX ADMIN — OXYBUTYNIN CHLORIDE 5 MG: 5 TABLET ORAL at 17:50

## 2020-01-01 RX ADMIN — MYCOPHENOLATE MOFETIL 750 MG: 250 CAPSULE ORAL at 08:18

## 2020-01-01 RX ADMIN — ASPIRIN 81 MG 81 MG: 81 TABLET ORAL at 08:28

## 2020-01-01 RX ADMIN — POTASSIUM CHLORIDE 40 MEQ: 1500 TABLET, EXTENDED RELEASE ORAL at 18:01

## 2020-01-01 RX ADMIN — NYSTATIN 1 APPLICATION: 100000 POWDER TOPICAL at 17:07

## 2020-01-01 RX ADMIN — NYSTATIN 1 APPLICATION: 100000 POWDER TOPICAL at 08:22

## 2020-01-01 RX ADMIN — OXYBUTYNIN CHLORIDE 5 MG: 5 TABLET ORAL at 21:07

## 2020-01-01 RX ADMIN — METOPROLOL SUCCINATE 25 MG: 25 TABLET, EXTENDED RELEASE ORAL at 09:07

## 2020-01-01 RX ADMIN — MYCOPHENOLATE MOFETIL 750 MG: 250 CAPSULE ORAL at 09:17

## 2020-01-01 RX ADMIN — NYSTATIN: 100000 POWDER TOPICAL at 16:16

## 2020-01-01 RX ADMIN — ATORVASTATIN CALCIUM 80 MG: 40 TABLET, FILM COATED ORAL at 21:52

## 2020-01-01 RX ADMIN — MYCOPHENOLATE MOFETIL 750 MG: 250 CAPSULE ORAL at 09:07

## 2020-01-01 RX ADMIN — MYCOPHENOLATE MOFETIL 750 MG: 250 CAPSULE ORAL at 10:11

## 2020-01-01 RX ADMIN — LEVOTHYROXINE SODIUM 50 MCG: 50 TABLET ORAL at 06:01

## 2020-01-01 RX ADMIN — POTASSIUM CHLORIDE 40 MEQ: 1500 TABLET, EXTENDED RELEASE ORAL at 14:46

## 2020-01-01 RX ADMIN — HEPARIN SODIUM AND DEXTROSE 18 UNITS/KG/HR: 10000; 5 INJECTION INTRAVENOUS at 02:01

## 2020-01-01 RX ADMIN — MAGNESIUM SULFATE HEPTAHYDRATE 2 G: 40 INJECTION, SOLUTION INTRAVENOUS at 07:48

## 2020-01-01 RX ADMIN — WARFARIN SODIUM 3 MG: 3 TABLET ORAL at 17:38

## 2020-01-01 RX ADMIN — ATORVASTATIN CALCIUM 80 MG: 40 TABLET, FILM COATED ORAL at 23:00

## 2020-01-01 RX ADMIN — SODIUM CHLORIDE, SODIUM LACTATE, POTASSIUM CHLORIDE, AND CALCIUM CHLORIDE 150 ML/HR: .6; .31; .03; .02 INJECTION, SOLUTION INTRAVENOUS at 05:25

## 2020-01-01 RX ADMIN — ASPIRIN 81 MG: 81 TABLET ORAL at 08:48

## 2020-01-01 RX ADMIN — FUROSEMIDE 80 MG: 10 INJECTION, SOLUTION INTRAMUSCULAR; INTRAVENOUS at 08:59

## 2020-01-01 RX ADMIN — MYCOPHENOLATE MOFETIL 750 MG: 250 CAPSULE ORAL at 17:30

## 2020-01-01 RX ADMIN — LEVOTHYROXINE SODIUM 50 MCG: 50 TABLET ORAL at 05:41

## 2020-01-01 RX ADMIN — SODIUM CHLORIDE, SODIUM GLUCONATE, SODIUM ACETATE, POTASSIUM CHLORIDE, MAGNESIUM CHLORIDE, SODIUM PHOSPHATE, DIBASIC, AND POTASSIUM PHOSPHATE 150 ML/HR: .53; .5; .37; .037; .03; .012; .00082 INJECTION, SOLUTION INTRAVENOUS at 07:58

## 2020-01-01 RX ADMIN — NYSTATIN: 100000 POWDER TOPICAL at 10:30

## 2020-01-01 RX ADMIN — SODIUM CHLORIDE 500 ML: 0.9 INJECTION, SOLUTION INTRAVENOUS at 17:20

## 2020-01-01 RX ADMIN — POTASSIUM CHLORIDE 40 MEQ: 1500 TABLET, EXTENDED RELEASE ORAL at 12:31

## 2020-01-01 RX ADMIN — MYCOPHENOLATE MOFETIL 750 MG: 250 CAPSULE ORAL at 09:06

## 2020-01-01 RX ADMIN — ASPIRIN 81 MG 81 MG: 81 TABLET ORAL at 08:55

## 2020-01-01 RX ADMIN — LEVOTHYROXINE SODIUM 50 MCG: 50 TABLET ORAL at 06:20

## 2020-01-01 RX ADMIN — DOCUSATE SODIUM 100 MG: 100 CAPSULE, LIQUID FILLED ORAL at 10:29

## 2020-01-01 RX ADMIN — MYCOPHENOLATE MOFETIL 750 MG: 250 CAPSULE ORAL at 17:50

## 2020-01-01 RX ADMIN — HEPARIN SODIUM 5000 UNITS: 5000 INJECTION INTRAVENOUS; SUBCUTANEOUS at 14:38

## 2020-01-01 RX ADMIN — ONDANSETRON 4 MG: 2 INJECTION INTRAMUSCULAR; INTRAVENOUS at 13:55

## 2020-01-01 RX ADMIN — FUROSEMIDE 40 MG: 40 TABLET ORAL at 08:19

## 2020-01-01 RX ADMIN — MYCOPHENOLATE MOFETIL 750 MG: 250 CAPSULE ORAL at 08:15

## 2020-01-01 RX ADMIN — DOCUSATE SODIUM 100 MG: 100 CAPSULE, LIQUID FILLED ORAL at 18:24

## 2020-01-01 RX ADMIN — LEVOTHYROXINE SODIUM 50 MCG: 50 TABLET ORAL at 05:43

## 2020-01-01 RX ADMIN — FUROSEMIDE 40 MG: 40 TABLET ORAL at 08:55

## 2020-01-01 RX ADMIN — MELATONIN 3 MG: at 21:37

## 2020-01-01 RX ADMIN — MYCOPHENOLATE MOFETIL 750 MG: 250 CAPSULE ORAL at 17:04

## 2020-01-01 RX ADMIN — VANCOMYCIN HYDROCHLORIDE 1000 MG: 1 INJECTION, SOLUTION INTRAVENOUS at 18:03

## 2020-01-01 RX ADMIN — ATORVASTATIN CALCIUM 80 MG: 40 TABLET, FILM COATED ORAL at 21:31

## 2020-01-01 RX ADMIN — CALCIUM GLUCONATE 2 G: 20 INJECTION, SOLUTION INTRAVENOUS at 14:02

## 2020-01-01 RX ADMIN — LEVOTHYROXINE SODIUM 50 MCG: 50 TABLET ORAL at 04:55

## 2020-01-01 RX ADMIN — HEPARIN SODIUM AND DEXTROSE 18 UNITS/KG/HR: 10000; 5 INJECTION INTRAVENOUS at 08:47

## 2020-01-01 RX ADMIN — POTASSIUM CHLORIDE 20 MEQ: 1500 TABLET, EXTENDED RELEASE ORAL at 21:07

## 2020-01-01 RX ADMIN — LEVOTHYROXINE SODIUM 50 MCG: 50 TABLET ORAL at 05:24

## 2020-01-01 RX ADMIN — LEVOTHYROXINE SODIUM 50 MCG: 50 TABLET ORAL at 05:21

## 2020-01-01 RX ADMIN — FUROSEMIDE 40 MG: 40 TABLET ORAL at 18:11

## 2020-01-01 RX ADMIN — NYSTATIN 1 APPLICATION: 100000 POWDER TOPICAL at 17:57

## 2020-01-01 RX ADMIN — MYCOPHENOLATE MOFETIL 750 MG: 250 CAPSULE ORAL at 08:01

## 2020-01-01 RX ADMIN — MELATONIN 3 MG: at 22:11

## 2020-01-01 RX ADMIN — HEPARIN SODIUM AND DEXTROSE 18 UNITS/KG/HR: 10000; 5 INJECTION INTRAVENOUS at 08:35

## 2020-01-01 RX ADMIN — FUROSEMIDE 20 MG: 20 TABLET ORAL at 08:36

## 2020-01-01 RX ADMIN — ASPIRIN 81 MG: 81 TABLET ORAL at 08:36

## 2020-01-01 RX ADMIN — MYCOPHENOLATE MOFETIL 750 MG: 250 CAPSULE ORAL at 08:52

## 2020-01-01 RX ADMIN — ATORVASTATIN CALCIUM 80 MG: 80 TABLET, FILM COATED ORAL at 22:11

## 2020-01-01 RX ADMIN — LISINOPRIL 2.5 MG: 2.5 TABLET ORAL at 08:37

## 2020-01-01 RX ADMIN — METOPROLOL SUCCINATE 50 MG: 50 TABLET, EXTENDED RELEASE ORAL at 08:55

## 2020-01-01 RX ADMIN — OXYCODONE HYDROCHLORIDE 5 MG: 5 TABLET ORAL at 14:53

## 2020-01-01 RX ADMIN — NYSTATIN: 100000 POWDER TOPICAL at 09:10

## 2020-01-01 RX ADMIN — LEVOTHYROXINE SODIUM 50 MCG: 50 TABLET ORAL at 06:54

## 2020-01-01 RX ADMIN — Medication 1 MG/HR: at 11:26

## 2020-01-01 RX ADMIN — ATORVASTATIN CALCIUM 80 MG: 40 TABLET, FILM COATED ORAL at 21:23

## 2020-01-01 RX ADMIN — MYCOPHENOLATE MOFETIL 750 MG: 250 CAPSULE ORAL at 20:44

## 2020-01-01 RX ADMIN — NYSTATIN: 100000 POWDER TOPICAL at 17:29

## 2020-01-01 RX ADMIN — FUROSEMIDE 40 MG: 40 TABLET ORAL at 17:11

## 2020-01-01 RX ADMIN — HEPARIN SODIUM 5000 UNITS: 5000 INJECTION INTRAVENOUS; SUBCUTANEOUS at 06:06

## 2020-01-01 RX ADMIN — NOREPINEPHRINE BITARTRATE 6 MCG/MIN: 1 INJECTION INTRAVENOUS at 09:43

## 2020-01-01 RX ADMIN — NYSTATIN: 100000 POWDER TOPICAL at 08:39

## 2020-01-01 RX ADMIN — ONDANSETRON 4 MG: 2 INJECTION INTRAMUSCULAR; INTRAVENOUS at 05:52

## 2020-01-01 RX ADMIN — LEVOTHYROXINE SODIUM 50 MCG: 50 TABLET ORAL at 06:15

## 2020-01-01 RX ADMIN — POTASSIUM CHLORIDE 20 MEQ: 14.9 INJECTION, SOLUTION INTRAVENOUS at 05:58

## 2020-01-01 RX ADMIN — WARFARIN SODIUM 7.5 MG: 7.5 TABLET ORAL at 17:28

## 2020-01-01 RX ADMIN — POTASSIUM CHLORIDE 20 MEQ: 14.9 INJECTION, SOLUTION INTRAVENOUS at 12:50

## 2020-01-01 RX ADMIN — ATORVASTATIN CALCIUM 80 MG: 40 TABLET, FILM COATED ORAL at 22:35

## 2020-01-01 RX ADMIN — FUROSEMIDE 40 MG: 40 TABLET ORAL at 08:37

## 2020-01-01 RX ADMIN — NYSTATIN: 100000 POWDER TOPICAL at 09:36

## 2020-01-01 RX ADMIN — HEPARIN SODIUM 5000 UNITS: 5000 INJECTION INTRAVENOUS; SUBCUTANEOUS at 06:08

## 2020-01-01 RX ADMIN — POTASSIUM CHLORIDE 20 MEQ: 1500 TABLET, EXTENDED RELEASE ORAL at 05:58

## 2020-01-01 RX ADMIN — HEPARIN SODIUM 15.8 UNITS/KG/HR: 10000 INJECTION, SOLUTION INTRAVENOUS at 04:55

## 2020-01-01 RX ADMIN — ATORVASTATIN CALCIUM 80 MG: 40 TABLET, FILM COATED ORAL at 21:21

## 2020-01-01 RX ADMIN — CEFTRIAXONE SODIUM 1000 MG: 10 INJECTION, POWDER, FOR SOLUTION INTRAVENOUS at 09:18

## 2020-01-01 RX ADMIN — ACETAMINOPHEN 975 MG: 325 TABLET, FILM COATED ORAL at 21:21

## 2020-01-01 RX ADMIN — FUROSEMIDE 80 MG: 10 INJECTION, SOLUTION INTRAMUSCULAR; INTRAVENOUS at 08:28

## 2020-01-01 RX ADMIN — METOPROLOL TARTRATE 25 MG: 25 TABLET ORAL at 20:44

## 2020-01-01 RX ADMIN — HEPARIN SODIUM AND DEXTROSE 18 UNITS/KG/HR: 10000; 5 INJECTION INTRAVENOUS at 18:40

## 2020-01-01 RX ADMIN — NYSTATIN: 100000 POWDER TOPICAL at 08:44

## 2020-01-01 RX ADMIN — SODIUM BICARBONATE: 84 INJECTION, SOLUTION INTRAVENOUS at 22:26

## 2020-01-01 RX ADMIN — ATORVASTATIN CALCIUM 80 MG: 80 TABLET, FILM COATED ORAL at 21:25

## 2020-01-01 RX ADMIN — ACETAMINOPHEN 975 MG: 325 TABLET, FILM COATED ORAL at 12:03

## 2020-01-01 RX ADMIN — SODIUM CHLORIDE, SODIUM GLUCONATE, SODIUM ACETATE, POTASSIUM CHLORIDE, MAGNESIUM CHLORIDE, SODIUM PHOSPHATE, DIBASIC, AND POTASSIUM PHOSPHATE 75 ML/HR: .53; .5; .37; .037; .03; .012; .00082 INJECTION, SOLUTION INTRAVENOUS at 21:31

## 2020-01-01 RX ADMIN — OXYCODONE HYDROCHLORIDE 5 MG: 5 TABLET ORAL at 12:02

## 2020-01-01 RX ADMIN — ATORVASTATIN CALCIUM 80 MG: 80 TABLET, FILM COATED ORAL at 22:07

## 2020-01-01 RX ADMIN — MYCOPHENOLATE MOFETIL 750 MG: 250 CAPSULE ORAL at 17:15

## 2020-01-01 RX ADMIN — MYCOPHENOLATE MOFETIL 750 MG: 250 CAPSULE ORAL at 17:18

## 2020-01-01 RX ADMIN — ASPIRIN 81 MG 81 MG: 81 TABLET ORAL at 18:01

## 2020-01-01 RX ADMIN — POTASSIUM CHLORIDE 40 MEQ: 1500 TABLET, EXTENDED RELEASE ORAL at 12:05

## 2020-01-01 RX ADMIN — ASPIRIN 81 MG 81 MG: 81 TABLET ORAL at 09:07

## 2020-01-01 RX ADMIN — WARFARIN SODIUM 5 MG: 5 TABLET ORAL at 17:44

## 2020-01-01 RX ADMIN — CEFTRIAXONE SODIUM 2000 MG: 10 INJECTION, POWDER, FOR SOLUTION INTRAVENOUS at 10:01

## 2020-01-01 RX ADMIN — NYSTATIN 1 APPLICATION: 100000 POWDER TOPICAL at 17:50

## 2020-01-01 RX ADMIN — ALBUMIN (HUMAN) 25 G: 0.25 INJECTION, SOLUTION INTRAVENOUS at 16:06

## 2020-01-01 RX ADMIN — WARFARIN SODIUM 7.5 MG: 7.5 TABLET ORAL at 17:11

## 2020-01-01 RX ADMIN — POTASSIUM CHLORIDE 20 MEQ: 1500 TABLET, EXTENDED RELEASE ORAL at 08:55

## 2020-01-01 RX ADMIN — SODIUM CHLORIDE, SODIUM GLUCONATE, SODIUM ACETATE, POTASSIUM CHLORIDE, MAGNESIUM CHLORIDE, SODIUM PHOSPHATE, DIBASIC, AND POTASSIUM PHOSPHATE 75 ML/HR: .53; .5; .37; .037; .03; .012; .00082 INJECTION, SOLUTION INTRAVENOUS at 08:22

## 2020-01-01 RX ADMIN — Medication 20 MG/HR: at 12:58

## 2020-01-01 RX ADMIN — ISOSORBIDE DINITRATE 10 MG: 10 TABLET ORAL at 08:47

## 2020-01-01 RX ADMIN — ASPIRIN 81 MG 81 MG: 81 TABLET ORAL at 17:18

## 2020-01-01 RX ADMIN — SODIUM BICARBONATE: 84 INJECTION, SOLUTION INTRAVENOUS at 00:08

## 2020-01-01 RX ADMIN — MYCOPHENOLATE MOFETIL 750 MG: 250 CAPSULE ORAL at 09:24

## 2020-01-01 RX ADMIN — NYSTATIN 1 APPLICATION: 100000 POWDER TOPICAL at 18:23

## 2020-01-01 RX ADMIN — TRAMADOL HYDROCHLORIDE 50 MG: 50 TABLET, FILM COATED ORAL at 10:48

## 2020-01-01 RX ADMIN — SODIUM BICARBONATE 150 ML/HR: 84 INJECTION, SOLUTION INTRAVENOUS at 18:17

## 2020-01-01 RX ADMIN — HEPARIN SODIUM 12 UNITS/KG/HR: 10000 INJECTION, SOLUTION INTRAVENOUS at 08:25

## 2020-01-01 RX ADMIN — MYCOPHENOLATE MOFETIL 750 MG: 250 CAPSULE ORAL at 17:09

## 2020-01-01 RX ADMIN — ISOSORBIDE DINITRATE 10 MG: 10 TABLET ORAL at 09:17

## 2020-01-01 RX ADMIN — APIXABAN 5 MG: 5 TABLET, FILM COATED ORAL at 10:29

## 2020-01-01 RX ADMIN — MILRINONE LACTATE IN DEXTROSE 0.25 MCG/KG/MIN: 200 INJECTION, SOLUTION INTRAVENOUS at 20:20

## 2020-01-01 RX ADMIN — MYCOPHENOLATE MOFETIL 750 MG: 250 CAPSULE ORAL at 08:28

## 2020-01-01 RX ADMIN — MYCOPHENOLATE MOFETIL 750 MG: 250 CAPSULE ORAL at 09:35

## 2020-01-01 RX ADMIN — HEPARIN SODIUM 4000 UNITS: 1000 INJECTION INTRAVENOUS; SUBCUTANEOUS at 06:26

## 2020-01-01 RX ADMIN — NYSTATIN: 100000 POWDER TOPICAL at 18:48

## 2020-01-01 RX ADMIN — NYSTATIN: 100000 POWDER TOPICAL at 08:00

## 2020-01-01 RX ADMIN — Medication 10 MG/HR: at 17:10

## 2020-01-01 RX ADMIN — IOHEXOL 80 ML: 350 INJECTION, SOLUTION INTRAVENOUS at 08:20

## 2020-01-01 RX ADMIN — FUROSEMIDE 40 MG: 40 TABLET ORAL at 09:07

## 2020-01-01 RX ADMIN — PANTOPRAZOLE SODIUM 40 MG: 40 TABLET, DELAYED RELEASE ORAL at 05:51

## 2020-01-01 RX ADMIN — MYCOPHENOLATE MOFETIL 750 MG: 250 CAPSULE ORAL at 18:14

## 2020-01-01 RX ADMIN — METOPROLOL SUCCINATE 25 MG: 25 TABLET, EXTENDED RELEASE ORAL at 11:42

## 2020-01-01 RX ADMIN — METOPROLOL SUCCINATE 25 MG: 25 TABLET, EXTENDED RELEASE ORAL at 11:30

## 2020-01-01 RX ADMIN — SODIUM BICARBONATE 50 MEQ: 84 INJECTION, SOLUTION INTRAVENOUS at 20:17

## 2020-01-01 RX ADMIN — NYSTATIN: 100000 POWDER TOPICAL at 18:06

## 2020-01-01 RX ADMIN — LISINOPRIL 2.5 MG: 2.5 TABLET ORAL at 08:36

## 2020-01-01 RX ADMIN — OXYCODONE HYDROCHLORIDE 2.5 MG: 5 TABLET ORAL at 01:18

## 2020-01-01 RX ADMIN — ATORVASTATIN CALCIUM 80 MG: 40 TABLET, FILM COATED ORAL at 17:01

## 2020-01-01 RX ADMIN — NYSTATIN 1 APPLICATION: 100000 POWDER TOPICAL at 08:15

## 2020-01-01 RX ADMIN — MYCOPHENOLATE MOFETIL 750 MG: 250 CAPSULE ORAL at 17:10

## 2020-01-01 RX ADMIN — NYSTATIN: 100000 POWDER TOPICAL at 10:12

## 2020-01-01 RX ADMIN — PANTOPRAZOLE SODIUM 40 MG: 40 TABLET, DELAYED RELEASE ORAL at 08:00

## 2020-01-01 RX ADMIN — MYCOPHENOLATE MOFETIL 750 MG: 250 CAPSULE ORAL at 17:01

## 2020-01-01 RX ADMIN — TRAMADOL HYDROCHLORIDE 50 MG: 50 TABLET, FILM COATED ORAL at 11:58

## 2020-01-01 RX ADMIN — LEVOTHYROXINE SODIUM 50 MCG: 50 TABLET ORAL at 05:49

## 2020-01-01 RX ADMIN — MYCOPHENOLATE MOFETIL 750 MG: 250 CAPSULE ORAL at 17:31

## 2020-01-01 RX ADMIN — Medication 10 MG/HR: at 14:01

## 2020-01-01 RX ADMIN — FUROSEMIDE 80 MG: 10 INJECTION, SOLUTION INTRAMUSCULAR; INTRAVENOUS at 16:32

## 2020-01-01 RX ADMIN — POTASSIUM CHLORIDE 20 MEQ: 1500 TABLET, EXTENDED RELEASE ORAL at 16:43

## 2020-01-01 RX ADMIN — DEXTROSE MONOHYDRATE 25 ML: 25 INJECTION, SOLUTION INTRAVENOUS at 12:36

## 2020-01-01 RX ADMIN — POTASSIUM CHLORIDE 40 MEQ: 1500 TABLET, EXTENDED RELEASE ORAL at 14:02

## 2020-01-01 RX ADMIN — MELATONIN 3 MG: at 21:07

## 2020-01-01 RX ADMIN — FUROSEMIDE 40 MG: 40 TABLET ORAL at 07:30

## 2020-01-01 RX ADMIN — ASPIRIN 81 MG 81 MG: 81 TABLET ORAL at 08:32

## 2020-01-01 RX ADMIN — MYCOPHENOLATE MOFETIL 750 MG: 250 CAPSULE ORAL at 21:52

## 2020-01-01 RX ADMIN — NYSTATIN 1 APPLICATION: 100000 POWDER TOPICAL at 08:07

## 2020-01-01 RX ADMIN — ALPRAZOLAM 0.25 MG: 0.25 TABLET ORAL at 20:27

## 2020-01-01 RX ADMIN — MYCOPHENOLATE MOFETIL 750 MG: 250 CAPSULE ORAL at 18:47

## 2020-01-01 RX ADMIN — FUROSEMIDE 40 MG: 40 TABLET ORAL at 15:19

## 2020-01-01 RX ADMIN — NYSTATIN 1 APPLICATION: 100000 POWDER TOPICAL at 07:43

## 2020-01-01 RX ADMIN — SODIUM CHLORIDE 5 MCG/MIN: 0.9 INJECTION, SOLUTION INTRAVENOUS at 11:14

## 2020-01-01 RX ADMIN — OXYBUTYNIN CHLORIDE 5 MG: 5 TABLET ORAL at 08:55

## 2020-01-01 RX ADMIN — NYSTATIN: 100000 POWDER TOPICAL at 08:53

## 2020-01-01 RX ADMIN — OXYBUTYNIN CHLORIDE 5 MG: 5 TABLET ORAL at 08:32

## 2020-01-01 RX ADMIN — SODIUM BICARBONATE: 84 INJECTION, SOLUTION INTRAVENOUS at 10:13

## 2020-01-01 RX ADMIN — POTASSIUM CHLORIDE 20 MEQ: 1500 TABLET, EXTENDED RELEASE ORAL at 08:36

## 2020-01-01 RX ADMIN — WARFARIN SODIUM 5 MG: 5 TABLET ORAL at 18:14

## 2020-01-01 RX ADMIN — POTASSIUM CHLORIDE 40 MEQ: 1500 TABLET, EXTENDED RELEASE ORAL at 09:38

## 2020-01-01 RX ADMIN — NYSTATIN 1 APPLICATION: 100000 POWDER TOPICAL at 08:48

## 2020-01-01 RX ADMIN — NYSTATIN: 100000 POWDER TOPICAL at 08:52

## 2020-01-01 RX ADMIN — MYCOPHENOLATE MOFETIL 750 MG: 250 CAPSULE ORAL at 18:04

## 2020-01-01 RX ADMIN — ATORVASTATIN CALCIUM 80 MG: 80 TABLET, FILM COATED ORAL at 21:24

## 2020-01-01 RX ADMIN — OXYBUTYNIN CHLORIDE 5 MG: 5 TABLET ORAL at 15:04

## 2020-01-01 RX ADMIN — MILRINONE LACTATE 0.13 MCG/KG/MIN: 200 INJECTION, SOLUTION INTRAVENOUS at 11:57

## 2020-01-01 RX ADMIN — LEVOTHYROXINE SODIUM 50 MCG: 50 TABLET ORAL at 05:53

## 2020-01-01 RX ADMIN — MYCOPHENOLATE MOFETIL 750 MG: 250 CAPSULE ORAL at 17:07

## 2020-01-01 RX ADMIN — SODIUM BICARBONATE 100 ML/HR: 84 INJECTION, SOLUTION INTRAVENOUS at 05:57

## 2020-01-01 RX ADMIN — SODIUM BICARBONATE 100 ML/HR: 84 INJECTION, SOLUTION INTRAVENOUS at 17:50

## 2020-01-01 RX ADMIN — CEFTRIAXONE SODIUM 1000 MG: 10 INJECTION, POWDER, FOR SOLUTION INTRAVENOUS at 18:56

## 2020-01-01 RX ADMIN — ACETYLCYSTEINE 1200 MG: 200 SOLUTION ORAL; RESPIRATORY (INHALATION) at 18:04

## 2020-01-01 RX ADMIN — LISINOPRIL 2.5 MG: 2.5 TABLET ORAL at 16:46

## 2020-01-01 RX ADMIN — SODIUM CHLORIDE 125 ML/HR: 0.9 INJECTION, SOLUTION INTRAVENOUS at 04:30

## 2020-01-01 RX ADMIN — ALBUMIN (HUMAN) 25 G: 0.25 INJECTION, SOLUTION INTRAVENOUS at 08:00

## 2020-01-01 RX ADMIN — OXYBUTYNIN CHLORIDE 5 MG: 5 TABLET ORAL at 08:36

## 2020-01-01 RX ADMIN — APIXABAN 5 MG: 5 TABLET, FILM COATED ORAL at 17:31

## 2020-01-01 RX ADMIN — ATORVASTATIN CALCIUM 80 MG: 40 TABLET, FILM COATED ORAL at 22:10

## 2020-01-01 RX ADMIN — ALBUMIN (HUMAN) 25 G: 0.25 INJECTION, SOLUTION INTRAVENOUS at 20:57

## 2020-01-01 RX ADMIN — ASPIRIN 81 MG 81 MG: 81 TABLET ORAL at 09:23

## 2020-01-01 RX ADMIN — HEPARIN SODIUM 5000 UNITS: 5000 INJECTION INTRAVENOUS; SUBCUTANEOUS at 21:21

## 2020-01-01 RX ADMIN — ATORVASTATIN CALCIUM 80 MG: 40 TABLET, FILM COATED ORAL at 20:44

## 2020-01-01 RX ADMIN — ATORVASTATIN CALCIUM 80 MG: 80 TABLET, FILM COATED ORAL at 21:54

## 2020-01-01 RX ADMIN — LIDOCAINE HYDROCHLORIDE 5 ML: 20 INJECTION, SOLUTION EPIDURAL; INFILTRATION; INTRACAUDAL at 17:18

## 2020-01-01 RX ADMIN — MYCOPHENOLATE MOFETIL 750 MG: 250 CAPSULE ORAL at 17:14

## 2020-01-01 RX ADMIN — SODIUM CHLORIDE 100 ML/HR: 0.9 INJECTION, SOLUTION INTRAVENOUS at 08:42

## 2020-01-01 RX ADMIN — LEVOTHYROXINE SODIUM 50 MCG: 50 TABLET ORAL at 05:28

## 2020-01-01 RX ADMIN — ATORVASTATIN CALCIUM 80 MG: 40 TABLET, FILM COATED ORAL at 21:16

## 2020-01-01 RX ADMIN — MELATONIN 3 MG: at 21:36

## 2020-01-01 RX ADMIN — NYSTATIN 1 APPLICATION: 100000 POWDER TOPICAL at 17:47

## 2020-01-01 RX ADMIN — LISINOPRIL 2.5 MG: 2.5 TABLET ORAL at 18:24

## 2020-01-01 RX ADMIN — FUROSEMIDE 40 MG: 40 TABLET ORAL at 16:35

## 2020-01-01 RX ADMIN — MYCOPHENOLATE MOFETIL 750 MG: 250 CAPSULE ORAL at 10:29

## 2020-01-01 RX ADMIN — WARFARIN SODIUM 2.5 MG: 2.5 TABLET ORAL at 17:52

## 2020-01-01 RX ADMIN — MAGNESIUM SULFATE HEPTAHYDRATE 2 G: 40 INJECTION, SOLUTION INTRAVENOUS at 19:25

## 2020-01-01 RX ADMIN — LIDOCAINE HYDROCHLORIDE: 10 INJECTION, SOLUTION EPIDURAL; INFILTRATION; INTRACAUDAL; PERINEURAL at 22:39

## 2020-01-01 RX ADMIN — ATORVASTATIN CALCIUM 80 MG: 40 TABLET, FILM COATED ORAL at 21:44

## 2020-01-01 RX ADMIN — ACETAMINOPHEN 650 MG: 325 TABLET ORAL at 22:10

## 2020-01-01 RX ADMIN — OXYBUTYNIN CHLORIDE 5 MG: 5 TABLET ORAL at 21:23

## 2020-01-01 RX ADMIN — HEPARIN SODIUM 4000 UNITS: 1000 INJECTION, SOLUTION INTRAVENOUS; SUBCUTANEOUS at 16:43

## 2020-01-01 RX ADMIN — HEPARIN SODIUM 2600 UNITS: 1000 INJECTION INTRAVENOUS; SUBCUTANEOUS at 08:32

## 2020-01-01 RX ADMIN — ISOSORBIDE DINITRATE 10 MG: 10 TABLET ORAL at 13:37

## 2020-01-01 RX ADMIN — MAGNESIUM SULFATE HEPTAHYDRATE 2 G: 40 INJECTION, SOLUTION INTRAVENOUS at 07:38

## 2020-01-01 RX ADMIN — ATORVASTATIN CALCIUM 80 MG: 80 TABLET, FILM COATED ORAL at 22:17

## 2020-01-01 RX ADMIN — NYSTATIN: 100000 POWDER TOPICAL at 17:20

## 2020-01-01 RX ADMIN — POTASSIUM CHLORIDE 20 MEQ: 1500 TABLET, EXTENDED RELEASE ORAL at 11:36

## 2020-01-01 RX ADMIN — HEPARIN SODIUM 5000 UNITS: 5000 INJECTION INTRAVENOUS; SUBCUTANEOUS at 21:49

## 2020-01-01 RX ADMIN — METOPROLOL SUCCINATE 25 MG: 25 TABLET, EXTENDED RELEASE ORAL at 10:11

## 2020-01-01 RX ADMIN — MYCOPHENOLATE MOFETIL 750 MG: 250 CAPSULE ORAL at 18:20

## 2020-01-01 RX ADMIN — ASPIRIN 81 MG 81 MG: 81 TABLET ORAL at 08:18

## 2020-01-01 RX ADMIN — HYDROMORPHONE HYDROCHLORIDE 0.5 MG: 1 INJECTION, SOLUTION INTRAMUSCULAR; INTRAVENOUS; SUBCUTANEOUS at 05:45

## 2020-01-01 RX ADMIN — ASPIRIN 81 MG 81 MG: 81 TABLET ORAL at 08:52

## 2020-01-01 RX ADMIN — NOREPINEPHRINE BITARTRATE 8 MCG/MIN: 1 INJECTION INTRAVENOUS at 02:24

## 2020-01-01 RX ADMIN — NYSTATIN: 100000 POWDER TOPICAL at 22:12

## 2020-01-01 RX ADMIN — GADOBUTROL 14 ML: 604.72 INJECTION INTRAVENOUS at 22:03

## 2020-01-01 RX ADMIN — LEVOTHYROXINE SODIUM 50 MCG: 50 TABLET ORAL at 06:08

## 2020-01-01 RX ADMIN — PERFLUTREN 0.8 ML/MIN: 6.52 INJECTION, SUSPENSION INTRAVENOUS at 10:50

## 2020-01-01 RX ADMIN — MYCOPHENOLATE MOFETIL 750 MG: 250 CAPSULE ORAL at 18:22

## 2020-01-01 RX ADMIN — ISOSORBIDE DINITRATE 10 MG: 10 TABLET ORAL at 11:42

## 2020-01-01 RX ADMIN — MYCOPHENOLATE MOFETIL 750 MG: 250 CAPSULE ORAL at 17:20

## 2020-01-01 RX ADMIN — SODIUM BICARBONATE 125 ML/HR: 84 INJECTION, SOLUTION INTRAVENOUS at 18:43

## 2020-01-01 RX ADMIN — ACETAMINOPHEN 975 MG: 325 TABLET, FILM COATED ORAL at 05:00

## 2020-01-01 RX ADMIN — Medication 1 MG/HR: at 16:12

## 2020-01-01 RX ADMIN — LISINOPRIL 2.5 MG: 2.5 TABLET ORAL at 17:10

## 2020-01-01 RX ADMIN — CEFPODOXIME PROXETIL 200 MG: 200 TABLET, FILM COATED ORAL at 18:04

## 2020-01-01 RX ADMIN — OXYBUTYNIN CHLORIDE 5 MG: 5 TABLET ORAL at 20:32

## 2020-01-01 RX ADMIN — LEVOTHYROXINE SODIUM 50 MCG: 50 TABLET ORAL at 05:18

## 2020-01-01 RX ADMIN — FUROSEMIDE 40 MG: 10 INJECTION, SOLUTION INTRAVENOUS at 10:13

## 2020-01-01 RX ADMIN — OXYBUTYNIN CHLORIDE 5 MG: 5 TABLET ORAL at 17:37

## 2020-01-01 RX ADMIN — PIPERACILLIN SODIUM,TAZOBACTAM SODIUM 2.25 G: 2; .25 INJECTION, POWDER, FOR SOLUTION INTRAVENOUS at 21:49

## 2020-01-01 RX ADMIN — SODIUM BICARBONATE 125 ML/HR: 84 INJECTION, SOLUTION INTRAVENOUS at 20:38

## 2020-01-01 RX ADMIN — SODIUM CHLORIDE, SODIUM GLUCONATE, SODIUM ACETATE, POTASSIUM CHLORIDE, MAGNESIUM CHLORIDE, SODIUM PHOSPHATE, DIBASIC, AND POTASSIUM PHOSPHATE 150 ML/HR: .53; .5; .37; .037; .03; .012; .00082 INJECTION, SOLUTION INTRAVENOUS at 21:49

## 2020-01-01 RX ADMIN — OXYBUTYNIN CHLORIDE 5 MG: 5 TABLET ORAL at 09:07

## 2020-01-01 RX ADMIN — MYCOPHENOLATE MOFETIL 750 MG: 250 CAPSULE ORAL at 08:39

## 2020-01-01 RX ADMIN — LEVOTHYROXINE SODIUM 50 MCG: 50 TABLET ORAL at 06:34

## 2020-01-01 RX ADMIN — OXYCODONE HYDROCHLORIDE 5 MG: 5 TABLET ORAL at 15:59

## 2020-01-01 RX ADMIN — OXYBUTYNIN CHLORIDE 5 MG: 5 TABLET ORAL at 16:18

## 2020-01-01 RX ADMIN — HYDROMORPHONE HYDROCHLORIDE 0.5 MG: 1 INJECTION, SOLUTION INTRAMUSCULAR; INTRAVENOUS; SUBCUTANEOUS at 20:17

## 2020-01-01 RX ADMIN — NYSTATIN: 100000 POWDER TOPICAL at 09:39

## 2020-01-01 RX ADMIN — POTASSIUM CHLORIDE 20 MEQ: 1500 TABLET, EXTENDED RELEASE ORAL at 08:14

## 2020-01-01 RX ADMIN — WARFARIN SODIUM 5 MG: 5 TABLET ORAL at 16:14

## 2020-01-01 RX ADMIN — NYSTATIN: 100000 POWDER TOPICAL at 09:01

## 2020-01-01 RX ADMIN — APIXABAN 5 MG: 5 TABLET, FILM COATED ORAL at 08:55

## 2020-01-01 RX ADMIN — MYCOPHENOLATE MOFETIL 750 MG: 250 CAPSULE ORAL at 17:23

## 2020-01-01 RX ADMIN — DOCUSATE SODIUM 100 MG: 100 CAPSULE, LIQUID FILLED ORAL at 18:03

## 2020-01-01 RX ADMIN — ACETAMINOPHEN 975 MG: 325 TABLET, FILM COATED ORAL at 13:03

## 2020-01-01 RX ADMIN — SODIUM BICARBONATE 50 MEQ: 84 INJECTION, SOLUTION INTRAVENOUS at 17:25

## 2020-01-01 RX ADMIN — MAGNESIUM SULFATE HEPTAHYDRATE 4 G: 40 INJECTION, SOLUTION INTRAVENOUS at 14:51

## 2020-01-01 RX ADMIN — SODIUM CHLORIDE 1000 ML: 0.9 INJECTION, SOLUTION INTRAVENOUS at 17:23

## 2020-01-01 RX ADMIN — ASPIRIN 81 MG 81 MG: 81 TABLET ORAL at 08:50

## 2020-01-01 RX ADMIN — ACETAMINOPHEN 650 MG: 325 TABLET, FILM COATED ORAL at 13:54

## 2020-01-01 RX ADMIN — OXYCODONE HYDROCHLORIDE 2.5 MG: 5 TABLET ORAL at 08:08

## 2020-01-01 RX ADMIN — ATORVASTATIN CALCIUM 80 MG: 80 TABLET, FILM COATED ORAL at 21:08

## 2020-01-01 RX ADMIN — ISOSORBIDE DINITRATE 10 MG: 10 TABLET ORAL at 17:19

## 2020-01-01 RX ADMIN — SODIUM BICARBONATE 125 ML/HR: 84 INJECTION, SOLUTION INTRAVENOUS at 06:21

## 2020-01-01 RX ADMIN — PANTOPRAZOLE SODIUM 40 MG: 40 TABLET, DELAYED RELEASE ORAL at 06:53

## 2020-01-01 RX ADMIN — HEPARIN SODIUM 15.8 UNITS/KG/HR: 10000 INJECTION, SOLUTION INTRAVENOUS at 20:40

## 2020-01-01 RX ADMIN — METOPROLOL SUCCINATE 25 MG: 25 TABLET, EXTENDED RELEASE ORAL at 09:35

## 2020-01-01 RX ADMIN — MELATONIN 3 MG: at 21:35

## 2020-01-01 RX ADMIN — ASPIRIN 81 MG 81 MG: 81 TABLET ORAL at 08:06

## 2020-01-01 RX ADMIN — ASPIRIN 81 MG 81 MG: 81 TABLET ORAL at 17:01

## 2020-01-01 RX ADMIN — SODIUM CHLORIDE 1000 ML: 0.9 INJECTION, SOLUTION INTRAVENOUS at 18:17

## 2020-01-01 RX ADMIN — OXYBUTYNIN CHLORIDE 5 MG: 5 TABLET ORAL at 08:06

## 2020-01-01 RX ADMIN — HEPARIN SODIUM 5000 UNITS: 5000 INJECTION INTRAVENOUS; SUBCUTANEOUS at 21:35

## 2020-01-01 RX ADMIN — MYCOPHENOLATE MOFETIL 750 MG: 250 CAPSULE ORAL at 09:16

## 2020-01-01 RX ADMIN — NYSTATIN: 100000 POWDER TOPICAL at 18:25

## 2020-01-01 RX ADMIN — ACETAMINOPHEN 975 MG: 325 TABLET, FILM COATED ORAL at 07:43

## 2020-01-01 RX ADMIN — SODIUM CHLORIDE 2 MCG/MIN: 0.9 INJECTION, SOLUTION INTRAVENOUS at 09:57

## 2020-01-01 RX ADMIN — HEPARIN SODIUM 5000 UNITS: 5000 INJECTION INTRAVENOUS; SUBCUTANEOUS at 14:26

## 2020-01-01 RX ADMIN — POTASSIUM CHLORIDE 20 MEQ: 14.9 INJECTION, SOLUTION INTRAVENOUS at 07:10

## 2020-01-01 RX ADMIN — LIDOCAINE HYDROCHLORIDE,EPINEPHRINE BITARTRATE 5 ML: 10; .01 INJECTION, SOLUTION INFILTRATION; PERINEURAL at 22:57

## 2020-01-01 RX ADMIN — MELATONIN 3 MG: at 21:21

## 2020-01-01 RX ADMIN — OXYCODONE HYDROCHLORIDE 5 MG: 5 TABLET ORAL at 07:02

## 2020-01-01 RX ADMIN — METOPROLOL SUCCINATE 25 MG: 25 TABLET, EXTENDED RELEASE ORAL at 17:14

## 2020-01-01 RX ADMIN — HEPARIN SODIUM AND DEXTROSE 20 UNITS/KG/HR: 10000; 5 INJECTION INTRAVENOUS at 19:46

## 2020-01-01 RX ADMIN — PIPERACILLIN SODIUM,TAZOBACTAM SODIUM 2.25 G: 2; .25 INJECTION, POWDER, FOR SOLUTION INTRAVENOUS at 17:18

## 2020-01-01 RX ADMIN — ACETAMINOPHEN 650 MG: 325 TABLET, FILM COATED ORAL at 08:22

## 2020-01-01 RX ADMIN — SODIUM BICARBONATE 100 ML/HR: 84 INJECTION, SOLUTION INTRAVENOUS at 15:26

## 2020-01-01 RX ADMIN — OXYBUTYNIN CHLORIDE 5 MG: 5 TABLET ORAL at 21:25

## 2020-01-01 RX ADMIN — ACETYLCYSTEINE 1200 MG: 200 SOLUTION ORAL; RESPIRATORY (INHALATION) at 17:32

## 2020-01-01 RX ADMIN — LORAZEPAM 1 MG: 2 INJECTION INTRAMUSCULAR; INTRAVENOUS at 07:56

## 2020-01-01 RX ADMIN — ATROPA BELLADONNA AND OPIUM 1 SUPPOSITORY: 16.2; 3 SUPPOSITORY RECTAL at 14:02

## 2020-01-01 RX ADMIN — HEPARIN SODIUM AND DEXTROSE 18 UNITS/KG/HR: 10000; 5 INJECTION INTRAVENOUS at 04:51

## 2020-01-01 RX ADMIN — FUROSEMIDE 80 MG: 10 INJECTION, SOLUTION INTRAMUSCULAR; INTRAVENOUS at 15:36

## 2020-01-01 RX ADMIN — ACETAMINOPHEN 650 MG: 325 TABLET, FILM COATED ORAL at 21:27

## 2020-01-01 RX ADMIN — ASPIRIN 81 MG 81 MG: 81 TABLET ORAL at 10:12

## 2020-01-01 RX ADMIN — NYSTATIN 1 APPLICATION: 100000 POWDER TOPICAL at 08:28

## 2020-01-01 RX ADMIN — OXYBUTYNIN CHLORIDE 5 MG: 5 TABLET ORAL at 10:11

## 2020-01-01 RX ADMIN — ATORVASTATIN CALCIUM 80 MG: 40 TABLET, FILM COATED ORAL at 21:19

## 2020-01-01 RX ADMIN — DOCUSATE SODIUM 100 MG: 100 CAPSULE, LIQUID FILLED ORAL at 17:07

## 2020-01-01 RX ADMIN — MYCOPHENOLATE MOFETIL 750 MG: 250 CAPSULE ORAL at 09:01

## 2020-01-01 RX ADMIN — ALBUMIN (HUMAN) 12.5 G: 0.25 INJECTION, SOLUTION INTRAVENOUS at 05:28

## 2020-01-01 RX ADMIN — OXYBUTYNIN CHLORIDE 5 MG: 5 TABLET ORAL at 21:52

## 2020-01-01 RX ADMIN — HEPARIN SODIUM 15.8 UNITS/KG/HR: 10000 INJECTION, SOLUTION INTRAVENOUS at 12:57

## 2020-01-01 RX ADMIN — SODIUM BICARBONATE 150 ML/HR: 84 INJECTION, SOLUTION INTRAVENOUS at 11:27

## 2020-01-01 RX ADMIN — METOPROLOL SUCCINATE 50 MG: 50 TABLET, EXTENDED RELEASE ORAL at 08:37

## 2020-01-01 RX ADMIN — LEVOTHYROXINE SODIUM 50 MCG: 50 TABLET ORAL at 06:06

## 2020-01-01 RX ADMIN — NYSTATIN 1 APPLICATION: 100000 POWDER TOPICAL at 10:12

## 2020-01-01 RX ADMIN — METOPROLOL SUCCINATE 50 MG: 50 TABLET, EXTENDED RELEASE ORAL at 08:47

## 2020-01-01 RX ADMIN — LEVOTHYROXINE SODIUM 50 MCG: 50 TABLET ORAL at 05:00

## 2020-01-01 RX ADMIN — HEPARIN SODIUM 5000 UNITS: 5000 INJECTION INTRAVENOUS; SUBCUTANEOUS at 05:00

## 2020-01-01 RX ADMIN — ISOSORBIDE DINITRATE 10 MG: 10 TABLET ORAL at 18:11

## 2020-01-01 RX ADMIN — ACETAMINOPHEN 975 MG: 325 TABLET, FILM COATED ORAL at 21:35

## 2020-01-01 RX ADMIN — HEPARIN SODIUM 16 UNITS/KG/HR: 10000 INJECTION, SOLUTION INTRAVENOUS at 21:27

## 2020-01-01 RX ADMIN — ASPIRIN 81 MG 81 MG: 81 TABLET ORAL at 17:14

## 2020-01-01 RX ADMIN — POTASSIUM CHLORIDE 40 MEQ: 1500 TABLET, EXTENDED RELEASE ORAL at 18:22

## 2020-01-01 RX ADMIN — HEPARIN SODIUM 5000 UNITS: 5000 INJECTION INTRAVENOUS; SUBCUTANEOUS at 14:09

## 2020-01-01 RX ADMIN — MYCOPHENOLATE MOFETIL 750 MG: 250 CAPSULE ORAL at 17:52

## 2020-01-01 RX ADMIN — MILRINONE LACTATE IN DEXTROSE 0.25 MCG/KG/MIN: 200 INJECTION, SOLUTION INTRAVENOUS at 05:32

## 2020-01-01 RX ADMIN — ALBUMIN (HUMAN) 25 G: 12.5 SOLUTION INTRAVENOUS at 22:04

## 2020-01-01 RX ADMIN — POTASSIUM CHLORIDE 20 MEQ: 1500 TABLET, EXTENDED RELEASE ORAL at 12:49

## 2020-01-01 RX ADMIN — MYCOPHENOLATE MOFETIL 750 MG: 250 CAPSULE ORAL at 17:12

## 2020-01-01 RX ADMIN — ASPIRIN 81 MG 81 MG: 81 TABLET ORAL at 08:49

## 2020-01-01 RX ADMIN — PIPERACILLIN SODIUM,TAZOBACTAM SODIUM 2.25 G: 2; .25 INJECTION, POWDER, FOR SOLUTION INTRAVENOUS at 10:02

## 2020-01-01 RX ADMIN — FUROSEMIDE 80 MG: 10 INJECTION, SOLUTION INTRAMUSCULAR; INTRAVENOUS at 16:04

## 2020-01-01 RX ADMIN — OXYBUTYNIN CHLORIDE 5 MG: 5 TABLET ORAL at 17:10

## 2020-01-01 RX ADMIN — OXYBUTYNIN CHLORIDE 5 MG: 5 TABLET ORAL at 17:28

## 2020-01-01 RX ADMIN — ATORVASTATIN CALCIUM 80 MG: 40 TABLET, FILM COATED ORAL at 22:37

## 2020-01-01 RX ADMIN — SODIUM CHLORIDE, SODIUM GLUCONATE, SODIUM ACETATE, POTASSIUM CHLORIDE, MAGNESIUM CHLORIDE, SODIUM PHOSPHATE, DIBASIC, AND POTASSIUM PHOSPHATE 75 ML/HR: .53; .5; .37; .037; .03; .012; .00082 INJECTION, SOLUTION INTRAVENOUS at 18:02

## 2020-01-01 RX ADMIN — HEPARIN SODIUM 5000 UNITS: 5000 INJECTION INTRAVENOUS; SUBCUTANEOUS at 21:37

## 2020-01-01 RX ADMIN — DOCUSATE SODIUM 100 MG: 100 CAPSULE, LIQUID FILLED ORAL at 08:34

## 2020-01-01 RX ADMIN — WARFARIN SODIUM 5 MG: 5 TABLET ORAL at 18:11

## 2020-01-01 RX ADMIN — PANTOPRAZOLE SODIUM 40 MG: 40 TABLET, DELAYED RELEASE ORAL at 06:08

## 2020-01-01 RX ADMIN — CEFEPIME HYDROCHLORIDE 2000 MG: 2 INJECTION, POWDER, FOR SOLUTION INTRAVENOUS at 17:49

## 2020-01-01 RX ADMIN — MELATONIN 3 MG: at 01:57

## 2020-01-01 RX ADMIN — CALCIUM GLUCONATE 2 G: 20 INJECTION, SOLUTION INTRAVENOUS at 10:10

## 2020-01-01 RX ADMIN — HEPARIN SODIUM 18 UNITS/KG/HR: 10000 INJECTION, SOLUTION INTRAVENOUS at 23:44

## 2020-01-01 RX ADMIN — POTASSIUM CHLORIDE 40 MEQ: 1500 TABLET, EXTENDED RELEASE ORAL at 17:01

## 2020-01-01 RX ADMIN — INSULIN HUMAN 10 UNITS: 100 INJECTION, SOLUTION PARENTERAL at 12:36

## 2020-01-01 RX ADMIN — ACETAMINOPHEN 650 MG: 325 TABLET, FILM COATED ORAL at 05:49

## 2020-01-01 RX ADMIN — MYCOPHENOLATE MOFETIL 750 MG: 250 CAPSULE ORAL at 16:48

## 2020-01-01 RX ADMIN — ISOSORBIDE DINITRATE 10 MG: 10 TABLET ORAL at 18:15

## 2020-01-01 RX ADMIN — LEVOTHYROXINE SODIUM 50 MCG: 50 TABLET ORAL at 06:21

## 2020-01-01 RX ADMIN — NYSTATIN: 100000 POWDER TOPICAL at 18:27

## 2020-01-01 RX ADMIN — ASPIRIN 81 MG: 81 TABLET ORAL at 08:56

## 2020-01-01 RX ADMIN — OXYBUTYNIN CHLORIDE 5 MG: 5 TABLET ORAL at 21:31

## 2020-01-01 RX ADMIN — MYCOPHENOLATE MOFETIL 750 MG: 250 CAPSULE ORAL at 08:34

## 2020-01-01 RX ADMIN — ISOSORBIDE DINITRATE 10 MG: 10 TABLET ORAL at 12:05

## 2020-01-01 RX ADMIN — CEFTRIAXONE SODIUM 2000 MG: 10 INJECTION, POWDER, FOR SOLUTION INTRAVENOUS at 09:55

## 2020-01-01 RX ADMIN — MYCOPHENOLATE MOFETIL 750 MG: 250 CAPSULE ORAL at 17:36

## 2020-01-01 RX ADMIN — NOREPINEPHRINE BITARTRATE 10 MCG/MIN: 1 INJECTION INTRAVENOUS at 20:20

## 2020-01-01 RX ADMIN — MYCOPHENOLATE MOFETIL 750 MG: 250 CAPSULE ORAL at 18:11

## 2020-01-01 RX ADMIN — MELATONIN 3 MG: at 21:48

## 2020-01-01 RX ADMIN — HYDROMORPHONE HYDROCHLORIDE 0.5 MG: 1 INJECTION, SOLUTION INTRAMUSCULAR; INTRAVENOUS; SUBCUTANEOUS at 22:06

## 2020-01-01 RX ADMIN — TRAMADOL HYDROCHLORIDE 50 MG: 50 TABLET, FILM COATED ORAL at 21:37

## 2020-01-01 RX ADMIN — VANCOMYCIN HYDROCHLORIDE 1000 MG: 1 INJECTION, SOLUTION INTRAVENOUS at 00:46

## 2020-01-01 RX ADMIN — NYSTATIN: 100000 POWDER TOPICAL at 17:12

## 2020-01-01 RX ADMIN — VANCOMYCIN HYDROCHLORIDE 250 MG: 500 INJECTION, POWDER, LYOPHILIZED, FOR SOLUTION INTRAVENOUS at 04:26

## 2020-01-01 RX ADMIN — LIDOCAINE HYDROCHLORIDE 5 APPLICATION: 20 JELLY TOPICAL at 18:27

## 2020-01-01 RX ADMIN — HYDROMORPHONE HYDROCHLORIDE 0.5 MG: 1 INJECTION, SOLUTION INTRAMUSCULAR; INTRAVENOUS; SUBCUTANEOUS at 14:49

## 2020-01-01 RX ADMIN — OXYCODONE HYDROCHLORIDE 5 MG: 5 TABLET ORAL at 08:00

## 2020-01-01 RX ADMIN — NYSTATIN: 100000 POWDER TOPICAL at 09:24

## 2020-01-01 RX ADMIN — MYCOPHENOLATE MOFETIL 750 MG: 250 CAPSULE ORAL at 17:29

## 2020-01-01 RX ADMIN — FUROSEMIDE 40 MG: 40 TABLET ORAL at 17:19

## 2020-01-01 RX ADMIN — POTASSIUM CHLORIDE 20 MEQ: 1500 TABLET, EXTENDED RELEASE ORAL at 07:42

## 2020-01-01 RX ADMIN — ASPIRIN 81 MG 81 MG: 81 TABLET ORAL at 10:29

## 2020-01-01 RX ADMIN — POTASSIUM CHLORIDE 40 MEQ: 1500 TABLET, EXTENDED RELEASE ORAL at 08:53

## 2020-01-01 RX ADMIN — WARFARIN SODIUM 5 MG: 5 TABLET ORAL at 19:11

## 2020-01-01 RX ADMIN — BUMETANIDE 2 MG: 0.25 INJECTION INTRAMUSCULAR; INTRAVENOUS at 05:27

## 2020-01-01 RX ADMIN — NOREPINEPHRINE BITARTRATE 11 MCG/MIN: 1 INJECTION INTRAVENOUS at 14:13

## 2020-01-01 RX ADMIN — MYCOPHENOLATE MOFETIL 750 MG: 250 CAPSULE ORAL at 08:19

## 2020-01-01 RX ADMIN — PIPERACILLIN SODIUM,TAZOBACTAM SODIUM 2.25 G: 2; .25 INJECTION, POWDER, FOR SOLUTION INTRAVENOUS at 03:57

## 2020-01-01 RX ADMIN — WARFARIN SODIUM 10 MG: 5 TABLET ORAL at 17:14

## 2020-01-01 RX ADMIN — OXYBUTYNIN CHLORIDE 5 MG: 5 TABLET ORAL at 21:08

## 2020-01-01 RX ADMIN — WARFARIN SODIUM 5 MG: 5 TABLET ORAL at 17:18

## 2020-01-01 RX ADMIN — POTASSIUM CHLORIDE 40 MEQ: 1500 TABLET, EXTENDED RELEASE ORAL at 14:11

## 2020-01-01 RX ADMIN — HEPARIN SODIUM 4000 UNITS: 1000 INJECTION INTRAVENOUS; SUBCUTANEOUS at 08:01

## 2020-01-01 RX ADMIN — ISOSORBIDE DINITRATE 10 MG: 10 TABLET ORAL at 14:24

## 2020-01-01 RX ADMIN — SODIUM BICARBONATE: 84 INJECTION, SOLUTION INTRAVENOUS at 12:42

## 2020-01-01 RX ADMIN — MYCOPHENOLATE MOFETIL 750 MG: 250 CAPSULE ORAL at 08:55

## 2020-01-01 RX ADMIN — ATORVASTATIN CALCIUM 80 MG: 40 TABLET, FILM COATED ORAL at 17:14

## 2020-01-01 RX ADMIN — MYCOPHENOLATE MOFETIL 750 MG: 250 CAPSULE ORAL at 10:12

## 2020-01-01 RX ADMIN — WARFARIN SODIUM 5 MG: 5 TABLET ORAL at 17:09

## 2020-01-01 RX ADMIN — LEVOTHYROXINE SODIUM 50 MCG: 50 TABLET ORAL at 06:12

## 2020-01-01 RX ADMIN — WARFARIN SODIUM 3 MG: 3 TABLET ORAL at 17:19

## 2020-01-01 RX ADMIN — MYCOPHENOLATE MOFETIL 750 MG: 250 CAPSULE ORAL at 08:50

## 2020-01-01 RX ADMIN — ALBUMIN (HUMAN) 25 G: 0.25 INJECTION, SOLUTION INTRAVENOUS at 11:03

## 2020-01-01 RX ADMIN — SODIUM CHLORIDE, SODIUM LACTATE, POTASSIUM CHLORIDE, AND CALCIUM CHLORIDE 150 ML/HR: .6; .31; .03; .02 INJECTION, SOLUTION INTRAVENOUS at 12:15

## 2020-01-01 RX ADMIN — HEPARIN SODIUM AND DEXTROSE 12 UNITS/KG/HR: 10000; 5 INJECTION INTRAVENOUS at 12:09

## 2020-01-01 RX ADMIN — HEPARIN SODIUM AND DEXTROSE 18 UNITS/KG/HR: 10000; 5 INJECTION INTRAVENOUS at 02:30

## 2020-01-01 RX ADMIN — LIDOCAINE HYDROCHLORIDE 10 ML: 10 INJECTION, SOLUTION EPIDURAL; INFILTRATION; INTRACAUDAL; PERINEURAL at 07:58

## 2020-01-01 RX ADMIN — METOPROLOL SUCCINATE 50 MG: 50 TABLET, EXTENDED RELEASE ORAL at 08:14

## 2020-01-01 RX ADMIN — MAGNESIUM SULFATE HEPTAHYDRATE 2 G: 40 INJECTION, SOLUTION INTRAVENOUS at 10:11

## 2020-01-01 RX ADMIN — ATORVASTATIN CALCIUM 80 MG: 40 TABLET, FILM COATED ORAL at 17:18

## 2020-01-01 RX ADMIN — LEVOTHYROXINE SODIUM 50 MCG: 50 TABLET ORAL at 06:30

## 2020-01-01 RX ADMIN — ATORVASTATIN CALCIUM 80 MG: 80 TABLET, FILM COATED ORAL at 00:00

## 2020-01-01 RX ADMIN — OXYBUTYNIN CHLORIDE 5 MG: 5 TABLET ORAL at 21:54

## 2020-01-01 RX ADMIN — APIXABAN 5 MG: 5 TABLET, FILM COATED ORAL at 08:50

## 2020-01-01 RX ADMIN — OXYBUTYNIN CHLORIDE 5 MG: 5 TABLET ORAL at 17:00

## 2020-01-01 RX ADMIN — DOCUSATE SODIUM 100 MG: 100 CAPSULE, LIQUID FILLED ORAL at 17:14

## 2020-01-01 RX ADMIN — ACETAMINOPHEN 975 MG: 325 TABLET, FILM COATED ORAL at 06:06

## 2020-01-01 RX ADMIN — CALCIUM GLUCONATE 1 G: 20 INJECTION, SOLUTION INTRAVENOUS at 05:28

## 2020-01-01 RX ADMIN — FUROSEMIDE 40 MG: 40 TABLET ORAL at 08:48

## 2020-01-01 RX ADMIN — ISOSORBIDE DINITRATE 10 MG: 10 TABLET ORAL at 08:19

## 2020-01-01 RX ADMIN — MYCOPHENOLATE MOFETIL 750 MG: 250 CAPSULE ORAL at 18:37

## 2020-01-01 RX ADMIN — SODIUM BICARBONATE 125 ML/HR: 84 INJECTION, SOLUTION INTRAVENOUS at 12:22

## 2020-01-01 RX ADMIN — HEPARIN SODIUM 5000 UNITS: 5000 INJECTION INTRAVENOUS; SUBCUTANEOUS at 14:44

## 2020-01-01 RX ADMIN — HEPARIN SODIUM 16 UNITS/KG/HR: 10000 INJECTION, SOLUTION INTRAVENOUS at 16:13

## 2020-01-01 RX ADMIN — OXYCODONE HYDROCHLORIDE 5 MG: 5 TABLET ORAL at 03:55

## 2020-01-01 RX ADMIN — FUROSEMIDE 40 MG: 40 TABLET ORAL at 15:56

## 2020-01-01 RX ADMIN — MYCOPHENOLATE MOFETIL 750 MG: 250 CAPSULE ORAL at 17:56

## 2020-01-01 RX ADMIN — NYSTATIN: 100000 POWDER TOPICAL at 18:03

## 2020-01-01 RX ADMIN — SODIUM CHLORIDE, SODIUM GLUCONATE, SODIUM ACETATE, POTASSIUM CHLORIDE, MAGNESIUM CHLORIDE, SODIUM PHOSPHATE, DIBASIC, AND POTASSIUM PHOSPHATE 60 ML/HR: .53; .5; .37; .037; .03; .012; .00082 INJECTION, SOLUTION INTRAVENOUS at 13:27

## 2020-01-01 RX ADMIN — MYCOPHENOLATE MOFETIL 750 MG: 250 CAPSULE ORAL at 09:38

## 2020-01-01 RX ADMIN — NYSTATIN: 100000 POWDER TOPICAL at 17:34

## 2020-01-01 RX ADMIN — ASPIRIN 81 MG 81 MG: 81 TABLET ORAL at 08:07

## 2020-01-01 RX ADMIN — MELATONIN 3 MG: at 23:59

## 2020-01-01 RX ADMIN — MILRINONE LACTATE 0.13 MCG/KG/MIN: 200 INJECTION, SOLUTION INTRAVENOUS at 10:13

## 2020-01-01 RX ADMIN — HEPARIN SODIUM 5000 UNITS: 5000 INJECTION INTRAVENOUS; SUBCUTANEOUS at 05:50

## 2020-01-01 RX ADMIN — POTASSIUM CHLORIDE 40 MEQ: 1500 TABLET, EXTENDED RELEASE ORAL at 19:25

## 2020-01-01 RX ADMIN — NYSTATIN: 100000 POWDER TOPICAL at 21:52

## 2020-01-01 RX ADMIN — NYSTATIN 1 APPLICATION: 100000 POWDER TOPICAL at 17:14

## 2020-01-01 RX ADMIN — SODIUM CHLORIDE 1000 ML: 0.9 INJECTION, SOLUTION INTRAVENOUS at 15:56

## 2020-01-01 RX ADMIN — APIXABAN 5 MG: 5 TABLET, FILM COATED ORAL at 18:04

## 2020-01-01 RX ADMIN — HEPARIN SODIUM 15.8 UNITS/KG/HR: 10000 INJECTION, SOLUTION INTRAVENOUS at 00:41

## 2020-01-01 RX ADMIN — ALBUMIN (HUMAN) 12.5 G: 0.25 INJECTION, SOLUTION INTRAVENOUS at 13:24

## 2020-01-01 RX ADMIN — POTASSIUM CHLORIDE 20 MEQ: 1500 TABLET, EXTENDED RELEASE ORAL at 17:10

## 2020-01-01 RX ADMIN — OXYCODONE HYDROCHLORIDE 2.5 MG: 5 TABLET ORAL at 11:22

## 2020-01-01 RX ADMIN — MYCOPHENOLATE MOFETIL 750 MG: 250 CAPSULE ORAL at 16:14

## 2020-01-01 RX ADMIN — OXYCODONE HYDROCHLORIDE 5 MG: 5 TABLET ORAL at 20:57

## 2020-01-01 RX ADMIN — OXYBUTYNIN CHLORIDE 5 MG: 5 TABLET ORAL at 16:02

## 2020-01-01 RX ADMIN — ONDANSETRON 4 MG: 2 INJECTION INTRAMUSCULAR; INTRAVENOUS at 12:42

## 2020-01-01 RX ADMIN — MIDAZOLAM HYDROCHLORIDE 1 MG: 1 INJECTION, SOLUTION INTRAMUSCULAR; INTRAVENOUS at 07:53

## 2020-01-01 RX ADMIN — LISINOPRIL 2.5 MG: 2.5 TABLET ORAL at 08:13

## 2020-01-01 RX ADMIN — OXYBUTYNIN CHLORIDE 5 MG: 5 TABLET ORAL at 09:35

## 2020-01-01 RX ADMIN — NYSTATIN: 100000 POWDER TOPICAL at 17:38

## 2020-01-01 RX ADMIN — POTASSIUM CHLORIDE 40 MEQ: 1500 TABLET, EXTENDED RELEASE ORAL at 08:06

## 2020-01-01 RX ADMIN — ATORVASTATIN CALCIUM 80 MG: 40 TABLET, FILM COATED ORAL at 21:07

## 2020-01-01 RX ADMIN — POTASSIUM PHOSPHATE, MONOBASIC AND POTASSIUM PHOSPHATE, DIBASIC 30 MMOL: 224; 236 INJECTION, SOLUTION, CONCENTRATE INTRAVENOUS at 08:06

## 2020-01-01 RX ADMIN — ASPIRIN 81 MG 324 MG: 81 TABLET ORAL at 14:45

## 2020-01-01 RX ADMIN — PANTOPRAZOLE SODIUM 40 MG: 40 TABLET, DELAYED RELEASE ORAL at 05:19

## 2020-01-01 RX ADMIN — OXYCODONE HYDROCHLORIDE 5 MG: 5 TABLET ORAL at 21:55

## 2020-01-01 RX ADMIN — LEVOTHYROXINE SODIUM 50 MCG: 50 TABLET ORAL at 05:30

## 2020-01-01 RX ADMIN — OXYBUTYNIN CHLORIDE 5 MG: 5 TABLET ORAL at 08:19

## 2020-01-01 RX ADMIN — MILRINONE LACTATE 0.13 MCG/KG/MIN: 200 INJECTION, SOLUTION INTRAVENOUS at 04:58

## 2020-01-01 RX ADMIN — FUROSEMIDE 40 MG: 40 TABLET ORAL at 09:16

## 2020-01-01 RX ADMIN — ASPIRIN 81 MG 81 MG: 81 TABLET ORAL at 07:42

## 2020-01-01 RX ADMIN — OXYBUTYNIN CHLORIDE 5 MG: 5 TABLET ORAL at 17:09

## 2020-01-01 RX ADMIN — CEFEPIME HYDROCHLORIDE 1000 MG: 1 INJECTION, POWDER, FOR SOLUTION INTRAMUSCULAR; INTRAVENOUS at 17:28

## 2020-01-01 RX ADMIN — OXYBUTYNIN CHLORIDE 5 MG: 5 TABLET ORAL at 08:17

## 2020-01-01 RX ADMIN — PIPERACILLIN SODIUM,TAZOBACTAM SODIUM 2.25 G: 2; .25 INJECTION, POWDER, FOR SOLUTION INTRAVENOUS at 21:40

## 2020-01-01 RX ADMIN — MYCOPHENOLATE MOFETIL 750 MG: 250 CAPSULE ORAL at 07:42

## 2020-01-01 RX ADMIN — MYCOPHENOLATE MOFETIL 750 MG: 250 CAPSULE ORAL at 17:38

## 2020-01-01 RX ADMIN — LISINOPRIL 2.5 MG: 2.5 TABLET ORAL at 09:07

## 2020-01-13 NOTE — TELEPHONE ENCOUNTER
Called [facility] and spoke with Nia as we received several requests for refills for this medication since 12/31  He confirmed that they received script sent on 12/31/19  It is ready to ship for the patient

## 2020-02-11 PROBLEM — R65.20 SEVERE SEPSIS (HCC): Status: ACTIVE | Noted: 2020-01-01

## 2020-02-11 PROBLEM — A41.9 SEVERE SEPSIS (HCC): Status: ACTIVE | Noted: 2020-01-01

## 2020-02-11 NOTE — ED PROVIDER NOTES
History  Chief Complaint   Patient presents with    Possible UTI     per "Spouse pt  has been having some urinary frequency, urgency with some pain and spasms, pt has a prostate problem "       History provided by:  Patient and spouse  Urinary Frequency   Location:  Suprapubic  Quality:  Constant urge to urinate  Severity:  Moderate  Onset quality:  Gradual  Duration:  2 weeks  Timing:  Intermittent  Progression:  Waxing and waning  Chronicity:  Recurrent  Context:  Recurrent urinary tract infection, states he feels like he has he urinary tract infection in that he has increased frequency of urination, and some dysuria  Relieved by:  Nothing  Worsened by:  Nothing  Ineffective treatments:  None tried  Associated symptoms: no abdominal pain, no chest pain, no congestion, no cough, no diarrhea, no fever, no headaches, no nausea, no rash, no shortness of breath, no sore throat, no vomiting and no wheezing    Associated symptoms comment:  No fevers no weakness no other systemic symptoms  Prior to Admission Medications   Prescriptions Last Dose Informant Patient Reported? Taking? Calcium Citrate-Vitamin D (CALCIUM CITRATE +D PO)   Yes No   Sig: Take 1 tablet by mouth  Ostomy Supplies (PREMIER COLOSTOMY/ILEOSTOMY) KIT   No No   Sig: Change as needed    Sensura 2 piece coloplast  2 1/4   Box of 5   amLODIPine (NORVASC) 2 5 mg tablet   Yes No   Sig: Take 2 5 mg by mouth daily   aspirin 81 MG tablet   Yes No   Sig: Take 81 mg by mouth daily  atorvastatin (LIPITOR) 80 mg tablet   Yes No   Sig: Take 80 mg by mouth daily  furosemide (LASIX) 20 mg tablet   Yes No   Sig: Take 40 mg by mouth daily     furosemide (LASIX) 40 mg tablet   No No   Sig: TAKE 1 TABLET DAILY  isosorbide dinitrate (ISORDIL) 30 mg tablet   Yes No   Sig: Take 30 mg by mouth daily  levothyroxine 25 mcg tablet   Yes No   Sig: Take 50 mcg by mouth daily       lisinopril (ZESTRIL) 10 mg tablet   Yes No   Sig: Take 40 mg by mouth daily metoprolol tartrate (LOPRESSOR) 25 mg tablet   Yes No   Sig: Take 25 mg by mouth every 12 (twelve) hours     mycophenolate (CELLCEPT) 250 mg capsule   No No   Sig: TAKE 3 CAPSULES TWICE A DAY   ofloxacin (OCUFLOX) 0 3 % ophthalmic solution   No No   Sig: Administer 1 drop to the right eye 4 (four) times a day   Patient not taking: Reported on 11/19/2018    pantoprazole (PROTONIX) 40 mg tablet   Yes No   Sig: Take 40 mg by mouth daily     potassium chloride (KLOR-CON) 20 mEq packet   Yes No   Sig: Take 20 mEq by mouth daily  prednisoLONE acetate (PRED FORTE) 1 % ophthalmic suspension   No No   Sig: Administer 1 drop to the right eye 4 (four) times a day   Patient not taking: Reported on 11/19/2018       Facility-Administered Medications: None       Past Medical History:   Diagnosis Date    Cardiac disease     Carotid stenosis     [facility]     Compression fracture of lumbar vertebra ([facility])     Coronary artery disease     Disease of thyroid gland     Diverticulitis     Hernia, diaphragmatic, without obstruction     Hyperlipidemia     Hypertension     Inguinal hernia     Right    Ischemic cardiomyopathy     [facility] [address] [facility]     Myasthenia gravis ([facility] [address] [facility]        Past Surgical History:   Procedure Laterality Date    ANGIOPLASTY / STENTING FEMORAL      CATARACT EXTRACTION      COLON SURGERY      colostomy    COLOSTOMY      ESOPHAGOGASTRODUODENOSCOPY N/A 3/29/2016    Procedure: ESOPHAGOGASTRODUODENOSCOPY (EGD); Surgeon: Cristóbal Long MD;  [facility]; Service:     GASTROSTOMY TUBE PLACEMENT N/A 3/29/2016    Procedure: PEG CHANGE-LOW PROFILE TUBE ;  Surgeon: Cristóbal Long MD;  [facility];   Service:     LAPAROTOMY N/A 5/17/2016    Procedure: LAPAROTOMY EXPLORATORY; RESSECTION OF GASTRO-CUTANEOUS FISTULA ;  Surgeon: Geena Izquierdo DO;  [facility];  Service:     PEG TUBE PLACEMENT      PEG TUBE REMOVAL      WI ESOPHAGOGASTRODUODENOSCOPY TRANSORAL DIAGNOSTIC N/A 2016    Procedure: ESOPHAGOGASTRODUODENOSCOPY w 12-6 gc clip,anchor ;  Surgeon: Jose Carlos Alanis MD;  [facility]; Service: Gastroenterology       Family History   Problem Relation Age of Onset    Heart disease Mother     Hypertension Mother      I have reviewed and agree with the history as documented  Social History     Tobacco Use    Smoking status: Former Smoker     Last attempt to quit: 1973     Years since quittin 0    Smokeless tobacco: Never Used   Substance Use Topics    Alcohol use: Yes     Comment: 1 per month    Drug use: No       Review of Systems   Constitutional: Negative for activity change, chills, diaphoresis and fever  HENT: Negative for congestion, sinus pressure and sore throat  Eyes: Negative for pain and visual disturbance  Respiratory: Negative for cough, chest tightness, shortness of breath, wheezing and stridor  Cardiovascular: Negative for chest pain and palpitations  Gastrointestinal: Negative for abdominal distention, abdominal pain, constipation, diarrhea, nausea and vomiting  Genitourinary: Positive for difficulty urinating, dysuria and frequency  Musculoskeletal: Negative for neck pain and neck stiffness  Skin: Negative for rash  Neurological: Negative for dizziness, speech difficulty, light-headedness, numbness and headaches  Physical Exam  Physical Exam   Constitutional: He is oriented to person, place, and time  He appears well-developed  No distress  HENT:   Head: Normocephalic and atraumatic  Eyes: Pupils are equal, round, and reactive to light  Neck: Normal range of motion  Neck supple  No tracheal deviation present  Cardiovascular: Normal rate, regular rhythm, normal heart sounds and intact distal pulses  No murmur heard  Pulmonary/Chest: Effort normal and breath sounds normal  No stridor  No respiratory distress  Abdominal: Soft  He exhibits no distension  There is tenderness (Suprapubic  no CVA tenderness)  There is no rebound and no guarding  Colostomy in place   Musculoskeletal: Normal range of motion  Neurological: He is alert and oriented to person, place, and time  Skin: Skin is warm and dry  He is not diaphoretic  No erythema  No pallor  Psychiatric: He has a normal mood and affect  Vitals reviewed        Vital Signs  ED Triage Vitals [02/11/20 1627]   Temperature Pulse Respirations Blood Pressure SpO2   99 5 °F (37 5 °C) 89 20 138/58 96 %      Temp Source Heart Rate Source Patient Position - Orthostatic VS BP Location FiO2 (%)   Oral Monitor Sitting Left arm --      Pain Score       Worst Possible Pain           Vitals:    02/11/20 1627   BP: 138/58   Pulse: 89   Patient Position - Orthostatic VS: Sitting         Visual Acuity      ED Medications  Medications   ceftriaxone (ROCEPHIN) 1 g/50 mL in dextrose IVPB (has no administration in time range)   sodium chloride 0 9 % bolus 1,000 mL (has no administration in time range)       Diagnostic Studies  Results Reviewed     Procedure Component Value Units Date/Time    Protime-INR [[id number]]     Lab Status:  No result Specimen:  Blood     APTT [[id number]]     Lab Status:  No result Specimen:  Blood     Lactic acid, plasma x2 [[id number]]     Lab Status:  No result Specimen:  Blood     Lactic acid, plasma x2 [[id number]]     Lab Status:  No result Specimen:  Blood     Blood culture #1 [[id number]]     Lab Status:  No result Specimen:  Blood     Blood culture #2 [[id number]]     Lab Status:  No result Specimen:  Blood     Comprehensive metabolic panel [[id number]]  (Abnormal) Collected:  02/11/20 1731    Lab Status:  Final result Specimen:  Blood from Arm, Right Updated:  02/11/20 1804     Sodium 139 mmol/L      Potassium 4 6 mmol/L      Chloride 103 mmol/L      CO2 23 mmol/L      ANION GAP 13 mmol/L      BUN 42 mg/dL      Creatinine 2 16 mg/dL      Glucose 133 mg/dL      Calcium 9 2 mg/dL      AST 31 U/L ALT 22 U/L      Alkaline Phosphatase 86 U/L      Total Protein 8 0 g/dL      Albumin 3 9 g/dL      Total Bilirubin 1 75 mg/dL      eGFR 29 ml/min/1 73sq m     Narrative:       [place] guidelines for Chronic Kidney Disease (CKD):     Stage 1 with normal or high GFR (GFR > 90 mL/min/1 73 square meters)    Stage 2 Mild CKD (GFR = 60-89 mL/min/1 73 square meters)    Stage 3A Moderate CKD (GFR = 45-59 mL/min/1 73 square meters)    Stage 3B Moderate CKD (GFR = 30-44 mL/min/1 73 square meters)    Stage 4 Severe CKD (GFR = 15-29 mL/min/1 73 square meters)    Stage 5 End Stage CKD (GFR <15 mL/min/1 73 square meters)  Note: GFR calculation is accurate only with a steady state creatinine    Urine culture [[id number]] Collected:  02/11/20 1756    Lab Status: In process Specimen:  Urine, Clean Catch Updated:  02/11/20 1757    CBC and differential [[id number]]  (Abnormal) Collected:  02/11/20 1731    Lab Status:  Final result Specimen:  Blood from Arm, Right Updated:  02/11/20 1745     WBC 16 81 Thousand/uL      RBC 5 30 Million/uL      Hemoglobin 14 9 g/dL      Hematocrit 48 0 %      MCV 91 fL      MCH 28 1 pg      MCHC 31 0 g/dL      RDW 14 0 %      MPV 9 5 fL      Platelets 498 Thousands/uL      nRBC 0 /100 WBCs      Neutrophils Relative 85 %      Immat GRANS % 1 %      Lymphocytes Relative 5 %      Monocytes Relative 9 %      Eosinophils Relative 0 %      Basophils Relative 0 %      Neutrophils Absolute 14 34 Thousands/µL      Immature Grans Absolute 0 09 Thousand/uL      Lymphocytes Absolute 0 90 Thousands/µL      Monocytes Absolute 1 44 Thousand/µL      Eosinophils Absolute 0 00 Thousand/µL      Basophils Absolute 0 04 Thousands/µL                  No orders to display              Procedures  Procedures         ED Course  ED Course as of Feb 11 1824   Tue Feb 11, 2020   1740 Patient's postvoid residual 89 cc  No indication for Lawrence catheter at this time          [address] [facility][1],  [address] Again long conversation with patient regarding admission again patient adamantly refusing  , will give a g of ceftriaxone, discharge the patient with strict return parameters  He understands my concern understands my recommendation is admission patient's wife at bedside also understands patient still requesting to be discharged  1756 Patient only able to provide a small amount of urine, not for a urine culture but not for urinalysis asked for another specimen for which patient is refusing to give  , again patient refusing to be admitted, agreeable to stay and wait till [address] results to wait kidney function patient starting to get very upset requesting to be discharged  1807 Patient now agreeable to be admitted to hospital   Will expand blood work will check blood cultures, will discussed case with Internal Medicine  Initial Sepsis Screening     Row Name 02/11/20 1823                Is the patient's history suggestive of a new or worsening infection? (!) Yes (Proceed)  -DA        Suspected source of infection  urinary tract infection  -DA        Are two or more of the following signs & symptoms of infection both present and new to the patient? No  -DA        Indicate SIRS criteria  Leukocytosis (WBC > [address] IJL)  -DA        If the answer is yes to both questions, suspicion of sepsis is present          If severe sepsis is present AND tissue hypoperfusion perists in the hour after fluid resuscitation or lactate > 4, the patient meets criteria for SEPTIC SHOCK          Are any of the following organ dysfunction criteria present within 6 hours of suspected infection and SIRS criteria that are NOT considered to be chronic conditions?           Organ dysfunction          Date of presentation of severe sepsis          Time of presentation of severe sepsis          Tissue hypoperfusion persists in the hour after crystalloid fluid administration, evidenced, by either:          Was hypotension present within one hour of the conclusion of crystalloid fluid administration?         Date of presentation of septic shock          Time of presentation of septic shock            User Key  (r) = Recorded By, (t) = Taken By, (c) = Cosigned By    [address] Name Provider Type    SALOME [facility], DO Physician                  MDM  Number of Diagnoses or Management Options  Acute kidney injury [facility]): new and requires workup  UTI (urinary tract infection): new and requires workup  Diagnosis management comments:       Initial ED assessment:  79-year-old male presents increased frequency of urination    Initial DDx includes but is not limited to:   Urinary tract infection, urinary retention with or without infection    Initial ED plan:   Bladder scan, blood work, urinalysis, disposition pending ED workup  The patient states to me multiple times that he does not want to be admitted to the hospital and will not stay  Final ED summary/disposition:   After evaluation and workup in the emergency department, after multiple conversations with patient as wife eventually patient agreeable to stay in hospital due to acute kidney injury and acute urinary tract infection based on symptoms  Patient unable provide enough urine for tip, will culture           Amount and/or Complexity of Data Reviewed  Clinical lab tests: ordered and reviewed  Decide to obtain previous medical records or to obtain history from someone other than the patient: yes  Obtain history from someone other than the patient: yes  Review and summarize past medical records: yes  Discuss the patient with other providers: yes  Independent visualization of images, tracings, or specimens: yes          Disposition  Final diagnoses:   UTI (urinary tract infection)   Acute kidney injury ([facility] [address] )     Time reflects when diagnosis was documented in both MDM as applicable and the Disposition within this note     Time User Action Codes Description Comment    2/11/2020  6:23 PM Giselle Solano Erickson WILLIAMSON Add [N39 0] UTI (urinary tract infection)     2/11/2020  6:23 PM Lermaalda Briseno Add [N17 9] Acute kidney injury [facility])       ED Disposition     ED Disposition Condition Date/Time Comment    Admit Stable Tue Feb 11, 2020  6:22 PM Case was discussed with Dr Romulo Goff and the patient's admission status was agreed to be Admission Status: inpatient status to the service of Dr Romulo Goff   Follow-up Information    None         Patient's Medications   Discharge Prescriptions    No medications on file     No discharge procedures on file      PDMP Review     None          ED Provider  Electronically Signed by           Pancho Davis DO  02/11/20 8725

## 2020-02-12 PROBLEM — N13.30 HYDRONEPHROSIS: Status: ACTIVE | Noted: 2020-01-01

## 2020-02-12 PROBLEM — Z86.69 H/O MYASTHENIA GRAVIS: Status: ACTIVE | Noted: 2020-01-01

## 2020-02-12 PROBLEM — R07.9 CHEST PAIN: Status: ACTIVE | Noted: 2020-01-01

## 2020-02-12 PROBLEM — R78.81 GRAM-NEGATIVE BACTEREMIA: Status: ACTIVE | Noted: 2020-01-01

## 2020-02-12 NOTE — NURSING NOTE
Patient refused to remove socks to allow me to assess feet  Patient stated there were "normal feet" and "nothing to see " I educated on the importance to see his feet to assess for abnormalities and monitor any changes, but patient still refused  Wife was at bedside and heard conversation  Oncoming shift made aware

## 2020-02-12 NOTE — ASSESSMENT & PLAN NOTE
· Patient with urinary symptoms, burning when he urinates as well as urinary frequency  Has had symptoms similar to this in the past   · Unable to provide a of specimen for urinalysis, will obtain if able to  · He was able to provide enough specimen for a urine culture, follow up on urinary culture results  · IV ceftriaxone Q 24 hours, await culture for sensitivities

## 2020-02-12 NOTE — ASSESSMENT & PLAN NOTE
Patient has known coronary artery disease for which he takes aspirin, atorvastatin and Imdur  He is also on lisinopril and beta-blocker  He was supposed to get CABG but was unable to because of health issues  He has been having chest pain for a few days  We did an EKG today  The last EKG I could find was from May of 2016  Today's EKG does show new ST depressions in the inferolateral leads  As it is, patient is not in any shape for any cardiac intervention  Nonetheless, I will discussed with Cardiology and I will put patient on telemetry  In setting of known coronary artery disease I do wonder if ischemic changes on EKG could be related to hypotension and sepsis

## 2020-02-12 NOTE — ASSESSMENT & PLAN NOTE
 SIRS criteria: tachypnea, leukocytosis   Suspected source: UTI   Lactic acid: normal   End organ damage: T  Bili >1 5   IV Fluids: NS at 125 ml/hr   IV antibiotics: IV Ceftriaxone   Follow up on culture results   Monitor vital signs, laboratory studies

## 2020-02-12 NOTE — CONSULTS
UROLOGY CONSULTATION NOTE     Patient Identifiers: Jessica Flaherty (MRN [id number])  Service Requesting Consultation:  Internal Medicine  Service Providing Consultation:  Urology, Annalisa Mcgovern, Selwyn Champagne    Date of Service: 2/12/2020  Consults    Reason for Consultation:  Bilateral hydronephrosis    ASSESSMENT/PLAN:     BILATERAL HYDRONEPHROSIS  · Multifactorial-benign prostatic hyperplasia, large right inguinal hernia  · CT abdomen pelvis performed 02/12/2020 reveals diffuse bladder wall thickening, moderate bilateral hydronephrosis  · Insert Cameron catheter  · Hand irrigate cameron rrigate BID and prn to keep catheter patent  · Monitor urinary output  · Ultrasound kidney and bladder to be performed to assess for status of hydronephrosis 48 hours post insertion of Cameron catheter (02/14/2020)    RIGHT INGUINAL HERNIA  · Will require surgical evaluation and intervention when stable  · Scrotal elevation for comfort and swelling    RENAL CALCULI  · As seen on CT abdomen pelvis 02/12/2020  · Will address in the outpatient setting    ACUTE RENAL FAILURE  · Multifactorial- hydration, bladder obstruction pathology  · Creatinine on admission 2 16 (baseline 0 9-1 1)  · Monitor urinary output via Cameron catheter  · Avoid nephrotoxic agents  · Nephrology following  · IV fluids per Nephrology  · Trend renal function    URINARY TRACT INFECTION/UROSEPSIS   · Culture driven and IV antibiotic therapy- urine culture pending  · Blood culture x2 positive for gram-negative rods  · Infectious disease following  · Cameron catheter in place as above  · Continue with Zosyn 2 5 g IV every 6 hours  · Trend vital signs  · Afebrile, VSS    LEUKOCYTOSIS  · Secondary to above  · WBC count 17 02 on 02/12/2020  · Lactic acid 1 1 performed 02/11/2020  · Continue to trend with CBC  · Antibiotics as above  · IV fluids as above    History of Present Illness:     Jessica Flaherty is a 75 y o  old male presented to the emergency department on 02/11/2020 for the complaint of urinary urgency, frequency and pain  In the emergency department, he complained of difficulty with urination with dysuria  He also reported having a decreased amount of urine output over the course of the past 2 days  Patient's past medical history consist of cardiac disease with cardiac stenosis, CHF, lumbar compression fracture, diverticulitis, diaphragmatic hernia, hyperlipidemia, hypertension, large right inguinal hernia, ischemic cardiomyopathy, MI, myasthenia gravis, urinary tract infections  Past surgical history includes temporary creation of colostomy secondary to rupture from diverticulitis  Prior to admission to the hospital, patient with the complaint of urinary frequency and urgency, nausea  Patient reported the sensation of complete bladder emptying with urination  He reports his urinary stream to be weak  Patient does complain of having moderate scrotal skin irritation secondary to the size of his scrotum and the buried penis causing his urinary stream to run down his scrotum causing significant irritation  Patient reports being told he has an enlarged prostate approximately 5 years ago but has not had further urologic intervention and evaluation      Past Medical, Past Surgical History:     Past Medical History:   Diagnosis Date    Cardiac disease     Carotid stenosis     [facility]     Compression fracture of lumbar vertebra ([facility])     Coronary artery disease     Disease of thyroid gland     Diverticulitis     Hernia, diaphragmatic, without obstruction     Hyperlipidemia     Hypertension     Inguinal hernia     Right    Ischemic cardiomyopathy     [facility] [address] [facility]     Myasthenia gravis [facility] [address] [facility]    :    Past Surgical History:   Procedure Laterality Date    ANGIOPLASTY / STENTING FEMORAL      CATARACT EXTRACTION      COLON SURGERY      colostomy    COLOSTOMY      ESOPHAGOGASTRODUODENOSCOPY N/A 3/29/2016    Procedure: ESOPHAGOGASTRODUODENOSCOPY (EGD); Surgeon: Carola Jorgensen MD;  [facility]; Service:     GASTROSTOMY TUBE PLACEMENT N/A 3/29/2016    Procedure: PEG CHANGE-LOW PROFILE TUBE ;  Surgeon: Carola Jorgensen MD;  [facility]; Service:     LAPAROTOMY N/A 2016    Procedure: LAPAROTOMY EXPLORATORY; RESSECTION OF GASTRO-CUTANEOUS FISTULA ;  Surgeon: Ilya Holcomb DO;  [facility];  Service:     PEG TUBE PLACEMENT      PEG TUBE REMOVAL      NE ESOPHAGOGASTRODUODENOSCOPY TRANSORAL DIAGNOSTIC N/A 2016    Procedure: ESOPHAGOGASTRODUODENOSCOPY w 12-6 gc clip,anchor ;  Surgeon: Carola Jorgensen MD;  [facility];   Service: Gastroenterology   :    Medications, Allergies:     Current Facility-Administered Medications   Medication Dose Route Frequency    acetaminophen (TYLENOL) tablet 650 mg  650 mg Oral Q6H PRN    aspirin (ECOTRIN LOW STRENGTH) 81 mg EC tablet **ADS Override Pull**        aspirin chewable tablet 81 mg  81 mg Oral Daily    atorvastatin (LIPITOR) tablet 80 mg  80 mg Oral Daily With Dinner    enoxaparin (LOVENOX) subcutaneous injection 30 mg  30 mg Subcutaneous Daily    lactated ringers infusion  150 mL/hr Intravenous Continuous    levothyroxine tablet 50 mcg  50 mcg Oral Early Morning    mycophenolate (CELLCEPT) capsule 750 mg  750 mg Oral BID    ondansetron (ZOFRAN) injection 4 mg  4 mg Intravenous Q6H PRN    pantoprazole (PROTONIX) EC tablet 40 mg  40 mg Oral Daily    piperacillin-tazobactam (ZOSYN) 2 25 g in sodium chloride 0 9 % 50 mL IVPB  2 25 g Intravenous Q6H       Allergies:  No Known Allergies:    Social and Family History:   Social History:   Social History     Tobacco Use    Smoking status: Former Smoker     Last attempt to quit: 1973     Years since quittin 0    Smokeless tobacco: Never Used   Substance Use Topics    Alcohol use: Yes     Comment: 1 per month    Drug use: No        Social History     Tobacco Use   Smoking Status Former Smoker  Last attempt to quit: 1973    Years since quittin 0   Smokeless Tobacco Never Used       Family History:  Family History   Problem Relation Age of Onset    Heart disease Mother     Hypertension Mother    :     Review of Systems:     General: Fever, chills, or night sweats: negative  Cardiac: Negative for chest pain  Pulmonary: Negative for shortness of breath  Gastrointestinal: Abdominal pain positive  Nausea, vomiting, or diarrhea positive,  Genitourinary: See HPI above  Patient does not have hematuria  All other systems queried were negative  Physical Exam:   General: Patient is pleasant and in NAD  Awake and alert  BP (!) 89/51 (BP Location: Left arm) Comment: RN aware  Pulse 76   Temp (!) 97 4 °F (36 3 °C) (Oral)   Resp 16   Ht 5' 9" (1 753 m)   Wt 78 6 kg (173 lb 4 5 oz)   SpO2 96%   BMI 25 59 kg/m² Temp (24hrs), Av 5 °F (36 9 °C), Min:97 4 °F (36 3 °C), Max:99 5 °F (37 5 °C)  current; Temperature: (!) 97 4 °F (36 3 °C)  I/O last 24 hours: In: 65 [P O :120; IV Piggyback:500]  Out: 260 [Urine:260]  Skin: warm, dry, intact  Cardiac: S1S2, HRR, Peripheral edema: positive  Pulmonary: Non-labored breathing  Abdomen: Soft, distended  Positive tenderness noted  Left abdominal colostomy patent for brown stool  Stoma protruding with noticeable peristomal hernia  Musculoskeletal: AROM with no joint deformity or tenderness  + moderate bilateral pitting edema to lower extremities  Neurology: alert, oriented x3, affect appropriate  Genitourinary: Negative CVA tenderness, positive suprapubic tenderness  (Male): Pt with a buried penis  There is significant scrotal swelling with his scrotal skin very excoriated      PICKERING: purulent output  no clots      Labs:     Lab Results   Component Value Date    HGB 14 3 2020    HCT 47 5 2020    WBC 17 08 (H) 2020     2020     Lab Results   Component Value Date     2016    K 4 5 2020     02/12/2020    CO2 21 02/12/2020    BUN 54 (H) 02/12/2020    CREATININE 2 74 (H) 02/12/2020    CALCIUM 8 9 02/12/2020    GLUCOSE 169 (H) 05/23/2016       Imaging:   I personally reviewed the images and report of the following studies, and reviewed them with the patient:    CT abdomen pelvis performed 02/12/2020    IMPRESSION:     1  Diffuse bladder wall thickening with inflammatory change of the surrounding perivesical tissue concerning for cystitis  2  Mild to moderate bilateral hydronephrosis  Although multiple left renal calculi are seen, no evidence of ureteral stone is seen  Right perinephric soft tissue stranding which may be associated with pyelonephritis  3  Prostatomegaly  4  Very large right inguinal hernia, containing multiple loops of large and small bowel  This creates mass effect upon the penis and may create compression of the penile urethra, further predisposing to bladder outlet symptomatology  5  Note is also made of a left parastomal hernia  Thank you for allowing me to participate in this patients care  Please do not hesitate to call with any additional questions      SIMONA Montoya

## 2020-02-12 NOTE — ASSESSMENT & PLAN NOTE
 Blood pressure is reviewed and acceptable   o Last recorded pressure: 118/64   Continue metoprolol, amlodipine   o Hold lisinopril and Lasix in setting of a ANDREA   Monitor blood pressure

## 2020-02-12 NOTE — H&P
H&Guille Spencer 1945, 75 y o  male MRN: [id number]  Unit/Bed#: W -36 Encounter: [id number]  Primary Care Provider: Shruti Acevedo MD   Date and time admitted to hospital: 2/11/2020  4:31 PM    * Severe sepsis [facility])  Assessment & Plan   SIRS criteria: tachypnea, leukocytosis   Suspected source: UTI   Lactic acid: normal   End organ damage: ANDREA   IV Fluids: NS at 125 ml/hr   IV antibiotics: IV Ceftriaxone   Follow up on culture results   Monitor vital signs, laboratory studies  Acute renal failure (HCC)  Assessment & Plan   Creatinine upon admission: 2 16  o Baseline: 0 9-1 1   Secondary to severe sepsis, decreased oral intake   Treatment: hold lisinopril, hold lasix  Give IV hydration tonight   Monitor BMP    UTI (urinary tract infection)  Assessment & Plan  · Patient with urinary symptoms, burning when he urinates as well as urinary frequency  Has had symptoms similar to this in the past   · Unable to provide a of specimen for urinalysis, will obtain if able to  · He was able to provide enough specimen for a urine culture, follow up on urinary culture results  · IV ceftriaxone Q 24 hours, await culture for sensitivities  Gastrocutaneous fistula  Assessment & Plan  · Colostomy in place with good output  · Supportive care  Essential hypertension  Assessment & Plan   Blood pressure is reviewed and acceptable   o Last recorded pressure: 118/64   Continue metoprolol, amlodipine   o Hold lisinopril and Lasix in setting of a ANDREA   Monitor blood pressure  VTE Prophylaxis: Enoxaparin (Lovenox)  / sequential compression device   Code Status: FULL CODE   POLST: POLST form is not discussed and not completed at this time  Discussion with family: patient, wife    Anticipated Length of Stay:  Patient will be admitted on an Inpatient basis with an anticipated length of stay of  > 2 midnights     Justification for Hospital Stay: severe sepsis, UTI, ANDREA    Total Time for Visit, including Counseling / Coordination of Care: 1 hour  Greater than 50% of this total time spent on direct patient counseling and coordination of care  Chief Complaint:   Urinary frequency, dysuria    History of Present Illness:    Devaughn Quiroz is a 75 y o  male who presents with urinary symptoms over the past day  He reports that he has had difficulty with his urination, as well as some burning when he urinates  He has urinated very infrequently, has not had much to drink or eat over the past day or 2  He does not report any fevers or chills, sick contacts, coughing, chest pain, headaches, lightheadedness, dizziness, nausea, vomiting, change in his stool output  He does not have any new changes in skin  Reports he has had urinary symptoms in the past, that this feels similar to urinary tract infection  He has an ostomy in place, which has had good output of light brown stool  No blood in his ostomy  He does have a large inguinal hernia, a does not report this has change in any way or has becoming more painful  He does not have any blood in his urine  Initially the patient was unable to provide much of a urine sample in the emergency department, only enough for a urinary culture and not a urinalysis  Initially the patient was hesitant on hospitalization, however is now agreeable to staying at this time  Review of Systems:    Review of Systems   Constitutional: Positive for appetite change  Negative for activity change, chills, fatigue and fever  HENT: Negative for congestion, rhinorrhea, sinus pressure and sore throat  Eyes: Negative for photophobia, pain and visual disturbance  Respiratory: Negative for cough, shortness of breath and wheezing  Cardiovascular: Negative for chest pain, palpitations and leg swelling  Gastrointestinal: Positive for abdominal pain  Negative for abdominal distention, constipation, diarrhea, nausea and vomiting     Endocrine: Negative for cold intolerance, heat intolerance, polydipsia and polyuria  Genitourinary: Positive for difficulty urinating, dysuria and frequency  Negative for flank pain and hematuria  Musculoskeletal: Negative for arthralgias, back pain and joint swelling  Skin: Negative for color change, pallor and rash  Allergic/Immunologic: Negative  Neurological: Negative for dizziness, syncope, weakness, light-headedness and headaches  Hematological: Negative  Psychiatric/Behavioral: Negative  Past Medical and Surgical History:     Past Medical History:   Diagnosis Date    Cardiac disease     Carotid stenosis     [facility]     Compression fracture of lumbar vertebra ([facility])     Coronary artery disease     Disease of thyroid gland     Diverticulitis     Hernia, diaphragmatic, without obstruction     Hyperlipidemia     Hypertension     Inguinal hernia     Right    Ischemic cardiomyopathy     [facility] [address] [facility]     [facility] )        Past Surgical History:   Procedure Laterality Date    ANGIOPLASTY / STENTING FEMORAL      CATARACT EXTRACTION      COLON SURGERY      colostomy    COLOSTOMY      ESOPHAGOGASTRODUODENOSCOPY N/A 3/29/2016    Procedure: ESOPHAGOGASTRODUODENOSCOPY (EGD); Surgeon: Deborah Restrepo MD;  [facility]; Service:     GASTROSTOMY TUBE PLACEMENT N/A 3/29/2016    Procedure: PEG CHANGE-LOW PROFILE TUBE ;  Surgeon: Deborah Restrepo MD;  [facility]; Service:     LAPAROTOMY N/A 5/17/2016    Procedure: LAPAROTOMY EXPLORATORY; RESSECTION OF GASTRO-CUTANEOUS FISTULA ;  Surgeon: Hector Gomez DO;  [facility];  Service:     PEG TUBE PLACEMENT      PEG TUBE REMOVAL      [facility] ESOPHAGOGASTRODUODENOSCOPY TRANSORAL DIAGNOSTIC N/A 5/12/2016    Procedure: ESOPHAGOGASTRODUODENOSCOPY w 12-6 gc clip,anchor ;  Surgeon: Deborah Restrepo MD;  [facility];   Service: Gastroenterology       Meds/Allergies:    Prior to Admission medications Medication Sig Start Date End Date Taking? Authorizing Provider   amLODIPine (NORVASC) 2 5 mg tablet Take 2 5 mg by mouth daily 4/28/18  Yes Historical Provider, MD   aspirin 81 MG tablet Take 81 mg by mouth daily  Yes Historical Provider, MD   atorvastatin (LIPITOR) 80 mg tablet Take 80 mg by mouth daily  Yes Historical Provider, MD   furosemide (LASIX) 20 mg tablet Take 40 mg by mouth daily     Yes Historical Provider, MD   isosorbide dinitrate (ISORDIL) 30 mg tablet Take 30 mg by mouth daily  Yes Historical Provider, MD   levothyroxine 25 mcg tablet Take 50 mcg by mouth daily  Yes Historical Provider, MD   lisinopril (ZESTRIL) 10 mg tablet Take 40 mg by mouth daily     Yes Historical Provider, MD   metoprolol tartrate (LOPRESSOR) 25 mg tablet Take 25 mg by mouth every 12 (twelve) hours     Yes Historical Provider, MD   mycophenolate (CELLCEPT) 250 mg capsule TAKE 3 CAPSULES TWICE A DAY 12/31/19  Yes Jade Pack,    Ostomy Supplies (PREMIER COLOSTOMY/ILEOSTOMY) KIT Change as needed    Sensura 2 piece coloplast  2 1/4   Box of 5 11/16/17  Yes Shari Wise MD   potassium chloride (KLOR-CON) 20 mEq packet Take 20 mEq by mouth daily  Yes Historical Provider, MD   Calcium Citrate-Vitamin D (CALCIUM CITRATE +D PO) Take 1 tablet by mouth  Historical Provider, MD   furosemide (LASIX) 40 mg tablet TAKE 1 TABLET DAILY  10/22/18   Hector Long MD   ofloxacin (OCUFLOX) 0 3 % ophthalmic solution Administer 1 drop to the right eye 4 (four) times a day  Patient not taking: Reported on 11/19/2018 11/16/17   Shari Wise MD   pantoprazole (PROTONIX) 40 mg tablet Take 40 mg by mouth daily      Historical Provider, MD   prednisoLONE acetate (PRED FORTE) 1 % ophthalmic suspension Administer 1 drop to the right eye 4 (four) times a day  Patient not taking: Reported on 11/19/2018 11/16/17   Shari Wise MD     I have reviewed home medications with patient personally      Allergies: No Known Allergies    Social History:     Marital Status: /Civil Union   Occupation: non-contributory  Patient Pre-hospital Living Situation: home  Patient Pre-hospital Level of Mobility: full  Patient Pre-hospital Diet Restrictions: none  Substance Use History:   Social History     Substance and Sexual Activity   Alcohol Use Yes    Comment: 1 per month     Social History     Tobacco Use   Smoking Status Former Smoker    Last attempt to quit: 1973    Years since quittin 0   Smokeless Tobacco Never Used     Social History     Substance and Sexual Activity   Drug Use No       Family History:    Family History   Problem Relation Age of Onset    Heart disease Mother     Hypertension Mother        Physical Exam:     Vitals:   Blood Pressure: 118/64 (20)  Pulse: 99 (20)  Temperature: 99 5 °F (37 5 °C) (20)  Temp Source: Oral (20)  Respirations: 19 (20)  Weight - Scale: 78 6 kg (173 lb 3 2 oz) (20)  SpO2: 100 % (20)    Physical Exam   Constitutional: He is oriented to person, place, and time  He appears well-developed and well-nourished  No distress  HENT:   Head: Normocephalic and atraumatic  Mouth/Throat: Oropharynx is clear and moist    Eyes: Pupils are equal, round, and reactive to light  EOM are normal  No scleral icterus  Neck: Neck supple  Cardiovascular: Normal rate, regular rhythm and normal heart sounds  No murmur heard  Pulmonary/Chest: Effort normal and breath sounds normal  No respiratory distress  He has no wheezes  He has no rales  Abdominal: Soft  Bowel sounds are normal  He exhibits no distension  There is no tenderness  Colostomy in place, with light brown stool in the bag  No evidence of blood  Genitourinary:   Genitourinary Comments: Large inguinal hernia noted  Reducible  Nontender  Musculoskeletal: He exhibits no deformity  Neurological: He is alert and oriented to person, place, and time   No cranial nerve deficit  GCS eye subscore is 4  GCS verbal subscore is 5  GCS motor subscore is 6  Skin: Skin is warm and dry  Capillary refill takes less than 2 seconds  No rash noted  He is not diaphoretic  No erythema  No pallor  Psychiatric: He has a normal mood and affect  Nursing note and vitals reviewed  Additional Data:     Lab Results: I have personally reviewed pertinent reports  Results from last 7 days   Lab Units 02/11/20  1731   WBC Thousand/uL 16 81*   HEMOGLOBIN g/dL 14 9   HEMATOCRIT % 48 0   PLATELETS Thousands/uL 179   NEUTROS PCT % 85*   LYMPHS PCT % 5*   MONOS PCT % 9   EOS PCT % 0     Results from last 7 days   Lab Units 02/11/20  1731   SODIUM mmol/L 139   POTASSIUM mmol/L 4 6   CHLORIDE mmol/L 103   CO2 mmol/L 23   BUN mg/dL 42*   CREATININE mg/dL 2 16*   ANION GAP mmol/L 13   CALCIUM mg/dL 9 2   ALBUMIN g/dL 3 9   TOTAL BILIRUBIN mg/dL 1 75*   ALK PHOS U/L 86   ALT U/L 22   AST U/L 31   GLUCOSE RANDOM mg/dL 133     Results from last 7 days   Lab Units 02/11/20  1853   INR  1 20*             Results from last 7 days   Lab Units 02/11/20  1854   LACTIC ACID mmol/L 1 4       Imaging: none    No orders to display       EKG, Pathology, and Other Studies Reviewed on Admission:   · Prior pertinent studies and records reviewed in [employer] Records Reviewed: Yes     ** Please Note: This note has been constructed using a voice recognition system   **

## 2020-02-12 NOTE — CONSULTS
Consultation - Infectious Disease   Toma Barnett 75 y o  male MRN: [id number]  Unit/Bed#: S -01 Encounter: [id number]      IMPRESSION & RECOMMENDATIONS:   Impression/Recommendations:  1  Severe sepsis, POA  Leukocytosis, tachypnea, tachycardia, acute kidney injury  Secondary to gram-negative sandy bacteremia  No high fevers but patient is on CellCept, which may suppress fevers  WBC count up to 17 today  Blood pressures are on low side for which he is receiving IV bolus  Will adjust antibiotic as below     -antibiotic plan as below  -monitor temperatures and hemodynamics  -recheck CBC in a m   -supportive care per primary service    2  Gram-negative sandy bacteremia  Suspect urinary source given dysuria, difficulty passing urine  Less likely intra-abdominal pathology as no focal abdominal complaints  No reported history of MDR infections  However, patient is immunocompromised and remains ill appearing despite dose of IV ceftriaxone  Will broaden antibiotics for now pending further culture results     -discontinue ceftriaxone  -start renally dosed IV Zosyn  -follow-up blood cultures and urine culture to guide further recommendations  -follow-up CT abdomen/pelvis    3  Acute kidney injury  Creatinine up to 2 7 today  May all be secondary to severe sepsis  Also consider obstructive process as patient is unable to properly pass urine  Nephrology input noted     -dose adjust antibiotic based on renal function  -follow-up CT abdomen/pelvis results  May require urology evaluation pending CT findings   -suspect will require Lawrence catheter placement  -recheck BMP in a m   -nephrology follow-up ongoing    4  Myasthenia gravis  Immunosuppressed state, currently on CellCept  Risk factor for development of severe infections  May need to hold CellCept for now given acute infection  Follow-up Neurology input  5  Hyperbilirubinemia  Suspect acute elevation secondary to severe sepsis    No right upper quadrant tenderness on exam   Remainder of LFTs remain normal   Total bili is lower today at 1 3  Less likely etiology of gram-negative bacteremia  -follow-up CT abdomen/pelvis findings  -serial abdominal exams    6  History of perforated diverticulum status post Padmini's procedure  Antibiotics:  Ceftriaxone 2    Discussed above plan with patient and his wife at bedside, Dr Venkat Nick from primary service and with Dr Claudean Bears from nephrology service  Thank you for this consultation  We will follow along with you  HISTORY OF PRESENT ILLNESS:  Reason for Consult:  Bacteremia    HPI: Ifrah Maria is a 75 y o  male with history of myasthenia gravis, perforated diverticulum status post Padmini's procedure who presented to hospital with dysuria and progressive difficulty passing his urine for the past 2 weeks  Patient was noted to have a leukocytosis of 16, elevated creatinine of 2 1  Urinalysis was not initially checked as patient was unable to provide specimen  A bladder scan was checked which was reportedly negative so patient was not straight cathed  He was able to produce a sample which was sent for urine culture after antibiotic was given  Blood cultures were also checked which now reveal gram-negative rods  Patient was started empirically on IV ceftriaxone  Today, patient states he feels about the same  He denies fevers, chills  He is still having difficulty passing his urine and continues to have frequency, dysuria  Denies abdominal pain, nausea, vomiting, diarrhea, URI symptoms  His colostomy is functioning well  REVIEW OF SYSTEMS:  A complete system-based review of systems is otherwise negative      PAST MEDICAL HISTORY:  Past Medical History:   Diagnosis Date    Cardiac disease     Carotid stenosis     [facility]congestive [facility] failure[facility])     Compression fracture of lumbar vertebra (HCC)     Coronary artery disease     Disease of thyroid gland     Diverticulitis     Hernia, diaphragmatic, without obstruction     Hyperlipidemia     Hypertension     Inguinal hernia     Right    Ischemic cardiomyopathy     [facility]) [facility] [address] [facility]     Myasthenia gravis ([facility] [address] )      Past Surgical History:   Procedure Laterality Date    ANGIOPLASTY / STENTING FEMORAL      CATARACT EXTRACTION      COLON SURGERY      colostomy    COLOSTOMY      ESOPHAGOGASTRODUODENOSCOPY N/A 3/29/2016    Procedure: ESOPHAGOGASTRODUODENOSCOPY (EGD); Surgeon: Ishan Nix MD;  [facility]; Service:     GASTROSTOMY TUBE PLACEMENT N/A 3/29/2016    Procedure: PEG CHANGE-LOW PROFILE TUBE ;  Surgeon: Ishan Nix MD;  [facility]; Service:     LAPAROTOMY N/A 2016    Procedure: LAPAROTOMY EXPLORATORY; RESSECTION OF GASTRO-CUTANEOUS FISTULA ;  Surgeon: Radha Vinson DO;  [facility];  Service:     PEG TUBE PLACEMENT      PEG TUBE REMOVAL      [place] ESOPHAGOGASTRODUODENOSCOPY TRANSORAL DIAGNOSTIC N/A 2016    Procedure: ESOPHAGOGASTRODUODENOSCOPY w 12-6 gc clip,anchor ;  Surgeon: Ishan Nix MD;  [facility]; Service: Gastroenterology       FAMILY HISTORY:  Non-contributory    SOCIAL HISTORY:  Social History     Substance and Sexual Activity   Alcohol Use Yes    Comment: 1 per month     Social History     Substance and Sexual Activity   Drug Use No     Social History     Tobacco Use   Smoking Status Former Smoker    Last attempt to quit: 1973    Years since quittin 0   Smokeless Tobacco Never Used       ALLERGIES:  No Known Allergies    MEDICATIONS:  All current active medications have been reviewed      PHYSICAL EXAM:  Vitals:  Temp:  [97 4 °F (36 3 °C)-99 5 °F (37 5 °C)] 97 4 °F (36 3 °C)  HR:  [73-99] 76  Resp:  [16-22] 16  BP: ()/(51-64) 89/51  SpO2:  [96 %-100 %] 96 %  Temp (24hrs), Av 8 °F (37 1 °C), Min:97 4 °F (36 3 °C), Max:99 5 °F (37 5 °C)  Current: Temperature: (!) 97 4 °F (36 3 °C)     Physical Exam:  General:  No acute distress  Eyes: Conjunctive clear with no hemorrhages or effusions  Oropharynx:  No ulcers, no lesions  Neck:  Supple, no lymphadenopathy  Lungs:  Clear to auscultation bilaterally, no accessory muscle use  Cardiac:  Regular rate and rhythm, no murmurs  Abdomen:  Soft, non-tender, non-distended with brown stool, mild suprapubic tenderness  Extremities:  No peripheral cyanosis, clubbing, or edema  Skin:  No rashes, no ulcers  :  Large inguinal hernia  Neurological:  Moves all four extremities spontaneously      LABS, IMAGING, & OTHER STUDIES:  Lab Results:  I have personally reviewed pertinent labs  Results from last 7 days   Lab Units 02/12/20  0558 02/11/20  1731   POTASSIUM mmol/L 4 5 4 6   CHLORIDE mmol/L 105 103   CO2 mmol/L 21 23   BUN mg/dL 54* 42*   CREATININE mg/dL 2 74* 2 16*   EGFR ml/min/1 73sq m 22 29   CALCIUM mg/dL 8 9 9 2   AST U/L 36 31   ALT U/L 21 22   ALK PHOS U/L 80 86     Results from last 7 days   Lab Units 02/12/20  0558 02/11/20  1731   WBC Thousand/uL 17 08* 16 81*   HEMOGLOBIN g/dL 14 3 14 9   PLATELETS Thousands/uL 154 179     Results from last 7 days   Lab Units 02/11/20  1853 02/11/20  1731   GRAM STAIN RESULT  Gram negative rods* Gram negative rods*       Imaging Studies:   I have personally reviewed pertinent imaging study reports and images in PACS  EKG, Pathology, and Other Studies:   I have personally reviewed pertinent reports

## 2020-02-12 NOTE — ASSESSMENT & PLAN NOTE
 Creatinine upon admission: 2 16  o Baseline: 0 9-1 1   Secondary to severe sepsis, decreased oral intake   Treatment: hold lisinopril, hold lasix  Give IV hydration tonight     Monitor BMP

## 2020-02-12 NOTE — SOCIAL WORK
LOS 1, patient is not a bundle, patient is not a 30 day readmission  CM met with patient at bedside to discuss discharge planning  CM name and role introduced  Patient reports living with his wife, whom he identifies as POA, in a 2 story home with a first floor set-up  Patient reports not using any DME, income source is a pension, is independent with his ADLs, drives himself, fills his prescriptions through [address]  Patient denies hx of mental health, denies D+A history, denies STR  Wife to transport  Patient reports a hx of [address] with [facility]; CM offered to place a referral to [facility] but patient declines any needs at this time and thus no referrals placed at this time  CM will continue to follow patient's hospital course and assist through discharge  CM reviewed discharge planning process including the following: identifying caregivers at home, preference for d/c planning needs, [facility] Meds to Bed program, availability of treatment team to discuss questions or concerns patient and/or family may have regarding diagnosis, plan of care, old or new medications and discharge planning   CM will continue to follow for care coordination and update assessment as necessary

## 2020-02-12 NOTE — CONSULTS
Consultation - Nephrology   Tho March 75 y o  male MRN: [id number]  Unit/Bed#: W -01 Encounter: [id number]    ASSESSMENT:  1  Acute Kidney Injury, POA- likely secondary to prerenal azotemia but cannot rule out urinary retention/obstruction  - baseline creatinine around 1  - UA: pending  - CT without contrast: pending  - PVR: pending  - agree with holding lisinopril and lasix  - agree with IVF  - avoid hypotension, avoid IV contrast  - monitor intake/output  - no acute indication for dialysis  2  Severe Sepsis- per primary team  - suspected source is UTI, culture pending  - on IV ceftriaxone q24 hr  3  Hypertension- BP now soft in the setting of sepsis  - diagnosed about 5 years ago  - off antihypertensives    PLAN:  Agree with IVF  CT without contrast  Bladder scan/pvr  Hold antihypertensives  Hold lisinopril and lasix    HISTORY OF PRESENT ILLNESS:  Requesting Physician: Bertrand Baez DO  Reason for Consult: CKD    Tho March is a 75y o  year old male who was admitted to [address] [place] [address] after presenting with malaise, urinary dribbling, and dysuria  He states he has not felt well for the past few weeks but got worse the last two days  He has not been eating anything at all for a day and a half prior to coming in  He denies shortness of breath, vomiting, worsening edema  He did have nausea/indigestion  He denies taking any NSAIDs  A renal consultation is requested today for assistance in the management of acute kidney injury        PAST MEDICAL HISTORY:  Past Medical History:   Diagnosis Date    Cardiac disease     Carotid stenosis     [facility])     [facility] of lumbar vertebra ([facility])     Coronary artery disease     Disease of thyroid gland     Diverticulitis     Hernia, diaphragmatic, without obstruction     Hyperlipidemia     Hypertension     Inguinal hernia     Right    Ischemic cardiomyopathy     MI [facility]myocardial infarction) ([facility] [address] )  Myasthenia gravis ([facility] [address] )        PAST SURGICAL HISTORY:  Past Surgical History:   Procedure Laterality Date    ANGIOPLASTY / STENTING FEMORAL      CATARACT EXTRACTION      COLON SURGERY      colostomy    COLOSTOMY      ESOPHAGOGASTRODUODENOSCOPY N/A 3/29/2016    Procedure: ESOPHAGOGASTRODUODENOSCOPY (EGD); Surgeon: Ramiro Childs MD;  [facility]; Service:     GASTROSTOMY TUBE PLACEMENT N/A 3/29/2016    Procedure: PEG CHANGE-LOW PROFILE TUBE ;  Surgeon: Ramiro Childs MD;  [facility]; Service:     LAPAROTOMY N/A 2016    Procedure: LAPAROTOMY EXPLORATORY; RESSECTION OF GASTRO-CUTANEOUS FISTULA ;  Surgeon: Ulises Tao DO;  [facility];  Service:     PEG TUBE PLACEMENT      PEG TUBE REMOVAL      [place] ESOPHAGOGASTRODUODENOSCOPY TRANSORAL DIAGNOSTIC N/A 2016    Procedure: ESOPHAGOGASTRODUODENOSCOPY w 12-6 gc clip,anchor ;  Surgeon: Ramiro Childs MD;  [facility];   Service: Gastroenterology       ALLERGIES:  No Known Allergies    SOCIAL HISTORY:  Social History     Substance and Sexual Activity   Alcohol Use Yes    Comment: 1 per month     Social History     Substance and Sexual Activity   Drug Use No     Social History     Tobacco Use   Smoking Status Former Smoker    Last attempt to quit: 1973    Years since quittin 0   Smokeless Tobacco Never Used       FAMILY HISTORY:  Family History   Problem Relation Age of Onset    Heart disease Mother     Hypertension Mother        MEDICATIONS:    Current Facility-Administered Medications:     acetaminophen (TYLENOL) tablet 650 mg, 650 mg, Oral, Q6H PRN, Fredi Mullins PA-C    aspirin (ECOTRIN LOW STRENGTH) 81 mg EC tablet **ADS Override Pull**, , , ,     aspirin chewable tablet 81 mg, 81 mg, Oral, Daily, Fredi Mullins PA-C    atorvastatin (LIPITOR) tablet 80 mg, 80 mg, Oral, Daily With Clyde Cheung PA-C, 80 mg at 20    cefTRIAXone (ROCEPHIN) 1,000 mg in dextrose 5 % 50 mL IVPB, 1,000 mg, Intravenous, Q24H, Johannyuncion Goltz, PA-C    enoxaparin (LOVENOX) subcutaneous injection 30 mg, 30 mg, Subcutaneous, Daily, Johannyuncion Goltz, PA-C, Stopped at 02/12/20 9398    lactated ringers infusion, 150 mL/hr, Intravenous, Continuous, Joseph Fermin DO, Last Rate: 150 mL/hr at 02/12/20 1215, 150 mL/hr at 02/12/20 1215    levothyroxine tablet 50 mcg, 50 mcg, Oral, Early Morning, Johanny Goltz, PA-C, 50 mcg at 02/12/20 2754    mycophenolate (CELLCEPT) capsule 750 mg, 750 mg, Oral, BID, Johanny Goltz, PA-C, 750 mg at 02/12/20 0818    ondansetron (ZOFRAN) injection 4 mg, 4 mg, Intravenous, Q6H PRN, Johanny Goltz, PA-C    pantoprazole (PROTONIX) EC tablet 40 mg, 40 mg, Oral, Daily, Johanny Goltz, PA-C, 40 mg at 02/12/20 3594    REVIEW OF SYSTEMS:  A complete 10 point review of systems was performed and found to be negative unless otherwise noted in the history of present illness  General: No fevers, chills  Cardiovascular:  No chest pain, + chronic leg edema  Respiratory: No cough, sputum production,  No shortness of breath  Gastrointestinal:  + nausea, no vomiting,  No diarrhea,  No abdominal pain  Genitourinary: No hematuria  No foamy urine  + dysuria   + incomplete emptying/dribbling      PHYSICAL EXAM:  Current Weight: Weight - Scale: 78 6 kg (173 lb 4 5 oz)  First Weight: Weight - Scale: 78 6 kg (173 lb 3 2 oz)  Vitals:    02/11/20 2044 02/11/20 2100 02/12/20 0800 02/12/20 1141   BP:  118/64 92/52 94/63   BP Location:  Right arm Left arm Left arm   Pulse: 99  73 80   Resp:  22 16 22   Temp:  99 5 °F (37 5 °C)     TempSrc:  Oral     SpO2:   97% 97%   Weight:  78 6 kg (173 lb 4 5 oz)     Height:  5' 9" (1 753 m)         Intake/Output Summary (Last 24 hours) at 2/12/2020 1339  Last data filed at 2/12/2020 0900  Gross per 24 hour   Intake 120 ml   Output    Net 120 ml     General: NAD  Skin: no rash  Eyes: anicteric  ENMT: mm moist  Neck: no masses  Respiratory: CTAB  CVS: RRR  Extremities: + bilateral edema to knees  GI: soft nt nd  Neuro: alert awake  Psych: mood and affect appropriate     Lab Results:   Results from last 7 days   Lab Units 02/12/20  0558 02/11/20  1731   WBC Thousand/uL 17 08* 16 81*   HEMOGLOBIN g/dL 14 3 14 9   HEMATOCRIT % 47 5 48 0   PLATELETS Thousands/uL 154 179   POTASSIUM mmol/L 4 5 4 6   CHLORIDE mmol/L 105 103   CO2 mmol/L 21 23   BUN mg/dL 54* 42*   CREATININE mg/dL 2 74* 2 16*   CALCIUM mg/dL 8 9 9 2   ALK PHOS U/L 80 86   ALT U/L 21 22   AST U/L 36 31

## 2020-02-12 NOTE — ASSESSMENT & PLAN NOTE
· Colostomy in place with good output  Colostomy was placed because of perforated diverticulitis in the past   · Supportive care

## 2020-02-12 NOTE — ASSESSMENT & PLAN NOTE
 Creatinine upon admission: 2 16  o Baseline: 0 9-1 1   Deemed secondary to severe sepsis and poor p o  Intake  Patient is hypotensive, so will increase IV fluids and will hold cardiac medications  Creatinine has bumped up to 2 74 today  I am worried about obstructive uropathy  I am uncertain whether not patient's large right-sided inguinal hernia is causing obstructive uropathy  I will go ahead and get a CT scan of the abdomen/pelvis without contrast   Will discuss with Nephrology

## 2020-02-12 NOTE — PLAN OF CARE
Problem: PAIN - ADULT  Goal: Verbalizes/displays adequate comfort level or baseline comfort level  Description  Interventions:  - Encourage patient to monitor pain and request assistance  - Assess pain using appropriate pain scale  - Administer analgesics based on type and severity of pain and evaluate response  - Implement non-pharmacological measures as appropriate and evaluate response  - Consider cultural and social influences on pain and pain management  - Notify physician/advanced practitioner if interventions unsuccessful or patient reports new pain  Outcome: Progressing     Problem: INFECTION - ADULT  Goal: Absence or prevention of progression during hospitalization  Description  INTERVENTIONS:  - Assess and monitor for signs and symptoms of infection  - Monitor lab/diagnostic results  - Monitor all insertion sites, i e  indwelling lines, tubes, and drains  - Monitor endotracheal if appropriate and nasal secretions for changes in amount and color  - [place] appropriate cooling/warming therapies per order  - Administer medications as ordered  - Instruct and encourage patient and family to use good hand hygiene technique  - Identify and instruct in appropriate isolation precautions for identified infection/condition  Outcome: Progressing  Goal: Absence of fever/infection during neutropenic period  Description  INTERVENTIONS:  - Monitor WBC    Outcome: Progressing     Problem: DISCHARGE PLANNING  Goal: Discharge to home or other facility with appropriate resources  Description  INTERVENTIONS:  - Identify barriers to discharge w/patient and caregiver  - Arrange for needed discharge resources and transportation as appropriate  - Identify discharge learning needs (meds, wound care, etc )  - Arrange for interpretive services to assist at discharge as needed  - Refer to Case Management Department for coordinating discharge planning if the patient needs post-hospital services based on physician/advanced practitioner order or complex needs related to functional status, cognitive ability, or social support system  Outcome: Progressing

## 2020-02-12 NOTE — PROGRESS NOTES
Progress Note Salvador Galeana 1945, 75 y o  male MRN: [id number]    Unit/Bed#: W -44 Encounter: [id number]    Primary Care Provider: Jose Cerda MD   Date and time admitted to hospital: 2/11/2020  4:31 PM      Chest pain  Assessment & Plan  Patient has known coronary artery disease for which he takes aspirin, atorvastatin and Imdur  He is also on lisinopril and beta-blocker  He was supposed to get CABG but was unable to because of health issues  He has been having chest pain for a few days  We did an EKG today  The last EKG I could find was from May of 2016  Today's EKG does show new ST depressions in the inferolateral leads  As it is, patient is not in any shape for any cardiac intervention  Nonetheless, I will discussed with Cardiology and I will put patient on telemetry  In setting of known coronary artery disease I do wonder if ischemic changes on EKG could be related to hypotension and sepsis  Acute renal failure (HCC)  Assessment & Plan   Creatinine upon admission: 2 16  o Baseline: 0 9-1 1   Deemed secondary to severe sepsis and poor p o  Intake  Patient is hypotensive, so will increase IV fluids and will hold cardiac medications  Creatinine has bumped up to 2 74 today  I am worried about obstructive uropathy  I am uncertain whether not patient's large right-sided inguinal hernia is causing obstructive uropathy  I will go ahead and get a CT scan of the abdomen/pelvis without contrast   Will discuss with Nephrology  UTI (urinary tract infection)  Assessment & Plan  · Patient with urinary symptoms, burning when he urinates as well as urinary frequency  Has had symptoms similar to this in the past   · Unable to provide a of specimen for urinalysis, will obtain if able to  · He was able to provide enough specimen for a urine culture, follow up on urinary culture results  · IV ceftriaxone Q 24 hours, await culture for sensitivities      Gastrocutaneous fistula  Assessment & Plan  · Colostomy in place with good output  Colostomy was placed because of perforated diverticulitis in the past   · Supportive care  Essential hypertension  Assessment & Plan   Patient has been hypotensive today  Will hold all blood pressure medications  * Severe sepsis (HCC)  Assessment & Plan   SIRS criteria: tachypnea, leukocytosis   Suspected source: UTI   Lactic acid: normal   End organ damage: ANDREA    We have been unable to obtain a urine sample for UA  He was able to provide sample for urine culture  Blood cultures are pending  Will continue Rocephin for now  Subjective/Objective     Subjective:   Patient seen and examined this afternoon with wife and daughter-in-law at bedside  He is urinating a little more today  He states he was having chest pain today which has actually been going on for the last few days  The burning with urination is somewhat better today  No other issues reported  Upon further history patient and his family states that he has been unable to have numerous recommended surgeries in the past because of health issues, particularly being on steroids because of myasthenia gravis  He never had a colostomy reversal, inguinal hernia repair or CABG  Objective:  Vitals: Blood pressure 94/63, pulse 80, temperature 99 5 °F (37 5 °C), temperature source Oral, resp  rate 22, height 5' 9" (1 753 m), weight 78 6 kg (173 lb 4 5 oz), SpO2 97 %  ,Body mass index is 25 59 kg/m²        Intake/Output Summary (Last 24 hours) at 2/12/2020 1239  Last data filed at 2/12/2020 0900  Gross per 24 hour   Intake 120 ml   Output    Net 120 ml       Invasive Devices     Peripheral Intravenous Line            Peripheral IV 02/11/20 Right Forearm less than 1 day          Drain            Colostomy  -- days    Colostomy LLQ 1366 days                Physical Exam: BP 94/63 (BP Location: Left arm)   Pulse 80   Temp 99 5 °F (37 5 °C) (Oral)   Resp 22   Ht 5' 9" (1 753 m)   Wt 78 6 kg (173 lb 4 5 oz)   SpO2 97%   BMI 25 59 kg/m²   General appearance: alert and oriented, in no acute distress  Head: Normocephalic, without obvious abnormality, atraumatic  Eyes: No scleral icterus  Lungs: clear to auscultation bilaterally  Heart: regular rate and rhythm, S1, S2 normal, no murmur, click, rub or gallop  Abdomen: Ostomy in place  Is working  Extremely large right-sided inguinal hernia  Penis is buried  Extremities: 1+ pitting edema noted in lower extremities bilaterally  Neurologic: Grossly normal    Lab, Imaging and other studies: I have personally reviewed pertinent reports      VTE Pharmacologic Prophylaxis: Enoxaparin (Lovenox)  VTE Mechanical Prophylaxis: sequential compression device

## 2020-02-12 NOTE — PLAN OF CARE
Problem: PAIN - ADULT  Goal: Verbalizes/displays adequate comfort level or baseline comfort level  Description  Interventions:  - Encourage patient to monitor pain and request assistance  - Assess pain using appropriate pain scale  - Administer analgesics based on type and severity of pain and evaluate response  - Implement non-pharmacological measures as appropriate and evaluate response  - Consider cultural and social influences on pain and pain management  - Notify physician/advanced practitioner if interventions unsuccessful or patient reports new pain  2/12/2020 0006 by Armando Jacinto RN  Outcome: Progressing  2/11/2020 2036 by Armando Jacinto RN  Outcome: Progressing     Problem: INFECTION - ADULT  Goal: Absence or prevention of progression during hospitalization  Description  INTERVENTIONS:  - Assess and monitor for signs and symptoms of infection  - Monitor lab/diagnostic results  - Monitor all insertion sites, i e  indwelling lines, tubes, and drains  - Monitor endotracheal if appropriate and nasal secretions for changes in amount and color  - [place] appropriate cooling/warming therapies per order  - Administer medications as ordered  - Instruct and encourage patient and family to use good hand hygiene technique  - Identify and instruct in appropriate isolation precautions for identified infection/condition  2/12/2020 0006 by Armando Jacinto RN  Outcome: Progressing  2/11/2020 2036 by Armando Jacinto RN  Outcome: Progressing  Goal: Absence of fever/infection during neutropenic period  Description  INTERVENTIONS:  - Monitor WBC    2/12/2020 0006 by Armando Jacinto RN  Outcome: Progressing  2/11/2020 2036 by Armando Jacinto RN  Outcome: Progressing     Problem: DISCHARGE PLANNING  Goal: Discharge to home or other facility with appropriate resources  Description  INTERVENTIONS:  - Identify barriers to discharge w/patient and caregiver  - Arrange for needed discharge resources and transportation as appropriate  - Identify discharge learning needs (meds, wound care, etc )  - Arrange for interpretive services to assist at discharge as needed  - Refer to Case Management Department for coordinating discharge planning if the patient needs post-hospital services based on physician/advanced practitioner order or complex needs related to functional status, cognitive ability, or social support system  2/12/2020 0006 by Josh Lira, RN  Outcome: Progressing  2/11/2020 2036 by Josh Lira, RN  Outcome: Progressing

## 2020-02-12 NOTE — ASSESSMENT & PLAN NOTE
 SIRS criteria: tachypnea, leukocytosis   Suspected source: UTI   Lactic acid: normal   End organ damage: ANDREA    We have been unable to obtain a urine sample for UA  He was able to provide sample for urine culture  Blood cultures are pending  Will continue Rocephin for now

## 2020-02-13 PROBLEM — K40.90 RIGHT INGUINAL HERNIA: Status: ACTIVE | Noted: 2020-01-01

## 2020-02-13 NOTE — PLAN OF CARE
Problem: PAIN - ADULT  Goal: Verbalizes/displays adequate comfort level or baseline comfort level  Description  Interventions:  - Encourage patient to monitor pain and request assistance  - Assess pain using appropriate pain scale  - Administer analgesics based on type and severity of pain and evaluate response  - Implement non-pharmacological measures as appropriate and evaluate response  - Consider cultural and social influences on pain and pain management  - Notify physician/advanced practitioner if interventions unsuccessful or patient reports new pain  Outcome: Progressing     Problem: INFECTION - ADULT  Goal: Absence or prevention of progression during hospitalization  Description  INTERVENTIONS:  - Assess and monitor for signs and symptoms of infection  - Monitor lab/diagnostic results  - Monitor all insertion sites, i e  indwelling lines, tubes, and drains  - Monitor endotracheal if appropriate and nasal secretions for changes in amount and color  - [place] appropriate cooling/warming therapies per order  - Administer medications as ordered  - Instruct and encourage patient and family to use good hand hygiene technique  - Identify and instruct in appropriate isolation precautions for identified infection/condition  Outcome: Progressing  Goal: Absence of fever/infection during neutropenic period  Description  INTERVENTIONS:  - Monitor WBC    Outcome: Progressing     Problem: DISCHARGE PLANNING  Goal: Discharge to home or other facility with appropriate resources  Description  INTERVENTIONS:  - Identify barriers to discharge w/patient and caregiver  - Arrange for needed discharge resources and transportation as appropriate  - Identify discharge learning needs (meds, wound care, etc )  - Arrange for interpretive services to assist at discharge as needed  - Refer to Case Management Department for coordinating discharge planning if the patient needs post-hospital services based on physician/advanced practitioner order or complex needs related to functional status, cognitive ability, or social support system  Outcome: Progressing     Problem: GENITOURINARY - ADULT  Goal: Maintains or returns to baseline urinary function  Description  INTERVENTIONS:  - Assess urinary function  - Encourage oral fluids to ensure adequate hydration if ordered  - Administer IV fluids as ordered to ensure adequate hydration  - Administer ordered medications as needed  - Offer frequent toileting  - Follow urinary retention protocol if ordered  Outcome: Progressing  Goal: Absence of urinary retention  Description  INTERVENTIONS:  - Assess patient's ability to void and empty bladder  - Monitor I/O  - Bladder scan as needed  - Discuss with physician/AP medications to alleviate retention as needed  - Discuss catheterization for long term situations as appropriate   Outcome: Progressing  Goal: Urinary catheter remains patent  Description  INTERVENTIONS:  - Assess patency of urinary catheter  - If patient has a chronic cameron, consider changing catheter if non-functioning  - Follow guidelines for intermittent irrigation of non-functioning urinary catheter  Outcome: Progressing     Problem: GASTROINTESTINAL - ADULT  Goal: Minimal or absence of nausea and/or vomiting  Description  INTERVENTIONS:  - Administer IV fluids if ordered to ensure adequate hydration  - Maintain NPO status until nausea and vomiting are resolved  - Nasogastric tube if ordered  - Administer ordered antiemetic medications as needed  - Provide nonpharmacologic comfort measures as appropriate  - Advance diet as tolerated, if ordered  - Consider nutrition services referral to assist patient with adequate nutrition and appropriate food choices  Outcome: Progressing  Goal: Maintains or returns to baseline bowel function  Description  INTERVENTIONS:  - Assess bowel function  - Encourage oral fluids to ensure adequate hydration  - Administer IV fluids if ordered to ensure adequate hydration  - Administer ordered medications as needed  - Encourage mobilization and activity  - Consider nutritional services referral to assist patient with adequate nutrition and appropriate food choices  Outcome: Progressing  Goal: Maintains adequate nutritional intake  Description  INTERVENTIONS:  - Monitor percentage of each meal consumed  - Identify factors contributing to decreased intake, treat as appropriate  - Assist with meals as needed  - Monitor I&O, weight, and lab values if indicated  - Obtain nutrition services referral as needed  Outcome: Progressing  Goal: Establish and maintain optimal ostomy function  Description  INTERVENTIONS:  - Assess bowel function  - Encourage oral fluids to ensure adequate hydration  - Administer IV fluids if ordered to ensure adequate hydration   - Administer ordered medications as needed  - Encourage mobilization and activity  - Nutrition services referral to assist patient with appropriate food choices  - Assess stoma site  - Consider wound care consult   Outcome: Progressing

## 2020-02-13 NOTE — PROGRESS NOTES
NEPHROLOGY PROGRESS NOTE   Michael Medina 75 y o  male MRN: [id number]  Unit/Bed#: S -01 Encounter: [id number]  Reason for Consult: ANDREA    ASSESSMENT/PLAN:  1  Acute Kidney Injury, POA- likely secondary to prerenal azotemia but cannot rule out urinary retention/obstruction  - baseline creatinine around 1  - UA: large blood, moderate leuks, >300 protein, innumerable WBC, occasional bacteria  - CT without contrast 2/12: Mild to moderate bilateral hydronephrosis noted  No calculi on the right  Multiple small left renal calculi noted  Very large right inguinal hernia, containing multiple loops of large and small bowel  This creates mass effect upon the penis and may create compression of the penile urethra, further predisposing to bladder outlet symptomatology  - cameron catheter placed 2/12 with 130ml returned on insertion  - agree with holding lisinopril and lasix  - continue IVF  - avoid hypotension, avoid IV contrast  - monitor intake/output  - renal function improving  - no acute indication for dialysis  2  Severe Sepsis- per primary team  - suspected source is UTI, culture + for proteus and gram neg rods  - blood cultures + for gram neg rods  - on IV ceftriaxone q24 hr  3  Hypertension- BP now soft in the setting of sepsis  - bolus as needed for symptomatic hypotension  - diagnosed about 5 years ago  - off antihypertensives  4  Low bicarbonate level- change IVF to bicarbonate drip  5   Bilateral Hydronephrosis- urology on board  - cameron catheter inserted 2/12 pm  - for renal ultrasound 48hr after cameron catheter placed    Disposition:  Continue IVF    SUBJECTIVE:  Complaining of scrotal pain and irritation and catheter discomfort    OBJECTIVE:  Current Weight: Weight - Scale: 78 6 kg (173 lb 4 5 oz)  Vitals:    02/12/20 1541 02/12/20 2335 02/13/20 0900 02/13/20 1100   BP: (!) 89/51 92/58 (!) 84/52 94/52   BP Location: Left arm Left arm Left arm Left arm   Pulse: 76 74 74 69   Resp: 16 16 16 18   Temp: (!) 97 4 °F (36 3 °C) 97 6 °F (36 4 °C) 97 6 °F (36 4 °C) 97 8 °F (36 6 °C)   TempSrc: Oral Oral Oral Oral   SpO2: 96% 96% 96% 97%   Weight:       Height:           Intake/Output Summary (Last 24 hours) at 2/13/2020 1154  Last data filed at 2/13/2020 7003  Gross per 24 hour   Intake 3062 5 ml   Output 860 ml   Net 2202 5 ml     General: NAD  Skin: no rash  Eyes: anicteric  ENMT: mm moist  Neck: no masses  Respiratory: CTAB  Cardiac: RRR  Extremities: + bilateral edema to knees  GI: soft nt nd  Neuro: alert awake  Psych: mood and affect appropriate    Medications:    Current Facility-Administered Medications:     acetaminophen (TYLENOL) tablet 650 mg, 650 mg, Oral, Q6H PRN, Sita Garcia PA-C    aspirin chewable tablet 81 mg, 81 mg, Oral, Daily, JONY Muñzo-C, 81 mg at 02/12/20 1718    atorvastatin (LIPITOR) tablet 80 mg, 80 mg, Oral, Daily With Gisele Box, PA-C, 80 mg at 02/12/20 1718    heparin (porcine) subcutaneous injection 5,000 Units, 5,000 Units, Subcutaneous, Q8H [facility], [facility] DO Jeny, 5,000 Units at 02/13/20 0518    levothyroxine tablet 50 mcg, 50 mcg, Oral, Early Morning, JONY Muñoz-C, 50 mcg at 02/13/20 0518    melatonin tablet 3 mg, 3 mg, Oral, HS, [facility] DO Jeny, 3 mg at 02/12/20 2136    mycophenolate (CELLCEPT) capsule 750 mg, 750 mg, Oral, BID, Sita Garcia PA-C, 750 mg at 02/13/20 0916    ondansetron (ZOFRAN) injection 4 mg, 4 mg, Intravenous, Q6H PRN, JONY Muñoz-C, 4 mg at 02/12/20 1355    pantoprazole (PROTONIX) EC tablet 40 mg, 40 mg, Oral, Daily, JONY Muñoz-C, 40 mg at 02/13/20 0519    piperacillin-tazobactam (ZOSYN) 2 25 g in sodium chloride 0 9 % 50 mL IVPB, 2 25 g, Intravenous, Q6H, Katy Eisenberg DO, Last Rate: 100 mL/hr at 02/13/20 1002, 2 25 g at 02/13/20 1002    sodium bicarbonate 150 mEq in dextrose 5 % 1,000 mL infusion, 150 mL/hr, Intravenous, Continuous, [facility], PABRENDA, Last Rate: 150 mL/hr at 02/13/20 1127, 150 mL/hr at 02/13/20 1127    Laboratory Results:  Results from last 7 days   Lab Units 02/13/20  0638 02/12/20  0558 02/11/20  1731   WBC Thousand/uL 9 95 17 08* 16 81*   HEMOGLOBIN g/dL 13 4 14 3 14 9   HEMATOCRIT % 46 8 47 5 48 0   PLATELETS Thousands/uL 119* 154 179   POTASSIUM mmol/L 4 0 4 5 4 6   CHLORIDE mmol/L 109* 105 103   CO2 mmol/L 14* 21 23   BUN mg/dL 70* 54* 42*   CREATININE mg/dL 2 00* 2 74* 2 16*   CALCIUM mg/dL 8 4 8 9 9 2   MAGNESIUM mg/dL 2 2  --   --

## 2020-02-13 NOTE — PROGRESS NOTES
Progress Note - Infectious Disease   Grace Alt 75 y o  male MRN: [id number]  Unit/Bed#: S -01 Encounter: [id number]      Impression/Plan:  1  Severe sepsis, POA  Leukocytosis, tachypnea, tachycardia, acute kidney injury  Secondary to gram-negative sandy bacteremia  No high fevers but patient is on CellCept, which may suppress fevers  WBC count trended down today  Blood pressures are on low side for which he is receiving IV fluids      -antibiotic plan as below  -monitor temperatures and hemodynamics  -supportive care per primary service     2  Gram-negative sandy bacteremia  Suspect urinary source given dysuria, difficulty passing urine  urine cultures growing greater than 100,000 colonies of Proteus species in [phone] colonies of gram-negative     -continue renally dosed IV Zosyn  -follow-up final blood cultures and urine culture to guide further recommendations  -follow-up CT abdomen/pelvis     3  Acute kidney injury  Creatinine up to 2 7 today  May all be secondary to severe sepsis  Also consider obstructive process as patient is unable to properly pass urine  Nephrology input noted      -dose adjust antibiotic based on renal function  -follow-up CT abdomen/pelvis results  May require urology evaluation pending CT findings   -suspect will require Lawrence catheter placement  -recheck BMP in a m   -nephrology follow-up ongoing     4  Myasthenia gravis  Immunosuppressed state, currently on CellCept  Risk factor for development of severe infections  May need to hold CellCept for now given acute infection  Follow-up Neurology input      5  Hyperbilirubinemia  Suspect acute elevation secondary to severe sepsis  No right upper quadrant tenderness on exam   Remainder of LFTs remain normal   Total bili is lower today at 1 3  Less likely etiology of gram-negative bacteremia      -follow-up CT abdomen/pelvis findings  -serial abdominal exams     6   History of perforated diverticulum status post Padmini's procedure      Antibiotics:  Zosyn D2  Antibiotic D3     Discussed above plan with patient and his daughter at bedside, Farzana Bob, Urology NP, Dr Nicholas Pinzon from primary service and with Dr Hayley Sepulveda from nephrology service  Subjective:  Patient has no fever, chills, sweats overnight; no nausea, vomiting, diarrhea; no cough, shortness of breath; patient is not currently having dysuria with Lawrence catheter in place  He does have constant discomfort from large inguinal hernia and reports dysuria symptoms at home  No new symptoms  Objective:  Vitals:  Temp:  [97 4 °F (36 3 °C)-97 8 °F (36 6 °C)] 97 8 °F (36 6 °C)  HR:  [69-76] 69  Resp:  [16-18] 18  BP: (84-94)/(51-58) 94/52  SpO2:  [96 %-97 %] 97 %  Temp (24hrs), Av 6 °F (36 4 °C), Min:97 4 °F (36 3 °C), Max:97 8 °F (36 6 °C)  Current: Temperature: 97 8 °F (36 6 °C)    General Appearance:  Awake, alert, cooperative, resting in chair, no acute distress  Throat: Oropharynx moist without lesions  Lungs:   Clear to auscultation bilaterally; no wheezes, rhonchi or rales; respirations unlabored   Heart:  RRR; S1-S2 heard, no murmur   Abdomen:   Soft, non-tender, non-distended, positive bowel sounds  : Lawrence in place with scant yellow urine in bag    Huge soft right inguinal hernia   Extremities: + LE edema, arm IV site nontender   Skin: No rashes      Labs, Imaging, & Other studies:   All pertinent labs and imaging studies were personally reviewed  Results from last 7 days   Lab Units 20  0638 20  0558 20  1731   WBC Thousand/uL 9 95 17 08* 16 81*   HEMOGLOBIN g/dL 13 4 14 3 14 9   PLATELETS Thousands/uL 119* 154 179     Results from last 7 days   Lab Units 20  0638 20  0558 20  1731   POTASSIUM mmol/L 4 0 4 5 4 6   CHLORIDE mmol/L 109* 105 103   CO2 mmol/L 14* 21 23   BUN mg/dL 70* 54* 42*   CREATININE mg/dL 2 00* 2 74* 2 16*   EGFR ml/min/1 73sq m 32 22 29   CALCIUM mg/dL 8 4 8 9 9 2   AST U/L  --  36 31   ALT U/L --  21 22   ALK PHOS U/L  --  80 86         Results from last 7 days   Lab Units 02/11/20  1853 02/11/20  1756 02/11/20  1731   BLOOD CULTURE  Gram Negative Deandre Enteric Like*  --  Gram Negative Deandre Enteric Like*   GRAM STAIN RESULT  Gram negative rods*  --  Gram negative rods*   URINE CULTURE   --  >100,000 cfu/ml Proteus species*  80,000-89,000 cfu/ml Gram Negative Deandre*  --    02/12/2020 CT abdomen pelvis:  Diffuse bladder wall thickening with inflammatory changes  Mild to moderate bilateral hydronephrosis  Multiple left renal calculi  Prostatomegaly    Very large right inguinal hernia creating mass-effect on penile urethra predisposing patient to bladder outlet symptomatology

## 2020-02-13 NOTE — ASSESSMENT & PLAN NOTE
 Creatinine upon admission: 2 16  o Baseline: 0 9-1 1    Acute kidney failure with tubular necrosis likely due to sepsis and hypovolemia as well as obstructive uropathy from large right-sided inguinal hernia compressing on urethra  Since Lawrence catheter was placed yesterday creatinine has improved from 2 74 yesterday to 2 00 today  Discussed with Nephrology  Continue IV fluids and continue to monitor creatinine

## 2020-02-13 NOTE — PROGRESS NOTES
Pt noted to be resting comfortably with family visiting  (+) for urine output in cameron  No pain noted  Safety measures in place  Will continue to monitor    Gerard Kaye RN

## 2020-02-13 NOTE — ASSESSMENT & PLAN NOTE
 Patient continues to be actually hypotensive, though he is asymptomatic  Continue to hold blood pressure medications

## 2020-02-13 NOTE — ASSESSMENT & PLAN NOTE
 SIRS criteria: tachypnea, leukocytosis   Suspected source: UTI   Lactic acid: normal   End organ damage: ANDREA    Leukocytosis has resolved  Patient no longer tachypneic  Continue Zosyn as per ID

## 2020-02-13 NOTE — ASSESSMENT & PLAN NOTE
Patient has known coronary artery disease for which he takes aspirin, atorvastatin and Imdur  He is also on lisinopril and beta-blocker  He was supposed to get CABG but was unable to because of health issues  He has been having chest pain for a few days  We did an EKG on 02/12 that showed ST depressions in the inferior lateral leads that were not present on prior EKGs from 2016  Nonetheless, patient is not having as much chest pain today  He is not a candidate for any sort of intervention at the moment, so I am going to hold off on Cardiology consult for now

## 2020-02-13 NOTE — ASSESSMENT & PLAN NOTE
Patient has an extremely large right-sided inguinal hernia  He is not a surgical candidate at the moment, but we will have General surgery evaluate him to get him on board with surgery sooner rather than later

## 2020-02-13 NOTE — CONSULTS
Consultation -General Surgery  Xiomy Dee 75 y o  male MRN: [id number]  Unit/Bed#: S -01 Encounter: [id number]        Inpatient consult to Acute Care Surgery  Consult performed by: Afua Celis PA-C  Consult ordered by: Ady Diaz DO          ASSESSMENT/PLAN:  Problem List     * (Principal) Severe sepsis ([facility] [address] )    Coronary artery disease    Hyperlipidemia    Essential hypertension (Chronic)    Myasthenia gravis (HCC)    Non-ST elevation myocardial infarction (NSTEMI), type 2    Gastrocutaneous fistula    UTI (urinary tract infection)    Acute renal failure (HCC)    Partial small bowel obstruction (HCC)    Chest pain    H/O myasthenia gravis    Gram-negative bacteremia    Hydronephrosis    Right inguinal hernia        74 y/o male admitted for Sepsis/UTI and acute kidney injury possibly secondary to urinary obstruction  Urology, nephrology, ID teams consulted, currently with Lawrence to remain indefinitely  Patient has very large right-sided inguinal hernia without bowel obstruction but there is concern of mass effect to penis causing urethral obstruction  General surgery consulted to discuss possible future hernia repair  Due to extent of hernia and loss of abdominal domain questionable if hernia can be repaired  - Medically not a surgical candidate at this time  - Would need to recover from current hospitalization as well as obtain cardiology and neurology clearances prior to surgery  - Attending surgeon will see patient tomorrow to discuss planning    - Agree with urology plan of management with Lawrence catheter  - IV abx per ID recs  - Care per primary team      Reason for Consult / Principal Problem: Right inguinal hernia obstructing urethra    HPI: Xiomy Dee is a 75y o  year old male who presents with Severe sepsis ([facility] [address] )   History of myasthenia gravis, MI with angioplasty, colon perforation and Villeda's procedure and current colostomy, and large right inguinal hernia presented to ED with symptoms of UTI  Symptoms including frequency, difficulty urinating, and burning with urination  Additional symptoms of tachypnea  Workup including labs showing WBC 16 8 and admission creatinine of 2 16 thought to be secondary to severe sepsis  Urinalysis confirming UTI, blood cultures growing E coli at 2 days  Infectious disease team consult, patient switched to IV Zosyn  Nephrology consulted recommending continued IV fluids with CT to evaluate source of urinary retention/obstruction  CT performed 2/12 showing cystitis, bilateral hydronephrosis, and very large right inguinal hernia containing multiple loops of large small-bowel obstruction that is creating mass effect upon the penis which may create compression of urethra  Urology was consulted and has placed a Lawrence catheter indefinitely for facilitation of urine drainage  Surgical team consulted to discuss future management of inguinal hernia and possible repair  Review of Systems   Constitutional: Negative for chills and fever  HENT: Negative for ear pain and sore throat  Respiratory: Negative for chest tightness and shortness of breath  Cardiovascular: Negative for chest pain and palpitations  Gastrointestinal: Positive for abdominal pain  Negative for abdominal distention, diarrhea, nausea and vomiting  Genitourinary: Positive for difficulty urinating, frequency and scrotal swelling  Negative for flank pain  Musculoskeletal: Negative for arthralgias  Skin: Negative for color change and rash  Neurological: Negative for dizziness and headaches         Historical Information   Past Medical History:   Diagnosis Date    Cardiac disease     Carotid stenosis     [facility])     Compression fracture of lumbar vertebra (HCC)     Coronary artery disease     Disease of thyroid gland     Diverticulitis     Hernia, diaphragmatic, without obstruction     Hyperlipidemia     Hypertension     Inguinal hernia Right    Ischemic cardiomyopathy     MI (myocardial infarction) ([facility] [address] )     Myasthenia gravis ([facility] [address] )      Past Surgical History:   Procedure Laterality Date    ANGIOPLASTY / STENTING FEMORAL      CATARACT EXTRACTION      COLON SURGERY      colostomy    COLOSTOMY      ESOPHAGOGASTRODUODENOSCOPY N/A 3/29/2016    Procedure: ESOPHAGOGASTRODUODENOSCOPY (EGD); Surgeon: Ishan Nix MD;  [facility]; Service:     GASTROSTOMY TUBE PLACEMENT N/A 3/29/2016    Procedure: PEG CHANGE-LOW PROFILE TUBE ;  Surgeon: Ishan Nix MD;  [facility]; Service:     LAPAROTOMY N/A 2016    Procedure: LAPAROTOMY EXPLORATORY; RESSECTION OF GASTRO-CUTANEOUS FISTULA ;  Surgeon: Radha Vinson DO;  Location: [facility];  Service:     PEG TUBE PLACEMENT      PEG TUBE REMOVAL      CO ESOPHAGOGASTRODUODENOSCOPY TRANSORAL DIAGNOSTIC N/A 2016    Procedure: ESOPHAGOGASTRODUODENOSCOPY w 12-6 gc clip,anchor ;  Surgeon: Ishan Nix MD;  Location: [facility];   Service: Gastroenterology     Social History   Social History     Substance and Sexual Activity   Alcohol Use Yes    Comment: 1 per month     Social History     Substance and Sexual Activity   Drug Use No     Social History     Tobacco Use   Smoking Status Former Smoker    Last attempt to quit: 1973    Years since quittin 0   Smokeless Tobacco Never Used     Family History   Problem Relation Age of Onset    Heart disease Mother     Hypertension Mother        Meds/Allergies     Medications Prior to Admission   Medication    amLODIPine (NORVASC) 2 5 mg tablet    aspirin 81 MG tablet    atorvastatin (LIPITOR) 80 mg tablet    furosemide (LASIX) 20 mg tablet    isosorbide dinitrate (ISORDIL) 30 mg tablet    levothyroxine 25 mcg tablet    lisinopril (ZESTRIL) 10 mg tablet    metoprolol tartrate (LOPRESSOR) 25 mg tablet    mycophenolate (CELLCEPT) 250 mg capsule    Ostomy Supplies (PREMIER COLOSTOMY/ILEOSTOMY) KIT    potassium chloride (KLOR-CON) 20 mEq packet    Calcium Citrate-Vitamin D (CALCIUM CITRATE +D PO)    furosemide (LASIX) 40 mg tablet    ofloxacin (OCUFLOX) 0 3 % ophthalmic solution    pantoprazole (PROTONIX) 40 mg tablet    prednisoLONE acetate (PRED FORTE) 1 % ophthalmic suspension     Current Facility-Administered Medications   Medication Dose Route Frequency    acetaminophen (TYLENOL) tablet 650 mg  650 mg Oral Q6H PRN    aspirin chewable tablet 81 mg  81 mg Oral Daily    atorvastatin (LIPITOR) tablet 80 mg  80 mg Oral Daily With Dinner    heparin (porcine) subcutaneous injection 5,000 Units  5,000 Units Subcutaneous Q8H [facility]    levothyroxine tablet 50 mcg  50 mcg Oral Early Morning    melatonin tablet 3 mg  3 mg Oral HS    mycophenolate (CELLCEPT) capsule 750 mg  750 mg Oral BID    ondansetron (ZOFRAN) injection 4 mg  4 mg Intravenous Q6H PRN    pantoprazole (PROTONIX) EC tablet 40 mg  40 mg Oral Daily    piperacillin-tazobactam (ZOSYN) 2 25 g in sodium chloride 0 9 % 50 mL IVPB  2 25 g Intravenous Q6H    sodium bicarbonate 150 mEq in dextrose 5 % 1,000 mL infusion  150 mL/hr Intravenous Continuous       No Known Allergies    Objective     Blood pressure 90/56, pulse 90, temperature 98 °F (36 7 °C), temperature source Oral, resp  rate 20, height 5' 9" (1 753 m), weight 78 6 kg (173 lb 4 5 oz), SpO2 96 %  Intake/Output Summary (Last 24 hours) at 2/13/2020 3988  Last data filed at 2/13/2020 5392  Gross per 24 hour   Intake 2562 5 ml   Output 600 ml   Net 1962 5 ml       PHYSICAL EXAM  General appearance: alert and oriented, in no acute distress  Skin: Skin color, texture, turgor normal  No rashes or lesions  Head: Normocephalic, without obvious abnormality  Heart: regular rate and rhythm, S1, S2 normal, no murmur, click, rub or gallop  Lungs: clear to auscultation bilaterally  Abdomen: Nondistended abdomen    Very large right inguinal hernia extending to level of upper/mid thigh which almost entirely envelops the penis  Tenderness inferior edge of hernia secondary to skin irritation from urine leakage, remainder of hernia nontender with audible bowel sounds  Colostomy appliance in place with liquid brown stool    Otherwise no tenderness or rigidity/guarding noted on exam   Neurological: normal without focal findings and mental status, speech normal, alert and oriented x3    Lab Results:   Admission on 02/11/2020   Component Date Value    WBC 02/11/2020 16 81*    RBC 02/11/2020 5 30     Hemoglobin 02/11/2020 14 9     Hematocrit 02/11/2020 48 0     MCV 02/11/2020 91     MCH 02/11/2020 28 1     MCHC 02/11/2020 31 0*    RDW 02/11/2020 14 0     MPV 02/11/2020 9 5     Platelets 55/54/8783 179     nRBC 02/11/2020 0     Neutrophils Relative 02/11/2020 85*    Immat GRANS % 02/11/2020 1     Lymphocytes Relative 02/11/2020 5*    Monocytes Relative 02/11/2020 9     Eosinophils Relative 02/11/2020 0     Basophils Relative 02/11/2020 0     Neutrophils Absolute 02/11/2020 14 34*    Immature Grans Absolute 02/11/2020 0 09     Lymphocytes Absolute 02/11/2020 0 90     Monocytes Absolute 02/11/2020 1 44*    Eosinophils Absolute 02/11/2020 0 00     Basophils Absolute 02/11/2020 0 04     Sodium 02/11/2020 139     Potassium 02/11/2020 4 6     Chloride 02/11/2020 103     CO2 02/11/2020 23     ANION GAP 02/11/2020 13     BUN 02/11/2020 42*    Creatinine 02/11/2020 2 16*    Glucose 02/11/2020 133     Calcium 02/11/2020 9 2     AST 02/11/2020 31     ALT 02/11/2020 22     Alkaline Phosphatase 02/11/2020 86     Total Protein 02/11/2020 8 0     Albumin 02/11/2020 3 9     Total Bilirubin 02/11/2020 1 75*    eGFR 02/11/2020 29     Urine Culture 02/11/2020 >100,000 cfu/ml Proteus mirabilis*    Urine Culture 02/11/2020 80,000-89,000 cfu/ml Escherichia coli*    Protime 02/11/2020 14 6*    INR 02/11/2020 1 20*    PTT 02/11/2020 32     LACTIC ACID 02/11/2020 1 4     Blood Culture 02/11/2020 Escherichia coli*    Gram Stain Result 02/11/2020 Gram negative rods*    Blood Culture 02/11/2020 Escherichia coli*    Gram Stain Result 02/11/2020 Gram negative rods*    Clarity, UA 02/12/2020 Cloudy     Color, UA 02/12/2020 Yellow     Specific Gravity, UA 02/12/2020 1 025     pH, UA 02/12/2020 6 0     Glucose, UA 02/12/2020 Negative     Ketones, UA 02/12/2020 Negative     Blood, UA 02/12/2020 Large*    Protein, UA 02/12/2020 >=300*    Nitrite, UA 02/12/2020 Negative     Bilirubin, UA 02/12/2020 Negative     Urobilinogen, UA 02/12/2020 0 2     Leukocytes, UA 02/12/2020 Moderate*    WBC, UA 02/12/2020 Innumerable*    RBC, UA 02/12/2020 None Seen     Bacteria, UA 02/12/2020 Occasional     Epithelial Cells 02/12/2020 None Seen     WBC 02/12/2020 17 08*    RBC 02/12/2020 5 04     Hemoglobin 02/12/2020 14 3     Hematocrit 02/12/2020 47 5     MCV 02/12/2020 94     MCH 02/12/2020 28 4     MCHC 02/12/2020 30 1*    RDW 02/12/2020 13 9     MPV 02/12/2020 10 5     Platelets 12/38/4541 154     nRBC 02/12/2020 0     Neutrophils Relative 02/12/2020 83*    Immat GRANS % 02/12/2020 1     Lymphocytes Relative 02/12/2020 7*    Monocytes Relative 02/12/2020 9     Eosinophils Relative 02/12/2020 0     Basophils Relative 02/12/2020 0     Neutrophils Absolute 02/12/2020 14 28*    Immature Grans Absolute 02/12/2020 0 08     Lymphocytes Absolute 02/12/2020 1 20     Monocytes Absolute 02/12/2020 1 48*    Eosinophils Absolute 02/12/2020 0 00     Basophils Absolute 02/12/2020 0 04     Sodium 02/12/2020 140     Potassium 02/12/2020 4 5     Chloride 02/12/2020 105     CO2 02/12/2020 21     ANION GAP 02/12/2020 14*    BUN 02/12/2020 54*    Creatinine 02/12/2020 2 74*    Glucose 02/12/2020 116     Calcium 02/12/2020 8 9     AST 02/12/2020 36     ALT 02/12/2020 21     Alkaline Phosphatase 02/12/2020 80     Total Protein 02/12/2020 7 4     Albumin 02/12/2020 3 6     Total Bilirubin 02/12/2020 1 36*    eGFR 02/12/2020 22     WBC 02/13/2020 9 95     RBC 02/13/2020 4 78     Hemoglobin 02/13/2020 13 4     Hematocrit 02/13/2020 46 8     MCV 02/13/2020 98     MCH 02/13/2020 28 0     MCHC 02/13/2020 28 6*    RDW 02/13/2020 14 1     MPV 02/13/2020 10 1     Platelets 76/83/6384 119*    nRBC 02/13/2020 0     Neutrophils Relative 02/13/2020 86*    Immat GRANS % 02/13/2020 1     Lymphocytes Relative 02/13/2020 5*    Monocytes Relative 02/13/2020 8     Eosinophils Relative 02/13/2020 0     Basophils Relative 02/13/2020 0     Neutrophils Absolute 02/13/2020 8 56*    Immature Grans Absolute 02/13/2020 0 06     Lymphocytes Absolute 02/13/2020 0 50*    Monocytes Absolute 02/13/2020 0 78     Eosinophils Absolute 02/13/2020 0 01     Basophils Absolute 02/13/2020 0 04     Sodium 02/13/2020 138     Potassium 02/13/2020 4 0     Chloride 02/13/2020 109*    CO2 02/13/2020 14*    ANION GAP 02/13/2020 15*    BUN 02/13/2020 70*    Creatinine 02/13/2020 2 00*    Glucose 02/13/2020 104     Calcium 02/13/2020 8 4     eGFR 02/13/2020 32     Magnesium 02/13/2020 2 2     Ventricular Rate 02/12/2020 87     Atrial Rate 02/12/2020 87     CT Interval 02/12/2020 194     QRSD Interval 02/12/2020 116     QT Interval 02/12/2020 382     QTC Interval 02/12/2020 459     P Axis 02/12/2020 51     QRS Axis 02/12/2020 51     T Wave Axis 02/12/2020 190      Imaging Studies:   2/13 CT a/p:  IMPRESSION:     1  Diffuse bladder wall thickening with inflammatory change of the surrounding perivesical tissue concerning for cystitis  2  Mild to moderate bilateral hydronephrosis  Although multiple left renal calculi are seen, no evidence of ureteral stone is seen  Right perinephric soft tissue stranding which may be associated with pyelonephritis  3  Prostatomegaly  4  Very large right inguinal hernia, containing multiple loops of large and small bowel    This creates mass effect upon the penis and may create compression of the penile urethra, further predisposing to bladder outlet symptomatology  5  Note is also made of a left parastomal hernia  Counseling / Coordination of Care  Total time spent today  30 minutes  Greater than 50% of total time was spent with the patient and / or family counseling and / or coordination of care

## 2020-02-13 NOTE — PROGRESS NOTES
Progress Note - Felicitas Horowitz 75 y o  male MRN: [id number]    Unit/Bed#: S -01 Encounter: [id number]      Assessment:  Felicitas Horowitz is a 41-year-old male known remotely to Dr Sahil Lugo for BPH and UTI, not seen in at least 6 years; admitted for chest pain, hypotension with multiple findings including: large chronic right inguinal hernia with mass effect on bladder and urethra with complete obstruction, prostatomegaly and bilateral hydronephrosis with resultant  acute kidney injury  Preliminary urine culture positive for Proteus species  Blood culture x2 passive for gram-negative sandy growth  Creatinine slowly trending downward from 2 74--2 0  Leukocytosis resolved with current WBC count of 9 9 from 17 K on arrival   Lawrence is patent for clear yellow urine  Expected more in the way of auto diuresis post obstruction, urine output 660 ml yesterday  So far 200 mL this shift  Suspect some degree of dehydration  IV fluids at 150 mL / hour  Plan:  Maintain Lawrence catheter  Do not remove  Not nursing or other department managed  Monitor creatinine trend and await all cultures  Appreciate ID recommendations for antimicrobial regimen  Explained to patient and family at the bedside that urinary catheter will need to be in place indefinitely, at least until underlying hernia can be resolved  Unfortunately, patient is quite comorbid and perhaps not surgical candidate  However, per spouse, patient at one point was cleared by both neurology and cardiology to proceed with hernia repair and patient declined  Would recommend consultation with General Surgery to at minimum connect for sooner rather than later outpatient follow-up and evaluation for possible inguinal hernia repair  As long as hernia persists, patient will be catheter dependent  Subjective:   Denies fever, chills, flank, abdominal or suprapubic pain, nausea/vomiting or hematuria  Lujean Forestck right groin and scrotal pain    Objective:     Vitals: Blood pressure (!) 84/52, pulse 74, temperature 97 6 °F (36 4 °C), temperature source Oral, resp  rate 16, height 5' 9" (1 753 m), weight 78 6 kg (173 lb 4 5 oz), SpO2 96 %  ,Body mass index is 25 59 kg/m²  Intake/Output Summary (Last 24 hours) at 2/13/2020 1027  Last data filed at 2/13/2020 0931  Gross per 24 hour   Intake 3062 5 ml   Output 860 ml   Net 2202 5 ml       Physical Exam: General appearance: alert and oriented, in no acute distress, appears stated age, cooperative and no distress  Head: Normocephalic, without obvious abnormality, atraumatic  Neck: no adenopathy, no carotid bruit, no JVD, supple, symmetrical, trachea midline and thyroid not enlarged, symmetric, no tenderness/mass/nodules  Lungs: diminished breath sounds  Heart: regular rate and rhythm, S1, S2 normal, no murmur, click, rub or gallop  Abdomen: soft, non-tender; bowel sounds normal; no masses,  no organomegaly and Colostomy  Extremities: extremities normal, warm and well-perfused; no cyanosis, clubbing, or edema  Pulses: 2+ and symmetric  Neurologic: Grossly normal  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  Large right inguinal hernia  Occupying entire right hemiscrotum  Left palpable testicle  Scrotal sac is not edematous, ecchymotic or signs of urine female a  Scrotal skin is intact without abrasion or seepage of fluid       Invasive Devices     Peripheral Intravenous Line            Peripheral IV 02/12/20 Right Hand less than 1 day          Drain            Colostomy  -- days    Colostomy LLQ 1367 days    Urethral Catheter Latex 18 Fr  less than 1 day              Lab Results   Component Value Date    WBC 9 95 02/13/2020    HGB 13 4 02/13/2020    HCT 46 8 02/13/2020    MCV 98 02/13/2020     (L) 02/13/2020     Lab Results   Component Value Date    SODIUM 138 02/13/2020    K 4 0 02/13/2020     (H) 02/13/2020    CO2 14 (L) 02/13/2020    BUN 70 (H) 02/13/2020 CREATININE 2 00 (H) 02/13/2020    GLUC 104 02/13/2020    CALCIUM 8 4 02/13/2020       Lab, Imaging and other studies: I have personally reviewed pertinent reports

## 2020-02-13 NOTE — ASSESSMENT & PLAN NOTE
· Patient with urinary symptoms, burning when he urinates as well as urinary frequency  Has had symptoms similar to this in the past     Urine culture growing both Proteus and E coli

## 2020-02-13 NOTE — ASSESSMENT & PLAN NOTE
Id notes reviewed  Blood cultures growing E coli  Source likely urinary  Continue Zosyn  Patient does have severe sepsis was was present on admission  His leukocytosis has resolved

## 2020-02-13 NOTE — PROGRESS NOTES
Progress Note Lety Baez 1945, 75 y o  male MRN: [id number]    Unit/Bed#: S -01 Encounter: [id number]    Primary Care Provider: Zuleika Raymundo MD   Date and time admitted to hospital: 2/11/2020  4:31 PM      Right inguinal hernia  Assessment & Plan  Patient has an extremely large right-sided inguinal hernia  He is not a surgical candidate at the moment, but we will have General surgery evaluate him to get him on board with surgery sooner rather than later  Hydronephrosis  Assessment & Plan  Secondary to large inguinal hernia obstructing urethra  BPH likely contributing also  Appreciate assistance from urology  Continue Lawrence catheter  Gram-negative bacteremia  Assessment & Plan  Id notes reviewed  Blood cultures growing E coli  Source likely urinary  Continue Zosyn  Patient does have severe sepsis was was present on admission  His leukocytosis has resolved  H/O myasthenia gravis  Assessment & Plan  Continue mycophenolate  Chest pain  Assessment & Plan  Patient has known coronary artery disease for which he takes aspirin, atorvastatin and Imdur  He is also on lisinopril and beta-blocker  He was supposed to get CABG but was unable to because of health issues  He has been having chest pain for a few days  We did an EKG on 02/12 that showed ST depressions in the inferior lateral leads that were not present on prior EKGs from 2016  Nonetheless, patient is not having as much chest pain today  He is not a candidate for any sort of intervention at the moment, so I am going to hold off on Cardiology consult for now  Acute renal failure (HCC)  Assessment & Plan   Creatinine upon admission: 2 16  o Baseline: 0 9-1 1    Acute kidney failure with tubular necrosis likely due to sepsis and hypovolemia as well as obstructive uropathy from large right-sided inguinal hernia compressing on urethra      Since Lawrence catheter was placed yesterday creatinine has improved from 2 74 yesterday to 2 00 today  Discussed with Nephrology  Continue IV fluids and continue to monitor creatinine  UTI (urinary tract infection)  Assessment & Plan  · Patient with urinary symptoms, burning when he urinates as well as urinary frequency  Has had symptoms similar to this in the past     Urine culture growing both Proteus and E coli  Gastrocutaneous fistula  Assessment & Plan  · Colostomy in place with good output  Colostomy was placed because of perforated diverticulitis in the past   · Supportive care  Essential hypertension  Assessment & Plan   Patient continues to be actually hypotensive, though he is asymptomatic  Continue to hold blood pressure medications  * Severe sepsis (HCC)  Assessment & Plan   SIRS criteria: tachypnea, leukocytosis   Suspected source: UTI   Lactic acid: normal   End organ damage: ANDREA    Leukocytosis has resolved  Patient no longer tachypneic  Continue Zosyn as per ID  Subjective/Objective     Subjective:   Patient seen and examined by me  He is getting some sleep finally  His chest pain is actually a lot better today  He feels better since the Lawrence catheter was placed  No other issues reported  Objective:  Vitals: Blood pressure 90/56, pulse 90, temperature 98 °F (36 7 °C), temperature source Oral, resp  rate 20, height 5' 9" (1 753 m), weight 78 6 kg (173 lb 4 5 oz), SpO2 96 %  ,Body mass index is 25 59 kg/m²        Intake/Output Summary (Last 24 hours) at 2/13/2020 1702  Last data filed at 2/13/2020 0931  Gross per 24 hour   Intake 2562 5 ml   Output 860 ml   Net 1702 5 ml       Invasive Devices     Peripheral Intravenous Line            Peripheral IV 02/12/20 Right Hand 1 day    Long-Dwell Peripheral IV (Midline) 23/05/23 Left Basilic less than 1 day          Drain            Colostomy  -- days    Colostomy LLQ 1367 days    Urethral Catheter Latex 18 Fr  less than 1 day                Physical Exam: BP 90/56 (BP Location: Left arm)   Pulse 90 Temp 98 °F (36 7 °C) (Oral)   Resp 20   Ht 5' 9" (1 753 m)   Wt 78 6 kg (173 lb 4 5 oz)   SpO2 96%   BMI 25 59 kg/m²   General appearance: alert and oriented, in no acute distress and Chronically ill-appearing  Head: Normocephalic, without obvious abnormality, atraumatic  Eyes: No scleral icterus  Lungs: clear to auscultation bilaterally  Heart: regular rate and rhythm, S1, S2 normal, no murmur, click, rub or gallop  Abdomen: Ostomy in place  Extremely large right-sided inguinal hernia  Extremities: edema 2+ pitting edema in lower extremities bilaterally  Neurologic: Grossly normal  Lawrence catheter in place draining clear yellow urine  Lab, Imaging and other studies: I have personally reviewed pertinent reports      VTE Pharmacologic Prophylaxis: Heparin  VTE Mechanical Prophylaxis: sequential compression device

## 2020-02-14 NOTE — TELEPHONE ENCOUNTER
Called pt but still in the hospital and wife answered the phone and asked for a call back on Monday with the info for the appt

## 2020-02-14 NOTE — TELEPHONE ENCOUNTER
Jessica Flaherty is a 76-year-old male seen in consultation at [facility] regarding urinary obstruction secondary to large right inguinal hernia with urethral obstruction and bilateral hydronephrosis  Please contact patient for catheter exchange in approximately 4--5 weeks  Difficult catheterization secondary to buried phallus  Patient cannot have void trial unless hernia is repaired  Chronic Lawrence until then

## 2020-02-14 NOTE — PLAN OF CARE
Problem: PAIN - ADULT  Goal: Verbalizes/displays adequate comfort level or baseline comfort level  Description  Interventions:  - Encourage patient to monitor pain and request assistance  - Assess pain using appropriate pain scale  - Administer analgesics based on type and severity of pain and evaluate response  - Implement non-pharmacological measures as appropriate and evaluate response  - Consider cultural and social influences on pain and pain management  - Notify physician/advanced practitioner if interventions unsuccessful or patient reports new pain  Outcome: Progressing     Problem: INFECTION - ADULT  Goal: Absence or prevention of progression during hospitalization  Description  INTERVENTIONS:  - Assess and monitor for signs and symptoms of infection  - Monitor lab/diagnostic results  - Monitor all insertion sites, i e  indwelling lines, tubes, and drains  - Monitor endotracheal if appropriate and nasal secretions for changes in amount and color  - [place] appropriate cooling/warming therapies per order  - Administer medications as ordered  - Instruct and encourage patient and family to use good hand hygiene technique  - Identify and instruct in appropriate isolation precautions for identified infection/condition  Outcome: Progressing  Goal: Absence of fever/infection during neutropenic period  Description  INTERVENTIONS:  - Monitor WBC    Outcome: Progressing     Problem: GENITOURINARY - ADULT  Goal: Maintains or returns to baseline urinary function  Description  INTERVENTIONS:  - Assess urinary function  - Encourage oral fluids to ensure adequate hydration if ordered  - Administer IV fluids as ordered to ensure adequate hydration  - Administer ordered medications as needed  - Offer frequent toileting  - Follow urinary retention protocol if ordered  Outcome: Progressing  Goal: Absence of urinary retention  Description  INTERVENTIONS:  - Assess patient's ability to void and empty bladder  - Monitor I/O  - Bladder scan as needed  - Discuss with physician/AP medications to alleviate retention as needed  - Discuss catheterization for long term situations as appropriate   Outcome: Progressing  Goal: Urinary catheter remains patent  Description  INTERVENTIONS:  - Assess patency of urinary catheter  - If patient has a chronic cameron, consider changing catheter if non-functioning  - Follow guidelines for intermittent irrigation of non-functioning urinary catheter  Outcome: Progressing     Problem: GASTROINTESTINAL - ADULT  Goal: Minimal or absence of nausea and/or vomiting  Description  INTERVENTIONS:  - Administer IV fluids if ordered to ensure adequate hydration  - Maintain NPO status until nausea and vomiting are resolved  - Nasogastric tube if ordered  - Administer ordered antiemetic medications as needed  - Provide nonpharmacologic comfort measures as appropriate  - Advance diet as tolerated, if ordered  - Consider nutrition services referral to assist patient with adequate nutrition and appropriate food choices  Outcome: Progressing  Goal: Maintains or returns to baseline bowel function  Description  INTERVENTIONS:  - Assess bowel function  - Encourage oral fluids to ensure adequate hydration  - Administer IV fluids if ordered to ensure adequate hydration  - Administer ordered medications as needed  - Encourage mobilization and activity  - Consider nutritional services referral to assist patient with adequate nutrition and appropriate food choices  Outcome: Progressing  Goal: Maintains adequate nutritional intake  Description  INTERVENTIONS:  - Monitor percentage of each meal consumed  - Identify factors contributing to decreased intake, treat as appropriate  - Assist with meals as needed  - Monitor I&O, weight, and lab values if indicated  - Obtain nutrition services referral as needed  Outcome: Progressing  Goal: Establish and maintain optimal ostomy function  Description  INTERVENTIONS:  - Assess bowel function  - Encourage oral fluids to ensure adequate hydration  - Administer IV fluids if ordered to ensure adequate hydration   - Administer ordered medications as needed  - Encourage mobilization and activity  - Nutrition services referral to assist patient with appropriate food choices  - Assess stoma site  - Consider wound care consult   Outcome: Progressing

## 2020-02-14 NOTE — DISCHARGE INSTRUCTIONS
PER UROLOGY  Cath Care instructions---Maintain catheter to straight drainage  May use leg bag and shower  May flush daily prn using Fauzia syringe and 120 ml NSS  May use more saline ad valentina to prevent/treat cath obstruction  Urinary Catheter can remain in place for up to 4 weeks at a time  Change thereafter using same catheter size and type   Catheter placed at [facility] on 2/11/2020

## 2020-02-14 NOTE — ASSESSMENT & PLAN NOTE
Patient has an extremely large right-sided inguinal hernia  Per general surgery, patient is not a good surgical candidate given all his comorbidities  Patient and his family are certainly very understanding of this  Nonetheless, they would like to seek a 2nd opinion from Colorectal surgery as an outpatient  Will make sure contact information is available

## 2020-02-14 NOTE — PROGRESS NOTES
Progress Note Sathya Steele 1945, 75 y o  male MRN: [id number]    Unit/Bed#: S -01 Encounter: [id number]    Primary Care Provider: Alison Valle MD   Date and time admitted to hospital: 2/11/2020  4:31 PM      Right inguinal hernia  Assessment & Plan  Patient has an extremely large right-sided inguinal hernia  Per general surgery, patient is not a good surgical candidate given all his comorbidities  Patient and his family are certainly very understanding of this  Nonetheless, they would like to seek a 2nd opinion from Colorectal surgery as an outpatient  Will make sure contact information is available  Hydronephrosis  Assessment & Plan  Per surgery, patient's hydronephrosis is likely being caused by BPH rather than a large right-sided inguinal hernia  Renal ultrasound done today shows normal size kidneys  Continue to maintain Lawrence catheter  Gram-negative bacteremia  Assessment & Plan  Id notes reviewed  Blood cultures growing E coli sensitive to cefuroxime  Urine culture growing both Proteus mirabilis and E coli  Zosyn will be stopped and Rocephin will be started tomorrow  H/O myasthenia gravis  Assessment & Plan  Continue mycophenolate  Chest pain  Assessment & Plan  Patient has known coronary artery disease for which he takes aspirin, atorvastatin and Imdur  He is also on lisinopril and beta-blocker  He was supposed to get CABG but was unable to because of health issues  He has been having chest pain for a few days  We did an EKG on 02/12 that showed ST depressions in the inferior lateral leads that were not present on prior EKGs from 2016  Patient is not having any chest pain today  He is not a candidate for any sort of intervention at the moment  Acute renal failure (HCC)  Assessment & Plan   Creatinine upon admission: 2 16  o Baseline: 0 9-1 1    Acute kidney failure with tubular necrosis likely due to sepsis and hypovolemia as well as obstructive uropathy    Per surgery, obstructive uropathy was likely caused by BPH rather than large right-sided inguinal hernia  Lawrence catheter was placed on 02/12  Creatinine has improved since then  Two days ago creatinine was 2 74  Today it is 1 70  Discussed with Nephrology  IV fluids reduced  Continue to monitor creatinine  UTI (urinary tract infection)  Assessment & Plan  Zosyn being changed to Rocephin  Urine culture growing both Proteus mirabilis and E coli  Gastrocutaneous fistula  Assessment & Plan  · Colostomy in place with good output  Colostomy was placed because of perforated diverticulitis in the past   · Supportive care  Essential hypertension  Assessment & Plan   Patient has actually been hypotensive during hospital stay  His blood pressure is a little better today  Will hold off on starting back any antihypertensives  * Severe sepsis (HCC)  Assessment & Plan   SIRS criteria: tachypnea, leukocytosis   Suspected source: UTI   Lactic acid: normal   End organ damage: ANDREA    Resolved  Antibiotics as per ID  Subjective/Objective     Subjective:   Patient seen and examined with family at bedside this afternoon  He has some pain from his hernia but states his dysuria has resolved  He reports no other issues  Objective:  Vitals: Blood pressure 92/50, pulse 100, temperature 98 8 °F (37 1 °C), temperature source Axillary, resp  rate 18, height 5' 9" (1 753 m), weight 78 6 kg (173 lb 4 5 oz), SpO2 92 %  ,Body mass index is 25 59 kg/m²        Intake/Output Summary (Last 24 hours) at 2/14/2020 1621  Last data filed at 2/14/2020 1500  Gross per 24 hour   Intake 1420 ml   Output    Net 1420 ml       Invasive Devices     Peripheral Intravenous Line            Long-Dwell Peripheral IV (Midline) 18/47/70 Left Basilic 1 day          Drain            Colostomy  -- days    Colostomy LLQ 1368 days    Urethral Catheter Latex 18 Fr  1 day                Physical Exam: BP 92/50 (BP Location: Left arm) Pulse 100   Temp 98 8 °F (37 1 °C) (Axillary)   Resp 18   Ht 5' 9" (1 753 m)   Wt 78 6 kg (173 lb 4 5 oz)   SpO2 92%   BMI 25 59 kg/m²   General appearance: alert and oriented, in no acute distress  Head: Normocephalic, without obvious abnormality, atraumatic  Eyes: No scleral icterus  Lungs: clear to auscultation bilaterally  Heart: Regular rate and rhythm  Systolic murmur auscultated  Extremities: edema 2+ pitting edema in lower extremities bilaterally  Neurologic: Grossly normal    Lab, Imaging and other studies: I have personally reviewed pertinent reports      VTE Pharmacologic Prophylaxis: Heparin  VTE Mechanical Prophylaxis: sequential compression device

## 2020-02-14 NOTE — ASSESSMENT & PLAN NOTE
 Creatinine upon admission: 2 16  o Baseline: 0 9-1 1    Acute kidney failure with tubular necrosis likely due to sepsis and hypovolemia as well as obstructive uropathy  Per surgery, obstructive uropathy was likely caused by BPH rather than large right-sided inguinal hernia  Lawrence catheter was placed on 02/12  Creatinine has improved since then  Two days ago creatinine was 2 74  Today it is 1 70  Discussed with Nephrology  IV fluids reduced  Continue to monitor creatinine

## 2020-02-14 NOTE — ASSESSMENT & PLAN NOTE
Per surgery, patient's hydronephrosis is likely being caused by BPH rather than a large right-sided inguinal hernia  Renal ultrasound done today shows normal size kidneys  Continue to maintain Lawrence catheter

## 2020-02-14 NOTE — ASSESSMENT & PLAN NOTE
 Patient has actually been hypotensive during hospital stay  His blood pressure is a little better today  Will hold off on starting back any antihypertensives

## 2020-02-14 NOTE — PROGRESS NOTES
I believe the patient has bilateral hydronephrosis secondary to BPH  The ureteral dilatation has resolved after insertion of a Lawrence catheter(not after reduction of right inguinal hernia)  Will need some guidance from Urology as to follow-up for TUR     Patient has extremely large right inguinal hernia with loss of domain  I explained that he is very high risk for surgical repair  I am unsure whether the hernia can be reduced without causing pulmonary distress  He has multiple comorbidities, and poor abdominal fascia  The recommended observation is I believe that a possible attempted repair will be fraught with complications  This was discussed with the family and patient      For similar reasons, I recommended it not repairing the left parastomal hernia as he is asymptomatic

## 2020-02-14 NOTE — ASSESSMENT & PLAN NOTE
Patient has known coronary artery disease for which he takes aspirin, atorvastatin and Imdur  He is also on lisinopril and beta-blocker  He was supposed to get CABG but was unable to because of health issues  He has been having chest pain for a few days  We did an EKG on 02/12 that showed ST depressions in the inferior lateral leads that were not present on prior EKGs from 2016  Patient is not having any chest pain today  He is not a candidate for any sort of intervention at the moment

## 2020-02-14 NOTE — PROGRESS NOTES
NEPHROLOGY PROGRESS NOTE   Prashant Hayes 75 y o  male MRN: [id number]  Unit/Bed#: S -01 Encounter: [id number]  Reason for Consult: ANDREA    ASSESSMENT/PLAN:  1  Acute Kidney Injury, POA- likely secondary to prerenal azotemia but cannot rule out urinary retention/obstruction  - baseline creatinine around 1  - UA: large blood, moderate leuks, >300 protein, innumerable WBC, occasional bacteria  - CT without contrast 2/12: Mild to moderate bilateral hydronephrosis noted   No calculi on the right   Multiple small left renal calculi noted  Very large right inguinal hernia, containing multiple loops of large and small bowel   This creates mass effect upon the penis and may create compression of the penile urethra, further predisposing to bladder outlet symptomatology  - cameron catheter placed 2/12 with 130ml returned on insertion  - agree with holding lisinopril and lasix  - continue IVF, decrease rate  - avoid hypotension, avoid IV contrast  - monitor intake/output  - renal function improving  - no acute indication for dialysis  2  Severe Sepsis- per primary team  - suspected source is UTI, culture + for proteus and gram neg rods  - blood cultures + for gram neg rods  - on IV ceftriaxone q24 hr  3  Hypertension- BP now soft in the setting of sepsis  - bolus as needed for symptomatic hypotension  - diagnosed about 5 years ago  - off antihypertensives  4  Low bicarbonate level- improved, now on isolyte  5  Bilateral Hydronephrosis- urology on board  - cameron catheter inserted 2/12 pm  - for renal ultrasound 48hr after cameron catheter placed    Disposition:  Renal function improving with IVF and cameron catheter  Discussed with SLIM  Will sign off  Please call with any questions or concerns  Thank you  SUBJECTIVE:  Patient resting  Daughter at bedside       OBJECTIVE:  Current Weight: Weight - Scale: 78 6 kg (173 lb 4 5 oz)  Vitals:    02/13/20 1100 02/13/20 1653 02/13/20 2218 02/14/20 0818   BP: 94/52 90/56 94/56 100/74   BP Location: Left arm Left arm Left arm Left arm   Pulse: 69 90 84 86   Resp: 18 20 20 20   Temp: 97 8 °F (36 6 °C) 98 °F (36 7 °C) 98 2 °F (36 8 °C) 98 °F (36 7 °C)   TempSrc: Oral Oral Oral Oral   SpO2: 97% 96% 95% 93%   Weight:       Height:           Intake/Output Summary (Last 24 hours) at 2/14/2020 1353  Last data filed at 2/14/2020 1300  Gross per 24 hour   Intake 1360 ml   Output    Net 1360 ml     General: NAD  Skin: no rash  Eyes: anicteric  ENMT: mm moist  Neck: no masses  Respiratory: CTAB  Cardiac: RRR  Extremities: + bilateral edema  GI: soft nt nd  Neuro: alert awake  Psych: mood and affect appropriate    Medications:    Current Facility-Administered Medications:     acetaminophen (TYLENOL) tablet 650 mg, 650 mg, Oral, Q6H PRN, Marisela Palmer PA-C, 650 mg at 02/14/20 0549    aspirin chewable tablet 81 mg, 81 mg, Oral, Daily, Marisela Palmer PA-C, 81 mg at 02/13/20 1712    atorvastatin (LIPITOR) tablet 80 mg, 80 mg, Oral, Daily With Marcelle PA-C, 80 mg at 02/13/20 1712    cefTRIAXone (ROCEPHIN) 1,000 mg in dextrose 5 % 50 mL IVPB, 1,000 mg, Intravenous, Q24H, Brynn Abarca PA-C, Last Rate: 100 mL/hr at 02/14/20 1116, 1,000 mg at 02/14/20 1116    heparin (porcine) subcutaneous injection 5,000 Units, 5,000 Units, Subcutaneous, Q8H [facility], Joseph Fermin DO, 5,000 Units at 02/14/20 0550    levothyroxine tablet 50 mcg, 50 mcg, Oral, Early Morning, Marisela Palmer PA-C, 50 mcg at 02/14/20 0549    melatonin tablet 3 mg, 3 mg, Oral, HS, Joseph Fermin DO, 3 mg at 02/13/20 2148    multi-electrolyte (PLASMALYTE-A/ISOLYTE-S PH 7 4) IV solution, 75 mL/hr, Intravenous, Continuous, Shital Richard MD, Last Rate: 75 mL/hr at 02/14/20 1226, 75 mL/hr at 02/14/20 1226    mycophenolate (CELLCEPT) capsule 750 mg, 750 mg, Oral, BID, Marisela Palmer PA-C, 750 mg at 02/14/20 0832    ondansetron (ZOFRAN) injection 4 mg, 4 mg, Intravenous, Q6H PRN, Marisela Palmer PA-C, 4 mg at 02/14/20 0552    pantoprazole (PROTONIX) EC tablet 40 mg, 40 mg, Oral, Daily, Jeffolu LUIS A Roe, 40 mg at 02/14/20 0551    traMADol (ULTRAM) tablet 50 mg, 50 mg, Oral, Q6H PRN, Sonia Fermin DO, 50 mg at 02/14/20 1048    Laboratory Results:  Results from last 7 days   Lab Units 02/14/20  0630 02/14/20  0543 02/13/20  1851 02/13/20  0638 02/12/20  0558 02/11/20  1731   WBC Thousand/uL  --  10 18*  --  9 95 17 08* 16 81*   HEMOGLOBIN g/dL  --  12 3  --  13 4 14 3 14 9   HEMATOCRIT %  --  38 9  --  46 8 47 5 48 0   PLATELETS Thousands/uL  --  162  --  119* 154 179   POTASSIUM mmol/L 3 4*  --  3 5 4 0 4 5 4 6   CHLORIDE mmol/L 106  --  107 109* 105 103   CO2 mmol/L 24  --  23 14* 21 23   BUN mg/dL 57*  --  63* 70* 54* 42*   CREATININE mg/dL 1 70*  --  1 87* 2 00* 2 74* 2 16*   CALCIUM mg/dL 7 8*  --  8 0* 8 4 8 9 9 2   MAGNESIUM mg/dL  --  2 1  --  2 2  --   --

## 2020-02-14 NOTE — TELEPHONE ENCOUNTER
Patient scheduled for cameron catheter change on 3/12/20 at 830am with Diagnostic Nurse at the [facility] office  Please call and confirm  If patient cannot make appointment this has to be rescheduled with Diagnostic Nurse on a day a provider (preferrably MD) is in the office

## 2020-02-14 NOTE — PLAN OF CARE
Problem: PAIN - ADULT  Goal: Verbalizes/displays adequate comfort level or baseline comfort level  Description  Interventions:  - Encourage patient to monitor pain and request assistance  - Assess pain using appropriate pain scale  - Administer analgesics based on type and severity of pain and evaluate response  - Implement non-pharmacological measures as appropriate and evaluate response  - Consider cultural and social influences on pain and pain management  - Notify physician/advanced practitioner if interventions unsuccessful or patient reports new pain  Outcome: Progressing     Problem: INFECTION - ADULT  Goal: Absence or prevention of progression during hospitalization  Description  INTERVENTIONS:  - Assess and monitor for signs and symptoms of infection  - Monitor lab/diagnostic results  - Monitor all insertion sites, i e  indwelling lines, tubes, and drains  - Monitor endotracheal if appropriate and nasal secretions for changes in amount and color  - [place] appropriate cooling/warming therapies per order  - Administer medications as ordered  - Instruct and encourage patient and family to use good hand hygiene technique  - Identify and instruct in appropriate isolation precautions for identified infection/condition  Outcome: Progressing  Goal: Absence of fever/infection during neutropenic period  Description  INTERVENTIONS:  - Monitor WBC    Outcome: Progressing     Problem: DISCHARGE PLANNING  Goal: Discharge to home or other facility with appropriate resources  Description  INTERVENTIONS:  - Identify barriers to discharge w/patient and caregiver  - Arrange for needed discharge resources and transportation as appropriate  - Identify discharge learning needs (meds, wound care, etc )  - Arrange for interpretive services to assist at discharge as needed  - Refer to Case Management Department for coordinating discharge planning if the patient needs post-hospital services based on physician/advanced practitioner order or complex needs related to functional status, cognitive ability, or social support system  Outcome: Progressing     Problem: GENITOURINARY - ADULT  Goal: Maintains or returns to baseline urinary function  Description  INTERVENTIONS:  - Assess urinary function  - Encourage oral fluids to ensure adequate hydration if ordered  - Administer IV fluids as ordered to ensure adequate hydration  - Administer ordered medications as needed  - Offer frequent toileting  - Follow urinary retention protocol if ordered  Outcome: Progressing  Goal: Absence of urinary retention  Description  INTERVENTIONS:  - Assess patient's ability to void and empty bladder  - Monitor I/O  - Bladder scan as needed  - Discuss with physician/AP medications to alleviate retention as needed  - Discuss catheterization for long term situations as appropriate   Outcome: Progressing  Goal: Urinary catheter remains patent  Description  INTERVENTIONS:  - Assess patency of urinary catheter  - If patient has a chronic cameron, consider changing catheter if non-functioning  - Follow guidelines for intermittent irrigation of non-functioning urinary catheter  Outcome: Progressing     Problem: GASTROINTESTINAL - ADULT  Goal: Minimal or absence of nausea and/or vomiting  Description  INTERVENTIONS:  - Administer IV fluids if ordered to ensure adequate hydration  - Maintain NPO status until nausea and vomiting are resolved  - Nasogastric tube if ordered  - Administer ordered antiemetic medications as needed  - Provide nonpharmacologic comfort measures as appropriate  - Advance diet as tolerated, if ordered  - Consider nutrition services referral to assist patient with adequate nutrition and appropriate food choices  Outcome: Progressing  Goal: Maintains or returns to baseline bowel function  Description  INTERVENTIONS:  - Assess bowel function  - Encourage oral fluids to ensure adequate hydration  - Administer IV fluids if ordered to ensure adequate hydration  - Administer ordered medications as needed  - Encourage mobilization and activity  - Consider nutritional services referral to assist patient with adequate nutrition and appropriate food choices  Outcome: Progressing  Goal: Maintains adequate nutritional intake  Description  INTERVENTIONS:  - Monitor percentage of each meal consumed  - Identify factors contributing to decreased intake, treat as appropriate  - Assist with meals as needed  - Monitor I&O, weight, and lab values if indicated  - Obtain nutrition services referral as needed  Outcome: Progressing  Goal: Establish and maintain optimal ostomy function  Description  INTERVENTIONS:  - Assess bowel function  - Encourage oral fluids to ensure adequate hydration  - Administer IV fluids if ordered to ensure adequate hydration   - Administer ordered medications as needed  - Encourage mobilization and activity  - Nutrition services referral to assist patient with appropriate food choices  - Assess stoma site  - Consider wound care consult   Outcome: Progressing     Problem: Prexisting or High Potential for Compromised Skin Integrity  Goal: Skin integrity is maintained or improved  Description  INTERVENTIONS:  - Identify patients at risk for skin breakdown  - Assess and monitor skin integrity  - Assess and monitor nutrition and hydration status  - Monitor labs   - Assess for incontinence   - Turn and reposition patient  - Assist with mobility/ambulation  - Relieve pressure over bony prominences  - Avoid friction and shearing  - Provide appropriate hygiene as needed including keeping skin clean and dry  - Evaluate need for skin moisturizer/barrier cream  - Collaborate with interdisciplinary team   - Patient/family teaching  - Consider wound care consult   Outcome: Progressing     Problem: Potential for Falls  Goal: Patient will remain free of falls  Description  INTERVENTIONS:  - Assess patient frequently for physical needs  -  Identify cognitive and physical deficits and behaviors that affect risk of falls    -  [place] fall precautions as indicated by assessment   - Educate patient/family on patient safety including physical limitations  - Instruct patient to call for assistance with activity based on assessment  - Modify environment to reduce risk of injury  - Consider OT/PT consult to assist with strengthening/mobility  Outcome: Progressing

## 2020-02-14 NOTE — ASSESSMENT & PLAN NOTE
Id notes reviewed  Blood cultures growing E coli sensitive to cefuroxime  Urine culture growing both Proteus mirabilis and E coli  Zosyn will be stopped and Rocephin will be started tomorrow

## 2020-02-14 NOTE — ASSESSMENT & PLAN NOTE
 SIRS criteria: tachypnea, leukocytosis   Suspected source: UTI   Lactic acid: normal   End organ damage: ANDREA    Resolved  Antibiotics as per ID

## 2020-02-14 NOTE — PLAN OF CARE
Problem: PAIN - ADULT  Goal: Verbalizes/displays adequate comfort level or baseline comfort level  Description  Interventions:  - Encourage patient to monitor pain and request assistance  - Assess pain using appropriate pain scale  - Administer analgesics based on type and severity of pain and evaluate response  - Implement non-pharmacological measures as appropriate and evaluate response  - Consider cultural and social influences on pain and pain management  - Notify physician/advanced practitioner if interventions unsuccessful or patient reports new pain  Outcome: Progressing     Problem: INFECTION - ADULT  Goal: Absence or prevention of progression during hospitalization  Description  INTERVENTIONS:  - Assess and monitor for signs and symptoms of infection  - Monitor lab/diagnostic results  - Monitor all insertion sites, i e  indwelling lines, tubes, and drains  - Monitor endotracheal if appropriate and nasal secretions for changes in amount and color  - [place] appropriate cooling/warming therapies per order  - Administer medications as ordered  - Instruct and encourage patient and family to use good hand hygiene technique  - Identify and instruct in appropriate isolation precautions for identified infection/condition  Outcome: Progressing  Goal: Absence of fever/infection during neutropenic period  Description  INTERVENTIONS:  - Monitor WBC    Outcome: Progressing     Problem: DISCHARGE PLANNING  Goal: Discharge to home or other facility with appropriate resources  Description  INTERVENTIONS:  - Identify barriers to discharge w/patient and caregiver  - Arrange for needed discharge resources and transportation as appropriate  - Identify discharge learning needs (meds, wound care, etc )  - Arrange for interpretive services to assist at discharge as needed  - Refer to Case Management Department for coordinating discharge planning if the patient needs post-hospital services based on physician/advanced practitioner order or complex needs related to functional status, cognitive ability, or social support system  Outcome: Progressing     Problem: GENITOURINARY - ADULT  Goal: Maintains or returns to baseline urinary function  Description  INTERVENTIONS:  - Assess urinary function  - Encourage oral fluids to ensure adequate hydration if ordered  - Administer IV fluids as ordered to ensure adequate hydration  - Administer ordered medications as needed  - Offer frequent toileting  - Follow urinary retention protocol if ordered  Outcome: Progressing  Goal: Absence of urinary retention  Description  INTERVENTIONS:  - Assess patient's ability to void and empty bladder  - Monitor I/O  - Bladder scan as needed  - Discuss with physician/AP medications to alleviate retention as needed  - Discuss catheterization for long term situations as appropriate   Outcome: Progressing  Goal: Urinary catheter remains patent  Description  INTERVENTIONS:  - Assess patency of urinary catheter  - If patient has a chronic cameron, consider changing catheter if non-functioning  - Follow guidelines for intermittent irrigation of non-functioning urinary catheter  Outcome: Progressing     Problem: GASTROINTESTINAL - ADULT  Goal: Minimal or absence of nausea and/or vomiting  Description  INTERVENTIONS:  - Administer IV fluids if ordered to ensure adequate hydration  - Maintain NPO status until nausea and vomiting are resolved  - Nasogastric tube if ordered  - Administer ordered antiemetic medications as needed  - Provide nonpharmacologic comfort measures as appropriate  - Advance diet as tolerated, if ordered  - Consider nutrition services referral to assist patient with adequate nutrition and appropriate food choices  Outcome: Progressing  Goal: Maintains or returns to baseline bowel function  Description  INTERVENTIONS:  - Assess bowel function  - Encourage oral fluids to ensure adequate hydration  - Administer IV fluids if ordered to ensure adequate hydration  - Administer ordered medications as needed  - Encourage mobilization and activity  - Consider nutritional services referral to assist patient with adequate nutrition and appropriate food choices  Outcome: Progressing  Goal: Maintains adequate nutritional intake  Description  INTERVENTIONS:  - Monitor percentage of each meal consumed  - Identify factors contributing to decreased intake, treat as appropriate  - Assist with meals as needed  - Monitor I&O, weight, and lab values if indicated  - Obtain nutrition services referral as needed  Outcome: Progressing  Goal: Establish and maintain optimal ostomy function  Description  INTERVENTIONS:  - Assess bowel function  - Encourage oral fluids to ensure adequate hydration  - Administer IV fluids if ordered to ensure adequate hydration   - Administer ordered medications as needed  - Encourage mobilization and activity  - Nutrition services referral to assist patient with appropriate food choices  - Assess stoma site  - Consider wound care consult   Outcome: Progressing     Problem: Prexisting or High Potential for Compromised Skin Integrity  Goal: Skin integrity is maintained or improved  Description  INTERVENTIONS:  - Identify patients at risk for skin breakdown  - Assess and monitor skin integrity  - Assess and monitor nutrition and hydration status  - Monitor labs   - Assess for incontinence   - Turn and reposition patient  - Assist with mobility/ambulation  - Relieve pressure over bony prominences  - Avoid friction and shearing  - Provide appropriate hygiene as needed including keeping skin clean and dry  - Evaluate need for skin moisturizer/barrier cream  - Collaborate with interdisciplinary team   - Patient/family teaching  - Consider wound care consult   Outcome: Progressing     Problem: Potential for Falls  Goal: Patient will remain free of falls  Description  INTERVENTIONS:  - Assess patient frequently for physical needs  -  Identify cognitive and physical deficits and behaviors that affect risk of falls    -  [place] fall precautions as indicated by assessment   - Educate patient/family on patient safety including physical limitations  - Instruct patient to call for assistance with activity based on assessment  - Modify environment to reduce risk of injury  - Consider OT/PT consult to assist with strengthening/mobility  Outcome: Progressing

## 2020-02-14 NOTE — PROGRESS NOTES
Progress Note - Infectious Disease   K[place]vin [place] 75 y o  male MRN: [id number]  Unit/Bed#: S -01 Encounter: [id number]      Impression/Plan:  1  Severe sepsis, POA   Leukocytosis, tachypnea, tachycardia, acute kidney injury   Secondary to gram-negative sandy bacteremia   No high fevers but patient is on CellCept, which may suppress fevers   WBC count trended down  Low Blood pressures are improving     -antibiotic plan as below  -monitor temperatures and hemodynamics  -supportive care per primary service     2  E  Coli bacteremia   Suspect urinary source given dysuria, difficulty passing urine    urine cultures growing greater than 100,000 colonies of Proteus species and [phone] colonies of E  Coli resistant to cefazolin    -deescalate to IV ceftriaxone  -upon further clinical improvement and on discharge patient can be transitioned to p o  Cefpodoxime  -follow-up final blood cultures and urine culture to guide further recommendations  -follow-up CT abdomen/pelvis     3  Acute kidney injury   Creatinine 1 7 today   May all be secondary to severe sepsis   Also consider obstructive process as patient is unable to properly pass urine   Lawrence in place  eGFR39%     -no dose adjust of ceftriaxone needed  -recheck BMP in a m   -nephrology follow-up ongoing  -renal dose adjust cefpodoxime prn on transition to p o  antibiotic when further clinically improved     4  Myasthenia gravis   Immunosuppressed state, currently on CellCept   Risk factor for development of severe infections   May need to hold CellCept for now given acute infection   Follow-up Neurology input      5   Hyperbilirubinemia   Suspect acute elevation secondary to severe sepsis   No right upper quadrant tenderness on exam   Remainder of LFTs remain normal   Total bili is lower today at 1 3   Less likely etiology of gram-negative bacteremia      6  History of perforated diverticulum status post Padmini's procedure      Antibiotics:  Zosyn D3  Antibiotic D4     Discussed above plan with patient, RNAlfred, Urology NP, Dr Federico Ordonez primary service      Subjective:  Patient is not currently having dysuria with Lawrence catheter in place  He does have constant discomfort from rubbing of under side of large inguinal hernia skin irritation  Calmoseptine being applied prn  ROS:  Patient has no fever, chills, sweats; no nausea, vomiting, diarrhea; no cough, shortness of breath  No new symptoms  Objective:  Vitals:  Temp:  [98 °F (36 7 °C)-98 2 °F (36 8 °C)] 98 °F (36 7 °C)  HR:  [84-90] 86  Resp:  [20] 20  BP: ()/(56-74) 100/74  SpO2:  [93 %-96 %] 93 %  Temp (24hrs), Av 1 °F (36 7 °C), Min:98 °F (36 7 °C), Max:98 2 °F (36 8 °C)  Current: Temperature: 98 °F (36 7 °C)    General Appearance:  Awake, alert, cooperative, resting in chair, no acute distress  Throat: Oropharynx moist without lesions  Lungs:   Clear to auscultation bilaterally; no wheezes, rhonchi or rales; respirations unlabored   Heart:  RRR; S1-S2 heard, no murmur   Abdomen:   Soft, non-tender, non-distended, positive bowel sounds  : Lawrence in place with scan yellow urine in bag  Huge right inguinal hernia with dry, raw skin on underside     Extremities: Trace LE edema, arm IV site nontender   Skin: No rashes      Labs, Imaging, & Other studies:   All pertinent labs and imaging studies were personally reviewed  Results from last 7 days   Lab Units 20  0543 20  0638 20  0558   WBC Thousand/uL 10 18* 9 95 17 08*   HEMOGLOBIN g/dL 12 3 13 4 14 3   PLATELETS Thousands/uL 162 119* 154     Results from last 7 days   Lab Units 20  0630  20  0558 20  1731   POTASSIUM mmol/L 3 4*   < > 4 5 4 6   CHLORIDE mmol/L 106   < > 105 103   CO2 mmol/L 24   < > 21 23   BUN mg/dL 57*   < > 54* 42*   CREATININE mg/dL 1 70*   < > 2 74* 2 16*   EGFR ml/min/1 73sq m 39   < > 22 29   CALCIUM mg/dL 7 8*   < > 8 9 9 2   AST U/L  --   --  36 31   ALT U/L  --   --  21 22   ALK PHOS U/L  --   --  80 86    < > = values in this interval not displayed           Results from last 7 days   Lab Units 02/11/20  1853 02/11/20  1756 02/11/20  1731   BLOOD CULTURE  Escherichia coli*  --  Escherichia coli*   GRAM STAIN RESULT  Gram negative rods*  --  Gram negative rods*   URINE CULTURE   --  >100,000 cfu/ml Proteus mirabilis*  80,000-89,000 cfu/ml Escherichia coli*  --

## 2020-02-15 PROBLEM — R07.9 CHEST PAIN: Status: RESOLVED | Noted: 2020-01-01 | Resolved: 2020-01-01

## 2020-02-15 NOTE — PLAN OF CARE
Problem: PAIN - ADULT  Goal: Verbalizes/displays adequate comfort level or baseline comfort level  Description  Interventions:  - Encourage patient to monitor pain and request assistance  - Assess pain using appropriate pain scale  - Administer analgesics based on type and severity of pain and evaluate response  - Implement non-pharmacological measures as appropriate and evaluate response  - Consider cultural and social influences on pain and pain management  - Notify physician/advanced practitioner if interventions unsuccessful or patient reports new pain  Outcome: Progressing     Problem: INFECTION - ADULT  Goal: Absence or prevention of progression during hospitalization  Description  INTERVENTIONS:  - Assess and monitor for signs and symptoms of infection  - Monitor lab/diagnostic results  - Monitor all insertion sites, i e  indwelling lines, tubes, and drains  - Monitor endotracheal if appropriate and nasal secretions for changes in amount and color  - [place] appropriate cooling/warming therapies per order  - Administer medications as ordered  - Instruct and encourage patient and family to use good hand hygiene technique  - Identify and instruct in appropriate isolation precautions for identified infection/condition  Outcome: Progressing  Goal: Absence of fever/infection during neutropenic period  Description  INTERVENTIONS:  - Monitor WBC    Outcome: Progressing     Problem: GENITOURINARY - ADULT  Goal: Maintains or returns to baseline urinary function  Description  INTERVENTIONS:  - Assess urinary function  - Encourage oral fluids to ensure adequate hydration if ordered  - Administer IV fluids as ordered to ensure adequate hydration  - Administer ordered medications as needed  - Offer frequent toileting  - Follow urinary retention protocol if ordered  Outcome: Progressing  Goal: Absence of urinary retention  Description  INTERVENTIONS:  - Assess patient's ability to void and empty bladder  - Monitor I/O  - Bladder scan as needed  - Discuss with physician/AP medications to alleviate retention as needed  - Discuss catheterization for long term situations as appropriate   Outcome: Progressing  Goal: Urinary catheter remains patent  Description  INTERVENTIONS:  - Assess patency of urinary catheter  - If patient has a chronic cameron, consider changing catheter if non-functioning  - Follow guidelines for intermittent irrigation of non-functioning urinary catheter  Outcome: Progressing     Problem: GASTROINTESTINAL - ADULT  Goal: Minimal or absence of nausea and/or vomiting  Description  INTERVENTIONS:  - Administer IV fluids if ordered to ensure adequate hydration  - Maintain NPO status until nausea and vomiting are resolved  - Nasogastric tube if ordered  - Administer ordered antiemetic medications as needed  - Provide nonpharmacologic comfort measures as appropriate  - Advance diet as tolerated, if ordered  - Consider nutrition services referral to assist patient with adequate nutrition and appropriate food choices  Outcome: Progressing  Goal: Maintains or returns to baseline bowel function  Description  INTERVENTIONS:  - Assess bowel function  - Encourage oral fluids to ensure adequate hydration  - Administer IV fluids if ordered to ensure adequate hydration  - Administer ordered medications as needed  - Encourage mobilization and activity  - Consider nutritional services referral to assist patient with adequate nutrition and appropriate food choices  Outcome: Progressing  Goal: Maintains adequate nutritional intake  Description  INTERVENTIONS:  - Monitor percentage of each meal consumed  - Identify factors contributing to decreased intake, treat as appropriate  - Assist with meals as needed  - Monitor I&O, weight, and lab values if indicated  - Obtain nutrition services referral as needed  Outcome: Progressing  Goal: Establish and maintain optimal ostomy function  Description  INTERVENTIONS:  - Assess bowel function  - Encourage oral fluids to ensure adequate hydration  - Administer IV fluids if ordered to ensure adequate hydration   - Administer ordered medications as needed  - Encourage mobilization and activity  - Nutrition services referral to assist patient with appropriate food choices  - Assess stoma site  - Consider wound care consult   Outcome: Progressing     Problem: Prexisting or High Potential for Compromised Skin Integrity  Goal: Skin integrity is maintained or improved  Description  INTERVENTIONS:  - Identify patients at risk for skin breakdown  - Assess and monitor skin integrity  - Assess and monitor nutrition and hydration status  - Monitor labs   - Assess for incontinence   - Turn and reposition patient  - Assist with mobility/ambulation  - Relieve pressure over bony prominences  - Avoid friction and shearing  - Provide appropriate hygiene as needed including keeping skin clean and dry  - Evaluate need for skin moisturizer/barrier cream  - Collaborate with interdisciplinary team   - Patient/family teaching  - Consider wound care consult   Outcome: Progressing     Problem: Potential for Falls  Goal: Patient will remain free of falls  Description  INTERVENTIONS:  - Assess patient frequently for physical needs  -  Identify cognitive and physical deficits and behaviors that affect risk of falls    -  [place] fall precautions as indicated by assessment   - Educate patient/family on patient safety including physical limitations  - Instruct patient to call for assistance with activity based on assessment  - Modify environment to reduce risk of injury  - Consider OT/PT consult to assist with strengthening/mobility  Outcome: Progressing

## 2020-02-15 NOTE — PLAN OF CARE
Problem: PHYSICAL THERAPY ADULT  Goal: Performs mobility at highest level of function for planned discharge setting  See evaluation for individualized goals  Description  Treatment/Interventions: Functional transfer training, LE strengthening/ROM, Elevations, Therapeutic exercise, Endurance training, Patient/family training, Equipment eval/education, Bed mobility, Gait training  Equipment Recommended: Diane Tavarez       See flowsheet documentation for full assessment, interventions and recommendations  Note:   Prognosis: Fair  Problem List: Decreased strength, Decreased endurance, Impaired balance, Decreased mobility, Impaired judgement, Decreased safety awareness, Decreased skin integrity, Pain  Assessment: Pt is a 75 y o  male seen for PT evaluation s/p admit to [address] [place] [address] on 2/11/2020 w/ Severe sepsis ([facility] [address] )  Order placed for PT  Comorbidities affecting pt's physical performance at time of assessment listed above  Personal factors affecting pt at time of IE include: steps to enter environment, limited home support, advanced age, inability to perform IADLs, inability to perform ADLs, inability to ambulate household distances and limited insight into impairments  Prior to admission, pt was was independent w/ all functional mobility w/ out AD, lived in one floor environment, had 3 MICAELA (+) railing and lived with wife  Pt reports he has "too much help from people at home " "If needed, his cat can help too "  Pt making frequent jokes and appears to be very confident in his abilities to go home and manage safely  Upon evaluation: Pt requires min A for sit to stand and min A for ambulation while leaning on IV pole for support  Pt frequently requires stops to reposition hernia due to raw/rubbing skin  (Please find full objective findings from PT assessment regarding body systems outlined above)   Impairments and limitations also listed above, especially due to  weakness, impaired balance, decreased endurance, gait deviations, pain, decreased activity tolerance, decreased safety awareness, fall risk and decreased skin integrity  The following objective measures performed on IE also reveal limitations: Barthel Index 35/100  Pt's clinical presentation is currently unstable/unpredictable seen in pt's presentation of decreased safety awareness, impulsivity, fall risk, and lack of insight into deficits  Pt to benefit from continued skilled PT tx while in hospital and upon DC to address deficits as defined above and maximize level of functional mobility  From PT/mobility standpoint, recommendation at time of d/c would be Home PT, home with family support and with rolling walker pending progress in order to maximize pt's functional independence and consistency w/ mobility in order to facilitate return to PLOF  Recommend progression of ambulation and stair negotiation as appropriate  Recommendation: Home PT, Home with family support          See flowsheet documentation for full assessment

## 2020-02-15 NOTE — ASSESSMENT & PLAN NOTE
Was on Zofran which was changed Rocephin based on cultures  Urine culture growing both Proteus mirabilis and E coli

## 2020-02-15 NOTE — ASSESSMENT & PLAN NOTE
Per surgery, patient's hydronephrosis is likely being caused by BPH rather than a large right-sided inguinal hernia  Renal ultrasound done yesterday shows normal size kidneys  Continue to maintain Lawrence catheter

## 2020-02-15 NOTE — PHYSICAL THERAPY NOTE
PHYSICAL THERAPY EVALUATION  NAME: Reji Ellison  AGE:   75 y o  MRN:  [id number]  ADMIT DX: UTI (urinary tract infection) [N39 0]  Acute kidney injury ([email or web address] 75 ) [N17 9]    PMH:   Past Medical History:   Diagnosis Date    Cardiac disease     Carotid stenosis     CHF (congestive heart failure) ([facility])     Compression fracture of lumbar vertebra ([facility])     Coronary artery disease     Disease of thyroid gland     Diverticulitis     Hernia, diaphragmatic, without obstruction     Hyperlipidemia     Hypertension     Inguinal hernia     Right    Ischemic cardiomyopathy     MI (myocardial infarction) ([email or web address] 75 )     Myasthenia gravis ([email or web address] )      LENGTH OF STAY: 4       02/15/20 0959   Pain Assessment   Pain Assessment 0-10   Pain Score 5   Pain Type Acute pain   Pain Location Abdomen;Groin   Pain Orientation [facility]   Hospital Pain Intervention(s) Ambulation/increased activity;Repositioned   Response to Interventions limited tolerance   Home Living   Type of [address] [place] [address] Two level; Able to live on main level with bedroom/bathroom;Stairs to enter with rails  (3 MICAELA; pt reports he stays on the first floor)   [address] [facility],[address]   Additional Comments Ambulates independently without AD at baseline  Prior Function   Level of [place] Independent with ADLs and functional mobility   Lives With Spouse   ADL Assistance Independent   IADLs Independent   Falls in the last 6 months 0   Comments (+)    Restrictions/Precautions   Weight Bearing Precautions Per Order No   Other Precautions Impulsive; Chair Alarm;Multiple lines; Fall Risk;Pain   General   Additional Pertinent History Per chart, plan is to hold hernia repair surgery until discharged     Family/Caregiver Present Yes   Cognition   Overall Cognitive Status WFL   Arousal/Participation Alert   Orientation Level Oriented X4   Memory Decreased recall of precautions   Following Commands Follows one step commands without difficulty   Comments Pt identified by name and   RLE Assessment   RLE Assessment X   Strength RLE   RLE Overall Strength 4/5  (functionally)   LLE Assessment   LLE Assessment X   Strength LLE   LLE Overall Strength 4/5  (functionally)   Bed Mobility   Supine to Sit Unable to assess  (OOB in chair pre/post session with alarm intact)   Transfers   Sit to Stand 4  Minimal assistance   Additional items Assist x 1; Increased time required;Verbal cues   Stand to Sit 4  Minimal assistance   Additional items Assist x 1; Increased time required;Verbal cues   Ambulation/Elevation   Gait pattern Improper Weight shift; Wide GAYE; Decreased foot clearance; Inconsistent alexis; Short stride; Excessively slow; Forward Flexion  (impulsive; requires reposition of hernia between legs )   Gait Assistance 4  Minimal assist   Additional items Assist x 1;Verbal cues   Assistive Device Other (Comment)  (pt leaning on IV pole; declines RW trial this session)   Distance 70` x1   Stair Management Assistance Not tested  (pt declined at this time; reports he won't have trouble )   Balance   Static Sitting Fair +   Dynamic Sitting Fair   Static Standing Fair   Dynamic Standing Fair -   Ambulatory Poor +   Endurance Deficit   Endurance Deficit Yes   Endurance Deficit Description limited ambulation distance, pain   Activity Tolerance   Activity Tolerance Patient limited by pain   Nurse Made Aware Per RN, pt appropriate to evaluate   Assessment   Prognosis Fair   Problem List Decreased strength;Decreased endurance; Impaired balance;Decreased mobility; Impaired judgement;Decreased safety awareness;Decreased skin integrity;Pain   Goals   Patient Goals Pt would like to go home      STG Expiration Date 20   Short Term Goal #1 Pt will be able to: (1) perform bed mobility supervision to increase independence (2) perform sit to stand with supervision to prepare for transfers/ambulation (3) ambulate at least 100` with supervision and least restrictive AD to increase independence (4) negotiate at least 3 stairs with supervision to allow access into home (5) increase standing balance by 1 grade to decrease risk of falls   PT Treatment Day 0   Plan   Treatment/Interventions Functional transfer training;LE strengthening/ROM; Elevations; Therapeutic exercise; Endurance training;Patient/family training;Equipment eval/education; Bed mobility;Gait training   PT Frequency   (3-5x/week)   Recommendation   Recommendation Home PT; Home with family support   Equipment Recommended Walker   Barthel Index   Feeding 10   Bathing 0   Grooming Score 5   Dressing Score 5   Bladder Score 0   Bowels Score 0   Toilet Use Score 5   Transfers (Bed/Chair) Score 10   Mobility (Level Surface) Score 0   Stairs Score 0   Barthel Index Score 35       Assessment: Pt is a 75 y o  male seen for PT evaluation s/p admit to [facility] on 2/11/2020 w/ Severe sepsis ([facility] )  Order placed for PT  Comorbidities affecting pt's physical performance at time of assessment listed above  Personal factors affecting pt at time of IE include: steps to enter environment, limited home support, advanced age, inability to perform IADLs, inability to perform ADLs, inability to ambulate household distances and limited insight into impairments  Prior to admission, pt was was independent w/ all functional mobility w/ out AD, lived in one floor environment, had 3 MICAELA (+) railing and lived with wife  Pt reports he has "too much help from people at home " "If needed, his cat can help too "  Pt making frequent jokes and appears to be very confident in his abilities to go home and manage safely  Upon evaluation: Pt requires min A for sit to stand and min A for ambulation while leaning on IV pole for support  Pt frequently requires stops to reposition hernia due to raw/rubbing skin  (Please find full objective findings from PT assessment regarding body systems outlined above)   Impairments and limitations also listed above, especially due to  weakness, impaired balance, decreased endurance, gait deviations, pain, decreased activity tolerance, decreased safety awareness, fall risk and decreased skin integrity  The following objective measures performed on IE also reveal limitations: Barthel Index 35/100  Pt's clinical presentation is currently unstable/unpredictable seen in pt's presentation of decreased safety awareness, impulsivity, fall risk, and lack of insight into deficits  Pt to benefit from continued skilled PT tx while in hospital and upon DC to address deficits as defined above and maximize level of functional mobility  From PT/mobility standpoint, recommendation at time of d/c would be Home PT, home with family support and with rolling walker pending progress in order to maximize pt's functional independence and consistency w/ mobility in order to facilitate return to PLOF  Recommend progression of ambulation and stair negotiation as appropriate        Janeth Mcdermott, PT,DPT

## 2020-02-15 NOTE — PLAN OF CARE
Problem: PAIN - ADULT  Goal: Verbalizes/displays adequate comfort level or baseline comfort level  Description  Interventions:  - Encourage patient to monitor pain and request assistance  - Assess pain using appropriate pain scale  - Administer analgesics based on type and severity of pain and evaluate response  - Implement non-pharmacological measures as appropriate and evaluate response  - Consider cultural and social influences on pain and pain management  - Notify physician/advanced practitioner if interventions unsuccessful or patient reports new pain  Outcome: Progressing     Problem: INFECTION - ADULT  Goal: Absence or prevention of progression during hospitalization  Description  INTERVENTIONS:  - Assess and monitor for signs and symptoms of infection  - Monitor lab/diagnostic results  - Monitor all insertion sites, i e  indwelling lines, tubes, and drains  - Monitor endotracheal if appropriate and nasal secretions for changes in amount and color  - [place] appropriate cooling/warming therapies per order  - Administer medications as ordered  - Instruct and encourage patient and family to use good hand hygiene technique  - Identify and instruct in appropriate isolation precautions for identified infection/condition  Outcome: Progressing  Goal: Absence of fever/infection during neutropenic period  Description  INTERVENTIONS:  - Monitor WBC    Outcome: Progressing     Problem: GENITOURINARY - ADULT  Goal: Maintains or returns to baseline urinary function  Description  INTERVENTIONS:  - Assess urinary function  - Encourage oral fluids to ensure adequate hydration if ordered  - Administer IV fluids as ordered to ensure adequate hydration  - Administer ordered medications as needed  - Offer frequent toileting  - Follow urinary retention protocol if ordered  Outcome: Progressing  Goal: Absence of urinary retention  Description  INTERVENTIONS:  - Assess patient's ability to void and empty bladder  - Monitor I/O  - Bladder scan as needed  - Discuss with physician/AP medications to alleviate retention as needed  - Discuss catheterization for long term situations as appropriate   Outcome: Progressing  Goal: Urinary catheter remains patent  Description  INTERVENTIONS:  - Assess patency of urinary catheter  - If patient has a chronic cameron, consider changing catheter if non-functioning  - Follow guidelines for intermittent irrigation of non-functioning urinary catheter  Outcome: Progressing     Problem: GASTROINTESTINAL - ADULT  Goal: Minimal or absence of nausea and/or vomiting  Description  INTERVENTIONS:  - Administer IV fluids if ordered to ensure adequate hydration  - Maintain NPO status until nausea and vomiting are resolved  - Nasogastric tube if ordered  - Administer ordered antiemetic medications as needed  - Provide nonpharmacologic comfort measures as appropriate  - Advance diet as tolerated, if ordered  - Consider nutrition services referral to assist patient with adequate nutrition and appropriate food choices  Outcome: Progressing  Goal: Maintains or returns to baseline bowel function  Description  INTERVENTIONS:  - Assess bowel function  - Encourage oral fluids to ensure adequate hydration  - Administer IV fluids if ordered to ensure adequate hydration  - Administer ordered medications as needed  - Encourage mobilization and activity  - Consider nutritional services referral to assist patient with adequate nutrition and appropriate food choices  Outcome: Progressing  Goal: Maintains adequate nutritional intake  Description  INTERVENTIONS:  - Monitor percentage of each meal consumed  - Identify factors contributing to decreased intake, treat as appropriate  - Assist with meals as needed  - Monitor I&O, weight, and lab values if indicated  - Obtain nutrition services referral as needed  Outcome: Progressing  Goal: Establish and maintain optimal ostomy function  Description  INTERVENTIONS:  - Assess bowel function  - Encourage oral fluids to ensure adequate hydration  - Administer IV fluids if ordered to ensure adequate hydration   - Administer ordered medications as needed  - Encourage mobilization and activity  - Nutrition services referral to assist patient with appropriate food choices  - Assess stoma site  - Consider wound care consult   Outcome: Progressing     Problem: Prexisting or High Potential for Compromised Skin Integrity  Goal: Skin integrity is maintained or improved  Description  INTERVENTIONS:  - Identify patients at risk for skin breakdown  - Assess and monitor skin integrity  - Assess and monitor nutrition and hydration status  - Monitor labs   - Assess for incontinence   - Turn and reposition patient  - Assist with mobility/ambulation  - Relieve pressure over bony prominences  - Avoid friction and shearing  - Provide appropriate hygiene as needed including keeping skin clean and dry  - Evaluate need for skin moisturizer/barrier cream  - Collaborate with interdisciplinary team   - Patient/family teaching  - Consider wound care consult   Outcome: Progressing     Problem: Potential for Falls  Goal: Patient will remain free of falls  Description  INTERVENTIONS:  - Assess patient frequently for physical needs  -  Identify cognitive and physical deficits and behaviors that affect risk of falls    -  [place] fall precautions as indicated by assessment   - Educate patient/family on patient safety including physical limitations  - Instruct patient to call for assistance with activity based on assessment  - Modify environment to reduce risk of injury  - Consider OT/PT consult to assist with strengthening/mobility  Outcome: Progressing

## 2020-02-15 NOTE — ASSESSMENT & PLAN NOTE
 Creatinine upon admission: 2 16  o Baseline: 0 9-1 1    Acute kidney failure with tubular necrosis likely due to sepsis and hypovolemia as well as obstructive uropathy  Per surgery, obstructive uropathy was likely caused by BPH rather than large right-sided inguinal hernia  Lawrence catheter was placed on 02/12  Creatinine has improved since then  Creatinine 2 74 --> 1 70 -->1  59  Will continue IV fluids and continue to monitor BMPs

## 2020-02-15 NOTE — ASSESSMENT & PLAN NOTE
 SIRS criteria: tachypnea, leukocytosis   Suspected source: UTI   Lactic acid: normal   End organ damage: ANDREA    Blood cultures grew E coli  Sepsis has Resolved  Antibiotics as per ID

## 2020-02-15 NOTE — ASSESSMENT & PLAN NOTE
Id notes reviewed  Blood cultures growing E coli sensitive to cefuroxime  Urine culture growing both Proteus mirabilis and E coli  Patient was on Zosyn but is currently on Rocephin  Continue Rocephin  Upon discharge will plan to put patient on cefpodoxime

## 2020-02-15 NOTE — PROGRESS NOTES
Progress Note - General Surgery   Alee Second 75 y o  male MRN: [id number]  Unit/Bed#: S -01 Encounter: [id number]    Assessment:  77M with large inguinal hernia, colostomy with urinary retention, skin irritation from urine tricling down hernia    Plan:  No plans for surgical intervention at this time  Patient has extensive cardiac disease which needs to be worked up first  Can follow as outpatient  Explained this to patient and family who understood  Also explained hernia repair and colostomy reversal should be performed as separate procedures  Management of urinary retention per urology  Lawrence in place and draining  Possible prostate or other etiologies to be considered  Subjective:  No new complaints at this time  Tolerating diet  Output per ostomy  Lawrence in place and draining  Afebrile  Objective:     Blood pressure (!) 84/55, pulse 104, temperature 98 4 °F (36 9 °C), temperature source Oral, resp  rate 16, height 5' 9" (1 753 m), weight 78 6 kg (173 lb 4 5 oz), SpO2 92 %  ,Body mass index is 25 59 kg/m²  I/O last 3 completed shifts: In: 9332 [P O :780; I V :2985]  Out: 825 [Urine:825]    Invasive Devices     Peripheral Intravenous Line            Long-Dwell Peripheral IV (Midline) 61/98/60 Left Basilic 2 days          Drain            Colostomy  -- days    Colostomy LLQ 1369 days    Urethral Catheter Latex 18 Fr  2 days                Physical Exam:  NAD, Aox3  MMM, NCAT  EOMI, PERRLA  Regular rate and rhythm  Equal chest expansion, unlabored respirations  Abd soft, NTND  Large inguinal hernia, no skin breakdown  Soft, no signs of strangulation  Ostomy pink and well perfused  Ext: No deficits, warm and well perfused  Normal mood and affect       Lab, Imaging and other Studies:    Recent Results (from the past 36 hour(s))   CBC    Collection Time: 02/14/20  5:43 AM   Result Value Ref Range    WBC 10 18 (H) 4 31 - 10 16 Thousand/uL    RBC 4 32 3 88 - 5 62 Million/uL    Hemoglobin 12 3 12 0 - 17 0 g/dL    Hematocrit 38 9 36 5 - 49 3 %    MCV 90 82 - 98 fL    MCH 28 5 26 8 - 34 3 pg    MCHC 31 6 31 4 - 37 4 g/dL    RDW 14 0 11 6 - 15 1 %    Platelets 752 270 - 186 Thousands/uL    MPV 10 3 8 9 - 12 7 fL   Magnesium    Collection Time: 02/14/20  5:43 AM   Result Value Ref Range    Magnesium 2 1 1 6 - 2 6 mg/dL   Basic metabolic panel    Collection Time: 02/14/20  6:30 AM   Result Value Ref Range    Sodium 142 136 - 145 mmol/L    Potassium 3 4 (L) 3 5 - 5 3 mmol/L    Chloride 106 100 - 108 mmol/L    CO2 24 21 - 32 mmol/L    ANION GAP 12 4 - 13 mmol/L    BUN 57 (H) 5 - 25 mg/dL    Creatinine 1 70 (H) 0 60 - 1 30 mg/dL    Glucose 108 65 - 140 mg/dL    Calcium 7 8 (L) 8 3 - 10 1 mg/dL    eGFR 39 ml/min/1 73sq m   Basic metabolic panel    Collection Time: 02/15/20  6:17 AM   Result Value Ref Range    Sodium 138 136 - 145 mmol/L    Potassium 3 3 (L) 3 5 - 5 3 mmol/L    Chloride 103 100 - 108 mmol/L    CO2 24 21 - 32 mmol/L    ANION GAP 11 4 - 13 mmol/L    BUN 48 (H) 5 - 25 mg/dL    Creatinine 1 59 (H) 0 60 - 1 30 mg/dL    Glucose 110 65 - 140 mg/dL    Calcium 7 7 (L) 8 3 - 10 1 mg/dL    eGFR 42 ml/min/1 73sq m   CBC and differential    Collection Time: 02/15/20  6:17 AM   Result Value Ref Range    WBC 8 36 4 31 - 10 16 Thousand/uL    RBC 4 17 3 88 - 5 62 Million/uL    Hemoglobin 12 0 12 0 - 17 0 g/dL    Hematocrit 37 8 36 5 - 49 3 %    MCV 91 82 - 98 fL    MCH 28 8 26 8 - 34 3 pg    MCHC 31 7 31 4 - 37 4 g/dL    RDW 14 0 11 6 - 15 1 %    MPV 10 2 8 9 - 12 7 fL    Platelets 470 526 - 859 Thousands/uL    nRBC 0 /100 WBCs    Neutrophils Relative 79 (H) 43 - 75 %    Immat GRANS % 1 0 - 2 %    Lymphocytes Relative 9 (L) 14 - 44 %    Monocytes Relative 10 4 - 12 %    Eosinophils Relative 1 0 - 6 %    Basophils Relative 0 0 - 1 %    Neutrophils Absolute 6 63 1 85 - 7 62 Thousands/µL    Immature Grans Absolute 0 04 0 00 - 0 20 Thousand/uL    Lymphocytes Absolute 0 78 0 60 - 4 47 Thousands/µL    Monocytes Absolute 0 84 0 17 - 1 22 Thousand/µL    Eosinophils Absolute 0 06 0 00 - 0 61 Thousand/µL    Basophils Absolute 0 01 0 00 - 0 10 Thousands/µL       Active Medications  No recently discontinued medications to reconcile

## 2020-02-15 NOTE — ASSESSMENT & PLAN NOTE
 Patient has actually been hypotensive during hospital stay  His blood pressure is a little better today and yesterday  Will hold off on starting back any antihypertensives

## 2020-02-15 NOTE — ASSESSMENT & PLAN NOTE
Patient has an extremely large right-sided inguinal hernia  Per general surgery, patient is not a good surgical candidate given all his comorbidities  Patient and his family are certainly very understanding of this  Nonetheless, they would like to seek a 2nd opinion from Colorectal surgery as an outpatient  Will make sure contact information is available  Patient has a lot of redness and excoriation on the underside of his hernia  It should actually improved now that Lawrence catheters in  Will start nystatin powder

## 2020-02-15 NOTE — PROGRESS NOTES
Progress Note Nancyria Query 1945, 75 y o  male MRN: [id number]    Unit/Bed#: S -01 Encounter: [id number]    Primary Care Provider: Franklin Wong MD   Date and time admitted to hospital: 2/11/2020  4:31 PM      Right inguinal hernia  Assessment & Plan  Patient has an extremely large right-sided inguinal hernia  Per general surgery, patient is not a good surgical candidate given all his comorbidities  Patient and his family are certainly very understanding of this  Nonetheless, they would like to seek a 2nd opinion from Colorectal surgery as an outpatient  Will make sure contact information is available  Patient has a lot of redness and excoriation on the underside of his hernia  It should actually improved now that Lawrence catheters in  Will start nystatin powder  Hydronephrosis  Assessment & Plan  Per surgery, patient's hydronephrosis is likely being caused by BPH rather than a large right-sided inguinal hernia  Renal ultrasound done yesterday shows normal size kidneys  Continue to maintain Lawrence catheter  Gram-negative bacteremia  Assessment & Plan  Id notes reviewed  Blood cultures growing E coli sensitive to cefuroxime  Urine culture growing both Proteus mirabilis and E coli  Patient was on Zosyn but is currently on Rocephin  Continue Rocephin  Upon discharge will plan to put patient on cefpodoxime  H/O myasthenia gravis  Assessment & Plan  Continue mycophenolate  Acute renal failure (HCC)  Assessment & Plan   Creatinine upon admission: 2 16  o Baseline: 0 9-1 1    Acute kidney failure with tubular necrosis likely due to sepsis and hypovolemia as well as obstructive uropathy  Per surgery, obstructive uropathy was likely caused by BPH rather than large right-sided inguinal hernia  Lawrence catheter was placed on 02/12  Creatinine has improved since then  Creatinine 2 74 --> 1 70 -->1  59  Will continue IV fluids and continue to monitor BMPs      UTI (urinary tract infection)  Assessment & Plan  Was on Zofran which was changed Rocephin based on cultures  Urine culture growing both Proteus mirabilis and E coli  Gastrocutaneous fistula  Assessment & Plan  · Colostomy in place with good output  Colostomy was placed because of perforated diverticulitis in the past   · Supportive care  Essential hypertension  Assessment & Plan   Patient has actually been hypotensive during hospital stay  His blood pressure is a little better today and yesterday  Will hold off on starting back any antihypertensives  * Severe sepsis (HCC)  Assessment & Plan   SIRS criteria: tachypnea, leukocytosis   Suspected source: UTI   Lactic acid: normal   End organ damage: ANDREA    Blood cultures grew E coli  Sepsis has Resolved  Antibiotics as per ID  Chest painresolved as of 2/15/2020  Assessment & Plan  Patient has known coronary artery disease for which he takes aspirin, atorvastatin and Imdur  He is also on lisinopril and beta-blocker  He was supposed to get CABG but was unable to because of health issues  He has been having chest pain for a few days  We did an EKG on 02/12 that showed ST depressions in the inferior lateral leads that were not present on prior EKGs from 2016  Patient is not having any chest pain today  He is not a candidate for any sort of intervention at the moment  Subjective/Objective     Subjective:   Patient seen and examined this afternoon  He has a lot of pain in his hernia which is nothing new  Otherwise he has no other complaints  Objective:  Vitals: Blood pressure 101/66, pulse 94, temperature 97 9 °F (36 6 °C), temperature source Oral, resp  rate 18, height 5' 9" (1 753 m), weight 78 6 kg (173 lb 4 5 oz), SpO2 95 %  ,Body mass index is 25 59 kg/m²        Intake/Output Summary (Last 24 hours) at 2/15/2020 1653  Last data filed at 2/15/2020 1501  Gross per 24 hour   Intake 2942 5 ml   Output 1100 ml   Net 1842 5 ml       Invasive Devices     Peripheral Intravenous Line            Long-Dwell Peripheral IV (Midline) 72/49/65 Left Basilic 2 days          Drain            Colostomy  -- days    Colostomy LLQ 1369 days    Urethral Catheter Latex 18 Fr  2 days                Physical Exam: /66 (BP Location: Right arm)   Pulse 94   Temp 97 9 °F (36 6 °C) (Oral)   Resp 18   Ht 5' 9" (1 753 m)   Wt 78 6 kg (173 lb 4 5 oz)   SpO2 95%   BMI 25 59 kg/m²   General appearance: alert and oriented, in no acute distress and Chronically ill-appearing  Head: Normocephalic, without obvious abnormality, atraumatic  Eyes: No scleral icterus  Lungs: clear to auscultation bilaterally  Heart: regular rate and rhythm, S1, S2 normal, no murmur, click, rub or gallop  Male genitalia:  Very large right-sided inguinal hernia noted  Underside of it appears red and excoriated and is moist   Extremities: edema 2+ pitting edema in lower extremities bilaterally    Neurologic: Grossly normal    Lab, Imaging and other studies: I have personally reviewed pertinent films in PACS with a Radiologist   VTE Pharmacologic Prophylaxis: Heparin  VTE Mechanical Prophylaxis: sequential compression device

## 2020-02-16 NOTE — ASSESSMENT & PLAN NOTE
Id notes reviewed  Blood cultures growing E coli sensitive to cefuroxime  Urine culture growing both Proteus mirabilis and E coli  Patient was on Zosyn but is currently on Rocephin  Continue Rocephin  Upon discharge will plan to put patient on cefpodoxime  ID following

## 2020-02-16 NOTE — ASSESSMENT & PLAN NOTE
 Patient has actually been hypotensive during hospital stay  His blood pressure these past few days has been better but are still soft  Will hold off on starting back any antihypertensives

## 2020-02-16 NOTE — ASSESSMENT & PLAN NOTE
 Creatinine upon admission: 2 16  o Baseline: 0 9-1 1    Acute kidney failure with tubular necrosis likely due to sepsis and hypovolemia as well as obstructive uropathy  Per surgery, obstructive uropathy was likely caused by BPH rather than large right-sided inguinal hernia  Lawrence catheter was placed on 02/12  Creatinine has improved since then  Creatinine 2 74 --> 1 70 -->1 59 --> 1 25  Will stop IV fluids and will recheck BMP tomorrow

## 2020-02-16 NOTE — ASSESSMENT & PLAN NOTE
Patient has an extremely large right-sided inguinal hernia  Per general surgery, patient is not a good surgical candidate given all his comorbidities  Patient and his family are certainly very understanding of this  Nonetheless, they would like to seek a 2nd opinion from Colorectal surgery as an outpatient  Will make sure contact information is available  Patient has a lot of redness and excoriation on the underside of his hernia  It should actually improved now that Lawrence catheters in  Continue nystatin powder

## 2020-02-16 NOTE — PROGRESS NOTES
Progress Note [address] [address] 1945, 75 y o  male MRN: [id number]    Unit/Bed#: S -01 Encounter: [id number]    Primary Care Provider: Jessie Plasencia MD   Date and time admitted to hospital: 2/11/2020  4:31 PM      Right inguinal hernia  Assessment & Plan  Patient has an extremely large right-sided inguinal hernia  Per general surgery, patient is not a good surgical candidate given all his comorbidities  Patient and his family are certainly very understanding of this  Nonetheless, they would like to seek a 2nd opinion from Colorectal surgery as an outpatient  Will make sure contact information is available  Patient has a lot of redness and excoriation on the underside of his hernia  It should actually improved now that Lawrence catheters in  Continue nystatin powder  Hydronephrosis  Assessment & Plan  Per surgery, patient's hydronephrosis is likely being caused by BPH rather than a large right-sided inguinal hernia  Renal ultrasound done yesterday shows normal size kidneys  Continue to maintain Lawrence catheter  Gram-negative bacteremia  Assessment & Plan  Id notes reviewed  Blood cultures growing E coli sensitive to cefuroxime  Urine culture growing both Proteus mirabilis and E coli  Patient was on Zosyn but is currently on Rocephin  Continue Rocephin  Upon discharge will plan to put patient on cefpodoxime  ID following  H/O myasthenia gravis  Assessment & Plan  Continue mycophenolate  Acute renal failure (HCC)  Assessment & Plan   Creatinine upon admission: 2 16  o Baseline: 0 9-1 1    Acute kidney failure with tubular necrosis likely due to sepsis and hypovolemia as well as obstructive uropathy  Per surgery, obstructive uropathy was likely caused by BPH rather than large right-sided inguinal hernia  Lawrence catheter was placed on 02/12  Creatinine has improved since then  Creatinine 2 74 --> 1 70 -->1 59 --> 1 25  Will stop IV fluids and will recheck BMP tomorrow      UTI (urinary tract infection)  Assessment & Plan  Was on Zofran which was changed Rocephin based on cultures  Urine culture growing both Proteus mirabilis and E coli  Gastrocutaneous fistula  Assessment & Plan  · Colostomy in place with good output  Colostomy was placed because of perforated diverticulitis in the past   · Supportive care  Essential hypertension  Assessment & Plan   Patient has actually been hypotensive during hospital stay  His blood pressure these past few days has been better but are still soft  Will hold off on starting back any antihypertensives  * Severe sepsis (HCC)  Assessment & Plan   SIRS criteria: tachypnea, leukocytosis   Suspected source: UTI   Lactic acid: normal   End organ damage: ANDREA    Blood cultures grew E coli  Sepsis has Resolved  Antibiotics as per ID  Subjective/Objective     Subjective:   Patient seen and examined this morning  He still has some hernia pain  He is doing fine otherwise  No other issues reported  Objective:  Vitals: Blood pressure 99/63, pulse 95, temperature 97 7 °F (36 5 °C), temperature source Oral, resp  rate 16, height 5' 9" (1 753 m), weight 78 6 kg (173 lb 4 5 oz), SpO2 95 %  ,Body mass index is 25 59 kg/m²  Intake/Output Summary (Last 24 hours) at 2/16/2020 1031  Last data filed at 2/16/2020 0941  Gross per 24 hour   Intake 2377 5 ml   Output 875 ml   Net 1502 5 ml       Invasive Devices     Peripheral Intravenous Line            Long-Dwell Peripheral IV (Midline) 17/43/95 Left Basilic 2 days          Drain            Colostomy  -- days    Colostomy LLQ 1370 days    Urethral Catheter Latex 18 Fr  3 days                Physical Exam: BP 99/63 (BP Location: Right arm)   Pulse 95   Temp 97 7 °F (36 5 °C) (Oral)   Resp 16   Ht 5' 9" (1 753 m)   Wt 78 6 kg (173 lb 4 5 oz)   SpO2 95%   BMI 25 59 kg/m²   General appearance: alert and oriented, in no acute distress and Chronically ill-appearing    Head: Normocephalic, without obvious abnormality, atraumatic  Eyes: No scleral icterus  Lungs: clear to auscultation bilaterally  Heart: regular rate and rhythm, S1, S2 normal, no murmur, click, rub or gallop  Extremities: edema 2+ pitting edema in lower extremities bilaterally  Neurologic: Grossly normal    Lab, Imaging and other studies: I have personally reviewed pertinent reports      VTE Pharmacologic Prophylaxis: Heparin  VTE Mechanical Prophylaxis: sequential compression device

## 2020-02-17 NOTE — SOCIAL WORK
Met with pt to discuss VNA as pt refused initially when pt was first admitted  Pt is still refusing VNA and has not reconsidered services  He does not feels he needs VNA

## 2020-02-17 NOTE — DISCHARGE SUMMARY
Discharge- Kelvin [place] 1945, 75 y o  male MRN: [id number]    Unit/Bed#: S -01 Encounter: [id number]    Primary Care Provider: Alison Valle MD   Date and time admitted to hospital: 2/11/2020  4:31 PM      Right inguinal hernia  Assessment & Plan  Patient has an extremely large right-sided inguinal hernia  Per general surgery, patient is not a good surgical candidate given all his comorbidities  Patient and his family are certainly very understanding of this  Nonetheless, they would like to seek a 2nd opinion from Colorectal surgery as an outpatient  Will make sure contact information is available  Patient has a lot of redness and excoriation on the underside of his hernia  It should actually improve now that Lawrence catheter is in  Continue nystatin powder  Hydronephrosis  Assessment & Plan  Per surgery, patient's hydronephrosis is likely being caused by BPH rather than a large right-sided inguinal hernia  Renal ultrasound done yesterday shows normal size kidneys  Continue to maintain Lawrence catheter chronically  Outpatient follow-up with urology  [facility] being set up for catheter management  Gram-negative bacteremia  Assessment & Plan  Id notes reviewed  Blood cultures growing E coli sensitive to cefuroxime  Urine culture growing both Proteus mirabilis and E coli  Patient was on Zosyn but was changed to Rocephin  Will discharge patient with cefpodoxime to complete 10 day course of antibiotics  H/O myasthenia gravis  Assessment & Plan  Continue mycophenolate  Acute renal failure (HCC)  Assessment & Plan   Creatinine upon admission: 2 16  o Baseline: 0 9-1 1    Acute kidney failure with tubular necrosis likely due to sepsis and hypovolemia as well as obstructive uropathy  Per surgery, obstructive uropathy was likely caused by BPH rather than large right-sided inguinal hernia  Lawrence catheter was placed on 02/12  Creatinine has improved since then    Creatinine 2 74 --> 1 70 -->1 59 --> 1 25 -->1 30  Potassium still low at 3 1, though patient did refuse 1 dose of p o  Replacement yesterday  P o  Replacement ordered for this morning  Will have patient get repeat BMP on Thursday and will have him continue the potassium supplement already on his home medication list     UTI (urinary tract infection)  Assessment & Plan  Was on Zofran which was changed Rocephin based on cultures  Urine culture growing both Proteus mirabilis and E coli  Will discharge with prescription for cefpodoxime to complete 10 day course  Gastrocutaneous fistula  Assessment & Plan  · Colostomy in place with good output  Colostomy was placed because of perforated diverticulitis in the past   · Supportive care  Essential hypertension  Assessment & Plan   Patient has actually been hypotensive during hospital stay  His blood pressure these past few days has been better but are still low end of normal   Will discontinue all home blood pressure medications  * Severe sepsis (HCC)  Assessment & Plan   SIRS criteria: tachypnea, leukocytosis   Suspected source: UTI   Lactic acid: normal   End organ damage: ANDREA    Blood cultures grew E coli  Sepsis has Resolved  Antibiotics as per ID        Disposition:     Home with VNA Services (Reminder: Complete face to face encounter)    Reason for Admission:  Sepsis, UTI, acute kidney injury    Discharge Diagnoses:     Principal Problem:    Severe sepsis ([address] )  Active Problems:    Essential hypertension    Gastrocutaneous fistula    UTI (urinary tract infection)    Acute renal failure ([address] )    H/O myasthenia gravis    Gram-negative bacteremia    Hydronephrosis    Right inguinal hernia  Resolved Problems:    Chest pain      Consultations During Hospital Stay:  · General surgery, Urology, Infectious Disease, Nephrology    Procedures Performed:     · None    Significant Findings / Test Results:     US kidney and bladder [[id number]] Collected: 02/14/20 8766 Order Status: Completed Updated: 02/14/20 0837   Narrative:     RENAL ULTRASOUND    INDICATION:   Hydronephrosis- bilateral     COMPARISON: CT 2/12/2020    TECHNIQUE:   Ultrasound of the retroperitoneum was performed with a curvilinear transducer utilizing volumetric sweeps and still imaging techniques  FINDINGS:    KIDNEYS:  Symmetric and normal size  Right kidney:  11 7 x 6 5 cm  Left kidney:  12 3 x 5 7 cm  Right kidney  Normal echogenicity and contour  No suspicious masses detected   Simple cortical cysts in the upper pole measures 10 x 8 x 7 mm  Mild right pyelocaliectasis is present  No shadowing calculi  No perinephric fluid collections  Left kidney  Normal echogenicity and contour  No suspicious masses detected  No hydronephrosis  No shadowing calculi  No perinephric fluid collections  URETERS:  Nonvisualized  BLADDER:   Decompressed by Lawrence catheter  No focal thickening or mass lesions  Ureteral jets are not identified  Impression:         1   Mild right renal pyelocaliectasis with otherwise unremarkable study  Workstation performed: [id number]   CT abdomen pelvis wo contrast [[id number]] Collected: 02/12/20 1626   Order Status: Completed Updated: 02/12/20 1642   Narrative:     CT ABDOMEN AND PELVIS WITHOUT IV CONTRAST    INDICATION:   History of large inguinal hernia   Urinary retention   Bacteremia  COMPARISON:  CT study of 11/12/2017    TECHNIQUE:  CT examination of the abdomen and pelvis was performed without intravenous contrast   Axial, sagittal, and coronal 2D reformatted images were created from the source data and submitted for interpretation  Radiation dose length product (DLP) for this visit:  294 mGy-cm    This examination, like all CT scans performed in the [facility], was performed utilizing techniques to minimize radiation dose exposure, including the use of iterative   reconstruction and automated exposure control  Enteric contrast was not administered  FINDINGS:    ABDOMEN    LOWER CHEST:  No clinically significant abnormality identified in the visualized lower chest     LIVER/BILIARY TREE:  Mild hepatic steatosis    GALLBLADDER:  Assessment limited secondary to motion artifact   No obvious abnormality    SPLEEN:  Unremarkable  PANCREAS:  Unremarkable  ADRENAL GLANDS:  Unremarkable  KIDNEYS/URETERS:  Mild to moderate bilateral hydronephrosis noted   No calculi on the right   Multiple small left renal calculi noted, individual largest in the interpolar aspect measuring approximately 3 to 4 mm   Both ureters are dilated however no   ureteral stone appreciated  Right perinephric soft tissue stranding noted    STOMACH AND BOWEL:  See discussion below    APPENDIX:  No findings to suggest appendicitis  ABDOMINOPELVIC CAVITY:  No ascites or free intraperitoneal air  No lymphadenopathy  VESSELS:  Unremarkable for patient's age  PELVIS    REPRODUCTIVE ORGANS:  Prostatomegaly, measuring approximately 5 7 x 4 8 cm    URINARY BLADDER:  Diffuse bladder wall thickening with perivesical inflammatory change   No bladder calculus   Bladder is not over distended at this time    ABDOMINAL WALL/INGUINAL REGIONS: Axel [place] is a very large right inguinal hernia containing multiple loops of both large and small bowel   This hernia was also present on the prior examination although appearing larger in size   There is no inflammatory   change within the hernia sac or evidence of bowel obstruction   The large size of the hernia creates mass effect upon the penis an external genitalia, displacing these organs to the left   Additionally, there is a left parastomal hernia again without   evidence of obstruction or inflammation  OSSEOUS STRUCTURES:  Multiple compression fractures of the lumbar spine, similar to prior study   Impression:       1   Diffuse bladder wall thickening with inflammatory change of the surrounding perivesical tissue concerning for cystitis  2  Mild to moderate bilateral hydronephrosis   Although multiple left renal calculi are seen, no evidence of ureteral stone is seen   Right perinephric soft tissue stranding which may be associated with pyelonephritis  3  Prostatomegaly  4  Very large right inguinal hernia, containing multiple loops of large and small bowel   This creates mass effect upon the penis and may create compression of the penile urethra, further predisposing to bladder outlet symptomatology  5  Note is also made of a left parastomal hernia  ·     Incidental Findings:   · None     Test Results Pending at Discharge (will require follow up): · None     Outpatient Tests Requested:  · BMP    Complications:  None    Hospital Course:     Patient was admitted for severe sepsis deemed secondary to UTI and acute renal failure  He was deemed to have a urinary tract infection, but a sample could not be obtained prior to initiation of antibiotics as patient had great difficulty trying to urinate  He was started on Rocephin  He was also started on IV fluids  However, his creatinine, which was 2 16 upon admission, bumped up further to 2 74  For that reason CT of abdomen/pelvis was done and nephrology was consulted  CT abdomen/pelvis showed markedly enlarged prostate and possible compression of the urethra by patient's large right-sided inguinal hernia  Lawrence catheter was placed with resultant improvement in patient's creatinine  Urology was consulted and recommended to keep Lawrence in chronically  His blood cultures grew Gram-negative rods  We also did manage to get a sample for urine culture  Infectious Disease was consulted and changed his Rocephin to Zosyn given how sick he was  His blood culture grew E coli sensitive to ceftriaxone  His urine culture grew both Proteus mirabilis and E coli  Zosyn was changed back to ceftriaxone by ID  Patient subsequently improved    His renal function also continue to improve  Early on in his hospital stay he did have chest pain, so he was transferred to telemetry  He does have a history of multi-vessel coronary artery disease which was never operated on  That being said, Cardiology consultation was held given that he is not currently a candidate for any sort of intervention at the moment  His chest pain did resolve  General surgery also saw patient about his massive right-sided inguinal hernia  However, they state that given all his medical comorbidities he is not a good surgical candidate  Patient and his family expressed understanding but do plan on seeking 2nd opinion from Colorectal surgery  Patient otherwise did fine during his hospital stay  Given all his medical comorbidities he is not a great candidate for any sort of major surgery including repair of his massive right-sided inguinal hernia  Condition at Discharge: stable     Discharge Day Visit / Exam:     Subjective:  Patient seen and examined this morning  He states the pain in his hernia is actually improved  He is eager to go home today  No other issues reported  Vitals: Blood Pressure: 103/66 (02/17/20 0700)  Pulse: 98 (02/17/20 0700)  Temperature: 97 9 °F (36 6 °C) (02/17/20 0700)  Temp Source: Oral (02/17/20 0700)  Respirations: 20 (02/17/20 0700)  Height: 5' 9" (175 3 cm) (02/11/20 2100)  Weight - Scale: 78 6 kg (173 lb 4 5 oz) (02/11/20 2100)  SpO2: 96 % (02/17/20 0700)  Exam:   Physical Exam   Constitutional: He is oriented to person, place, and time  No distress  HENT:   Head: Normocephalic and atraumatic  Eyes: No scleral icterus  Pulmonary/Chest: No respiratory distress  Genitourinary:   Genitourinary Comments: Lawrence catheter in place with dark urine draining  Neurological: He is alert and oriented to person, place, and time  Psychiatric: He has a normal mood and affect   His behavior is normal        Discussion with Family:  Patient called his wife on her cell phone and I updated her over the phone while in his room  Discharge instructions/Information to patient and family:   See after visit summary for information provided to patient and family  Provisions for Follow-Up Care:  See after visit summary for information related to follow-up care and any pertinent home health orders  Planned Readmission:  None     Discharge Statement:  I spent 45 minutes discharging the patient  This time was spent on the day of discharge  I had direct contact with the patient on the day of discharge  Greater than 50% of the total time was spent examining patient, answering all patient questions, arranging and discussing plan of care with patient as well as directly providing post-discharge instructions  Additional time then spent on discharge activities  Discharge Medications:  See after visit summary for reconciled discharge medications provided to patient and family        ** Please Note: This note has been constructed using a voice recognition system **

## 2020-02-17 NOTE — PLAN OF CARE
Problem: PAIN - ADULT  Goal: Verbalizes/displays adequate comfort level or baseline comfort level  Description  Interventions:  - Encourage patient to monitor pain and request assistance  - Assess pain using appropriate pain scale  - Administer analgesics based on type and severity of pain and evaluate response  - Implement non-pharmacological measures as appropriate and evaluate response  - Consider cultural and social influences on pain and pain management  - Notify physician/advanced practitioner if interventions unsuccessful or patient reports new pain  2/17/2020 1035 by Winnie Moya RN  Outcome: Progressing  2/17/2020 1035 by Winnie Moya RN  Outcome: Progressing     Problem: INFECTION - ADULT  Goal: Absence or prevention of progression during hospitalization  Description  INTERVENTIONS:  - Assess and monitor for signs and symptoms of infection  - Monitor lab/diagnostic results  - Monitor all insertion sites, i e  indwelling lines, tubes, and drains  - Monitor endotracheal if appropriate and nasal secretions for changes in amount and color  - [place] appropriate cooling/warming therapies per order  - Administer medications as ordered  - Instruct and encourage patient and family to use good hand hygiene technique  - Identify and instruct in appropriate isolation precautions for identified infection/condition  2/17/2020 1035 by Winnie Moya RN  Outcome: Progressing  2/17/2020 1035 by Winnie Moya RN  Outcome: Progressing  Goal: Absence of fever/infection during neutropenic period  Description  INTERVENTIONS:  - Monitor WBC    2/17/2020 1035 by Winnie Moya RN  Outcome: Progressing  2/17/2020 1035 by Winnie Moya RN  Outcome: Progressing     Problem: DISCHARGE PLANNING  Goal: Discharge to home or other facility with appropriate resources  Description  INTERVENTIONS:  - Identify barriers to discharge w/patient and caregiver  - Arrange for needed discharge resources and transportation as appropriate  - Identify discharge learning needs (meds, wound care, etc )  - Arrange for interpretive services to assist at discharge as needed  - Refer to Case Management Department for coordinating discharge planning if the patient needs post-hospital services based on physician/advanced practitioner order or complex needs related to functional status, cognitive ability, or social support system  2/17/2020 1035 by Melissa Schulte RN  Outcome: Progressing  2/17/2020 1035 by Melissa Schulte RN  Outcome: Progressing     Problem: GENITOURINARY - ADULT  Goal: Maintains or returns to baseline urinary function  Description  INTERVENTIONS:  - Assess urinary function  - Encourage oral fluids to ensure adequate hydration if ordered  - Administer IV fluids as ordered to ensure adequate hydration  - Administer ordered medications as needed  - Offer frequent toileting  - Follow urinary retention protocol if ordered  2/17/2020 1035 by Melissa Schulte RN  Outcome: Progressing  2/17/2020 1035 by Melissa Schulte RN  Outcome: Progressing  Goal: Absence of urinary retention  Description  INTERVENTIONS:  - Assess patient's ability to void and empty bladder  - Monitor I/O  - Bladder scan as needed  - Discuss with physician/AP medications to alleviate retention as needed  - Discuss catheterization for long term situations as appropriate   2/17/2020 1035 by Melissa Schulte RN  Outcome: Progressing  2/17/2020 1035 by Melissa Schulte RN  Outcome: Progressing  Goal: Urinary catheter remains patent  Description  INTERVENTIONS:  - Assess patency of urinary catheter  - If patient has a chronic cameron, consider changing catheter if non-functioning  - Follow guidelines for intermittent irrigation of non-functioning urinary catheter  2/17/2020 1035 by Melissa Schulte RN  Outcome: Progressing  2/17/2020 1035 by Melissa Schulte RN  Outcome: Progressing     Problem: GASTROINTESTINAL - ADULT  Goal: Minimal or absence of nausea and/or vomiting  Description  INTERVENTIONS:  - Administer IV fluids if ordered to ensure adequate hydration  - Maintain NPO status until nausea and vomiting are resolved  - Nasogastric tube if ordered  - Administer ordered antiemetic medications as needed  - Provide nonpharmacologic comfort measures as appropriate  - Advance diet as tolerated, if ordered  - Consider nutrition services referral to assist patient with adequate nutrition and appropriate food choices  2/17/2020 1035 by Isaac Cordova RN  Outcome: Progressing  2/17/2020 1035 by Isaac Cordova RN  Outcome: Progressing  Goal: Maintains or returns to baseline bowel function  Description  INTERVENTIONS:  - Assess bowel function  - Encourage oral fluids to ensure adequate hydration  - Administer IV fluids if ordered to ensure adequate hydration  - Administer ordered medications as needed  - Encourage mobilization and activity  - Consider nutritional services referral to assist patient with adequate nutrition and appropriate food choices  2/17/2020 1035 by Isaac Cordova RN  Outcome: Progressing  2/17/2020 1035 by Isaac Cordova RN  Outcome: Progressing  Goal: Maintains adequate nutritional intake  Description  INTERVENTIONS:  - Monitor percentage of each meal consumed  - Identify factors contributing to decreased intake, treat as appropriate  - Assist with meals as needed  - Monitor I&O, weight, and lab values if indicated  - Obtain nutrition services referral as needed  2/17/2020 1035 by Isaac Cordova RN  Outcome: Progressing  2/17/2020 1035 by Isaac Cordova RN  Outcome: Progressing  Goal: Establish and maintain optimal ostomy function  Description  INTERVENTIONS:  - Assess bowel function  - Encourage oral fluids to ensure adequate hydration  - Administer IV fluids if ordered to ensure adequate hydration   - Administer ordered medications as needed  - Encourage mobilization and activity  - Nutrition services referral to assist patient with appropriate food choices  - Assess stoma site  - Consider wound care consult   2/17/2020 1035 by Lincoln Bloom RN  Outcome: Progressing  2/17/2020 1035 by Lincoln Bloom RN  Outcome: Progressing     Problem: Prexisting or High Potential for Compromised Skin Integrity  Goal: Skin integrity is maintained or improved  Description  INTERVENTIONS:  - Identify patients at risk for skin breakdown  - Assess and monitor skin integrity  - Assess and monitor nutrition and hydration status  - Monitor labs   - Assess for incontinence   - Turn and reposition patient  - Assist with mobility/ambulation  - Relieve pressure over bony prominences  - Avoid friction and shearing  - Provide appropriate hygiene as needed including keeping skin clean and dry  - Evaluate need for skin moisturizer/barrier cream  - Collaborate with interdisciplinary team   - Patient/family teaching  - Consider wound care consult   2/17/2020 1035 by Lincoln Bloom RN  Outcome: Progressing  2/17/2020 1035 by Lincoln Bloom RN  Outcome: Progressing     Problem: Potential for Falls  Goal: Patient will remain free of falls  Description  INTERVENTIONS:  - Assess patient frequently for physical needs  -  Identify cognitive and physical deficits and behaviors that affect risk of falls    -  [place] fall precautions as indicated by assessment   - Educate patient/family on patient safety including physical limitations  - Instruct patient to call for assistance with activity based on assessment  - Modify environment to reduce risk of injury  - Consider OT/PT consult to assist with strengthening/mobility  2/17/2020 1035 by Lincoln Bloom RN  Outcome: Progressing  2/17/2020 1035 by Lincoln Bloom RN  Outcome: Progressing

## 2020-02-17 NOTE — SOCIAL WORK
Pt is accepted to [address] [place], [place] [address]  for SN and Home PT  Pt informed  AVS updated  IMM reviewed with pt and he voiced understanding  Copy provided

## 2020-02-17 NOTE — DISCHARGE INSTR - AVS FIRST PAGE
Dear Tho March,     It was our pleasure to care for you here at [facility]  It is our hope that we were always able to exceed the expected standards for your care during your stay  You were hospitalized due to sepsis, urinary tract infection, blood stream infection and acute kidney injury  You were cared for on the 4th floor under the service of Bertrand Baez,  with the [facility] who covers for your primary care physician (PCP), Roxanna Daniels MD, while you were hospitalized  If you have any questions or concerns related to this hospitalization, you may contact us at [phone]  For follow up as well as medication refills, we recommend that you follow up with your primary care physician  A registered nurse will reach out to you by phone within a few days after your discharge to answer any additional questions that you may have after going home  However, at this time we provide for you here, the most important instructions / recommendations at discharge:       · Notable Medication Adjustments -   · Starting tomorrow morning take the antibiotic cefpodoxime as directed until completion  · Stop taking your blood pressure medications  · Please continue to take your potassium supplement  · Testing Required after Discharge -   · Have a basic metabolic panel drawn on Thursday of this week  [facility] may be able to draw it for you  · Important follow up information -   · Follow-up with your primary care physician within 1-2 weeks  · Follow-up with Urology within the next 8 weeks  Please call their office to confirm that you have an appointment  · Follow-up with Nephrology  Please call their office also to confirm appointment      · Other Instructions -   · None    · Please review this entire after visit summary as additional general instructions including medication list, appointments, activity, diet, any pertinent wound care, and other additional recommendations from your care team that may be provided for you        Sincerely,     Orville Sneed, DO

## 2020-02-17 NOTE — ASSESSMENT & PLAN NOTE
Id notes reviewed  Blood cultures growing E coli sensitive to cefuroxime  Urine culture growing both Proteus mirabilis and E coli  Patient was on Zosyn but was changed to Rocephin  Will discharge patient with cefpodoxime to complete 10 day course of antibiotics

## 2020-02-17 NOTE — ASSESSMENT & PLAN NOTE
 Creatinine upon admission: 2 16  o Baseline: 0 9-1 1    Acute kidney failure with tubular necrosis likely due to sepsis and hypovolemia as well as obstructive uropathy  Per surgery, obstructive uropathy was likely caused by BPH rather than large right-sided inguinal hernia  Lawrence catheter was placed on 02/12  Creatinine has improved since then  Creatinine 2 74 --> 1 70 -->1 59 --> 1 25 -->1 30  Potassium still low at 3 1, though patient did refuse 1 dose of p o  Replacement yesterday  P o  Replacement ordered for this morning    Will have patient get repeat BMP on Thursday and will have him continue the potassium supplement already on his home medication list

## 2020-02-17 NOTE — PHYSICAL THERAPY NOTE
PHYSICAL THERAPY TREATMENT NOTE    Patient Name: Boo Mckeon  GDALI'U Date: 20 1250   Pain Assessment   Pain Assessment 0-10   Pain Score 6   Pain Type Acute pain   Pain Location [facility]   Hospital Pain Intervention(s) Ambulation/increased activity;Repositioned   Response to Interventions tolerated   Restrictions/Precautions   Weight Bearing Precautions Per Order No   Other Precautions Impulsive; Chair Alarm; Bed Alarm; Fall Risk;Pain   General   Chart Reviewed Yes   Response to Previous Treatment Patient reporting fatigue but able to participate  Family/Caregiver Present No   Cognition   Overall Cognitive Status WFL   Arousal/Participation Alert   Attention Attends with cues to redirect   Comments Pt identified by name and   Subjective   Subjective Agrees to PT treatment after encouragement  Bed Mobility   Supine to Sit Unable to assess  (OOB in chair pre/post session with alarm intact)   Transfers   Sit to Stand 4  Minimal assistance   Additional items Assist x 1; Increased time required;Verbal cues   Stand to Sit 4  Minimal assistance   Additional items Assist x 1; Increased time required;Verbal cues   Ambulation/Elevation   Gait pattern Improper Weight shift;Decreased foot clearance; Forward Flexion; Wide GAYE; Short stride; Excessively slow   Gait Assistance 4  Minimal assist   Additional items Assist x 1;Verbal cues   Assistive Device Rolling walker   Distance 90` x1   Stair Management Assistance 4  Minimal assist   Additional items Assist x 1;Verbal cues   Stair Management Technique Nonreciprocal;One rail R   Number of Stairs 4   Balance   Static Sitting Fair +   Dynamic Sitting Fair   Static Standing Fair   Dynamic Standing Fair -   Ambulatory Poor +   Endurance Deficit   Endurance Deficit Yes   Endurance Deficit Description limited ambulation distance, SOB, fatigue, pain  (SpO2 stable throughout; HR increased to 138, RN aware)   Activity Tolerance   Activity Tolerance Patient limited by pain   Nurse Made Aware Per RN, pt appropriate to treat   Assessment   Prognosis Fair   Problem List Decreased strength;Decreased endurance; Impaired balance;Decreased mobility; Impaired judgement;Decreased safety awareness;Decreased skin integrity;Pain   Assessment Pt tolerated treatment fairly, however continues to require min A for ambulation  Able to perform stair negotiation with min A and R handrail  Requires significant amount of standing rest breaks due to SOB  Pt is impulsive and has decreased safety awareness  Pt becomes agitated and upset when discussing his mobility and any difficulties he may have at home  Pt is insistent that he will be able to perform all activities at home without issue  Will continue to benefit from ongoing skilled PT to maximize his functional mobility and increase his level of independence  Recommending home with increased family support and HHPT at time of discharge when medically appropriate  Goals   Patient Goals to go home    STG Expiration Date 02/24/20   PT Treatment Day 1   Plan   Treatment/Interventions Functional transfer training;LE strengthening/ROM; Elevations; Therapeutic exercise; Endurance training;Patient/family training;Equipment eval/education; Bed mobility;Gait training   Progress Slow progress, decreased activity tolerance   PT Frequency   (3-5x/week)   Recommendation   Recommendation Home PT; Home with family support   Equipment Recommended Henry Leon, PT,DPT

## 2020-02-17 NOTE — ASSESSMENT & PLAN NOTE
 Patient has actually been hypotensive during hospital stay  His blood pressure these past few days has been better but are still low end of normal   Will discontinue all home blood pressure medications

## 2020-02-17 NOTE — ASSESSMENT & PLAN NOTE
Per surgery, patient's hydronephrosis is likely being caused by BPH rather than a large right-sided inguinal hernia  Renal ultrasound done yesterday shows normal size kidneys  Continue to maintain Lawrence catheter chronically  Outpatient follow-up with urology  Home health being set up for catheter management

## 2020-02-17 NOTE — PROGRESS NOTES
Progress Note - Infectious Disease   Ifrah Maria 75 y o  male MRN: [id number]  Unit/Bed#: S -01 Encounter: [id number]      Impression/Plan:  1  Severe sepsis, POA   Leukocytosis, tachypnea, tachycardia, acute kidney injury   Secondary to E coli sandy bacteremia   No high fevers but patient is on CellCept, which may suppress fevers   WBC count trended down  Low blood pressures are improving     -antibiotic plan as below  -monitor temperatures and hemodynamics  -supportive care per primary service     2  E  Coli bacteremia   2/2 sets + E coli     -Continues IV ceftriaxone in hospital  -Upon discharge, patient can be transitioned to p o  Cefpodoxime 200 mg PO q 12 hours through 2/20/20 to complete 10 day total antibiotic course      3  UTI/pyelonephritis   urine cultures growing greater than 100,000 colonies of Proteus species and [id number] colonies of E  Coli resistant to cefazolin    -continue antibiotic plan as above  4  Acute kidney injury   Creatinine improved to 1 30  eGFR 53%     -no dose adjust of ceftriaxone needed  -full dose cefpodoxime appropriate on transition to p o  antibiotic      5  Bilateral hydronephrosis   Status post Lawrence catheter placement  6  Large right inguinal hernia  No surgical repair planned at this time  Patient to f/u outpatient with surgery  7  Myasthenia gravis   Immunosuppressed state, currently on CellCept   Risk factor for development of severe infections       Antibiotics:  Ceftriaxone D4  Antibiotic D7     Discussed above plan with patient, RN, and primary service      Subjective:  Patient is not currently having dysuria with Lawrence catheter in place  Izabella Li does less discomfort on under side of large inguinal hernia skin irritation with barrier cream/powder      ROS:  Patient has no fever, chills, sweats; no nausea, vomiting, diarrhea; no cough, shortness of breath  No new symptoms      Objective:  Vitals:  Temp:  [97 5 °F (36 4 °C)-98 °F (36 7 °C)] 98 °F (36 7 °C)  HR: [] 92  Resp:  [16-20] 20  BP: ()/(61-66) 98/61  SpO2:  [93 %-95 %] 95 %  Temp (24hrs), Av 7 °F (36 5 °C), Min:97 5 °F (36 4 °C), Max:98 °F (36 7 °C)  Current: Temperature: 98 °F (36 7 °C)    General Appearance:  Awake, alert, cooperative, resting in chair, no acute distress  Throat: Oropharynx moist without lesions  Lungs:   Clear to auscultation bilaterally; no wheezes, rhonchi or rales; respirations unlabored   Heart:  RRR; S1-S2 heard, no murmur   Abdomen:   Soft, non-tender, non-distended, positive bowel sounds  Left lower quadrant ostomy bag in place  Extremities: Trace lower extremity edema, left arm IV site nontender   : Lawrence in place with [place] blood-tinged urine with some sediment  Huge right inguinal hernia with dry skin powdered with barrier cream in place  Skin: No rashes      Labs, Imaging, & Other studies:   All pertinent labs and imaging studies were personally reviewed  Results from last 7 days   Lab Units 20  0613 02/15/20  0617 20  0543   WBC Thousand/uL 8 54 8 36 10 18*   HEMOGLOBIN g/dL 13 2 12 0 12 3   PLATELETS Thousands/uL 207 152 162     Results from last 7 days   Lab Units 20  0613  20  0558 20  1731   POTASSIUM mmol/L 3 1*   < > 4 5 4 6   CHLORIDE mmol/L 104   < > 105 103   CO2 mmol/L 26   < > 21 23   BUN mg/dL 30*   < > 54* 42*   CREATININE mg/dL 1 30   < > 2 74* 2 16*   EGFR ml/min/1 73sq m 53   < > 22 29   CALCIUM mg/dL 8 2*   < > 8 9 9 2   AST U/L  --   --  36 31   ALT U/L  --   --  21 22   ALK PHOS U/L  --   --  80 86    < > = values in this interval not displayed           Results from last 7 days   Lab Units 20  1853 20  1756 20  1731   BLOOD CULTURE  Escherichia coli*  --  Escherichia coli*   GRAM STAIN RESULT  Gram negative rods*  --  Gram negative rods*   URINE CULTURE   --  >100,000 cfu/ml Proteus mirabilis*  80,000-89,000 cfu/ml Escherichia coli*  --

## 2020-02-17 NOTE — SOCIAL WORK
Pt has now reconsidered [facility] and now is agreeable to it  Referral placed to [facility] as pt stated he was open to it previously and is his preference

## 2020-02-17 NOTE — ASSESSMENT & PLAN NOTE
Patient has an extremely large right-sided inguinal hernia  Per general surgery, patient is not a good surgical candidate given all his comorbidities  Patient and his family are certainly very understanding of this  Nonetheless, they would like to seek a 2nd opinion from Colorectal surgery as an outpatient  Will make sure contact information is available  Patient has a lot of redness and excoriation on the underside of his hernia  It should actually improve now that Lawrence catheter is in  Continue nystatin powder

## 2020-02-17 NOTE — ASSESSMENT & PLAN NOTE
Was on Zofran which was changed Rocephin based on cultures  Urine culture growing both Proteus mirabilis and E coli  Will discharge with prescription for cefpodoxime to complete 10 day course

## 2020-02-17 NOTE — PLAN OF CARE
Problem: PHYSICAL THERAPY ADULT  Goal: Performs mobility at highest level of function for planned discharge setting  See evaluation for individualized goals  Description  Treatment/Interventions: Functional transfer training, LE strengthening/ROM, Elevations, Therapeutic exercise, Endurance training, Patient/family training, Equipment eval/education, Bed mobility, Gait training  Equipment Recommended: Jus Martinez       See flowsheet documentation for full assessment, interventions and recommendations  Outcome: Progressing  Note:   Prognosis: Fair  Problem List: Decreased strength, Decreased endurance, Impaired balance, Decreased mobility, Impaired judgement, Decreased safety awareness, Decreased skin integrity, Pain  Assessment: Pt tolerated treatment fairly, however continues to require min A for ambulation  Able to perform stair negotiation with min A and R handrail  Requires significant amount of standing rest breaks due to SOB  Pt is impulsive and has decreased safety awareness  Pt becomes agitated and upset when discussing his mobility and any difficulties he may have at home  Pt is insistent that he will be able to perform all activities at home without issue  Will continue to benefit from ongoing skilled PT to maximize his functional mobility and increase his level of independence  Recommending home with increased family support and HHPT at time of discharge when medically appropriate  Recommendation: Home PT, Home with family support          See flowsheet documentation for full assessment

## 2020-02-20 PROBLEM — R65.20 SEVERE SEPSIS (HCC): Status: RESOLVED | Noted: 2020-01-01 | Resolved: 2020-01-01

## 2020-02-20 PROBLEM — R77.8 ELEVATED TROPONIN I LEVEL: Status: ACTIVE | Noted: 2020-01-01

## 2020-02-20 PROBLEM — A41.9 SEVERE SEPSIS (HCC): Status: RESOLVED | Noted: 2020-01-01 | Resolved: 2020-01-01

## 2020-02-20 PROBLEM — K56.600 PARTIAL SMALL BOWEL OBSTRUCTION (HCC): Status: RESOLVED | Noted: 2017-11-13 | Resolved: 2020-01-01

## 2020-02-20 PROBLEM — N13.9 URINARY OBSTRUCTION: Status: ACTIVE | Noted: 2020-01-01

## 2020-02-20 PROBLEM — I50.43 ACUTE ON CHRONIC COMBINED SYSTOLIC AND DIASTOLIC HEART FAILURE (HCC): Status: ACTIVE | Noted: 2020-01-01

## 2020-02-20 NOTE — PROGRESS NOTES
Daily Progress Note/Critical Care Accept - [address] [place] 75 y o  male MRN: [id number]  Unit/Bed#: ICU 10 Encounter: [id number]        ----------------------------------------------------------------------------------------  HPI/24hr events:  Patient recently admitted 2/11-2/17  Received aggressive IV fluids during that admission  Director held I reviewed on discharge  Patient presented with increasing shortness of breath and 16 lb weight gain  Chest x-ray shows cardiomegaly and bilateral effusions and some pulmonary edema  Repeat echo 2/20 shows ejection fraction of 15%  Per cardiology - If VBG shows low O2 sat will start milrinone and use low dose levophed as needed to help offset vasodilitation  Avoid any Dopamine, Dobuatmine, or epi given 3VD if possible as this will likely bring out ischemia  [facility]     ---------------------------------------------------------------------------------------  SUBJECTIVE      Review of Systems   Constitutional: Positive for diaphoresis and fatigue  HENT: Negative for rhinorrhea and sore throat  Respiratory: Positive for shortness of breath  Cardiovascular: Positive for leg swelling  Negative for chest pain  Gastrointestinal: Positive for abdominal distention  Negative for nausea and vomiting  Genitourinary: Negative for urgency  Neurological: Positive for weakness  Negative for dizziness  Psychiatric/Behavioral: Negative for behavioral problems and confusion       Review of systems was reviewed and negative unless stated above in HPI/24-hour events   ---------------------------------------------------------------------------------------  Assessment and Plan:    Neuro:    Diagnosis: Myasthenia gravis  o Plan:  Continue mycophenolate    CV:    Diagnosis: Acute on Chronic combined systolic and diastolic HF  o Plan: Repeat echo showed EF 15%,   o JVD, Bibasilar crackles, Abd distention, pitting edema  o Continue supplemental O2  o Continue Diuresis furosemide 500 mg infusion 50 mL- 10 milligrams/hour  o Fluid restriction 1800 mL  o Oral antihypertensive agents held  o BNP 80,770 on admission  o Per cardiology may require pressors  o Cardiac diet   o Monitor I/O  o Cardiology Following   o    Diagnosis: Elevated Troponin   o Plan: Trend Troponin- Last 14 2   o Continue Diuresis     Pulm:   Diagnosis:  Fluid overload  o Plan:  Monitor oxygen saturation  o Currently on 4 L nasal cannula    GI:    Diagnosis: Rt Inguinal Hernia  o Plan: Following with surgery no plans for repair at this time      :    Diagnosis: Urinary Obstruction   o Plan: Continue Lawrence   o Renal ultrasound  o Following with urology    Diagnosis: ANDREA  o Plan: Monitor while a on IV Lasix  o Baseline creatinine around 1  o ACE-inhibitor held at this time      F/E/N:    Plan:  Fluid restriction 1800ml/day    Heme/Onc:    Diagnosis:  No acute issues    Endo:    Diagnosis:  No acute issues  o Plan:  Monitor glucose      ID:    Diagnosis:  History of severe sepsis  o Plan:  Cefpodoxime in 200 mg b i d   o Two hundred twenty last dose to complete antibiotic course of 10 days  o Monitor fever and WBC      MSK/Skin:    Diagnosis: No acute issues       PPX  Heparin      Disposition: Continue Critical Care   Code Status: Level 1 - Full Code  ---------------------------------------------------------------------------------------  ICU CORE MEASURES    Prophylaxis   VTE Pharmacologic Prophylaxis: Heparin  VTE Mechanical Prophylaxis: sequential compression device  Stress Ulcer Prophylaxis: Prophylaxis Not Indicated     ABCDE Protocol (if indicated)  Plan to perform spontaneous awakening trial today? Not applicable  Plan to perform spontaneous breathing trial today? Not applicable  Obvious barriers to extubation?  Not applicable  CAM-ICU: Negative    Invasive Devices Review  Invasive Devices     Peripheral Intravenous Line            Peripheral IV 02/20/20 Right Forearm less than 1 day          Drain            Colostomy  -- days    Colostomy LLQ 1374 days    Urethral Catheter Latex 18 Fr  7 days              Can any invasive devices be discontinued today? Not applicable  ---------------------------------------------------------------------------------------  OBJECTIVE    Vitals   Vitals:    20 0959 20 1312 20 1315 20 1408   BP: 127/91 119/80 119/80 90/55   BP Location: Right arm Right arm  Left arm   Pulse: (!) 115 (!) 107 (!) 108 (!) 106   Resp:  18  19   Temp:    98 °F (36 7 °C)   TempSrc:    Oral   SpO2: 97% 96% 96% 98%   Weight: 89 6 kg (197 lb 8 5 oz)   88 7 kg (195 lb 8 8 oz)   Height:    5' 9" (1 753 m)     Temp (24hrs), Av °F (36 7 °C), Min:98 °F (36 7 °C), Max:98 °F (36 7 °C)  Current: Temperature: 98 °F (36 7 °C)  HR: 101  BP: 110/77  RR: 22  SpO2: 98    Physical Exam   Constitutional: He is oriented to person, place, and time  HENT:   Head: Normocephalic and atraumatic  Cardiovascular: Regular rhythm  Tachycardia present  PMI is displaced  Exam reveals distant heart sounds  Exam reveals no friction rub  No murmur heard  Pulmonary/Chest: Tachypnea noted  He has rales in the right lower field and the left lower field  Abdominal: Soft  He exhibits distension  Musculoskeletal: He exhibits edema  Neurological: He is alert and oriented to person, place, and time  Psychiatric: He has a normal mood and affect  His behavior is normal    Nursing note and vitals reviewed          Respiratory:  SpO2: SpO2: 98 %  O2 Flow Rate (L/min): 4 L/min    Invasive/non-invasive ventilation settings   Respiratory    Lab Data (Last 4 hours)    None         O2/Vent Data (Last 4 hours)    None                Laboratory and Diagnostics:  Results from last 7 days   Lab Units 20  1059 20  0613 02/15/20  0617 20  0543   WBC Thousand/uL 15 45* 8 54 8 36 10 18*   HEMOGLOBIN g/dL 13 6 13 2 12 0 12 3   HEMATOCRIT % 44 9 43 0 37 8 38 9 PLATELETS Thousands/uL 253 207 152 162   NEUTROS PCT % 87* 77* 79*  --    MONOS PCT % 5 8 10  --      Results from last 7 days   Lab Units 02/20/20  1059 02/17/20  0613 02/16/20  0500 02/15/20  0617 02/14/20  0630 02/13/20  1851   SODIUM mmol/L 139 140 140 138 142 141   POTASSIUM mmol/L 5 0 3 1* 3 1* 3 3* 3 4* 3 5   CHLORIDE mmol/L 102 104 105 103 106 107   CO2 mmol/L 20* 26 26 24 24 23   ANION GAP mmol/L 17* 10 9 11 12 11   BUN mg/dL 52* 30* 37* 48* 57* 63*   CREATININE mg/dL 2 03* 1 30 1 25 1 59* 1 70* 1 87*   CALCIUM mg/dL 8 6 8 2* 7 6* 7 7* 7 8* 8 0*   GLUCOSE RANDOM mg/dL 121 115 97 110 108 136     Results from last 7 days   Lab Units 02/17/20  0613 02/16/20  0500 02/14/20  0543   MAGNESIUM mg/dL 2 4 2 3 2 1      Results from last 7 days   Lab Units 02/20/20  1059   INR  1 39*   PTT seconds 29      Results from last 7 days   Lab Units 02/20/20  1059   TROPONIN I ng/mL 14 15*         ABG:    VBG:          Micro        EKG:   Imaging: CXR 2/20- Cardiomegaly, bilateral pleural effusion I have personally reviewed pertinent reports  Intake and Output  I/O       02/18 0701 - 02/19 0700 02/19 0701 - 02/20 0700 02/20 0701 - 02/21 0700    P  O    200    Total Intake(mL/kg)   200 (2 3)    Urine (mL/kg/hr)   240    Stool   120    Total Output   360    Net   -160           Unmeasured Stool Occurrence   1 x        UOP: -160ml/hr     Height and Weights   Height: 5' 9" (175 3 cm)  IBW: 70 7 kg  Body mass index is 28 88 kg/m²  Weight (last 2 days)     Date/Time   Weight    02/20/20 1408   88 7 (195 55)    02/20/20 0959   89 6 (197 53)                Nutrition       Diet Orders   (From admission, onward)             Start     Ordered    02/20/20 1411  Room Service  Once     Question:  Type of Service  Answer:  Room Service - Appropriate with Assistance    02/20/20 1411    02/20/20 1349  Diet Cardiovascular; Sodium 2 GM;  Fluid Restriction 1500 ML  Diet effective now     Question Answer Comment   Diet Type Cardiovascular Cardiac Sodium 2 GM    Other Restriction(s): Fluid Restriction 1500 ML    RD to adjust diet per protocol? Yes        02/20/20 1348                    Active Medications  Scheduled Meds:  Current Facility-Administered Medications:  acetaminophen 650 mg Oral Q6H PRN Luke Patton MD    [START ON 2/21/2020] aspirin 81 mg Oral Daily Luke Patton MD    atorvastatin 80 mg Oral HS Luke Patton MD    cefpodoxime 200 mg Oral BID Luke Patton MD    docusate sodium 100 mg Oral BID Luke Patton MD    furosemide 10 mg/hr Intravenous Continuous SIMONA Schulte    heparin (porcine) 3-20 Units/kg/hr (Order-Specific) Intravenous Titrated SIMONA De La Rosa Last Rate: 11 8 Units/kg/hr (02/20/20 1644)   [MAR Hold] heparin (porcine) 2,000 Units Intravenous Q1H PRN [place] PostSIMONA    [facility] Hold] heparin (porcine) 4,000 Units Intravenous Q1H PRN eSrgio PostSIMONA    [START ON 2/21/2020] levothyroxine 50 mcg Oral Early Morning Luke Patton MD    mycophenolate 750 mg Oral BID Luke Patton MD    nystatin  Topical BID Luke Patton MD    polyethylene glycol 17 g Oral Daily PRN Luke Patton MD      Continuous Infusions:    furosemide 10 mg/hr    heparin (porcine) 3-20 Units/kg/hr (Order-Specific) Last Rate: 11 8 Units/kg/hr (02/20/20 1644)     PRN Meds:     acetaminophen 650 mg Q6H PRN   [MAR Hold] heparin (porcine) 2,000 Units Q1H PRN   [MAR Hold] heparin (porcine) 4,000 Units Q1H PRN   polyethylene glycol 17 g Daily PRN       Allergies   No Known Allergies  ---------------------------------------------------------------------------------------  Advance Directive and Living Will:      Power of :    POLST:    ---------------------------------------------------------------------------------------      Colt Jacobo MD      Portions of the record may have been created with voice recognition software    Occasional wrong word or "sound a like" substitutions may have occurred due to the inherent limitations of voice recognition software    Read the chart carefully and recognize, using context, where substitutions have occurred

## 2020-02-20 NOTE — ASSESSMENT & PLAN NOTE
· Patient was hospitalized from 02/11/20-2/17-20 for severe sepsis secondary to Gram-negative bacteremia with Proteus mirabilis and E coli infection  He was discharged on cefpodoxime through 2/21 to complete 10 day course  · He currently is afebrile without active signs of infection  · Resume cefpodoxime 200 mg b i d   Through tonight x1 more dose to complete antibiotic course for 10 days  · Monitor fever curve and WBC count

## 2020-02-20 NOTE — ED PROVIDER NOTES
History  Chief Complaint   Patient presents with    Shortness of Breath     Pt complains of shortness of breath with a weight gain of 17 pounds in past couple of days  Pt was taken off of his lasix due to his low blood pressure  Patient sent to the emergency department after evaluation by a home visiting nurse  Patient was complaining of exertional dyspnea and a 16 lb weight gain and 3 days after being discharged from this hospital   Patient denies chest pain fever chills cough  Does have significant leg swelling  Has been urinating less  Denies jaw arm neck back or belly pain  Prior to Admission Medications   Prescriptions Last Dose Informant Patient Reported? Taking? Ostomy Supplies (PREMIER COLOSTOMY/ILEOSTOMY) KIT   No Yes   Sig: Change as needed    Sensura 2 piece coloplast  2 1/4   Box of 5   aspirin 81 MG tablet 2/19/2020 at Unknown time  Yes Yes   Sig: Take 81 mg by mouth daily  atorvastatin (LIPITOR) 80 mg tablet 2/19/2020 at Unknown time  Yes Yes   Sig: Take 80 mg by mouth daily at bedtime    cefpodoxime (VANTIN) 200 mg tablet 2/20/2020 at Unknown time  No Yes   Sig: Take 1 tablet (200 mg total) by mouth 2 (two) times a day for 3 days   levothyroxine 25 mcg tablet 2/20/2020 at Unknown time  Yes Yes   Sig: Take 50 mcg by mouth daily  mycophenolate (CELLCEPT) 250 mg capsule 2/20/2020 at Unknown time  No Yes   Sig: TAKE 3 CAPSULES TWICE A DAY   nystatin (MYCOSTATIN) powder 2/20/2020 at Unknown time  No Yes   Sig: Apply topically 2 (two) times a day for 12 days   potassium chloride (KLOR-CON) 20 mEq packet 2/19/2020 at Unknown time  Yes Yes   Sig: Take 20 mEq by mouth daily        Facility-Administered Medications: None       Past Medical History:   Diagnosis Date    Cardiac disease     Carotid stenosis     [facility]     Compression fracture of lumbar vertebra (HCC)     Coronary artery disease     Disease of thyroid gland     Diverticulitis     Hernia, diaphragmatic, without obstruction     Hyperlipidemia     Hypertension     Inguinal hernia     Right    Ischemic cardiomyopathy     [facility] [address] [facility]     [facility]        Past Surgical History:   Procedure Laterality Date    ANGIOPLASTY / STENTING FEMORAL      CATARACT EXTRACTION      COLON SURGERY      colostomy    COLOSTOMY      ESOPHAGOGASTRODUODENOSCOPY N/A 3/29/2016    Procedure: ESOPHAGOGASTRODUODENOSCOPY (EGD); Surgeon: Nuria Stauffer MD;  [facility]; Service:     GASTROSTOMY TUBE PLACEMENT N/A 3/29/2016    Procedure: PEG CHANGE-LOW PROFILE TUBE ;  Surgeon: Nuria Stauffer MD;  [facility]; Service:     LAPAROTOMY N/A 2016    Procedure: LAPAROTOMY EXPLORATORY; RESSECTION OF GASTRO-CUTANEOUS FISTULA ;  Surgeon: Ben Sanches DO;  [facility];  Service:     PEG TUBE PLACEMENT      PEG TUBE REMOVAL      LA ESOPHAGOGASTRODUODENOSCOPY TRANSORAL DIAGNOSTIC N/A 2016    Procedure: ESOPHAGOGASTRODUODENOSCOPY w 12-6 gc clip,anchor ;  Surgeon: Nuria Stauffer MD;  [facility]; Service: Gastroenterology       Family History   Problem Relation Age of Onset    Heart disease Mother     Hypertension Mother      I have reviewed and agree with the history as documented  Social History     Tobacco Use    Smoking status: Former Smoker     Last attempt to quit: 1973     Years since quittin 0    Smokeless tobacco: Never Used   Substance Use Topics    Alcohol use: Yes     Comment: 1 per month    Drug use: No       Review of Systems   Constitutional: Negative  Negative for activity change, appetite change, chills, diaphoresis, fatigue and fever  HENT: Negative  Negative for congestion, drooling, rhinorrhea, trouble swallowing and voice change  Eyes: Negative  Negative for photophobia and visual disturbance  Respiratory: Negative  Negative for cough, chest tightness, shortness of breath, wheezing and stridor  Cardiovascular: Positive for leg swelling  Negative for chest pain and palpitations  Gastrointestinal: Negative  Negative for abdominal distention, abdominal pain, blood in stool, constipation, diarrhea, nausea and vomiting  Endocrine: Negative  Genitourinary: Negative  Negative for difficulty urinating, dysuria, frequency, hematuria and urgency  Musculoskeletal: Negative  Negative for back pain, neck pain and neck stiffness  Skin: Negative  Negative for rash and wound  Allergic/Immunologic: Negative  Neurological: Negative  Negative for dizziness, tremors, seizures, syncope, facial asymmetry, speech difficulty, weakness, light-headedness, numbness and headaches  Hematological: Negative  Does not bruise/bleed easily  Psychiatric/Behavioral: Negative  Negative for confusion  Physical Exam  Physical Exam   Constitutional: He is oriented to person, place, and time  He appears well-developed and well-nourished  Non-toxic appearance  No distress  Nontoxic appearance  No respiratory distress  Patient looks comfortable sitting upright on the stretcher  HENT:   Head: Normocephalic and atraumatic  Right Ear: External ear normal    Left Ear: External ear normal    Mouth/Throat: Oropharynx is clear and moist    Eyes: Pupils are equal, round, and reactive to light  Conjunctivae and EOM are normal    Cardiovascular: Normal rate, regular rhythm, normal heart sounds and intact distal pulses  Pulmonary/Chest: Effort normal and breath sounds normal  He has no decreased breath sounds  He has no wheezes  He has no rhonchi  He has no rales  Chest wall is not dull to percussion  He exhibits no mass, no tenderness, no bony tenderness, no laceration, no crepitus, no edema, no deformity, no swelling and no retraction  Abdominal: Soft  Bowel sounds are normal  There is no tenderness  There is no rebound and no guarding  Musculoskeletal: Normal range of motion          Right lower leg: He exhibits edema  He exhibits no tenderness  Left lower leg: He exhibits edema  He exhibits no tenderness  Neurological: He is alert and oriented to person, place, and time  He has normal reflexes  Skin: Skin is warm and dry  Nursing note and vitals reviewed        Vital Signs  ED Triage Vitals   Temperature Pulse Respirations Blood Pressure SpO2   02/20/20 1408 02/20/20 0959 02/20/20 0959 02/20/20 0959 02/20/20 0959   98 °F (36 7 °C) (!) 115 22 127/91 97 %      Temp Source Heart Rate Source Patient Position - Orthostatic VS BP Location FiO2 (%)   02/20/20 1408 02/20/20 0959 02/20/20 0959 02/20/20 0959 --   Oral Monitor Lying Right arm       Pain Score       02/20/20 0959       6           Vitals:    02/20/20 0959 02/20/20 1312 02/20/20 1315 02/20/20 1408   BP: 127/91 119/80 119/80 90/55   Pulse: (!) 115 (!) 107 (!) 108 (!) 106   Patient Position - Orthostatic VS: Lying   Lying         Visual Acuity      ED Medications  Medications   docusate sodium (COLACE) capsule 100 mg (has no administration in time range)   polyethylene glycol (MIRALAX) packet 17 g (has no administration in time range)   heparin (porcine) subcutaneous injection 5,000 Units (has no administration in time range)   furosemide (LASIX) injection 40 mg (has no administration in time range)   aspirin chewable tablet 324 mg (has no administration in time range)   acetaminophen (TYLENOL) tablet 650 mg (has no administration in time range)   furosemide (LASIX) injection 40 mg (40 mg Intravenous Given 2/20/20 1102)       Diagnostic Studies  Results Reviewed     Procedure Component Value Units Date/Time    UA w Reflex to Microscopic w Reflex to Culture [[id number]]     Lab Status:  No result Specimen:  Urine     Platelet count [[id number]]     Lab Status:  No result Specimen:  Blood     Troponin I [[id number]]     Lab Status:  No result Specimen:  Blood     NT-BNP PRO [[id number]]  (Abnormal) Collected:  02/20/20 1059    Lab Status:  Final result Specimen:  Blood from Arm, Right Updated:  02/20/20 1225     NT-proBNP 80,770 pg/mL     Basic metabolic panel [[id number]]  (Abnormal) Collected:  02/20/20 1059    Lab Status:  Final result Specimen:  Blood from Arm, Right Updated:  02/20/20 1154     Sodium 139 mmol/L      Potassium 5 0 mmol/L      Chloride 102 mmol/L      CO2 20 mmol/L      ANION GAP 17 mmol/L      BUN 52 mg/dL      Creatinine 2 03 mg/dL      Glucose 121 mg/dL      Calcium 8 6 mg/dL      eGFR 31 ml/min/1 73sq m     Narrative:       [facility] guidelines for Chronic Kidney Disease (CKD):     Stage 1 with normal or high GFR (GFR > 90 mL/min/1 73 square meters)    Stage 2 Mild CKD (GFR = 60-89 mL/min/1 73 square meters)    Stage 3A Moderate CKD (GFR = 45-59 mL/min/1 73 square meters)    Stage 3B Moderate CKD (GFR = 30-44 mL/min/1 73 square meters)    Stage 4 Severe CKD (GFR = 15-29 mL/min/1 73 square meters)    Stage 5 End Stage CKD (GFR <15 mL/min/1 73 square meters)  Note: GFR calculation is accurate only with a steady state creatinine    Troponin I [[id number]]  (Abnormal) Collected:  02/20/20 1059    Lab Status:  Final result Specimen:  Blood from Arm, Right Updated:  02/20/20 1123     Troponin I 14 15 ng/mL     Protime-INR [[id number]]  (Abnormal) Collected:  02/20/20 1059    Lab Status:  Final result Specimen:  Blood from Arm, Right Updated:  02/20/20 1116     Protime 16 4 seconds      INR 1 39    APTT [[id number]]  (Normal) Collected:  02/20/20 1059    Lab Status:  Final result Specimen:  Blood from Arm, Right Updated:  02/20/20 1116     PTT 29 seconds     CBC and differential [[id number]]  (Abnormal) Collected:  02/20/20 1059    Lab Status:  Final result Specimen:  Blood from Arm, Right Updated:  02/20/20 1107     WBC 15 45 Thousand/uL      RBC 4 77 Million/uL      Hemoglobin 13 6 g/dL      Hematocrit 44 9 %      MCV 94 fL      MCH 28 5 pg      MCHC 30 3 g/dL      RDW 14 7 %      MPV 10 1 fL      Platelets 305 Thousands/uL      nRBC 1 /100 WBCs      Neutrophils Relative 87 %      Immat GRANS % 2 %      Lymphocytes Relative 6 %      Monocytes Relative 5 %      Eosinophils Relative 0 %      Basophils Relative 0 %      Neutrophils Absolute 13 30 Thousands/µL      Immature Grans Absolute 0 30 Thousand/uL      Lymphocytes Absolute 0 99 Thousands/µL      Monocytes Absolute 0 81 Thousand/µL      Eosinophils Absolute 0 01 Thousand/µL      Basophils Absolute 0 04 Thousands/µL                  XR chest 1 view portable   ED Interpretation by Trish Ricks MD (02/20 1103)   Increased markings on  Mild congestion  Final Result by Lisbeth Chaudhry MD (02/20 1121)         1  Mild cardiomegaly with new small bilateral effusions and bibasilar opacities which may reflect atelectasis and/or edema, likely on the basis of volume overload  Workstation performed: [id number]         US retroperitoneal complete    (Results Pending)              Procedures  ECG 12 Lead Documentation Only  Date/Time: 2/20/2020 11:33 AM  Performed by: Trish Ricks MD  Authorized by: Trish Ricks MD     ECG reviewed by me, the ED Provider: yes    Patient location:  ED  Previous ECG:     Previous ECG:  Compared to current    Similarity:  Changes noted  Interpretation:     Interpretation: abnormal    Rate:     ECG rate:  110    ECG rate assessment: tachycardic    Rhythm:     Rhythm: sinus tachycardia    Ectopy:     Ectopy: none    QRS:     QRS axis:  Right    QRS intervals:  Normal  Conduction:     Conduction: normal    ST segments:     ST segments:  Abnormal    Elevation:  V3 and V4             ED Course  ED Course as of Feb 20 1434   Thu Feb 20, 2020   1150   Will admit to slim service with Cardiology consultation  1205 Case discussed with Dr Jasmine of the hospitalist service was accepted this patient to hospitalist service  Patient given Lasix    Will not treat is a non-STEMI as he has no chest pain is elevated troponin be secondary to failure  MDM      Disposition  Final diagnoses:   Dyspnea   CHF (congestive heart failure) ([facility])   Elevated troponin   Acute renal failure, unspecified acute renal failure type ([place] [address] )   Acute on chronic combined systolic and diastolic heart failure ([place] [address] )     Time reflects when diagnosis was documented in both MDM as applicable and the Disposition within this note     Time User Action Codes Description Comment    2/20/2020 12:06 PM Marcharles Nunnery Add [R06 00] Dyspnea     2/20/2020 12:06 PM Cleopatra Medina 55 [I50 9] CHF (congestive heart failure) ([place] [address] )     2/20/2020 12:06 PM Marzella Nunnery Add [R79 89] Elevated troponin     2/20/2020  1:39 PM Cardio, Florence Saba Add [N17 9] Acute renal failure, unspecified acute renal failure type ([place] [address] )     2/20/2020  1:39 PM Cardio, Florence Saba Add [I50 43] Acute on chronic combined systolic and diastolic heart failure [facility])       ED Disposition     ED Disposition Condition Date/Time Comment    Admit Stable u Feb 20, 2020 12:06 PM Case was discussed with Dr Cas Briggs and the patient's admission status was agreed to be Admission Status: inpatient status to the service of Dr Lynn Soriano    None         Current Discharge Medication List      CONTINUE these medications which have NOT CHANGED    Details   aspirin 81 MG tablet Take 81 mg by mouth daily  atorvastatin (LIPITOR) 80 mg tablet Take 80 mg by mouth daily at bedtime       cefpodoxime (VANTIN) 200 mg tablet Take 1 tablet (200 mg total) by mouth 2 (two) times a day for 3 days  Qty: 6 tablet, Refills: 0    Associated Diagnoses: UTI (urinary tract infection)      levothyroxine 25 mcg tablet Take 50 mcg by mouth daily          mycophenolate (CELLCEPT) 250 mg capsule TAKE 3 CAPSULES TWICE A DAY  Qty: 540 capsule, Refills: 1    Associated Diagnoses: Myasthenia gravis ([facility])      nystatin (MYCOSTATIN) powder Apply topically 2 (two) times a day for 12 days  Qty: 15 g, Refills: 0    Associated Diagnoses: Candida infection      Ostomy Supplies (PREMIER COLOSTOMY/ILEOSTOMY) KIT Change as needed    Sensura 2 piece coloplast  2 1/4   Box of 5  Qty: 1 kit, Refills: 4      potassium chloride (KLOR-CON) 20 mEq packet Take 20 mEq by mouth daily  No discharge procedures on file      PDMP Review     None          ED Provider  Electronically Signed by           Selma Arrington MD  02/20/20 0569 Brandin Chapman MD  02/20/20 0186

## 2020-02-20 NOTE — ASSESSMENT & PLAN NOTE
Wt Readings from Last 3 Encounters:   02/20/20 89 6 kg (197 lb 8 5 oz)   02/11/20 78 6 kg (173 lb 4 5 oz)   06/24/19 79 2 kg (174 lb 9 6 oz)       · Patient was recently admitted from 2/11/22 to 2/17/20  He received aggressive IV fluids that admission as he was thought to be intravascularly volume depleted with subsequent acute renal failure  His diuretics were held and not resumed on discharge  Patient presents with increasing shortness of breath and lower extremity edema  ProBNP is significantly elevated to 80,770  He was seen by [facility] today who noted approximately a 16 lb weight gain since discharge   Chest x-ray shows mild cardiomegaly with interval development of small bilateral effusions and some pulmonary edema  · Echocardiogram in 2017 showed EF of 50%, moderate concentric hypertrophy and grade 1 diastolic dysfunction  mild to moderate mitral valve regurg  · Repeat TTE  · Patient did received x1 dose of Lasix in the ED  Will start IV Lasix 40 mg b i d  Was on 40 mg daily prior to patient's last hospitalization though has not been on diuretics since  · No further IV fluids  · 2 g sodium restriction/1500 mL fluid restriction  · Complicated by worsening renal dysfunction, monitor renal function closely  · Daily weights/strict intake and output  · Cardiology consult  · Telemetry  · Keep magnesium greater than 2 and potassium greater than 4  · Lisinopril was discontinued last admission    Will continue to hold  · They also report that his beta-blocker was discontinued last admission as his blood pressures were too low

## 2020-02-20 NOTE — ASSESSMENT & PLAN NOTE
· Patient is currently without chest pain or ischemic changes on EKG as noted above  His troponin however is significantly elevated to 14 15  This could be a non MI troponin elevation verses a type 2 NSTEMI in the setting of his acute on chronic combined systolic and diastolic heart failure exacerbation  Given that he has Q-waves though now in his anterior leads, we cannot entirely rule out a type 1 NSTEMI that may have occurred on prior hospitalization and is now down trending  · Trend trop   · Telemetry monitoring  ·  mg now then ASA 81 mg thereafter  · Check lipid panel    Continue statin  · Telemetry monitor  · Cardiology consult  · Diuresis as outlined above

## 2020-02-20 NOTE — ED NOTES
Call placed to Midline team to request a line for difficult IV placement        Sravanthi Huddleston RN  02/20/20 6471

## 2020-02-20 NOTE — CONSULTS
Cardiology   Grace Alt 75 y o  male MRN: [id number]  Unit/Bed#: S -01 Encounter: [id number]      Reason for Consult / Principal Problem:  CHF exacerbation    Physician Requesting Consult:  Juan Carlos Bales MD    Cardiologist:  Marco Antonio Cage    PCP: Dr Beatriz Murrieta    Assessment/Plan:  1  Acute on chronic systolic HF  2  Ischemic cardiomyopathy LVEF 50% (per 2D echo 6/2017)  -On exam; significantly decompensated from a HF standpoint; grossly volume overloaded with significant JVD, bibasilar fine crackles, abdominal distension, and +3 pitting edema bilateral lower extremities; peripheral extremities are cool to the touch; he exhibits conversational dyspnea, currently on supplemental oxygen  -Symptomatically denies SOB at rest, but does complain of significant dyspnea with minimal activity and orthopnea  -Troponin x1 14 2; proBNP 04719  -2D echocardiogram 6/2017:  LVEF 50%, no regional wall motion abnormalities, grade 1 diastolic dysfunction, RV normal size and systolic function, mild-to-moderate MR, trace TR  Current medical therapies:  -On IV furosemide 40 mg BID  -24 hr I&O balance: -160 ml  Weights:  --2/20/2020: 195 lbs  --2/17/2020: 173 lbs    3  Troponin elevation  -Does not appear to be ACS/NSTEMI type 1---no acute ischemic changes noted on she current 12 lead ECG and Pt denies CP---however troponin elevation on admission could be secondary to a prior infarction that was sustained during previous admission (Q-waves noted in the anterior leads on admission 12 lead ECG) verses a type 2 MI in the setting of acutely decompensated HF with known underlying CAD  -12 lead ECG 2/20/2020: ST, rate 110 bpm, anterior infarct (age undetermined)  -12 lead ECG 2/12/2020: NSR, rate 87 bpm, occasional PVC, marked ST-T abnormalities in the anterior/lateral leads    4  CAD s/p (remote stenting 20yrs ago)  -No complaints of CP currently  -On aspirin, and statin; no BB as an outpatient    5   Hypertension  BP variable since admission; ranging 90s to 120s over 50s to 90s  Last recorded at 90/55  Currently on no oral antihypertensive medications    6  Acute kidney injury  -Suspect cardiorenal syndrome  -Baseline creatinine around 1  -Creatinine on most recent discharge 2/17; 1 30  -Creatinine on admission 2 03    Plan  -Obtain STAT 2D echocardiogram  -Start IV heparin infusion (ACS Low)  -Give 80 of IV lasix now  -Likely will require the initiation of inotropic/pressor support if becomes unstable  -Continue to withhold initiation of oral antihypertensive agents at this time  -Obtain STAT VBG  -Continue to trend troponin until peak and then discontinue once down trending  -Continue closely monitor volume status, strict I&Os, daily standing weights, 2 g NA +diet 1800 mL FR  -Continue to closely monitor renal function and electrolytes  -Continue to closely monitor on telemetry    Dispo: Discussed w/Dr Jasmine patient is very decompensated from a HF standpoint, clinical status is very tenuous and would recommend transfer to the ICU for closer hemodynamic monitoring  Preliminary echo read w/Dr Maryam Carvajal at bedside demonstrates a cardiac output of 2 0      HPI: Marlen Fabian 75y o  year old male with a medical history of CAD/prior MI and remote stenting (20 yrs ago), ischemic cardiomyopathy (most recent 2D echo 2017--LVEF 50%, previously 45% in 2013), hypertension, dyslipidemia, morbid obesity, myasthenic gravis, and chronic colostomy    He previously followed with Dr Xavier, however has not been formally seen as an outpatient since May of 2017  Patient was recently hospitalized at the [facility] from 2/11 through 2/17/2020 with severe sepsis in the setting of UTI with subsequent development of ANDREA (creatinine 2 16 on admission but did bump to 2 74 during his hospitalization)  He was initiated on IV antibiotics and intravenous fluids    He did undergo a CT scan which demonstrated a markedly enlarged prostate with possible compression of the urethra by a large right-sided inguinal hernia  General surgery had deemed the patient to not be a surgical candidate secondary to his comorbid conditions  Urology was consulted at that time and recommended Lawrence catheter placement long-term  He was also found to be bacteremic with blood cultures positive for E coli  ID was consulted at this point in time for further management of IV antibiotic therapies  During the patient's hospitalization he was reported to have complaints of chest pain and was transferred to the telemetry unit, however cardiology was not consulted at this time  Reviewing a 12 lead ECG from 2/12/2020, patient did have anterior/lateral ST T-wave abnormalities  Patient did have persistent issues with hypotension during this hospital stay and was taken off of all his oral antihypertensive medications including oral diuretic on discharge  His discharge weight on 2/17/2020 was 173 lbs  He presented to the [facility] on 2/2020 with complaints of progressively worsening shortness of breath and significant weight gain over the past 2-3 days      Further workup in the ED  Hemodynamics on admission  Temp 98° F, , RR 22, /91, sat 97% on nasal cannula  Laboratory data on admission  NA +139, K +5 0, chloride 102, CO2 20, anion gap 17, BUN 52, creatinine 2 03, glucose 121, calcium 8 6, WBC 15 5, HGB 13 6, platelet count 795, INR 1 39  Troponin x1 14 2; proBNP 88394  Imaging  Chest x-ray:  Mild cardiomegaly with new small bilateral effusions and bibasilar opacities which may reflect atelectasis and/or edema  Family History:   Family History   Problem Relation Age of Onset    Heart disease Mother     Hypertension Mother      Historical Information   Past Medical History:   Diagnosis Date    Cardiac disease     Carotid stenosis     [facility]     Compression fracture [facility] vertebra [facility]     Coronary artery disease     Disease of thyroid gland     Diverticulitis     Hernia, diaphragmatic, without obstruction     Hyperlipidemia     Hypertension     Inguinal hernia     Right    Ischemic cardiomyopathy     [facility] [address] [facility]     [facility]      Past Surgical History:   Procedure Laterality Date    ANGIOPLASTY / STENTING FEMORAL      CATARACT EXTRACTION      COLON SURGERY      colostomy    COLOSTOMY      ESOPHAGOGASTRODUODENOSCOPY N/A 3/29/2016    Procedure: ESOPHAGOGASTRODUODENOSCOPY (EGD); Surgeon: Anita Khoury MD;  [facility]; Service:     GASTROSTOMY TUBE PLACEMENT N/A 3/29/2016    Procedure: PEG CHANGE-LOW PROFILE TUBE ;  Surgeon: Anita Khoury MD;  [facility]; Service:     LAPAROTOMY N/A 2016    Procedure: LAPAROTOMY EXPLORATORY; RESSECTION OF GASTRO-CUTANEOUS FISTULA ;  Surgeon: Micaela Gallegos DO;  [facility];  Service:     PEG TUBE PLACEMENT      PEG TUBE REMOVAL      [place] ESOPHAGOGASTRODUODENOSCOPY TRANSORAL DIAGNOSTIC N/A 2016    Procedure: ESOPHAGOGASTRODUODENOSCOPY w 12-6 gc clip,anchor ;  Surgeon: Anita Khoury MD;  [facility]; Service: Gastroenterology     Social History   Social History     Substance and Sexual Activity   Alcohol Use Yes    Comment: 1 per month     Social History     Substance and Sexual Activity   Drug Use No     Social History     Tobacco Use   Smoking Status Former Smoker    Last attempt to quit: 1973    Years since quittin 0   Smokeless Tobacco Never Used     Family History:   Family History   Problem Relation Age of Onset    Heart disease Mother     Hypertension Mother        Review of Systems:  Review of Systems   Constitutional: Positive for activity change, fatigue and unexpected weight change  Negative for appetite change, chills and fever  HENT: Negative for congestion  Eyes: Negative for visual disturbance  Respiratory: Negative for cough, chest tightness and shortness of breath  Cardiovascular: Positive for leg swelling  Negative for chest pain and palpitations  +SHAW, and orthopnea   Gastrointestinal: Positive for abdominal distention  Negative for abdominal pain  Genitourinary: Positive for dysuria  Lawrence catheter   Musculoskeletal: Negative for arthralgias and myalgias  Neurological: Negative for dizziness, light-headedness and headaches  All other systems reviewed and are negative  Scheduled Meds:  Current Facility-Administered Medications:  acetaminophen 650 mg Oral Q6H PRN Kaylie Arellano MD   aspirin 324 mg Oral Once Kaylie Arellano MD   docusate sodium 100 mg Oral BID Kaylie Arellano MD   furosemide 40 mg Intravenous BID (diuretic) Kaylie Arellano MD   heparin (porcine) 5,000 Units Subcutaneous [facility] Kaylie Arellano MD   polyethylene glycol 17 g Oral Daily PRN Kaylie Arellano MD     Continuous Infusions:   PRN Meds:   acetaminophen    polyethylene glycol  all current active meds have been reviewed, current meds:   Current Facility-Administered Medications   Medication Dose Route Frequency    acetaminophen (TYLENOL) tablet 650 mg  650 mg Oral Q6H PRN    aspirin chewable tablet 324 mg  324 mg Oral Once    docusate sodium (COLACE) capsule 100 mg  100 mg Oral BID    furosemide (LASIX) injection 40 mg  40 mg Intravenous BID (diuretic)    heparin (porcine) subcutaneous injection 5,000 Units  5,000 Units Subcutaneous Q8H [facility]    polyethylene glycol (MIRALAX) packet 17 g  17 g Oral Daily PRN    and PTA meds:   Prior to Admission Medications   Prescriptions Last Dose Informant Patient Reported? Taking? Calcium Citrate-Vitamin D (CALCIUM CITRATE +D PO)   Yes Yes   Sig: Take 1 tablet by mouth     Ostomy Supplies (PREMIER COLOSTOMY/ILEOSTOMY) KIT   No Yes   Sig: Change as needed    Sensura 2 piece coloplast  2 1/4   Box of 5   acetaminophen (TYLENOL) 500 mg tablet   No Yes   Sig: Take 2 tablets (1,000 mg total) by mouth 3 (three) times a day for 7 days aspirin 81 MG tablet   Yes Yes   Sig: Take 81 mg by mouth daily  atorvastatin (LIPITOR) 80 mg tablet   Yes Yes   Sig: Take 80 mg by mouth daily  cefpodoxime (VANTIN) 200 mg tablet   No Yes   Sig: Take 1 tablet (200 mg total) by mouth 2 (two) times a day for 3 days   levothyroxine 25 mcg tablet   Yes Yes   Sig: Take 50 mcg by mouth daily  mycophenolate (CELLCEPT) 250 mg capsule   No Yes   Sig: TAKE 3 CAPSULES TWICE A DAY   nystatin (MYCOSTATIN) powder   No Yes   Sig: Apply topically 2 (two) times a day for 12 days   pantoprazole (PROTONIX) 40 mg tablet   Yes Yes   Sig: Take 40 mg by mouth daily     potassium chloride (KLOR-CON) 20 mEq packet   Yes Yes   Sig: Take 20 mEq by mouth daily  Facility-Administered Medications: None       No Known Allergies    Objective   Vitals: Blood pressure 119/80, pulse (!) 108, resp  rate 18, weight 89 6 kg (197 lb 8 5 oz), SpO2 96 %  , Body mass index is 29 17 kg/m² , Orthostatic Blood Pressures      Most Recent Value   Blood Pressure  119/80 filed at 02/20/2020 1315   Patient Position - Orthostatic VS  Lying filed at 02/20/2020 1717          No intake or output data in the 24 hours ending 02/20/20 1415    Invasive Devices     Peripheral Intravenous Line            Peripheral IV 02/20/20 Right Antecubital less than 1 day          Drain            Colostomy  -- days    Colostomy LLQ 1374 days    Urethral Catheter Latex 18 Fr  7 days              Physical Exam:  Physical Exam   Constitutional: He is oriented to person, place, and time  He appears distressed  HENT:   Mouth/Throat: Oropharynx is clear and moist  No oropharyngeal exudate  Tongue is pink   Eyes: No scleral icterus  Neck: JVD present  Cardiovascular: Regular rhythm and intact distal pulses  Murmur heard  Sinus tachycardia, rates in the low 100s  Cool peripheral extremities  +3 pitting edema bilateral lower extremities   Pulmonary/Chest: He has no wheezes  He has rales     Conversational dyspnea; mildly tachypneic at rest   Abdominal: Soft  He exhibits distension  There is no tenderness  Left lower quadrant colostomy bag with brown/yellow stool   Genitourinary:   Genitourinary Comments: Large right-sided inguinal hernia  Lawrence catheter in place---no urine drainage in Lawrence catheter bag   Musculoskeletal: He exhibits edema  He exhibits no deformity  Neurological: He is alert and oriented to person, place, and time  Skin: Skin is dry  Capillary refill takes 2 to 3 seconds  He is not diaphoretic  Peripheral extremities are cool to the touch   Psychiatric: He has a normal mood and affect  Nursing note and vitals reviewed        Lab Results:   Recent Results (from the past 24 hour(s))   ECG 12 lead    Collection Time: 02/20/20 10:04 AM   Result Value Ref Range    Ventricular Rate 110 BPM    Atrial Rate 110 BPM    KY Interval 176 ms    QRSD Interval 100 ms    QT Interval 324 ms    QTC Interval 438 ms    P Kohler 64 degrees    QRS Axis 91 degrees    T Wave Axis 111 degrees   Basic metabolic panel    Collection Time: 02/20/20 10:59 AM   Result Value Ref Range    Sodium 139 136 - 145 mmol/L    Potassium 5 0 3 5 - 5 3 mmol/L    Chloride 102 100 - 108 mmol/L    CO2 20 (L) 21 - 32 mmol/L    ANION GAP 17 (H) 4 - 13 mmol/L    BUN 52 (H) 5 - 25 mg/dL    Creatinine 2 03 (H) 0 60 - 1 30 mg/dL    Glucose 121 65 - 140 mg/dL    Calcium 8 6 8 3 - 10 1 mg/dL    eGFR 31 ml/min/1 73sq m   CBC and differential    Collection Time: 02/20/20 10:59 AM   Result Value Ref Range    WBC 15 45 (H) 4 31 - 10 16 Thousand/uL    RBC 4 77 3 88 - 5 62 Million/uL    Hemoglobin 13 6 12 0 - 17 0 g/dL    Hematocrit 44 9 36 5 - 49 3 %    MCV 94 82 - 98 fL    MCH 28 5 26 8 - 34 3 pg    MCHC 30 3 (L) 31 4 - 37 4 g/dL    RDW 14 7 11 6 - 15 1 %    MPV 10 1 8 9 - 12 7 fL    Platelets 528 676 - 454 Thousands/uL    nRBC 1 /100 WBCs    Neutrophils Relative 87 (H) 43 - 75 %    Immat GRANS % 2 0 - 2 %    Lymphocytes Relative 6 (L) 14 - 44 %    Monocytes Relative 5 4 - 12 %    Eosinophils Relative 0 0 - 6 %    Basophils Relative 0 0 - 1 %    Neutrophils Absolute 13 30 (H) 1 85 - 7 62 Thousands/µL    Immature Grans Absolute 0 30 (H) 0 00 - 0 20 Thousand/uL    Lymphocytes Absolute 0 99 0 60 - 4 47 Thousands/µL    Monocytes Absolute 0 81 0 17 - 1 22 Thousand/µL    Eosinophils Absolute 0 01 0 00 - 0 61 Thousand/µL    Basophils Absolute 0 04 0 00 - 0 10 Thousands/µL   Protime-INR    Collection Time: 02/20/20 10:59 AM   Result Value Ref Range    Protime 16 4 (H) 11 6 - 14 5 seconds    INR 1 39 (H) 0 84 - 1 19   APTT    Collection Time: 02/20/20 10:59 AM   Result Value Ref Range    PTT 29 23 - 37 seconds   NT-BNP PRO    Collection Time: 02/20/20 10:59 AM   Result Value Ref Range    NT-proBNP 80,770 (H) <450 pg/mL   Troponin I    Collection Time: 02/20/20 10:59 AM   Result Value Ref Range    Troponin I 14 15 (H) <=0 04 ng/mL   Imaging: I have personally reviewed pertinent reports  and I have personally reviewed pertinent films in PACS  Code Status: LVL1 Full Code   Epic/ Allscripts/Care Everywhere records reviewed: Yes    * Please Note: Fluency DirectDictation voice to text software may have been used in the creation of this document   **

## 2020-02-20 NOTE — ASSESSMENT & PLAN NOTE
· Patient was followed by Nephrology last admission for acute renal failure  Baseline creatinine prior to patient's last hospitalization was 0 9-1 1  His creatinine on discharge from last hospitalization on 02/17 was 1 3  His acute renal failure was thought to be multifactorial last admission secondary to intravascular volume depletion, ATN from sepsis as well as bladder outlet obstruction/urethral obstruction from large hernia and BPH  · Creatinine is back up to 2 03, patient has an anion gap metabolic acidosis of 17 and a bicarb of 20    K is 5  · No IV fluids  · Avoid nephrotoxic agents  · Hold ACE-inhibitor  · Avoid hypotension  · Consult nephrology  · Check renal ultrasound  · Check UA  · Maintain Lawrence catheter that patient was discharged with last admission

## 2020-02-20 NOTE — ASSESSMENT & PLAN NOTE
· 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  · Outpatient follow-up

## 2020-02-20 NOTE — CONSULTS
2           Consultation - Nephrology   Haile Bernard 75 y o  male MRN: [id number]  Unit/Bed#: S -01 Encounter: [id number]      Assessment/Plan     Assessment / Plan:  1  Renal    The patient was recently admitted to the hospital on discharge on 02/17/2020 with a creatinine 1 3  During that hospitalization he was here with sepsis was also discovered to have bilateral hydronephrosis felt potentially due to external pressure from large inguinal hernia  Catheter was placed at that time and creatinine declined to above baseline  The patient was discharged and apparently has gained significant amount await up to 16 lb since discharge and was referred in for admission  His creatinine was elevated above his baseline we were asked to see him regarding this  It is possible if he has volume overload that his cardiac output could be compromise resulting in effective volume depletion causing acute renal failure  The other possibility would be is if he had hydronephrosis recur which would be less likely  I personally reviewed the CT scan and renal ultrasound reports from prior admission  An ultrasound done after the CT scan diagnosed hydronephrosis revealed that the hydronephrosis had resolved with relief of bladder outlet obstruction  These studies were performed on 02/12/2020 and 02/14/2020  In speaking to the cardiologist I was alerted that there has been significant reduction in his left ventricular ejection fraction and he has severe systolic dysfunction  This could in itself of course explain heart failure and the effective volume depletion leading to increased creatinine  Potentially going to be on inotropics support and monitor with diuresis  Monitor with IV diuretic as Lasix as ordered  Renal ultrasound ordered to make sure hydronephrosis has stayed resolved  No other changes for now  Continue to hold ACE-inhibitor      2  History of myasthenia gravis and neurology is treating with mycophenolate  3  Chronic enterocutaneous fistula  4  Cardiac  The patient has elevated troponin I   Cardiology to be consulted  No symptoms presently of cardiac ischemia  History of Present Illness   Physician Requesting Consult: Yan Hendrix MD  Reason for Consult / Principal Problem:  Acute renal failure  Hx and PE limited by:   HPI: Hemant Allison is a 75y o  year old male who was recently hospitalized from 2/11-2/17 of this year  The patient was treated for sepsis and has been completing antibiotic course  At that time he was found to have acute renal failure was found have bilateral urinary obstruction and Lawrence catheter was placed and he was discharged with a creatinine of 1 3  He presents to the hospital complaining of weight gain shortness of breath and increased lower extremity swelling  The [facility] had seen the patient noted a 16 lb weight gain so the patient was referred over for admission  His creatinine was increased to 2 so were asked to see him regarding this  History obtained from chart review and the patient  Inpatient consult to Nephrology  Consult performed by: Rashmi Whitfield MD  Consult ordered by: Yan Hendrix MD          Review of Systems   Constitutional: Positive for activity change  Negative for appetite change, chills and fever  HENT: Negative  Eyes: Negative  Respiratory: Positive for shortness of breath  Negative for cough, chest tightness and wheezing  Cardiovascular: Positive for leg swelling  Negative for chest pain and palpitations  Positive dyspnea on exertion  Gastrointestinal: Negative  Negative for abdominal distention, abdominal pain, diarrhea, nausea and vomiting  Genitourinary: Negative for flank pain  Lawrence catheter  Skin: Negative  Neurological: Negative  Psychiatric/Behavioral: Negative          Historical Information   Patient Active Problem List   Diagnosis    Coronary artery disease    Hyperlipidemia    Essential hypertension    Gastrocutaneous fistula    Acute renal failure (HCC)    H/O myasthenia gravis    Gram-negative bacteremia    Hydronephrosis    Right inguinal hernia    Acute on chronic combined systolic and diastolic heart failure ([facility])    Urinary obstruction    Elevated troponin I level     Past Medical History:   Diagnosis Date    Cardiac disease     Carotid stenosis     CHF (congestive heart failure) ([facility])     Compression fracture of lumbar vertebra ([facility])     Coronary artery disease     Disease of thyroid gland     Diverticulitis     Hernia, diaphragmatic, without obstruction     Hyperlipidemia     Hypertension     Inguinal hernia     Right    Ischemic cardiomyopathy     MI [facility]ocardial infarction) ([facility] [address] [facility]     Myasthenia gravis ([facility] )      Past Surgical History:   Procedure Laterality Date    ANGIOPLASTY / STENTING FEMORAL      CATARACT EXTRACTION      COLON SURGERY      colostomy    COLOSTOMY      ESOPHAGOGASTRODUODENOSCOPY N/A 3/29/2016    Procedure: ESOPHAGOGASTRODUODENOSCOPY (EGD); Surgeon: Roberto Carlos Marie MD;  [facility]; Service:     GASTROSTOMY TUBE PLACEMENT N/A 3/29/2016    Procedure: PEG CHANGE-LOW PROFILE TUBE ;  Surgeon: Roberto Carlos Marie MD;  [facility]; Service:     LAPAROTOMY N/A 5/17/2016    Procedure: LAPAROTOMY EXPLORATORY; RESSECTION OF GASTRO-CUTANEOUS FISTULA ;  Surgeon: Darrel Koyanagi, DO;  [facility];  Service:     PEG TUBE PLACEMENT      PEG TUBE REMOVAL      IN ESOPHAGOGASTRODUODENOSCOPY TRANSORAL DIAGNOSTIC N/A 5/12/2016    Procedure: ESOPHAGOGASTRODUODENOSCOPY w 12-6 gc clip,anchor ;  Surgeon: Roberto Carlos Marie MD;  [facility];   Service: Gastroenterology     Social History   Social History     Substance and Sexual Activity   Alcohol Use Yes    Comment: 1 per month     Social History     Substance and Sexual Activity   Drug Use No     Social History     Tobacco Use   Smoking Status Former Smoker    Last attempt to quit: 2/4/1973  Years since quittin 0   Smokeless Tobacco Never Used     Family History   Problem Relation Age of Onset    Heart disease Mother     Hypertension Mother        Meds/Allergies   current meds:   Current Facility-Administered Medications   Medication Dose Route Frequency    acetaminophen (TYLENOL) tablet 650 mg  650 mg Oral Q6H PRN    [START ON 2020] aspirin chewable tablet 81 mg  81 mg Oral Daily    atorvastatin (LIPITOR) tablet 80 mg  80 mg Oral HS    cefpodoxime (VANTIN) tablet 200 mg  200 mg Oral BID    docusate sodium (COLACE) capsule 100 mg  100 mg Oral BID    furosemide (LASIX) injection 40 mg  40 mg Intravenous BID (diuretic)    heparin (porcine) subcutaneous injection 5,000 Units  5,000 Units Subcutaneous Q8H [address]    [START ON 2020] levothyroxine tablet 50 mcg  50 mcg Oral Early Morning    mycophenolate (CELLCEPT) capsule 750 mg  750 mg Oral BID    nystatin (MYCOSTATIN) powder   Topical BID    polyethylene glycol (MIRALAX) packet 17 g  17 g Oral Daily PRN     No Known Allergies    Objective     Intake/Output Summary (Last 24 hours) at 2020 1516  Last data filed at 2020 1408  Gross per 24 hour   Intake 200 ml   Output 360 ml   Net -160 ml     Body mass index is 28 88 kg/m²  Invasive Devices:   Urethral Catheter Latex 18 Fr   (Active)   Reasons to continue Urinary Catheter  Acute urinary retention/obstruction failing urinary retention protocol;Chronic urinary catheter 2020  1:15 PM   Goal for Removal N/A- chronic cameron 2020  1:15 PM   Site Assessment Clean;Skin intact;Edema 2020  1:15 PM   Collection Container Leg bag 2020  1:15 PM   Securement Method Securing device (Describe) 2020  1:15 PM   Output (mL) 240 mL 2020  2:08 PM       PHYSICAL EXAM:  BP 90/55 (BP Location: Left arm)   Pulse (!) 106   Temp 98 °F (36 7 °C) (Oral)   Resp 19   Ht 5' 9" (1 753 m)   Wt 88 7 kg (195 lb 8 8 oz)   SpO2 98%   BMI 28 88 kg/m²     Physical Exam Constitutional: He is oriented to person, place, and time  Non-toxic appearance  He does not appear ill  No distress  HENT:   Head: Atraumatic  Mouth/Throat: Oropharynx is clear and moist    Eyes: EOM are normal    Neck: Neck supple  JVD present  Cardiovascular: Normal rate and regular rhythm  Exam reveals no gallop and no friction rub  Pulmonary/Chest: Effort normal  No respiratory distress  He has no decreased breath sounds  He has no wheezes  He has no rhonchi  He has rales  Abdominal: Bowel sounds are normal  He exhibits no distension  There is no tenderness  Neurological: He is alert and oriented to person, place, and time           Current Weight: Weight - Scale: 88 7 kg (195 lb 8 8 oz)  First Weight: Weight - Scale: 89 6 kg (197 lb 8 5 oz)    Lab Results:    Results from last 7 days   Lab Units 02/20/20  1059   WBC Thousand/uL 15 45*   HEMOGLOBIN g/dL 13 6   HEMATOCRIT % 44 9   PLATELETS Thousands/uL 253     Results from last 7 days   Lab Units 02/20/20  1059   POTASSIUM mmol/L 5 0   CHLORIDE mmol/L 102   CO2 mmol/L 20*   BUN mg/dL 52*   CREATININE mg/dL 2 03*   CALCIUM mg/dL 8 6     Results from last 7 days   Lab Units 02/20/20  1059   POTASSIUM mmol/L 5 0   CHLORIDE mmol/L 102   CO2 mmol/L 20*   BUN mg/dL 52*   CREATININE mg/dL 2 03*   CALCIUM mg/dL 8 6

## 2020-02-20 NOTE — ASSESSMENT & PLAN NOTE
· Patient has reported history of coronary artery disease and underwent stenting x2 many years ago - does not remember details  He states he was recommended for CABG in the past  He is not on beta-blocker currently  Continue ASA - will give 324mg ASA than 81 mg ASA thereafter  Continue statin  · ACE-inhibitor, isosorbide, and beta-blocker was discontinued last admission due to low blood pressures and ANDREA? Likely should be back on beta-blocker - will discuss with cardiology  · Holding Lasix  · Troponin is elevated this admission to 14 15  He is without chest pain  · EKG shows sinus tachycardia with nonspecific ST abnormalities without ST depressions or elevations    There is however Q-waves noted did in his anterior leads which were not seen on prior EKG on 2/12/20  · Telemetry monitoring  · Cardiology consulted

## 2020-02-20 NOTE — H&P
ANSLEY&Yohana Umanzor 1945, 75 y o  male MRN: [id number]    Unit/Bed#: S -01 Encounter: [id number]    Primary Care Provider: Alex Sigala MD   Date and time admitted to hospital: 2/20/2020  9:52 AM      Acute on chronic combined systolic and diastolic heart failure [facility])  Assessment & Plan  Wt Readings from Last 3 Encounters:   02/20/20 89 6 kg (197 lb 8 5 oz)   02/11/20 78 6 kg (173 lb 4 5 oz)   06/24/19 79 2 kg (174 lb 9 6 oz)       · Patient was recently admitted from 2/11/22 to 2/17/20  He received aggressive IV fluids that admission as he was thought to be intravascularly volume depleted with subsequent acute renal failure  His diuretics were held and not resumed on discharge  Patient presents with increasing shortness of breath and lower extremity edema  ProBNP is significantly elevated to 80,770  He was seen by [facility] today who noted approximately a 16 lb weight gain since discharge   Chest x-ray shows mild cardiomegaly with interval development of small bilateral effusions and some pulmonary edema  · Echocardiogram in 2017 showed EF of 50%, moderate concentric hypertrophy and grade 1 diastolic dysfunction  mild to moderate mitral valve regurg  · Repeat TTE  · Patient did received x1 dose of Lasix in the ED  Will start IV Lasix 40 mg b i d  Was on 40 mg daily prior to patient's last hospitalization though has not been on diuretics since  · No further IV fluids  · 2 g sodium restriction/1500 mL fluid restriction  · Complicated by worsening renal dysfunction, monitor renal function closely  · Daily weights/strict intake and output  · Cardiology consult  · Telemetry  · Keep magnesium greater than 2 and potassium greater than 4  · Lisinopril was discontinued last admission    Will continue to hold  · They also report that his beta-blocker was discontinued last admission as his blood pressures were too low      Elevated troponin I level  Assessment & Plan  · Patient is currently without chest pain or ischemic changes on EKG as noted above  His troponin however is significantly elevated to 14 15  This could be a non MI troponin elevation verses a type 2 NSTEMI in the setting of his acute on chronic combined systolic and diastolic heart failure exacerbation  Given that he has Q-waves though now in his anterior leads, we cannot entirely rule out a type 1 NSTEMI that may have occurred on prior hospitalization and is now down trending  · Trend trop   · Telemetry monitoring  ·  mg now then ASA 81 mg thereafter  · Check lipid panel  Continue statin  · Telemetry monitor  · Cardiology consult  · Diuresis as outlined above    Urinary obstruction  Assessment & Plan  · urinary obstruction secondary to large right inguinal hernia with urethral obstruction and bilateral hydronephrosis  Seen by urology last hospitalization  · Patient is planned for Lawrence exchange on 3/12/20 with Urology in the office  They plan on keeping Lawrence in place until hernia is surgically repaired    Right inguinal hernia  Assessment & Plan  · Patient follows with surgery for hernia repair in the future though there is no definitive plans for surgical repair at this time until his cardiac issues are addressed per outpatient records    Gram-negative bacteremia  Assessment & Plan  · Patient was hospitalized from 02/11/20-2/17-20 for severe sepsis secondary to Gram-negative bacteremia with Proteus mirabilis and E coli infection  He was discharged on cefpodoxime through 2/21 to complete 10 day course  · He currently is afebrile without active signs of infection  · Resume cefpodoxime 200 mg b i d  Through tonight x1 more dose to complete antibiotic course for 10 days  · Monitor fever curve and WBC count    H/O myasthenia gravis  Assessment & Plan  · Followed as an outpatient by Neurology  Continue mycophenolate    Acute renal failure [facility])  Assessment & Plan  · Patient was followed by Nephrology last admission for acute renal failure  Baseline creatinine prior to patient's last hospitalization was 0 9-1 1  His creatinine on discharge from last hospitalization on 02/17 was 1 3  His acute renal failure was thought to be multifactorial last admission secondary to intravascular volume depletion, ATN from sepsis as well as bladder outlet obstruction/urethral obstruction from large hernia and BPH  · Creatinine is back up to 2 03, patient has an anion gap metabolic acidosis of 17 and a bicarb of 20  K is 5  · No IV fluids  · Avoid nephrotoxic agents  · Hold ACE-inhibitor  · Avoid hypotension  · Consult nephrology  · Check renal ultrasound  · Check UA  · Maintain Lawrence catheter that patient was discharged with last admission    Gastrocutaneous fistula  Assessment & Plan  · 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  · Outpatient follow-up    Essential hypertension  Assessment & Plan  · Currently not on antihypertensive medication regimen  Blood pressures are currently acceptable  · Holding Lasix  · Continue to monitor    Hyperlipidemia  Assessment & Plan  · Check lipid panel  · Resume atorvastatin 80 mg daily    Coronary artery disease  Assessment & Plan  · Patient has reported history of coronary artery disease and underwent stenting x2 many years ago - does not remember details  He states he was recommended for CABG in the past  He is not on beta-blocker currently  Continue ASA - will give 324mg ASA than 81 mg ASA thereafter  Continue statin  · ACE-inhibitor, isosorbide, and beta-blocker was discontinued last admission due to low blood pressures and ANDREA? Likely should be back on beta-blocker - will discuss with cardiology  · Holding Lasix  · Troponin is elevated this admission to 14 15  He is without chest pain  · EKG shows sinus tachycardia with nonspecific ST abnormalities without ST depressions or elevations    There is however Q-waves noted did in his anterior leads which were not seen on prior EKG on 2/12/20  · Telemetry monitoring  · Cardiology consulted    VTE Prophylaxis: Heparin  / sequential compression device   Code Status:  Level 1 full code      Anticipated Length of Stay:  Patient will be admitted on an Inpatient basis with an anticipated length of stay of  > 2 midnights  Justification for Hospital Stay:  Acute heart failure exacerbation requiring aggressive IV diuresis and Cardiology consult  Acute renal failure  Total Time for Visit, including Counseling / Coordination of Care: 1 hour  Greater than 50% of this total time spent on direct patient counseling and coordination of care  Chief Complaint:   Increasing shortness of breath and lower extremity edema/weight gain    History of Present Illness:    Lorenzo Gottlieb is a 75 y o  male with a past medical history of combined systolic and diastolic congestive heart failure, remote coronary artery disease status post stenting many years ago, hypertension, hyperlipidemia, BPH, CKD, myasthenia gravis, urinary retention status post Lawrence who presents with complaints of worsening shortness of breath and lower extremity edema/weight gain since being discharged on 2/17/20  Patient was recently admitted from 2/11/20 to 2/17/20 for severe sepsis secondary to Gram-negative bacteremia  His hospitalization was complicated by progressive renal dysfunction thought to be secondary to intravascular volume depletion, ATN from sepsis  During patient's last hospitalization, he had multiple medications and adjustments including discontinuation of his isosorbide and antihypertensive medication regimen including amlodipine and lisinopril as well as metoprolol  He also had a Lawrence catheter placed last admission due to urethral obstruction from large inguinal hernia  Patient was seen by his knee in 8 today and was noted to have gained 16 lb since discharge    Patient does report dyspnea on exertion as well as dyspnea at rest   He currently denies chest pain though had some intermittent chest discomfort on his prior hospitalization  He states that when he exerts himself he still has intermittent chest discomfort  He reports compliance with his antibiotics and is scheduled to complete his antibiotics this evening    Review of Systems:    Review of Systems   Constitutional: Positive for activity change and fatigue  Negative for appetite change, chills, diaphoresis and fever  Respiratory: Positive for shortness of breath  Negative for cough and wheezing  Cardiovascular: Positive for leg swelling  Negative for chest pain and palpitations  Gastrointestinal: Negative for blood in stool, constipation, diarrhea, nausea and vomiting  Genitourinary: Positive for difficulty urinating  Musculoskeletal: Positive for arthralgias  Neurological: Positive for weakness  Negative for dizziness, tremors, syncope, speech difficulty, light-headedness, numbness and headaches  All other systems reviewed and are negative  Past Medical and Surgical History:     Past Medical History:   Diagnosis Date    Cardiac disease     Carotid stenosis     [facility])     Compression fracture of lumbar vertebra ([facility])     Coronary artery disease     Disease of thyroid gland     Diverticulitis     Hernia, diaphragmatic, without obstruction     Hyperlipidemia     Hypertension     Inguinal hernia     Right    Ischemic cardiomyopathy     [facility]     Myasthenia gravis ([facility] )        Past Surgical History:   Procedure Laterality Date    ANGIOPLASTY / STENTING FEMORAL      CATARACT EXTRACTION      COLON SURGERY      colostomy    COLOSTOMY      ESOPHAGOGASTRODUODENOSCOPY N/A 3/29/2016    Procedure: ESOPHAGOGASTRODUODENOSCOPY (EGD); Surgeon: Asya Corbett MD;  [facility];   Service:     GASTROSTOMY TUBE PLACEMENT N/A 3/29/2016    Procedure: PEG CHANGE-LOW PROFILE TUBE ;  Surgeon: Asya Corbett MD;  [facility]; Service:     LAPAROTOMY N/A 5/17/2016    Procedure: LAPAROTOMY EXPLORATORY; RESSECTION OF GASTRO-CUTANEOUS FISTULA ;  Surgeon: Ulises Tao DO;  [facility];  Service:     PEG TUBE PLACEMENT      PEG TUBE REMOVAL      [place] ESOPHAGOGASTRODUODENOSCOPY TRANSORAL DIAGNOSTIC N/A 5/12/2016    Procedure: ESOPHAGOGASTRODUODENOSCOPY w 12-6 gc clip,anchor ;  Surgeon: Ramiro Childs MD;  [facility]; Service: Gastroenterology       Meds/Allergies:    Prior to Admission medications    Medication Sig Start Date End Date Taking? Authorizing Provider   aspirin 81 MG tablet Take 81 mg by mouth daily  Yes Historical Provider, MD   atorvastatin (LIPITOR) 80 mg tablet Take 80 mg by mouth daily at bedtime    Yes Historical Provider, MD   cefpodoxime (VANTIN) 200 mg tablet Take 1 tablet (200 mg total) by mouth 2 (two) times a day for 3 days 2/18/20 2/21/20 Yes Joseph Fermin DO   levothyroxine 25 mcg tablet Take 50 mcg by mouth daily  Yes Historical Provider, MD   mycophenolate (CELLCEPT) 250 mg capsule TAKE 3 CAPSULES TWICE A DAY 12/31/19  Yes Jade Pack DO   nystatin (MYCOSTATIN) powder Apply topically 2 (two) times a day for 12 days 2/17/20 2/29/20 Yes Joseph Fermin DO   Ostomy Supplies (PREMIER COLOSTOMY/ILEOSTOMY) KIT Change as needed    Sensura 2 piece coloplast  2 1/4   Box of 5 11/16/17  Yes Alisa Mcclellan MD   potassium chloride (KLOR-CON) 20 mEq packet Take 20 mEq by mouth daily  Yes Historical Provider, MD   acetaminophen (TYLENOL) 500 mg tablet Take 2 tablets (1,000 mg total) by mouth 3 (three) times a day for 7 days 2/17/20 2/20/20 Yes Joseph Fermin DO   Calcium Citrate-Vitamin D (CALCIUM CITRATE +D PO) Take 1 tablet by mouth   2/20/20 Yes Historical Provider, MD   pantoprazole (PROTONIX) 40 mg tablet Take 40 mg by mouth daily    2/20/20 Yes Historical Provider, MD     I have reviewed home medications with patient personally      Allergies: No Known Allergies    Social History:     Marital Status: /Civil Union     Substance Use History:   Social History     Substance and Sexual Activity   Alcohol Use Yes    Comment: 1 per month     Social History     Tobacco Use   Smoking Status Former Smoker    Last attempt to quit: 1973    Years since quittin 0   Smokeless Tobacco Never Used     Social History     Substance and Sexual Activity   Drug Use No       Family History:    Family History   Problem Relation Age of Onset    Heart disease Mother     Hypertension Mother        Physical Exam:     Vitals:   Blood Pressure: 90/55 (20 1408)  Pulse: (!) 106 (20 1408)  Temperature: 98 °F (36 7 °C) (20 1408)  Temp Source: Oral (20 1408)  Respirations: 19 (20 140)  Height: 5' 9" (175 3 cm) (20 1408)  Weight - Scale: 88 7 kg (195 lb 8 8 oz) (20 1408)  SpO2: 98 % (20 1408)    Physical Exam   Constitutional: He is oriented to person, place, and time  No distress  HENT:   Head: Normocephalic and atraumatic  Eyes: Pupils are equal, round, and reactive to light  Neck: JVD present  Cardiovascular:   Sinus tachycardia   Pulmonary/Chest: He has no wheezes  He has no rales  Conversational tachypnea  Crackles bilaterally   Abdominal: There is no tenderness  Obese abdomen   Musculoskeletal: He exhibits edema (3+ lower extremity pitting edema bilateral lower extremities)  Neurological: He is alert and oriented to person, place, and time  No cranial nerve deficit  Skin: He is not diaphoretic  Nursing note and vitals reviewed  Additional Data:     Lab Results: I have personally reviewed pertinent reports        Results from last 7 days   Lab Units 20  1059   WBC Thousand/uL 15 45*   HEMOGLOBIN g/dL 13 6   HEMATOCRIT % 44 9   PLATELETS Thousands/uL 253   NEUTROS PCT % 87*   LYMPHS PCT % 6*   MONOS PCT % 5   EOS PCT % 0     Results from last 7 days   Lab Units 20  1059   POTASSIUM mmol/L 5 0   CHLORIDE mmol/L 102   CO2 mmol/L 20* BUN mg/dL 52*   CREATININE mg/dL 2 03*   CALCIUM mg/dL 8 6     Results from last 7 days   Lab Units 02/20/20  1059   INR  1 39*       Imaging: I have personally reviewed pertinent reports  Ct Abdomen Pelvis Wo Contrast    Result Date: 2/12/2020  Narrative: CT ABDOMEN AND PELVIS WITHOUT IV CONTRAST INDICATION:   History of large inguinal hernia  Urinary retention  Bacteremia  COMPARISON:  CT study of 11/12/2017 TECHNIQUE:  CT examination of the abdomen and pelvis was performed without intravenous contrast   Axial, sagittal, and coronal 2D reformatted images were created from the source data and submitted for interpretation  Radiation dose length product (DLP) for this visit:  650 mGy-cm   This examination, like all CT scans performed in the [address] [place] [address], was performed utilizing techniques to minimize radiation dose exposure, including the use of iterative reconstruction and automated exposure control  Enteric contrast was not administered  FINDINGS: ABDOMEN LOWER CHEST:  No clinically significant abnormality identified in the visualized lower chest  LIVER/BILIARY TREE:  Mild hepatic steatosis GALLBLADDER:  Assessment limited secondary to motion artifact  No obvious abnormality SPLEEN:  Unremarkable  PANCREAS:  Unremarkable  ADRENAL GLANDS:  Unremarkable  KIDNEYS/URETERS:  Mild to moderate bilateral hydronephrosis noted  No calculi on the right  Multiple small left renal calculi noted, individual largest in the interpolar aspect measuring approximately 3 to 4 mm  Both ureters are dilated however no ureteral stone appreciated Right perinephric soft tissue stranding noted STOMACH AND BOWEL:  See discussion below APPENDIX:  No findings to suggest appendicitis  ABDOMINOPELVIC CAVITY:  No ascites or free intraperitoneal air  No lymphadenopathy  VESSELS:  Unremarkable for patient's age   PELVIS REPRODUCTIVE ORGANS:  Prostatomegaly, measuring approximately 5 7 x 4 8 cm URINARY BLADDER:  Diffuse bladder wall thickening with perivesical inflammatory change  No bladder calculus  Bladder is not over distended at this time ABDOMINAL WALL/INGUINAL REGIONS:  There is a very large right inguinal hernia containing multiple loops of both large and small bowel  This hernia was also present on the prior examination although appearing larger in size  There is no inflammatory change within the hernia sac or evidence of bowel obstruction  The large size of the hernia creates mass effect upon the penis an external genitalia, displacing these organs to the left  Additionally, there is a left parastomal hernia again without evidence of obstruction or inflammation  OSSEOUS STRUCTURES:  Multiple compression fractures of the lumbar spine, similar to prior study     Impression: 1  Diffuse bladder wall thickening with inflammatory change of the surrounding perivesical tissue concerning for cystitis  2  Mild to moderate bilateral hydronephrosis  Although multiple left renal calculi are seen, no evidence of ureteral stone is seen  Right perinephric soft tissue stranding which may be associated with pyelonephritis 3  Prostatomegaly 4  Very large right inguinal hernia, containing multiple loops of large and small bowel  This creates mass effect upon the penis and may create compression of the penile urethra, further predisposing to bladder outlet symptomatology  5  Note is also made of a left parastomal hernia  The case was discussed with Dr Parisa Rubin on 2/12/2020 at 4:40 PM  Workstation performed: [id number]     Xr Chest 1 View Portable    Result Date: 2/20/2020  Narrative: CHEST INDICATION:   Sob  COMPARISON:  CT 2/12/2020 EXAM PERFORMED/VIEWS:  XR CHEST PORTABLE FINDINGS: The heart is mildly enlarged  The mediastinum appears unremarkable  Small bilateral pleural effusions are present with bibasilar opacities which may reflect atelectasis and/or edema with pneumonia less likely  No pneumothorax identified   Osseous structures appear within normal limits for patient age  Impression: 1  Mild cardiomegaly with new small bilateral effusions and bibasilar opacities which may reflect atelectasis and/or edema, likely on the basis of volume overload  Workstation performed: [id number]     Us Kidney And Bladder    Result Date: 2/14/2020  Narrative: RENAL ULTRASOUND INDICATION:   Hydronephrosis- bilateral  COMPARISON: CT 2/12/2020 TECHNIQUE:   Ultrasound of the retroperitoneum was performed with a curvilinear transducer utilizing volumetric sweeps and still imaging techniques  FINDINGS: KIDNEYS: Symmetric and normal size  Right kidney:  11 7 x 6 5 cm  Left kidney:  12 3 x 5 7 cm  Right kidney Normal echogenicity and contour  No suspicious masses detected  Simple cortical cysts in the upper pole measures 10 x 8 x 7 mm  Mild right pyelocaliectasis is present  No shadowing calculi  No perinephric fluid collections  Left kidney Normal echogenicity and contour  No suspicious masses detected  No hydronephrosis  No shadowing calculi  No perinephric fluid collections  URETERS: Nonvisualized  BLADDER: Decompressed by Lawrence catheter  No focal thickening or mass lesions  Ureteral jets are not identified  Impression: 1  Mild right renal pyelocaliectasis with otherwise unremarkable study  Workstation performed: [id number]       EKG, Pathology, and Other Studies Reviewed on Admission:   · EKG:  Sinus tachycardia with nonspecific ST and T-wave abnormalities  Q-waves noted in anterior leads which are new from prior    Allscripts / Epic Records Reviewed: Yes     ** Please Note: This note has been constructed using a voice recognition system   **

## 2020-02-20 NOTE — ASSESSMENT & PLAN NOTE
· urinary obstruction secondary to large right inguinal hernia with urethral obstruction and bilateral hydronephrosis  Seen by urology last hospitalization  · Patient is planned for Lawrence exchange on 3/12/20 with Urology in the office    They plan on keeping Lawrence in place until hernia is surgically repaired

## 2020-02-20 NOTE — ASSESSMENT & PLAN NOTE
· Currently not on antihypertensive medication regimen    Blood pressures are currently acceptable  · Holding Lasix  · Continue to monitor

## 2020-02-20 NOTE — ASSESSMENT & PLAN NOTE
· Patient follows with surgery for hernia repair in the future though there is no definitive plans for surgical repair at this time until his cardiac issues are addressed per outpatient records

## 2020-02-21 NOTE — PROGRESS NOTES
[facility] Daily Progress Note - Critical Care   Paige Jimenez 75 y o  male MRN: [id number]  Unit/Bed#: ICU 10 Encounter: [id number]        ----------------------------------------------------------------------------------------  HPI/24hr events:  Amended to critical care per Cardiology for possible need of pressors overnight  Patient needed no pressors overnight  Remains on 4 L nasal cannula oxygen    ---------------------------------------------------------------------------------------  SUBJECTIVE  Patient says he is having no difficulty breathing with the oxygen    Review of Systems   Constitutional: Positive for fatigue  Negative for fever  Respiratory: Negative for shortness of breath and wheezing  Cardiovascular: Negative for chest pain and palpitations  Gastrointestinal: Negative for abdominal distention and abdominal pain  Genitourinary: Negative for difficulty urinating and dysuria  Neurological: Negative for speech difficulty, light-headedness and headaches  All other systems reviewed and are negative  Review of systems was reviewed and negative unless stated above in HPI/24-hour events   ---------------------------------------------------------------------------------------  Assessment and Plan:     Neuro:   · Diagnosis: Myasthenia gravis  ? Plan:  Continue mycophenolate     CV:   · Diagnosis: Acute on Chronic combined systolic and diastolic HF  ? Plan: Repeat echo showed EF 15%,   ? JVD, Bibasilar crackles, Abd distention, pitting edema  ? Continue supplemental O2  ? Continue Diuresis furosemide 500 mg infusion 50 mL- 10 milligrams/hour  ? Fluid restriction 1800 mL  ? Oral antihypertensive agents held  ? BNP 80,770 on admission  ? Per cardiology may require pressors  ? Cardiac diet   ? Lipid profile unremarkable  ? Monitor I/O  ? Cardiology Following   ?    · Diagnosis: Elevated Troponin   ? Plan:  troponins trended 14-16-15 5  ?  Continue Diuresis      Pulm:  · Diagnosis:  Fluid overload  ? Plan:  Monitor oxygen saturation  ? ABG  ? Nasal cannula oxygen 4 L     GI:   · Diagnosis: Rt Inguinal Hernia  ? Plan: Following with surgery no plans for repair at this time        :   · Diagnosis: Urinary Obstruction   ? Plan: Continue Lawrence   ? Renal ultrasound shows no hydronephrosis  ? Following with urology     · Diagnosis: ANDREA  ? Plan: Monitor while a on IV Lasix  ? Baseline creatinine around 1  ? Nephrology following  ? ACE-inhibitor held at this time        F/E/N:   · Plan:   continue Fluid restriction 1800ml/day     Heme/Onc:   · Diagnosis:  No acute issues     Endo:   · Diagnosis:  No acute issues  ? Plan:  Monitor glucose        ID:   · Diagnosis:  History of severe sepsis  ? Plan:   finish 10 days of antibiotics  ? WBC trending down 15->13        MSK/Skin:   · Diagnosis: No acute issues         PPX  Heparin    Disposition: Continue Critical Care   Code Status: Level 1 - Full Code  ---------------------------------------------------------------------------------------  ICU CORE MEASURES    Prophylaxis   VTE Pharmacologic Prophylaxis: Heparin  VTE Mechanical Prophylaxis: sequential compression device  Stress Ulcer Prophylaxis: Prophylaxis Not Indicated     ABCDE Protocol (if indicated)  Plan to perform spontaneous awakening trial today? Not applicable  Plan to perform spontaneous breathing trial today? Not applicable  Obvious barriers to extubation? Not applicable  CAM-ICU: Negative    Invasive Devices Review  Invasive Devices     Peripheral Intravenous Line            Peripheral IV 02/20/20 Right Antecubital less than 1 day    Peripheral IV 02/20/20 Right Forearm less than 1 day          Drain            Colostomy  -- days    Colostomy LLQ 1375 days    Urethral Catheter Latex 18 Fr  8 days              Can any invasive devices be discontinued today?  Not applicable  ---------------------------------------------------------------------------------------  OBJECTIVE    Vitals   Vitals: 20 0300 20 0400 20 0500 20 0600   BP: 125/80 122/80 123/89 114/74   BP Location: Left arm      Pulse: 100 103 98 98   Resp: 20 (!) 32 22 (!) 29   Temp: (!) 96 8 °F (36 °C)      TempSrc: Axillary      SpO2: 98% 98% 98% 98%   Weight:       Height:         Temp (24hrs), Av 5 °F (36 4 °C), Min:96 8 °F (36 °C), Max:98 °F (36 7 °C)  Current: Temperature: (!) 96 8 °F (36 °C)  HR:  98  BP:  114/74  RR:  29  SpO2:  98    Physical Exam   Constitutional: He is oriented to person, place, and time  HENT:   Head: Normocephalic and atraumatic  Cardiovascular: Normal rate and regular rhythm  Exam reveals distant heart sounds and decreased pulses  Pulmonary/Chest: Tachypnea noted  He has no decreased breath sounds  Musculoskeletal: He exhibits edema  He exhibits no tenderness  Neurological: He is alert and oriented to person, place, and time  Psychiatric: He has a normal mood and affect  His behavior is normal    Nursing note and vitals reviewed          Respiratory:  SpO2: SpO2: 98 %  O2 Flow Rate (L/min): 4 L/min    Invasive/non-invasive ventilation settings   Respiratory    Lab Data (Last 4 hours)    None         O2/Vent Data (Last 4 hours)    None                Laboratory and Diagnostics:  Results from last 7 days   Lab Units 20  0541 20  1059 20  0613 02/15/20  0617   WBC Thousand/uL 13 80* 15 45* 8 54 8 36   HEMOGLOBIN g/dL 13 3 13 6 13 2 12 0   HEMATOCRIT % 42 9 44 9 43 0 37 8   PLATELETS Thousands/uL 236 253 207 152   NEUTROS PCT % 86* 87* 77* 79*   MONOS PCT % 5 5 8 10     Results from last 7 days   Lab Units 20  0541 20  1059 20  0613 20  0500 02/15/20  0617   SODIUM mmol/L 140 139 140 140 138   POTASSIUM mmol/L 4 4 5 0 3 1* 3 1* 3 3*   CHLORIDE mmol/L 102 102 104 105 103   CO2 mmol/L 22 20* 26 26 24   ANION GAP mmol/L 16* 17* 10 9 11   BUN mg/dL 60* 52* 30* 37* 48*   CREATININE mg/dL 2 06* 2 03* 1 30 1 25 1 59*   CALCIUM mg/dL 8 2* 8 6 8 2* 7  6* 7 7*   GLUCOSE RANDOM mg/dL 109 121 115 97 110     Results from last 7 days   Lab Units 20  0541 20  0613 20  0500   MAGNESIUM mg/dL 2 4 2 4 2 3      Results from last 7 days   Lab Units 20  0541 20  2311 20  1646 20  1059   INR   --   --   --  1 39*   PTT seconds 35 70* 30 29      Results from last 7 days   Lab Units 20  1646 20  1059   TROPONIN I ng/mL 15 54* 16 09* 14 15*         ABG:    VBG:  Results from last 7 days   Lab Units 20  1646   PH ELIZABETH  7 318   PCO2 ELIZABETH mm Hg 36 1*   PO2 ELIZABETH mm Hg 31 1*   HCO3 ELIZABETH mmol/L 18 1*   BASE EXC ELIZABETH mmol/L -7 2           Micro        EKG:  Unremarkable   Imagin/20 chest x-ray shows cardiomegaly and bilateral pleural effusions I have personally reviewed pertinent reports  Intake and Output  I/O        07 -  0700  07 -  0700    P  O   800    I V  (mL/kg)  145 5 (1 6)    Total Intake(mL/kg)  945 5 (10 7)    Urine (mL/kg/hr)  1070    Stool  120    Total Output  1190    Net  -244 5          Unmeasured Stool Occurrence  1 x        UOP: 1070 ml/hr     Height and Weights   Height: 5' 9" (175 3 cm)  IBW: 70 7 kg  Body mass index is 28 88 kg/m²  Weight (last 2 days)     Date/Time   Weight    20 1408   88 7 (195 55)    20 0959   89 6 (197 53)                Nutrition       Diet Orders   (From admission, onward)             Start     Ordered    20 1411  Room Service  Once     Question:  Type of Service  Answer:  Room Service - Appropriate with Assistance    20 1411    20 1349  Diet Cardiovascular; Sodium 2 GM; Fluid Restriction 1500 ML  Diet effective now     Question Answer Comment   Diet Type Cardiovascular    Cardiac Sodium 2 GM    Other Restriction(s): Fluid Restriction 1500 ML    RD to adjust diet per protocol?  Yes        20 1348                  Active Medications  Scheduled Meds:  Current Facility-Administered Medications:  acetaminophen 650 mg Oral Q6H PRN Aaron Ramon MD    aspirin 81 mg Oral Daily Aaron Ramon MD    atorvastatin 80 mg Oral HS Aaron Ramon MD    docusate sodium 100 mg Oral BID Aaron Ramon MD    furosemide 10 mg/hr Intravenous Continuous Goble Dandy, CRNP Last Rate: 10 mg/hr (02/20/20 1710)   heparin (porcine) 3-20 Units/kg/hr (Order-Specific) Intravenous Titrated Goble Dandy, CRNP Last Rate: 15 8 Units/kg/hr (02/21/20 0627)   heparin (porcine) 2,000 Units Intravenous Q1H PRN Oneyda Martinez PA-C    heparin (porcine) 4,000 Units Intravenous Q1H PRN Oneyda Martinez PA-C    levothyroxine 50 mcg Oral Early Morning Aaron Ramon MD    mycophenolate 750 mg Oral BID Aaron Ramon MD    nystatin  Topical BID Aaron Ramon MD    polyethylene glycol 17 g Oral Daily PRN Aaron Ramon MD      Continuous Infusions:    furosemide 10 mg/hr Last Rate: 10 mg/hr (02/20/20 1710)   heparin (porcine) 3-20 Units/kg/hr (Order-Specific) Last Rate: 15 8 Units/kg/hr (02/21/20 0627)     PRN Meds:     acetaminophen 650 mg Q6H PRN   heparin (porcine) 2,000 Units Q1H PRN   heparin (porcine) 4,000 Units Q1H PRN   polyethylene glycol 17 g Daily PRN       Allergies   No Known Allergies  ---------------------------------------------------------------------------------------  Advance Directive and Living Will:      Power of :    POLST:    ---------------------------------------------------------------------------------------      Miguelina Jackson MD      Portions of the record may have been created with voice recognition software  Occasional wrong word or "sound a like" substitutions may have occurred due to the inherent limitations of voice recognition software    Read the chart carefully and recognize, using context, where substitutions have occurred

## 2020-02-21 NOTE — NURSING NOTE
Pt very upset about blood draws, continually complaining that nursing staff taking ptts q6hr in order to adjust heparin drip is inappropriate  Will continue to use ultrasound guidance to retrieve pt's blood work as much as pt is cooperative  Venous access consult ordered for midline placement d/t frequent labs and ICU status

## 2020-02-21 NOTE — PROGRESS NOTES
NEPHROLOGY PROGRESS NOTE    Nora Meza 75 y o  male MRN: [id number]  Unit/Bed#: ICU 10 Encounter: [id number]  Reason for Consult:  Acute renal failure    The patient is in ICU he is awake and alert relatively stable in good spirits and conversational   Still gets a little short of breath with any activity and when he speaking he has to catch his breath a little bit but he is in no distress  He denied chest pain  ASSESSMENT/PLAN:  1  Renal    The patient has acute renal failure with baseline creatinine around 1 3  He presented to the hospital after being discharged as he was recently here for critical illness that was treated  At home he gained a lot of weight presented back to the hospital with a creatinine of 2  Unfortunately he has been discovered to have had an MI as troponin I was very elevated and his ejection fraction of the left ventricle is significantly worse at 15% which is new  This is likely the main etiology of his volume overload and renal dysfunction  Discussing with Cardiology today they are likely going to start on inotropic support to see if he can respond better to diuretics and I would recommend increasing Lasix  Creatinine stable at 2 and electrolytes normal     Milrinone per cardiology  Lasix 40 mg IV then increase Lasix drip to 20 mg an hour  If milrinone is initiated I suspect his cardiac out would improve and may become more diuretic responsive  Monitor renal function and volume status with diuresis  2  Cardiac    The patient is subacute from recent MI  Has new onset systolic dysfunction with LV ejection fraction of 15%  Cardiology's impression is that the patient's cardiogenic shock  Likely starting milrinone and increasing diuretic rate  He was found to have an LV thrombus as well and has been placed on anticoagulation per Cardiology  3  History of myasthenia gravis  Follows with Neurology is treated with mycophenolate    4  Chronic enterocutaneous fistula  SUBJECTIVE:  Review of Systems   Constitution: Positive for malaise/fatigue  Negative for chills, decreased appetite, diaphoresis and fever  HENT: Negative  Eyes: Negative  Cardiovascular: Positive for dyspnea on exertion, leg swelling and orthopnea  Negative for chest pain and palpitations  Respiratory: Positive for shortness of breath  Negative for cough, sputum production and wheezing  Gastrointestinal: Negative  Negative for abdominal pain, diarrhea, nausea and vomiting  Genitourinary: Negative for flank pain  Lawrence catheter in no gross hematuria  Neurological: Negative  Psychiatric/Behavioral: Negative  Negative for altered mental status  OBJECTIVE:  Current Weight: Weight - Scale: 88 7 kg (195 lb 8 8 oz)  Vitals:Temp (24hrs), Av 6 °F (36 4 °C), Min:96 8 °F (36 °C), Max:98 °F (36 7 °C)  Current: Temperature: 98 °F (36 7 °C)   Blood pressure 117/76, pulse 96, temperature 98 °F (36 7 °C), temperature source Axillary, resp  rate (!) 28, height 5' 9" (1 753 m), weight 88 7 kg (195 lb 8 8 oz), SpO2 96 %  , Body mass index is 28 88 kg/m²  Intake/Output Summary (Last 24 hours) at 2020 0959  Last data filed at 2020 0600  Gross per 24 hour   Intake 945 5 ml   Output 1190 ml   Net -244 5 ml       Physical Exam: /76   Pulse 96   Temp 98 °F (36 7 °C) (Axillary)   Resp (!) 28   Ht 5' 9" (1 753 m)   Wt 88 7 kg (195 lb 8 8 oz)   SpO2 96%   BMI 28 88 kg/m²   Physical Exam   Constitutional: He is oriented to person, place, and time  Non-toxic appearance  He does not appear ill  No distress  HENT:   Head: Atraumatic  Mouth/Throat: Oropharynx is clear and moist    Eyes: EOM are normal    Neck: Normal range of motion  Neck supple  No JVD present  Cardiovascular: Normal rate and regular rhythm  Exam reveals no gallop and no friction rub  Pulmonary/Chest: No tachypnea  He has no decreased breath sounds  He has no wheezes   He has no rhonchi  He has rales  Slightly tachypneic  When talking catches breath a little bit after part of the sentence  Abdominal: Soft  Bowel sounds are normal  He exhibits no distension  There is no tenderness  Neurological: He is alert and oriented to person, place, and time  Psychiatric: He has a normal mood and affect         Medications:    Current Facility-Administered Medications:     acetaminophen (TYLENOL) tablet 650 mg, 650 mg, Oral, Q6H PRN, Will Day MD    aspirin chewable tablet 81 mg, 81 mg, Oral, Daily, Will Day MD    atorvastatin (LIPITOR) tablet 80 mg, 80 mg, Oral, HS, Will Day MD, 80 mg at 02/20/20 2119    docusate sodium (COLACE) capsule 100 mg, 100 mg, Oral, BID, Will Day MD, 100 mg at 02/20/20 1803    furosemide (LASIX) 500 mg infusion 50 mL, 20 mg/hr, Intravenous, Continuous, Polina Kurtz MD, Last Rate: 1 mL/hr at 02/20/20 1710, 10 mg/hr at 02/20/20 1710    furosemide (LASIX) injection 40 mg, 40 mg, Intravenous, Once, Polina Kurtz MD    heparin (porcine) 25,000 units in 250 mL infusion (premix), 3-20 Units/kg/hr (Order-Specific), Intravenous, Titrated, SIMONA Ramsey, Last Rate: 13 4 mL/hr at 02/21/20 0627, 15 8 Units/kg/hr at 02/21/20 5766    heparin (porcine) injection 2,000 Units, 2,000 Units, Intravenous, Q1H PRN, Vijay Leon PA-C    heparin (porcine) injection 4,000 Units, 4,000 Units, Intravenous, Q1H PRN, Vijay Leon PA-C, 4,000 Units at 02/21/20 8689    levothyroxine tablet 50 mcg, 50 mcg, Oral, Early Morning, Will Day MD, 50 mcg at 02/21/20 0524    milrinone (PRIMACOR) 20 mg in 100 mL infusion (premix), 0 13 mcg/kg/min, Intravenous, Continuous, SIMONA Musa    mycophenolate (CELLCEPT) capsule 750 mg, 750 mg, Oral, BID, Will Day MD, 750 mg at 02/20/20 9468    nystatin (MYCOSTATIN) powder, , Topical, BID, Will Day MD    polyethylene glycol [facility]) packet 17 g, 17 g, Oral, Daily PRN, Bello Shankar Cardio, MD    Laboratory Results:  Lab Results   Component Value Date    WBC 13 80 (H) 02/21/2020    HGB 13 3 02/21/2020    HCT 42 9 02/21/2020    MCV 92 02/21/2020     02/21/2020     Lab Results   Component Value Date    SODIUM 140 02/21/2020    K 4 4 02/21/2020     02/21/2020    CO2 22 02/21/2020    BUN 60 (H) 02/21/2020    CREATININE 2 06 (H) 02/21/2020    GLUC 109 02/21/2020    CALCIUM 8 2 (L) 02/21/2020     Lab Results   Component Value Date    CALCIUM 8 2 (L) 02/21/2020    PHOS 3 4 07/10/2016     No results found for: LABPROT

## 2020-02-21 NOTE — PLAN OF CARE
Problem: Potential for Falls  Goal: Patient will remain free of falls  Description  INTERVENTIONS:  - Assess patient frequently for physical needs  -  Identify cognitive and physical deficits and behaviors that affect risk of falls    -  [address] fall precautions as indicated by assessment   - Educate patient/family on patient safety including physical limitations  - Instruct patient to call for assistance with activity based on assessment  - Modify environment to reduce risk of injury  - Consider OT/PT consult to assist with strengthening/mobility  Outcome: Progressing     Problem: PAIN - ADULT  Goal: Verbalizes/displays adequate comfort level or baseline comfort level  Description  Interventions:  - Encourage patient to monitor pain and request assistance  - Assess pain using appropriate pain scale  - Administer analgesics based on type and severity of pain and evaluate response  - Implement non-pharmacological measures as appropriate and evaluate response  - Consider cultural and social influences on pain and pain management  - Notify physician/advanced practitioner if interventions unsuccessful or patient reports new pain  Outcome: Progressing     Problem: INFECTION - ADULT  Goal: Absence or prevention of progression during hospitalization  Description  INTERVENTIONS:  - Assess and monitor for signs and symptoms of infection  - Monitor lab/diagnostic results  - Monitor all insertion sites, i e  indwelling lines, tubes, and drains  - Monitor endotracheal if appropriate and nasal secretions for changes in amount and color  - [address] appropriate cooling/warming therapies per order  - Administer medications as ordered  - Instruct and encourage patient and family to use good hand hygiene technique  - Identify and instruct in appropriate isolation precautions for identified infection/condition  Outcome: Progressing     Problem: SAFETY ADULT  Goal: Maintain or return to baseline ADL function  Description  INTERVENTIONS:  -  Assess patient's ability to carry out ADLs; assess patient's baseline for ADL function and identify physical deficits which impact ability to perform ADLs (bathing, care of mouth/teeth, toileting, grooming, dressing, etc )  - Assess/evaluate cause of self-care deficits   - Assess range of motion  - Assess patient's mobility; develop plan if impaired  - Assess patient's need for assistive devices and provide as appropriate  - Encourage maximum independence but intervene and supervise when necessary  - Involve family in performance of ADLs  - Assess for home care needs following discharge   - Consider OT consult to assist with ADL evaluation and planning for discharge  - Provide patient education as appropriate  Outcome: Progressing  Goal: Maintain or return mobility status to optimal level  Description  INTERVENTIONS:  - Assess patient's baseline mobility status (ambulation, transfers, stairs, etc )    - Identify cognitive and physical deficits and behaviors that affect mobility  - Identify mobility aids required to assist with transfers and/or ambulation (gait belt, sit-to-stand, lift, walker, cane, etc )  - [place] fall precautions as indicated by assessment  - Record patient progress and toleration of activity level on Mobility SBAR; progress patient to next Phase/Stage  - Instruct patient to call for assistance with activity based on assessment  - Consider rehabilitation consult to assist with strengthening/weightbearing, etc   Outcome: Progressing     Problem: DISCHARGE PLANNING  Goal: Discharge to home or other facility with appropriate resources  Description  INTERVENTIONS:  - Identify barriers to discharge w/patient and caregiver  - Arrange for needed discharge resources and transportation as appropriate  - Identify discharge learning needs (meds, wound care, etc )  - Arrange for interpretive services to assist at discharge as needed  - Refer to Case Management Department for coordinating discharge planning if the patient needs post-hospital services based on physician/advanced practitioner order or complex needs related to functional status, cognitive ability, or social support system  Outcome: Progressing     Problem: Knowledge Deficit  Goal: Patient/family/caregiver demonstrates understanding of disease process, treatment plan, medications, and discharge instructions  Description  Complete learning assessment and assess knowledge base    Interventions:  - Provide teaching at level of understanding  - Provide teaching via preferred learning methods  Outcome: Progressing     Problem: CARDIOVASCULAR - ADULT  Goal: Maintains optimal cardiac output and hemodynamic stability  Description  INTERVENTIONS:  - Monitor I/O, vital signs and rhythm  - Monitor for S/S and trends of decreased cardiac output  - Administer and titrate ordered vasoactive medications to optimize hemodynamic stability  - Assess quality of pulses, skin color and temperature  - Assess for signs of decreased coronary artery perfusion  - Instruct patient to report change in severity of symptoms  Outcome: Progressing  Goal: Absence of cardiac dysrhythmias or at baseline rhythm  Description  INTERVENTIONS:  - Continuous cardiac monitoring, vital signs, obtain 12 lead EKG if ordered  - Administer antiarrhythmic and heart rate control medications as ordered  - Monitor electrolytes and administer replacement therapy as ordered  Outcome: Progressing     Problem: RESPIRATORY - ADULT  Goal: Achieves optimal ventilation and oxygenation  Description  INTERVENTIONS:  - Assess for changes in respiratory status  - Assess for changes in mentation and behavior  - Position to facilitate oxygenation and minimize respiratory effort  - Oxygen administered by appropriate delivery if ordered  - Initiate smoking cessation education as indicated  - Encourage broncho-pulmonary hygiene including cough, deep breathe, Incentive Spirometry  - Assess the need for suctioning and aspirate as needed  - Assess and instruct to report SOB or any respiratory difficulty  - Respiratory Therapy support as indicated  Outcome: Progressing

## 2020-02-21 NOTE — SOCIAL WORK
LOS 1  Patient is a Sepsis bundle  Patient is a 30 day readmission  Patient was recently hospitalized from 2/11 - 2/17 where he was treated for severe sepsis secondary to a UTI and acute renal failure  CT abdomen/pelvis showed markedly enlarged prostate and possible compression of the urethra by patient's large right-sided inguinal hernia  Lawrence catheter was placed with resultant improvement in patient's creatinine  Urology was consulted and recommended to keep Lawrence in chronically  Patient was sent to the ED by his Visiting Nurse due to complaints of SOB with a weight gain of 17 pounds in the past couple of days  CM met with patient at bedside  Patient is alert and oriented  CM name and role reviewed  Patient reports living with his wife Ingrid Membreno, whom he identifies as POA  Patient lives in a 2 story home with a first floor set-up  Patient reports not using any DME  He is independent with all ADL's  Patient reports he is currently open with [facility] as that is who sent his to the ED this admission  No HX of any STR  Patient has RX coverage and uses [facility] for immediate needs but primary uses [facility] with no issues getting or affording his medications  Patient does not work anymore and reports his income source is a pension  Patient drives himself  Patient denies any HX of mental health or drug and alcohol issues requiring hospitalization or treatment  CM reviewed the [address] Notification Letter  CM reviewed discharge planning process including the following: identifying caregivers at home, preference for d/c planning needs, [facility] Meds to Bed program, availability of treatment team to discuss questions or concerns patient and/or family may have regarding diagnosis, plan of care, old or new medications and discharge planning  CM name and role reviewed  CM will continue to follow for care coordination and update assessment as necessary

## 2020-02-21 NOTE — PLAN OF CARE
Problem: PHYSICAL THERAPY ADULT  Goal: Performs mobility at highest level of function for planned discharge setting  See evaluation for individualized goals  Description  Treatment/Interventions: LE strengthening/ROM, Functional transfer training, Therapeutic exercise, Endurance training, Patient/family training, Equipment eval/education, Bed mobility, Gait training(PT to see next session to complete mobility assessment)          See flowsheet documentation for full assessment, interventions and recommendations  Outcome: Progressing  Note:   Prognosis: Fair  Problem List: Decreased strength, Decreased endurance, Decreased mobility, Decreased safety awareness  Assessment: Pt presents with complaints of worsening shortness of breath and lower extremity edema/weight gain since being discharged on 2/17/20  Pt was recently admitted from 2/11/20 to 2/17/20 for severe sepsis secondary to Gram-negative bacteremia  Dx: myasthenia gravis, elevated troponin, acute on chronic combined systolic and diastolic heart failure, urinary obstruction, and UTI  order placed for PT eval and tx, w/ activity order of up and OOB as tolerated  pt presents w/ comorbidities of CHF, CAD, HTN, hyperlipidemia, carotid stenosis, lumbar compression fx, ICMP, MI, CKD, myasthenia gravis, and urinary retention and personal factors of stair(s) to enter home and readmission to hospital  pt presents w/ weakness, decreased endurance, decreased safety awareness and fall risk  these impairments are evident in findings from physical examination (weakness), mobility assessment (inability to reposition in bed or mobilize out of bed), and Barthel Index: 20/100  pt is at risk for falls due to physical deficits  pt's clinical presentation is unstable/unpredictable (evident in inability to mobilize out of bed, need for supplemental oxygen in order to maintain oxygen saturation)   pt needs inpatient PT tx to improve mobility deficits and progress mobility training as appropriate  discharge recommendation is dependent on progression of mobility status and level of support available at home  Pt may benefit from inpatient rehab to reduce fall risk and maximize level of functional independence  Recommendation: Other (Comment)(inpatient rehab vs home w/ family support and home PT)          See flowsheet documentation for full assessment

## 2020-02-21 NOTE — PLAN OF CARE
Problem: PHYSICAL THERAPY ADULT  Goal: Performs mobility at highest level of function for planned discharge setting  See evaluation for individualized goals  Description  Treatment/Interventions: LE strengthening/ROM, Functional transfer training, Therapeutic exercise, Endurance training, Patient/family training, Equipment eval/education, Bed mobility, Gait training(PT to see next session to complete mobility assessment)          See flowsheet documentation for full assessment, interventions and recommendations  2/21/2020 1025 by Rogelio Shah PT  Outcome: Progressing  Note:   Prognosis: Fair  Problem List: Decreased strength, Decreased endurance, Decreased mobility, Decreased safety awareness  Assessment: Pt was initiated w/ participation in exercise program to address physical and mobility deficits noted during eval  Pt had fair understanding of exercise technique after initial introduction and demonstration  Rest breaks were needed due to fatigue  Handout was provided to expedite understanding of technique  continued inpatient PT is indicated to facilitate return to independent level of mobility and function  Recommendation: Other (Comment)(inpatient rehab vs home w/ family support and home PT)          See flowsheet documentation for full assessment  2/21/2020 1021 by Rogelio Shah PT  Outcome: Progressing  Note:   Prognosis: Fair  Problem List: Decreased strength, Decreased endurance, Decreased mobility, Decreased safety awareness  Assessment: Pt presents with complaints of worsening shortness of breath and lower extremity edema/weight gain since being discharged on 2/17/20  Pt was recently admitted from 2/11/20 to 2/17/20 for severe sepsis secondary to Gram-negative bacteremia   Dx: myasthenia gravis, elevated troponin, acute on chronic combined systolic and diastolic heart failure, urinary obstruction, and UTI  order placed for PT eval and tx, w/ activity order of up and OOB as tolerated  pt presents w/ comorbidities of CHF, CAD, HTN, hyperlipidemia, carotid stenosis, lumbar compression fx, ICMP, MI, CKD, myasthenia gravis, and urinary retention and personal factors of stair(s) to enter home and readmission to hospital  pt presents w/ weakness, decreased endurance, decreased safety awareness and fall risk  these impairments are evident in findings from physical examination (weakness), mobility assessment (inability to reposition in bed or mobilize out of bed), and Barthel Index: 20/100  pt is at risk for falls due to physical deficits  pt's clinical presentation is unstable/unpredictable (evident in inability to mobilize out of bed, need for supplemental oxygen in order to maintain oxygen saturation)  pt needs inpatient PT tx to improve mobility deficits and progress mobility training as appropriate  discharge recommendation is dependent on progression of mobility status and level of support available at home  Pt may benefit from inpatient rehab to reduce fall risk and maximize level of functional independence  Recommendation: Other (Comment)(inpatient rehab vs home w/ family support and home PT)          See flowsheet documentation for full assessment

## 2020-02-21 NOTE — PHYSICAL THERAPY NOTE
PHYSICAL THERAPY TREATMENT NOTE    Patient Name: Jessica Flaherty  XMAAS'H Date: 2/21/2020 02/21/20 4386   Pain Assessment   Pain Assessment No/denies pain   Restrictions/Precautions   Other Precautions Chair Alarm; Bed Alarm;Telemetry;Multiple lines;O2;Fall Risk   General   Chart Reviewed Yes   Family/Caregiver Present No   Cognition   Arousal/Participation Alert   Attention Attends with cues to redirect   Orientation Level Oriented to person;Oriented to place; Other (Comment)  (pt was identified w/ full name, birth date)   Following Commands Follows one step commands with increased time or repetition   Subjective   Subjective pt agreed to participate in PT intervention after motivation was provided  pt agreed to LE exercises, as he is refusing to participate in mobility assessment  Activity Tolerance   Activity Tolerance Patient limited by fatigue   Nurse Made Aware spoke to [address], [facility]   Equipment Use   Comments ankle pumps 30  quad sets 20  heel slides and supine hip abduction 10 each  Assessment   Prognosis Fair   Problem List Decreased strength;Decreased endurance;Decreased mobility; Decreased safety awareness   Assessment Pt was initiated w/ participation in exercise program to address physical and mobility deficits noted during eval  Pt had fair understanding of exercise technique after initial introduction and demonstration  Rest breaks were needed due to fatigue  Handout was provided to expedite understanding of technique  continued inpatient PT is indicated to facilitate return to independent level of mobility and function  Goals   Patient Goals go home   STG Expiration Date 03/02/20   Short Term Goal #1 pt will:  Increase bilateral LE strength 1/2 grade to facilitate independent mobility, Complete exercise program independently to increase strength and endurance, Improve Barthel Index score to 45 or greater to facilitate independence, and Complete mobility assessment to facilitate safe return home   PT Treatment Day 1   Plan   Treatment/Interventions LE strengthening/ROM; Functional transfer training; Therapeutic exercise; Endurance training;Patient/family training;Equipment eval/education; Bed mobility;Gait training  (PT to see next session to complete mobility assessment)   Progress Slow progress, decreased activity tolerance   PT Frequency 5x/wk   Recommendation   Recommendation Other (Comment)  (inpatient rehab vs home w/ family support and home PT)   Additional Comments discharge recommendation depends on progression of mobility status and level of support available at home  Skilled inpatient PT recommended while in hospital to progress pt toward treatment goals      Fouzia Manual, PT

## 2020-02-21 NOTE — PROGRESS NOTES
Cardiology Progress Note - Kelvin Ayala 75 y o  male MRN: [id number]    Unit/Bed#: ICU 10 Encounter: [id number]  Assessment and plan:  1  Cardiogenic shock  2  Acute on chronic combined CHF  3  Non MI elevated troponin in setting of CHF  4  LV thrombus  5  Multivessel CAD  6  ANDREA on CKD  7  HLD  8  Hyperkalemia    Recommendations:  Did not diurese much overnight  Check VBG this am   Start milrinone today to try and facilitate diuresis  No ACS  Pt has MVCAD from 2017 but never got CABG  Sure it has progressed by now  Continue ASA, Heparin for LV thrombus, Lasix infusion can increase to 20  If he gets any worse can consider transfer to [facility] for higher level care  Subjective:    No significant events overnight  Awake and alert  Denies CP but complains of SOB  He has had chronic angina since 2017 that hasn't changed  No neuro symptoms  ROS    Objective:   Vitals: Blood pressure 117/76, pulse 96, temperature 98 °F (36 7 °C), temperature source Axillary, resp  rate (!) 28, height 5' 9" (1 753 m), weight 88 7 kg (195 lb 8 8 oz), SpO2 96 %  , Body mass index is 28 88 kg/m² , Orthostatic Blood Pressures      Most Recent Value   Blood Pressure  117/76 filed at 02/21/2020 1004   Patient Position - Orthostatic VS  Lying filed at 02/21/2020 2596         Systolic (26PSU), ZQQ:379 , Min:90 , DCJ:326     Diastolic (70EXL), NIV:67, Min:55, Max:91      Intake/Output Summary (Last 24 hours) at 2/21/2020 0919  Last data filed at 2/21/2020 0600  Gross per 24 hour   Intake 945 5 ml   Output 1190 ml   Net -244 5 ml     Weight (last 2 days)     Date/Time   Weight    02/20/20 1408   88 7 (195 55)    02/20/20 0959   89 6 (197 53)                Telemetry Review: No significant arrhythmias seen on telemetry review  EKG personally reviewed by Laya Gutierrez, DO  Physical Exam   Constitutional: He is oriented to person, place, and time  He appears well-developed and well-nourished  No distress     HENT: Head: Normocephalic and atraumatic  Eyes: Pupils are equal, round, and reactive to light  Conjunctivae are normal    Neck: Neck supple  Cardiovascular: Normal rate, regular rhythm and normal heart sounds  Exam reveals no friction rub  No murmur heard  Pulmonary/Chest: Effort normal  No respiratory distress  He has no wheezes  He has rales  Abdominal: Soft  Bowel sounds are normal  He exhibits no distension  There is no tenderness  There is no rebound  Musculoskeletal: Normal range of motion  He exhibits edema  He exhibits no tenderness or deformity  Neurological: He is alert and oriented to person, place, and time  No cranial nerve deficit  Skin: Skin is warm and dry  No erythema  Psychiatric: He has a normal mood and affect  Nursing note and vitals reviewed          Laboratory Results:  Results from last 7 days   Lab Units 02/20/20 2003 02/20/20  1646 02/20/20  1059   TROPONIN I ng/mL 15 54* 16 09* 14 15*       CBC with diff: Results from last 7 days   Lab Units 02/21/20  0541 02/20/20  1059 02/17/20  0613 02/15/20  0617   WBC Thousand/uL 13 80* 15 45* 8 54 8 36   HEMOGLOBIN g/dL 13 3 13 6 13 2 12 0   HEMATOCRIT % 42 9 44 9 43 0 37 8   MCV fL 92 94 91 91   PLATELETS Thousands/uL 236 253 207 152   MCH pg 28 5 28 5 27 9 28 8   MCHC g/dL 31 0* 30 3* 30 7* 31 7   RDW % 14 9 14 7 14 1 14 0   MPV fL 10 3 10 1 9 7 10 2   NRBC AUTO /100 WBCs 1 1 0 0         CMP:  Results from last 7 days   Lab Units 02/21/20  0541 02/20/20  1059 02/17/20  0613 02/16/20  0500 02/15/20  0617   POTASSIUM mmol/L 4 4 5 0 3 1* 3 1* 3 3*   CHLORIDE mmol/L 102 102 104 105 103   CO2 mmol/L 22 20* 26 26 24   BUN mg/dL 60* 52* 30* 37* 48*   CREATININE mg/dL 2 06* 2 03* 1 30 1 25 1 59*   CALCIUM mg/dL 8 2* 8 6 8 2* 7 6* 7 7*   EGFR ml/min/1 73sq m 31 31 53 56 42         BMP:  Results from last 7 days   Lab Units 02/21/20  0541 02/20/20  1059 02/17/20  0613 02/16/20  0500 02/15/20  0617   POTASSIUM mmol/L 4 4 5 0 3 1* 3 1* 3 3* CHLORIDE mmol/L 102 102 104 105 103   CO2 mmol/L 22 20* 26 26 24   BUN mg/dL 60* 52* 30* 37* 48*   CREATININE mg/dL 2 06* 2 03* 1 30 1 25 1 59*   CALCIUM mg/dL 8 2* 8 6 8 2* 7 6* 7 7*       BNP: No results for input(s): BNP in the last 72 hours  Magnesium:   Results from last 7 days   Lab Units 20  0541 20  0613 20  0500   MAGNESIUM mg/dL 2 4 2 4 2 3       Coags:   Results from last 7 days   Lab Units 20  0541 20  2311 20  1646 20  1059   PTT seconds 35 70* 30 29   INR   --   --   --  1 39*       TSH:        Hemoglobin A1C       Lipid Profile: Results from last 7 days   Lab Units 20  0541   TRIGLYCERIDES mg/dL 97   HDL mg/dL 23*       Cardiac testing:   Results for orders placed during the hospital encounter of 20   Echo complete with contrast if indicated    Narrative [address]  [phone]    Transthoracic Echocardiogram  2D, M-mode, Doppler, and Color Doppler    Study date:  2020    Patient: Erika Casas  MR number: [id number]  Account number: [de-identified]  : 1945  Age: 75 years  Gender: Male  Status: Inpatient  Location: Bedside  Height: 69 in  Weight: 196 lb  BP: 90/ 55 mmHg    Indications: Assess left ventricular function  Diagnoses: I50 9 - Heart failure, unspecified    Sonographer:  Ankit Vega RDCS  Primary Physician:  Jose Cerda MD  Referring Physician:  Tiffanie Martinez DO  Group:  [facility]  Interpreting Physician:  Tiffanie Martinez DO    SUMMARY    LEFT VENTRICLE:  Systolic function was severely reduced  Ejection fraction was estimated to be 15 %  There was severe diffuse hypokinesis with regional variations  Wall thickness was mildly increased  Doppler parameters were consistent with high ventricular filling pressure  There was a definite, medium-sized mass on the apical wall  It represents a thrombus      LEFT ATRIUM:  The atrium was mildly dilated  MITRAL VALVE:  There was moderate regurgitation  AORTIC VALVE:  The valve was trileaflet  Leaflets exhibited moderately increased thickness and moderately reduced cuspal separation  There was moderate stenosis  Low LV stroke volume  Estimated aortic valve area (by VTI) was 1 04 cmï¾²  Estimated aortic valve area (by Vmax) was 0 91 cmï¾²  Doppler stroke volume was 21 99 ml  Doppler cardiac output was 2 33 L/min, using LVOT flow data  Doppler cardiac index was 1 14 L/min/mï¾², using LVOT flow data  TRICUSPID VALVE:  There was mild regurgitation  PERICARDIUM:  There was a left pleural effusion  HISTORY: PRIOR HISTORY: CAD, hyperlipidemia    PROCEDURE: The procedure was performed at the bedside  This was a routine study  The transthoracic approach was used  The study included complete 2D imaging, M-mode, complete spectral Doppler, and color Doppler  The heart rate was 125 bpm,  at the start of the study  Images were obtained from the parasternal, apical, subcostal, and suprasternal notch acoustic windows  Intravenous contrast ( 0 4 ml definity in NSS) was administered to opacify the left ventricle  This was a  technically difficult study  LEFT VENTRICLE: Size was normal  Systolic function was severely reduced  Ejection fraction was estimated to be 15 %  There was severe diffuse hypokinesis with regional variations  Wall thickness was mildly increased  There was a definite,  medium-sized mass on the apical wall  It represents a thrombus  DOPPLER: Left ventricular diastolic function parameters were abnormal  Doppler parameters were consistent with high ventricular filling pressure  RIGHT VENTRICLE: The size was normal  Systolic function was normal  Wall thickness was normal     LEFT ATRIUM: The atrium was mildly dilated  RIGHT ATRIUM: Size was normal     MITRAL VALVE: Valve structure was normal  There was normal leaflet separation   DOPPLER: The transmitral velocity was within the normal range  There was no evidence for stenosis  There was moderate regurgitation  AORTIC VALVE: The valve was trileaflet  Leaflets exhibited moderately increased thickness and moderately reduced cuspal separation  DOPPLER: Transaortic velocity was within the normal range  There was moderate stenosis  Low LV stroke  volume There was no regurgitation  TRICUSPID VALVE: The valve structure was normal  There was normal leaflet separation  DOPPLER: The transtricuspid velocity was within the normal range  There was no evidence for stenosis  There was mild regurgitation  PULMONIC VALVE: Leaflets exhibited normal thickness, no calcification, and normal cuspal separation  DOPPLER: The transpulmonic velocity was within the normal range  There was no regurgitation  PERICARDIUM: There was no pericardial effusion  There was a left pleural effusion  The pericardium was normal in appearance  AORTA: The root exhibited normal size  SYSTEMIC VEINS: IVC: The inferior vena cava was not well visualized      MEASUREMENT TABLES    2D MEASUREMENTS  LVOT   (Reference normals)  Diam   20 mm   (--)    DOPPLER MEASUREMENTS  LVOT   (Reference normals)  Peak bari   50 cm/s   (--)  VTI   7 cm   (--)  R-R interval   566 ms   (--)  HR   106 bpm   (--)  Stroke vol   21 99 ml   (--)  Cardiac output   2 33 L/min   (--)  Cardiac index   1 14 L/min/mï¾²   (--)  Stroke index   0 13 ml/mï¾²   (--)  Aortic valve   (Reference normals)  Peak bari   170 cm/s   (--)  VTI   21 cm   (--)  Obstr index, VTI   0 33    (--)  Valve area, VTI   1 04 cmï¾²   (--)  Area index, VTI   0 51 cmï¾²/mï¾²   (--)  Obstr index, Vmax   0 29    (--)  Valve area, Vmax   0 91 cmï¾²   (--)  Area index, Vmax   0 44 cmï¾²/mï¾²   (--)    SYSTEM MEASUREMENT TABLES    2D  %FS: 15 4 %  Ao Diam: 3 6 cm  EDV(Teich): 133 78 ml  EF(Teich): 32 28 %  ESV(Teich): 90 59 ml  IVSd: 1 03 cm  LA Diam: 4 14 cm  LVIDd: 5 27 cm  LVIDs: 4 46 cm  LVPWd: 1 27 cm  SV(Teich): 43 19 ml    CW  TR MaxP 98 mmHg  TR Vmax: 2 91 m/s    MM  TAPSE: 1 32 cm    PW  LVOT Env  Ti: 244 52 ms  LVOT VTI: 5 9 cm  LVOT Vmax: 0 41 m/s  LVOT Vmean: 0 24 m/s  LVOT maxP 67 mmHg  LVOT meanP 28 mmHg  MV A [place]: 0 63 m/s  MV Dec Trigg: 10 25 m/s2  MV DecT: 60 31 ms  MV E [place]: 0 62 m/s  MV E/A Ratio: 0 98  MV PHT: 17 49 ms  MVA By PHT: 12 58 cm2    [address] [facility]    Prepared and electronically signed by    Johny Astorga DO  Signed 2020 16:49:07       No results found for this or any previous visit  No results found for this or any previous visit  No results found for this or any previous visit      Meds/Allergies   all current active meds have been reviewed  Medications Prior to Admission   Medication    aspirin 81 MG tablet    atorvastatin (LIPITOR) 80 mg tablet    cefpodoxime (VANTIN) 200 mg tablet    levothyroxine 25 mcg tablet    mycophenolate (CELLCEPT) 250 mg capsule    nystatin (MYCOSTATIN) powder    Ostomy Supplies (PREMIER COLOSTOMY/ILEOSTOMY) KIT    potassium chloride (KLOR-CON) 20 mEq packet         furosemide 10 mg/hr Last Rate: 10 mg/hr (20 1710)   heparin (porcine) 3-20 Units/kg/hr (Order-Specific) Last Rate: 15 8 Units/kg/hr (20 6728)     Assessment:  Principal Problem:    Acute on chronic combined systolic and diastolic heart failure (HCC)  Active Problems:    Coronary artery disease    Hyperlipidemia    Essential hypertension    Gastrocutaneous fistula    Acute renal failure (HCC)    H/O myasthenia gravis    Gram-negative bacteremia    Right inguinal hernia    Urinary obstruction    Elevated troponin I level    Hypervolemia

## 2020-02-21 NOTE — PHYSICAL THERAPY NOTE
PHYSICAL THERAPY EVALUATION NOTE    Patient Name: Hemant Allison  GTHSY'D Date: 2/21/2020  AGE:   75 y o  Mrn:   [id number]  ADMIT DX:  Acute on chronic combined systolic and diastolic heart failure ([facility]) [I50 43]  CHF (congestive heart failure) ([facility]) [I50 9]  Dyspnea [R06 00]  SOB (shortness of breath) [R06 02]  Elevated troponin [R79 89]  Acute renal failure, unspecified acute renal failure type ([facility] ) [N17 9]    Past Medical History:   Diagnosis Date    Cardiac disease     Carotid stenosis     CHF (congestive heart failure) ([facility])     Compression fracture of lumbar vertebra ([facility])     Coronary artery disease     Disease of thyroid gland     Diverticulitis     Hernia, diaphragmatic, without obstruction     Hyperlipidemia     Hypertension     Inguinal hernia     Right    Ischemic cardiomyopathy     MI (myocardial infarction) ([facility] )     Myasthenia gravis ([facility] )      Length Of Stay: 1  PHYSICAL THERAPY EVALUATION :    02/21/20 0907   Pain Assessment   Pain Assessment No/denies pain   Home Living   Type of [address] [facility] Two level; Able to live on main level with bedroom/bathroom; Other (Comment)  (2 MICAELA)   Additional Comments lives w/ spouse  ambulates w/o device  no DME  independent w/ ADLs and driving  no falls in last 6 months  no history of supplemental oxygen use  Prior Function   Comments pt seen supine in bed  pt initially agreed to participate in PT evaluation  after completion of bedlevel assessment, pt refused further mobility training  therapist provided motivation and education regarding detrimental effects of immobility  pt continued to refuse  pt stated goal is to go home  General   Additional Pertinent History 4L oxygen via nasal cannula   resting pulse ox 97% and 91 BPM, active 94% and 101 BPM    Family/Caregiver Present No   Cognition   Arousal/Participation Alert   Orientation Level Oriented to person;Oriented to place; Other (Comment)  (pt was identified w/ full name, birth date)   Following Commands Follows one step commands with increased time or repetition   RUE Assessment   RUE Assessment WFL  (4/5, shoulder 4-/5)   LUE Assessment   LUE Assessment WFL  (4/5, shoulder 4-/5)   RLE Assessment   RLE Assessment WFL  (4-/5, hip flexion 3+/5)   LLE Assessment   LLE Assessment WFL  (4-/5, hip flexion 3+/5)   Coordination   Movements are Fluid and Coordinated 1   Sensation WFL   Light Touch   RLE Light Touch Grossly intact   LLE Light Touch Grossly intact   Bed Mobility   Supine to Sit Unable to assess   Additional Comments after bedlevel assessment completed, pt refused further mobility training despite motivation and education from therapist    Balance   Static Sitting Zero   Activity Tolerance   Activity Tolerance Patient limited by fatigue   Nurse Made Aware spoke to [address] [facility] [place]   Assessment   Prognosis Fair   Problem List Decreased strength;Decreased endurance;Decreased mobility; Decreased safety awareness   Assessment Pt presents with complaints of worsening shortness of breath and lower extremity edema/weight gain since being discharged on 2/17/20  Pt was recently admitted from 2/11/20 to 2/17/20 for severe sepsis secondary to Gram-negative bacteremia  Dx: myasthenia gravis, elevated troponin, acute on chronic combined systolic and diastolic heart failure, urinary obstruction, and UTI  order placed for PT eval and tx, w/ activity order of up and OOB as tolerated  pt presents w/ comorbidities of CHF, CAD, HTN, hyperlipidemia, carotid stenosis, lumbar compression fx, ICMP, MI, CKD, myasthenia gravis, and urinary retention and personal factors of stair(s) to enter home and readmission to hospital  pt presents w/ weakness, decreased endurance, decreased safety awareness and fall risk   these impairments are evident in findings from physical examination (weakness), mobility assessment (inability to reposition in bed or mobilize out of bed), and Barthel Index: 20/100  pt is at risk for falls due to physical deficits  pt's clinical presentation is unstable/unpredictable (evident in inability to mobilize out of bed, need for supplemental oxygen in order to maintain oxygen saturation)  pt needs inpatient PT tx to improve mobility deficits and progress mobility training as appropriate  discharge recommendation is dependent on progression of mobility status and level of support available at home  Pt may benefit from inpatient rehab to reduce fall risk and maximize level of functional independence  Goals   Patient Goals go home   STG Expiration Date 03/02/20   Short Term Goal #1 pt will: Increase bilateral LE strength 1/2 grade to facilitate independent mobility, Complete exercise program independently to increase strength and endurance, Improve Barthel Index score to 45 or greater to facilitate independence, and Complete mobility assessment to facilitate safe return home   Plan   Treatment/Interventions LE strengthening/ROM; Functional transfer training; Therapeutic exercise; Endurance training;Patient/family training;Equipment eval/education; Bed mobility;Gait training  (PT to see next session to complete mobility assessment)   PT Frequency 5x/wk   Recommendation   Recommendation Other (Comment)  (inpatient rehab vs home w/ family support and home PT)   Additional Comments discharge recommendation depends on progression of mobility status and level of support available at home  Barthel Index   Feeding 10   Bathing 0   Grooming Score 5   Dressing Score 5   Bladder Score 0   Bowels Score 0   Toilet Use Score 0   Transfers (Bed/Chair) Score 0   Mobility (Level Surface) Score 0   Stairs Score 0   Barthel Index Score 20     Skilled PT recommended while in hospital and upon DC to progress pt toward treatment goals       Bernardo Echavarria, PT

## 2020-02-22 NOTE — PHYSICAL THERAPY NOTE
PHYSICAL TREATMENT THERAPY NOTE     02/22/20 0849   Pain Assessment   Pain Assessment No/denies pain   Pain Score No Pain  (Post ttransfer repts 9/10 pain in groin 2/2 inguinal hernia)   Restrictions/Precautions   Weight Bearing Precautions Per Order No   Other Precautions Chair Alarm   General   Chart Reviewed Yes   Additional Pertinent History 4 L NC   Response to Previous Treatment Patient with no complaints from previous session  Family/Caregiver Present No   Cognition   Overall Cognitive Status WFL   Arousal/Participation Cooperative  (encouragment needed)   Attention Attends with cues to redirect   Orientation Level Oriented X4   Following Commands Follows one step commands with increased time or repetition   Subjective   Subjective Pt  reports pain in inguinal region with transfer, agreeable to PT   Bed Mobility   Rolling L 4  Minimal assistance   Additional items Assist x 2;Bedrails; Increased time required;Verbal cues;LE management  (line managment)   Supine to Sit 4  Minimal assistance   Additional items Assist x 2; Increased time required;Verbal cues;LE management  (line managment)   Transfers   Sit to Stand 4  Minimal assistance   Additional items Assist x 2   Stand to Sit 4  Minimal assistance   Additional items Assist x 1; Armrests; Verbal cues  (pain in inguinal region)   Stand pivot 4  Minimal assistance   Additional items Assist x 2   Ambulation/Elevation   Gait Assistance Not tested   Balance   Static Sitting Fair   Dynamic Sitting Fair -   Static Standing Fair -   Dynamic Standing Fair -   Endurance Deficit   Endurance Deficit Yes   Endurance Deficit Description limited sitting and standing tolerance   Activity Tolerance   Activity Tolerance Patient tolerated treatment well;Patient limited by fatigue;Patient limited by pain   Nurse Made Aware spike with nursing   Exercises   Knee AROM Long Arc Quad Sitting;10 reps;Bilateral   Ankle Pumps Sitting;10 reps;Bilateral   Balance training  sitting EOB x 10 minutes - UE support  (c/o mild lightheadedness with sitting EOB)   Assessment   Prognosis Fair   Problem List Decreased strength;Decreased range of motion;Decreased endurance; Impaired balance;Decreased mobility; Impaired judgement;Decreased skin integrity;Pain   Assessment Pt was agreeable to PT but did require increased motivation to transfer  Co treat with OT musa 2/2 medical complexity  Sat EOB for 10 minutes, UE support and posterior lean  on elbows  He was agreeable to stand and surprisingly stood with Min a x2 but may requrie less assist pending OOB tolerance and chair sitting  Pivoted to chair next to bed with Min A, mainly for line managment and stability  Left seated in chair with belongings in reach  Barriers to Discharge Inaccessible home environment;Decreased caregiver support   Goals   Patient Goals go home   PT Treatment Day 2   Plan   Treatment/Interventions ADL retraining;Functional transfer training;LE strengthening/ROM; Elevations; Therapeutic exercise; Endurance training;Bed mobility;Gait training;Spoke to nursing   Progress Progressing toward goals   PT Frequency 5x/wk   Recommendation   Recommendation Other (Comment)  (IP rehab vs home/OP PT)     Josiah Mccauley PT        Patient Name: Haile Bernard  ZTMSC'W Date: 2/22/2020

## 2020-02-22 NOTE — PLAN OF CARE
Problem: OCCUPATIONAL THERAPY ADULT  Goal: Performs self-care activities at highest level of function for planned discharge setting  See evaluation for individualized goals  Description  Treatment Interventions: ADL retraining, Functional transfer training, UE strengthening/ROM, Endurance training, Patient/family training, Neuromuscular reeducation, Continued evaluation, Activityengagement, Energy conservation          See flowsheet documentation for full assessment, interventions and recommendations  Note:   Limitation: Decreased ADL status, Decreased UE strength, Decreased endurance, Decreased self-care trans, Decreased high-level ADLs  Prognosis: Fair  Assessment: Patient evaluated by Occupational Therapy  Patient admitted with Acute on chronic combined systolic and diastolic heart failure ([facility] )  The patients occupational profile, medical and therapy history includes a expanded review of medical and/or therapy records and additional review of physical, cognitive, or psychosocial history related to current functional performance  Comorbidities affecting functional mobility and ADLS include: CAD, CHF and hypertension, inguinal hernia and myostenia gravis  Prior to admission, patient was independent with functional mobility without assistive device, independent with ADLS, independent with IADLS and living with wife in a 2 level home with 0 steps to enter  Patient performed bed mobility and transfers at [facility] A X 2, UB dressing and hygiene at EOB at Sup  Patient tolerated 6-7 min sitting EOB  The evaluation identifies the following performance deficits: weakness, decreased ROM, impaired balance, decreased endurance, decreased coordination, increased fall risk, new onset of impairment of functional mobility, decreased ADLS, decreased activity tolerance and decreased strength, that result in activity limitations and/or participation restrictions   This evaluation requires clinical decision making of high complexity, because the patient presents with comorbidites that affect occupational performance and required significant modification of tasks or assistance with consideration of multiple treatment options  The Barthel Index was used as a functional outcome tool presenting with a score of 25,Patient will benefit from skilled Occupational Therapy services to address above deficits and facilitate a safe return to prior level of function       OT Discharge Recommendation: Other (Comment)(STR vs Home with assist)

## 2020-02-22 NOTE — PLAN OF CARE
Problem: Potential for Falls  Goal: Patient will remain free of falls  Description  INTERVENTIONS:  - Assess patient frequently for physical needs  -  Identify cognitive and physical deficits and behaviors that affect risk of falls    -  [place] fall precautions as indicated by assessment   - Educate patient/family on patient safety including physical limitations  - Instruct patient to call for assistance with activity based on assessment  - Modify environment to reduce risk of injury  - Consider OT/PT consult to assist with strengthening/mobility  Outcome: Progressing     Problem: PAIN - ADULT  Goal: Verbalizes/displays adequate comfort level or baseline comfort level  Description  Interventions:  - Encourage patient to monitor pain and request assistance  - Assess pain using appropriate pain scale  - Administer analgesics based on type and severity of pain and evaluate response  - Implement non-pharmacological measures as appropriate and evaluate response  - Consider cultural and social influences on pain and pain management  - Notify physician/advanced practitioner if interventions unsuccessful or patient reports new pain  Outcome: Progressing     Problem: INFECTION - ADULT  Goal: Absence or prevention of progression during hospitalization  Description  INTERVENTIONS:  - Assess and monitor for signs and symptoms of infection  - Monitor lab/diagnostic results  - Monitor all insertion sites, i e  indwelling lines, tubes, and drains  - Monitor endotracheal if appropriate and nasal secretions for changes in amount and color  - [place] appropriate cooling/warming therapies per order  - Administer medications as ordered  - Instruct and encourage patient and family to use good hand hygiene technique  - Identify and instruct in appropriate isolation precautions for identified infection/condition  Outcome: Progressing     Problem: SAFETY ADULT  Goal: Maintain or return to baseline ADL function  Description  INTERVENTIONS:  -  Assess patient's ability to carry out ADLs; assess patient's baseline for ADL function and identify physical deficits which impact ability to perform ADLs (bathing, care of mouth/teeth, toileting, grooming, dressing, etc )  - Assess/evaluate cause of self-care deficits   - Assess range of motion  - Assess patient's mobility; develop plan if impaired  - Assess patient's need for assistive devices and provide as appropriate  - Encourage maximum independence but intervene and supervise when necessary  - Involve family in performance of ADLs  - Assess for home care needs following discharge   - Consider OT consult to assist with ADL evaluation and planning for discharge  - Provide patient education as appropriate  Outcome: Progressing  Goal: Maintain or return mobility status to optimal level  Description  INTERVENTIONS:  - Assess patient's baseline mobility status (ambulation, transfers, stairs, etc )    - Identify cognitive and physical deficits and behaviors that affect mobility  - Identify mobility aids required to assist with transfers and/or ambulation (gait belt, sit-to-stand, lift, walker, cane, etc )  - [place] fall precautions as indicated by assessment  - Record patient progress and toleration of activity level on Mobility SBAR; progress patient to next Phase/Stage  - Instruct patient to call for assistance with activity based on assessment  - Consider rehabilitation consult to assist with strengthening/weightbearing, etc   Outcome: Progressing     Problem: DISCHARGE PLANNING  Goal: Discharge to home or other facility with appropriate resources  Description  INTERVENTIONS:  - Identify barriers to discharge w/patient and caregiver  - Arrange for needed discharge resources and transportation as appropriate  - Identify discharge learning needs (meds, wound care, etc )  - Arrange for interpretive services to assist at discharge as needed  - Refer to Case Management Department for coordinating discharge planning if the patient needs post-hospital services based on physician/advanced practitioner order or complex needs related to functional status, cognitive ability, or social support system  Outcome: Progressing     Problem: Knowledge Deficit  Goal: Patient/family/caregiver demonstrates understanding of disease process, treatment plan, medications, and discharge instructions  Description  Complete learning assessment and assess knowledge base    Interventions:  - Provide teaching at level of understanding  - Provide teaching via preferred learning methods  Outcome: Progressing     Problem: CARDIOVASCULAR - ADULT  Goal: Maintains optimal cardiac output and hemodynamic stability  Description  INTERVENTIONS:  - Monitor I/O, vital signs and rhythm  - Monitor for S/S and trends of decreased cardiac output  - Administer and titrate ordered vasoactive medications to optimize hemodynamic stability  - Assess quality of pulses, skin color and temperature  - Assess for signs of decreased coronary artery perfusion  - Instruct patient to report change in severity of symptoms  Outcome: Progressing  Goal: Absence of cardiac dysrhythmias or at baseline rhythm  Description  INTERVENTIONS:  - Continuous cardiac monitoring, vital signs, obtain 12 lead EKG if ordered  - Administer antiarrhythmic and heart rate control medications as ordered  - Monitor electrolytes and administer replacement therapy as ordered  Outcome: Progressing     Problem: RESPIRATORY - ADULT  Goal: Achieves optimal ventilation and oxygenation  Description  INTERVENTIONS:  - Assess for changes in respiratory status  - Assess for changes in mentation and behavior  - Position to facilitate oxygenation and minimize respiratory effort  - Oxygen administered by appropriate delivery if ordered  - Initiate smoking cessation education as indicated  - Encourage broncho-pulmonary hygiene including cough, deep breathe, Incentive Spirometry  - Assess the need for suctioning and aspirate as needed  - Assess and instruct to report SOB or any respiratory difficulty  - Respiratory Therapy support as indicated  Outcome: Progressing

## 2020-02-22 NOTE — PLAN OF CARE
Problem: PHYSICAL THERAPY ADULT  Goal: Performs mobility at highest level of function for planned discharge setting  See evaluation for individualized goals  Description  Treatment/Interventions: LE strengthening/ROM, Functional transfer training, Therapeutic exercise, Endurance training, Patient/family training, Equipment eval/education, Bed mobility, Gait training(PT to see next session to complete mobility assessment)          See flowsheet documentation for full assessment, interventions and recommendations  Outcome: Progressing  Note:   Prognosis: Fair  Problem List: Decreased strength, Decreased range of motion, Decreased endurance, Impaired balance, Decreased mobility, Impaired judgement, Decreased skin integrity, Pain  Assessment: Pt was agreeable to PT but did require increased motivation to transfer  Co treat with OT musa 2/2 medical complexity  Sat EOB for 10 minutes, UE support and posterior lean  on elbows  He was agreeable to stand and surprisingly stood with Min a x2 but may requrie less assist pending OOB tolerance and chair sitting  Pivoted to chair next to bed with Min A, mainly for line managment and stability  Left seated in chair with belongings in reach  Barriers to Discharge: Inaccessible home environment, Decreased caregiver support     Recommendation: Other (Comment)(IP rehab vs home/OP PT)          See flowsheet documentation for full assessment

## 2020-02-22 NOTE — PROGRESS NOTES
NEPHROLOGY PROGRESS NOTE    Nicolas Early 75 y o  male MRN: [id number]  Unit/Bed#: ICU 10 Encounter: [id number]  Reason for Consult:  Acute on chronic kidney disease    The patient is sitting in a chair he looks comfortable he does not feel that his breathing is difficult at all  When talking to him he does look less short of breath and is able to talk in full sentences  Other than that no changes are decompensation overnight reported  ASSESSMENT/PLAN:  1  Renal    Patient acute renal failure with baseline creatinine around 1 3 and was likely due to decompensated heart failure  The patient was discovered to have a left ventricular ejection fraction of 15% with elevated troponin I so he recently had MI and decline in his cardiac function  He presented in heart failure and likely cardiogenic shock per Cardiology  With milrinone and IV diuretic infusion the patient diuresed beautifully and actually made 6 L of urine yesterday  Creatinine is improving it to 1 8  For now I would just continue with diuresis and I note that the Lasix infusion has been reduced which I agree with  He is also on Milrinone and they may taper that as well but is helping diuretic responsiveness  Continue Lasix drip at 10 mg an hour  BMP a m     2  Cardiac    Patient had recent MI with left ventricular ejection fraction of 15%  On I inotropic support diuretic drip and responding  Cardiology is following  Continue with medical management  Echo did reveal LV thrombus as well  3  History of myasthenia gravis  Continue mycophenolate and follows with Neurology as an outpatient  SUBJECTIVE:  Review of Systems   Constitution: Negative for chills, decreased appetite, fever and night sweats  HENT: Negative  Eyes: Negative  Cardiovascular: Positive for dyspnea on exertion and leg swelling  Negative for chest pain and palpitations          Does not appear short of breath at all talking full sentences but states if he does a lot of activity he may get out of breath  Respiratory: Negative for cough, shortness of breath, sputum production and wheezing  Skin: Negative  Gastrointestinal: Negative  Negative for bloating, abdominal pain, diarrhea, nausea and vomiting  Genitourinary: Lawrence catheter  Neurological: Negative  Psychiatric/Behavioral: Negative  OBJECTIVE:  Current Weight: Weight - Scale: 87 2 kg (192 lb 3 9 oz)  Vitals:Temp (24hrs), Av 6 °F (36 4 °C), Min:97 1 °F (36 2 °C), Max:97 9 °F (36 6 °C)  Current: Temperature: 97 6 °F (36 4 °C)   Blood pressure 100/64, pulse 100, temperature 97 6 °F (36 4 °C), temperature source Oral, resp  rate (!) 25, height 5' 9" (1 753 m), weight 87 2 kg (192 lb 3 9 oz), SpO2 98 %  , Body mass index is 28 39 kg/m²  Intake/Output Summary (Last 24 hours) at 2020 1205  Last data filed at 2020 1115  Gross per 24 hour   Intake 1175 8 ml   Output 6575 ml   Net -5399 2 ml       Physical Exam: /64 (BP Location: Left arm)   Pulse 100   Temp 97 6 °F (36 4 °C) (Oral)   Resp (!) 25   Ht 5' 9" (1 753 m)   Wt 87 2 kg (192 lb 3 9 oz)   SpO2 98%   BMI 28 39 kg/m²   Physical Exam   Constitutional: He is oriented to person, place, and time  Non-toxic appearance  No distress  HENT:   Head: Atraumatic  Mouth/Throat: Oropharynx is clear and moist    Eyes: EOM are normal    Neck: No JVD present  Cardiovascular: Normal rate  Exam reveals no gallop and no friction rub  Irregular rhythm   Pulmonary/Chest: Effort normal  No respiratory distress  He has no wheezes  He has no rhonchi  Few crackles left base  Abdominal: Soft  Bowel sounds are normal  He exhibits no distension  There is no tenderness  Neurological: He is alert and oriented to person, place, and time  Psychiatric: He has a normal mood and affect         Medications:    Current Facility-Administered Medications:     acetaminophen (TYLENOL) tablet 650 mg, 650 mg, Oral, Q6H PRN, Jake Espinosa Cardio, MD    aspirin chewable tablet 81 mg, 81 mg, Oral, Daily, Korin Herman MD, 81 mg at 02/22/20 0015    atorvastatin (LIPITOR) tablet 80 mg, 80 mg, Oral, HS, Korin Herman MD, 80 mg at 02/21/20 2213    docusate sodium (COLACE) capsule 100 mg, 100 mg, Oral, BID, Korin Herman MD, 100 mg at 02/20/20 1803    furosemide (LASIX) 500 mg infusion 50 mL, 10 mg/hr, Intravenous, Continuous, Lisa Catching, CRNP, Last Rate: 1 mL/hr at 02/22/20 1022, 10 mg/hr at 02/22/20 1022    heparin (porcine) 25,000 units in 250 mL infusion (premix), 3-20 Units/kg/hr (Order-Specific), Intravenous, Titrated, Sissy Dowdy Spironello, CRNP, Last Rate: 13 4 mL/hr at 02/22/20 1115, 15 8 Units/kg/hr at 02/22/20 1115    heparin (porcine) injection 2,000 Units, 2,000 Units, Intravenous, Q1H PRN, Mir De Jesus PA-C    heparin (porcine) injection 4,000 Units, 4,000 Units, Intravenous, Q1H PRN, Mir De Jesus PA-C, 4,000 Units at 02/21/20 4335    levothyroxine tablet 50 mcg, 50 mcg, Oral, Early Morning, Korin Herman MD, 50 mcg at 02/22/20 0500    milrinone (PRIMACOR) 20 mg in 100 mL infusion (premix), 0 13 mcg/kg/min, Intravenous, Continuous, Lisa Catching, CRNP, Last Rate: 3 5 mL/hr at 02/22/20 0832, 0 13 mcg/kg/min at 02/22/20 9166    mycophenolate (CELLCEPT) capsule 750 mg, 750 mg, Oral, BID, Korin Herman MD, 750 mg at 02/22/20 3989    nystatin (MYCOSTATIN) powder, , Topical, BID, Korin Herman MD, 1 application at 12/84/09 9888    polyethylene glycol (MIRALAX) packet 17 g, 17 g, Oral, Daily PRN, Korin Herman MD    potassium chloride (K-DUR,KLOR-CON) CR tablet 20 mEq, 20 mEq, Oral, TID With Meals, SIMONA Rodriguez, 20 mEq at 02/22/20 9976    warfarin (COUMADIN) tablet 5 mg, 5 mg, Oral, Once (warfarin), SIMONA Rodriguez    Laboratory Results:  Lab Results   Component Value Date    WBC 11 44 (H) 02/22/2020    HGB 12 6 02/22/2020    HCT 40 2 02/22/2020    MCV 91 02/22/2020     02/22/2020 Lab Results   Component Value Date    SODIUM 143 02/22/2020    K 3 5 02/22/2020     02/22/2020    CO2 31 02/22/2020    BUN 59 (H) 02/22/2020    CREATININE 1 83 (H) 02/22/2020    GLUC 123 02/22/2020    CALCIUM 7 4 (L) 02/22/2020     Lab Results   Component Value Date    CALCIUM 7 4 (L) 02/22/2020    PHOS 3 4 07/10/2016     No results found for: LABPROT

## 2020-02-22 NOTE — OCCUPATIONAL THERAPY NOTE
Occupational Therapy Evaluation     Patient Name: Vickie Babinski  URZFQ'R Date: 2/22/2020  Problem List  Principal Problem:    Acute on chronic combined systolic and diastolic heart failure ([facility] )  Active Problems:    Coronary artery disease    Hyperlipidemia    Essential hypertension    Gastrocutaneous fistula    Acute renal failure (HCC)    H/O myasthenia gravis    Gram-negative bacteremia    Right inguinal hernia    Urinary obstruction    Elevated troponin I level    Hypervolemia    Past Medical History  Past Medical History:   Diagnosis Date    Cardiac disease     Carotid stenosis     [facility]     Compression fracture of lumbar vertebra ([facility])     Coronary artery disease     Disease of thyroid gland     Diverticulitis     Hernia, diaphragmatic, without obstruction     Hyperlipidemia     Hypertension     Inguinal hernia     Right    Ischemic cardiomyopathy     [facility] ([facility] )     Myasthenia gravis ([facility] )      Past Surgical History  Past Surgical History:   Procedure Laterality Date    ANGIOPLASTY / STENTING FEMORAL      CATARACT EXTRACTION      COLON SURGERY      colostomy    COLOSTOMY      ESOPHAGOGASTRODUODENOSCOPY N/A 3/29/2016    Procedure: ESOPHAGOGASTRODUODENOSCOPY (EGD); Surgeon: Anita Khoury MD;  [facility]; Service:     GASTROSTOMY TUBE PLACEMENT N/A 3/29/2016    Procedure: PEG CHANGE-LOW PROFILE TUBE ;  Surgeon: Anita Khoury MD;  [facility]; Service:     LAPAROTOMY N/A 5/17/2016    Procedure: LAPAROTOMY EXPLORATORY; RESSECTION OF GASTRO-CUTANEOUS FISTULA ;  Surgeon: Micaela Gallegos DO;  [facility];  Service:     PEG TUBE PLACEMENT      PEG TUBE REMOVAL      MS ESOPHAGOGASTRODUODENOSCOPY TRANSORAL DIAGNOSTIC N/A 5/12/2016    Procedure: ESOPHAGOGASTRODUODENOSCOPY w 12-6 gc clip,anchor ;  Surgeon: Anita Khoury MD;  [facility];   Service: Gastroenterology             02/22/20 0850   Note Type   Note type Eval only   Restrictions/Precautions   Weight Bearing Precautions Per Order No   Other Precautions Chair Alarm; Bed Alarm;Pain; Fall Risk;O2;Multiple lines   Pain Assessment   Pain Assessment No/denies pain   Pain Score 9   Pain Location Groin   Home Living   Type of [address] Two level; Able to live on main level with bedroom/bathroom  (0STE but 3-4 Steps to main level)   Bathroom Shower/Tub Tub/shower unit   Bathroom Toilet Standard   Bathroom Equipment Grab bars in [facility]   (No use of AE)   Prior Function   Level of [place] Independent with ADLs and functional mobility   Lives With [place] Help From Family   ADL Assistance Independent   IADLs Independent   Falls in the last 6 months 0   Comments   (Pt stated still drives)   ADL   Eating Assistance 7  Independent   Grooming Assistance 5  Supervision/Setup   [address] 5  Supervision/Setup   LB Bathing Assistance Unable to assess   [address] 5  Supervision/Setup   LB Dressing Assistance Unable to assess   [address]  Unable to assess   Bed Mobility   Rolling L 4  Minimal assistance   Additional items Assist x 2;Bedrails;Verbal cues; Increased time required   Supine to Sit 4  Minimal assistance   Additional items Assist x 2; Increased time required;Verbal cues; Bedrails   Transfers   Sit to Stand 4  Minimal assistance   Additional items Assist x 2; Increased time required;Verbal cues   Stand to Sit 4  Minimal assistance   Additional items Assist x 2; Increased time required;Verbal cues   Stand pivot 4  Minimal assistance   Additional items Assist x 2;Verbal cues   Balance   Static Sitting Fair +   Dynamic Sitting Fair   Static Standing Fair -   Dynamic Standing Fair -   Activity Tolerance   Activity Tolerance Patient tolerated treatment well   Nurse Made Aware spoke to LULU Gray   RUE Assessment   RUE Assessment [facility]   RUE Strength   RUE Overall Strength   (3+/5)   LUE Assessment   LUE Assessment WFL   LUE Strength   LUE Overall Strength   (3+/5)   Hand Function   Gross Motor Coordination Functional   Fine Motor Coordination Functional   Cognition   Overall Cognitive Status WFL   Orientation Level Oriented X4   Following Commands Follows one step commands with increased time or repetition   Comments Pt ID by wristband name and    Assessment   Limitation Decreased ADL status; Decreased UE strength;Decreased endurance;Decreased self-care trans;Decreased high-level ADLs   Prognosis Fair   Assessment Patient evaluated by Occupational Therapy  Patient admitted with Acute on chronic combined systolic and diastolic heart failure ([facility] )  The patients occupational profile, medical and therapy history includes a expanded review of medical and/or therapy records and additional review of physical, cognitive, or psychosocial history related to current functional performance  Comorbidities affecting functional mobility and ADLS include: CAD, CHF and hypertension, inguinal hernia and myostenia gravis  Prior to admission, patient was independent with functional mobility without assistive device, independent with ADLS, independent with IADLS and living with wife in a 2 level home with 0 steps to enter  Patient performed bed mobility and transfers at [facility] A X 2, UB dressing and hygiene at EOB at Sup  Patient tolerated 6-7 min sitting EOB  The evaluation identifies the following performance deficits: weakness, decreased ROM, impaired balance, decreased endurance, decreased coordination, increased fall risk, new onset of impairment of functional mobility, decreased ADLS, decreased activity tolerance and decreased strength, that result in activity limitations and/or participation restrictions   This evaluation requires clinical decision making of high complexity, because the patient presents with comorbidites that affect occupational performance and required significant modification of tasks or assistance with consideration of multiple treatment options  The Barthel Index was used as a functional outcome tool presenting with a score of 25,Patient will benefit from skilled Occupational Therapy services to address above deficits and facilitate a safe return to prior level of function  Goals   Patient Goals   ("go home")   STG Time Frame   (1-7)   Short Term Goal #1 Patient will increase standing tolerance to 3 minutes during ADL task to decrease assistance level and decrease fall risk; Patient will increase bed mobility to CGA in preparation for ADLS and transfers; Patient will increase functional mobility to and from bathroom with rolling walker CGA to increase performance with ADLS and to use a toilet; Patient will tolerate 10 minutes of UE ROM/strengthening to increase general activity tolerance and performance in ADLS/IADLS; Patient will improve functional activity tolerance to 5 minutes of sustained functional tasks to increase participation in basic self-care and decrease assistance level; Patient will increase dynamic standing balance to fair+ to improve postural stability and decrease fall risk during standing ADLS and transfers  LTG Time Frame   (8-14)   Long Term Goal #1 Goals established to promote patient goal of going home:  Patient will increase standing tolerance to 5 minutes during ADL task to decrease assistance level and decrease fall risk; Patient will increase bed mobility to supervision in preparation for ADLS and transfers;  Patient will increase functional mobility to and from bathroom with rolling walker with supervision to increase performance with ADLS and to use a toilet; Patient will tolerate 12 minutes of UE ROM/strengthening to increase general activity tolerance and performance in ADLS/IADLS; Patient will improve functional activity tolerance to 8 minutes of sustained functional tasks to increase participation in basic self-care and decrease assistance level; Patient will increase dynamic standing balance to good to improve postural stability and decrease fall risk during standing ADLS and transfers   Functional Transfer Goals   Pt Will Transfer To Bedside Commode With mod indep   Pt Will Transfer To Toilet With mod indep   Pt Will Transfer To Shower With mod indep   ADL Goals   Pt Will Perform Eating Independently   Pt Will Perform Grooming With mod indep   Pt Will Perform Bathing With mod indep   Pt Will Perform UE Dressing With mod indep   Pt Will Perform LE Dressing With mod indep   Pt Will Perform Toileting With mod indep   Plan   Treatment Interventions ADL retraining;Functional transfer training;UE strengthening/ROM; Endurance training;Patient/family training;Neuromuscular reeducation;Continued evaluation; Activityengagement; Energy conservation   Goal Expiration Date 03/07/20   OT Frequency 3-5x/wk   Recommendation   OT Discharge Recommendation Other (Comment)  (STR vs Home with assist)   Barthel Index   Feeding 10   Bathing 0   Grooming Score 5   Dressing Score 5   Bladder Score 0   Bowels Score 0   Toilet Use Score 0   Transfers (Bed/Chair) Score 5   Mobility (Level Surface) Score 0   Stairs Score 0   Barthel Index Score 25   Ra Glass, OTR/L

## 2020-02-22 NOTE — PROGRESS NOTES
Daily Progress Note - Critical Care   Kerwin Salter 75 y o  male MRN: [id number]  Unit/Bed#: ICU 10 Encounter: [id number]        ----------------------------------------------------------------------------------------  HPI/24hr events:  Patient is a 27-year-old male admitted on 2/20 with complaint of worsening shortness of breath, increasing lower extremity edema, 20 lb weight gain  He has a past medical history of severe combined heart failure, multivessel coronary artery disease, CKD stage 3, hypertension, myasthenia gravis, hyperlipidemia, and a recent admission 1 week ago for a UTI complicated by gram-negative bacteremia having recently completed antibiotics  He is felt to be profoundly volume overloaded and repeat echocardiogram demonstrated ejection fraction of 15% as well as an LV thrombus  He started on a heparin infusion, Lasix infusion, and yesterday started on a Milrinone infusion  He has had significant output from the Lasix infusion and Milrinone and is presently 4 5 L negative     ---------------------------------------------------------------------------------------  SUBJECTIVE  "I'm pretty thirsty"    Review of Systems   Constitutional: Negative for chills and fever  HENT: Negative for trouble swallowing  Respiratory: Negative for shortness of breath  Cardiovascular: Negative for chest pain  Gastrointestinal: Negative for abdominal pain  Genitourinary: Positive for dysuria  Musculoskeletal: Negative for arthralgias  Neurological: Negative for weakness         ---------------------------------------------------------------------------------------  Assessment and Plan:    Neuro:    Diagnosis:  Myasthenia gravis  o Plan:  Continue mycophenolate      CV:    Diagnosis:  Cardiogenic shock  o Plan:  Can trial patient off Milrinone likely, closely follow intake and output, appreciate Cardiology recommendations   Diagnosis:  Acute on chronic combined heart failure  o Plan:  Daily weights, continue Lasix infusion, would trial off nor known today   Diagnosis:  Not MI elevated troponin  o Plan:  Medical management   Diagnosis:  Multivessel coronary artery disease  o Plan:  Continue aspirin statin      Pulm:   Diagnosis:  Pulmonary insufficiency  o Plan:  Wean oxygen for SpO2 greater than 92%      GI:   o Plan:  Routine colostomy care      :    Diagnosis:  Acute kidney injury on chronic kidney disease stage 3  o Plan:  Close intake and output, daily weights, trend serum creatinine, expect improvement in renal function with continued diuresis      F/E/N:    Plan:  Oral diet, continue to the check and replete electrolytes q 6 hours while on Lasix infusion      Heme/Onc:    Diagnosis:  LV thrombus  o Plan:  Continue heparin, will discuss with cardiology transitioning to an oral agent      Endo:   o Plan:  Follow blood sugars as needed      ID:   o Plan:  Follow temperature and white count, present leukocytosis likely secondary to stress heart failure exacerbation      MSK/Skin:   o Plan:  Out of bed as tolerated, PT/OT      Disposition: Improving  Code Status: Level 1 - Full Code  ---------------------------------------------------------------------------------------  ICU CORE MEASURES    Prophylaxis   VTE Pharmacologic Prophylaxis: Heparin Drip  VTE Mechanical Prophylaxis: sequential compression device  Stress Ulcer Prophylaxis: Prophylaxis Not Indicated     ABCDE Protocol (if indicated)  Plan to perform spontaneous awakening trial today? Not applicable  Plan to perform spontaneous breathing trial today? Not applicable  Obvious barriers to extubation?  Not applicable  CAM-ICU: Negative    Invasive Devices Review  Invasive Devices     Peripheral Intravenous Line            Peripheral IV 02/20/20 Right Antecubital 1 day    Peripheral IV 02/20/20 Right Forearm 1 day    Long-Dwell Peripheral IV (Midline) 90/71/89 Left Basilic less than 1 day          Drain            Colostomy  -- days Colostomy LLQ 1376 days    Urethral Catheter Latex 18 Fr  9 days              Can any invasive devices be discontinued today? No  ---------------------------------------------------------------------------------------  OBJECTIVE    Vitals   Vitals:    20 0300 20 0400 20 0500 20 0600   BP: 95/58 104/66 104/71 109/83   BP Location:       Pulse: 89 90 91 84   Resp: 18 21 (!) 26 16   Temp: (!) 97 1 °F (36 2 °C)  97 5 °F (36 4 °C)    TempSrc: Axillary      SpO2: 97% 98% 97% 99%   Weight:       Height:         Temp (24hrs), Av 7 °F (36 5 °C), Min:97 1 °F (36 2 °C), Max:98 °F (36 7 °C)  Current: Temperature: 97 5 °F (36 4 °C)  HR: 91  BP: 120/63  RR: 18  SpO2: 97 on 4L    Physical Exam   Constitutional: He is oriented to person, place, and time  He appears well-developed and well-nourished  No distress  Nasal cannula in place  HENT:   Head: Normocephalic and atraumatic  Eyes: Pupils are equal, round, and reactive to light  Neck: No JVD present  No tracheal deviation present  Cardiovascular: Normal rate, regular rhythm and intact distal pulses  Pulmonary/Chest: Effort normal and breath sounds normal  No respiratory distress  Abdominal: Soft  Bowel sounds are normal  He exhibits no distension  There is no tenderness  Genitourinary:   Genitourinary Comments: Lawrence in place with clear urine   Musculoskeletal: He exhibits edema  Neurological: He is alert and oriented to person, place, and time  GCS eye subscore is 4  GCS verbal subscore is 5  GCS motor subscore is 6  Skin: He is not diaphoretic           Respiratory:  SpO2: SpO2: 99 %, SpO2 Activity: SpO2 Activity: At Rest, SpO2 Device: O2 Device: Nasal cannula  O2 Flow Rate (L/min): 4 L/min    Invasive/non-invasive ventilation settings   Respiratory    Lab Data (Last 4 hours)    None         O2/Vent Data (Last 4 hours)    None                Laboratory and Diagnostics:  Results from last 7 days   Lab Units 20  05 02/21/20  0541 02/20/20  1059 02/17/20  0613   WBC Thousand/uL 11 44* 13 80* 15 45* 8 54   HEMOGLOBIN g/dL 12 6 13 3 13 6 13 2   HEMATOCRIT % 40 2 42 9 44 9 43 0   PLATELETS Thousands/uL 202 236 253 207   NEUTROS PCT % 87* 86* 87* 77*   MONOS PCT % 4 5 5 8     Results from last 7 days   Lab Units 02/22/20  0458 02/21/20  2218 02/21/20  1643 02/21/20  0541 02/20/20  1059 02/17/20  0613 02/16/20  0500   SODIUM mmol/L 143 142 140 140 139 140 140   POTASSIUM mmol/L 3 5 3 5 3 7 4 4 5 0 3 1* 3 1*   CHLORIDE mmol/L 103 104 102 102 102 104 105   CO2 mmol/L 31 27 24 22 20* 26 26   ANION GAP mmol/L 9 11 14* 16* 17* 10 9   BUN mg/dL 59* 60* 61* 60* 52* 30* 37*   CREATININE mg/dL 1 83* 2 01* 2 04* 2 06* 2 03* 1 30 1 25   CALCIUM mg/dL 7 4* 7 1* 7 6* 8 2* 8 6 8 2* 7 6*   GLUCOSE RANDOM mg/dL 123 141* 133 109 121 115 97     Results from last 7 days   Lab Units 02/22/20  0458 02/21/20 2218 02/21/20  1643 02/21/20  0541 02/17/20  0613 02/16/20  0500   MAGNESIUM mg/dL 2 0 2 1 2 2 2 4 2 4 2 3      Results from last 7 days   Lab Units 02/22/20  0458 02/21/20  1833 02/21/20  1245 02/21/20  0541 02/20/20  2311 02/20/20  1646 02/20/20  1059   INR   --   --   --   --   --   --  1 39*   PTT seconds 62* 61* 86* 35 70* 30 29      Results from last 7 days   Lab Units 02/20/20 2003 02/20/20  1646 02/20/20  1059   TROPONIN I ng/mL 15 54* 16 09* 14 15*         ABG:  Results from last 7 days   Lab Units 02/21/20  0752   PH ART  7 428   PCO2 ART mm Hg 32 5*   PO2 ART mm Hg 93 7   HCO3 ART mmol/L 21 0*   BASE EXC ART mmol/L -2 4   ABG SOURCE  Radial, Right     VBG:  Results from last 7 days   Lab Units 02/22/20  0458  02/21/20  0752   PH ELIZABETH  7 401*   < >  --    PCO2 ELIZABETH mm Hg 48 6   < >  --    PO2 ELIZABETH mm Hg 53 4*   < >  --    HCO3 ELIZABETH mmol/L 29 5   < >  --    BASE EXC ELIZABETH mmol/L 3 9   < >  --    ABG SOURCE   --   --  Radial, Right    < > = values in this interval not displayed             Micro  Results from last 7 days   Lab Units 02/21/20  6975 BLOOD CULTURE  Received in Microbiology Lab  Culture in Progress  Received in Microbiology Lab  Culture in Progress  EKG:  Review of telemetry demonstrates sinus rhythm with intermittent PVCs  Imagin/22 CXR- stable effusions and atelectasis, my interpretation I have personally reviewed pertinent films in PACS    Intake and Output  I/O        07 -  0700  07 -  0700    P  O  1040 640    I V  (mL/kg) 145 5 (1 6) 433 1 (4 9)    Total Intake(mL/kg) 1185 5 (13 4) 1073 1 (12 1)    Urine (mL/kg/hr) 1070 6280 (3)    Stool 120 75    Total Output 1190 6355    Net -4 5 -5281 9          Unmeasured Stool Occurrence 1 x         UOP: 200 ml/hr     Height and Weights   Height: 5' 9" (175 3 cm)  IBW: 70 7 kg  Body mass index is 28 88 kg/m²  Weight (last 2 days)     Date/Time   Weight    20 1408   88 7 (195 55)    20 0959   89 6 (197 53)                Nutrition       Diet Orders   (From admission, onward)             Start     Ordered    20 1411  Room Service  Once     Question:  Type of Service  Answer:  Room Service - Appropriate with Assistance    20 1411    20 1349  Diet Cardiovascular; Sodium 2 GM; Fluid Restriction 1500 ML  Diet effective now     Question Answer Comment   Diet Type Cardiovascular    Cardiac Sodium 2 GM    Other Restriction(s): Fluid Restriction 1500 ML    RD to adjust diet per protocol?  Yes        20 1348              Active Medications  Scheduled Meds:  Current Facility-Administered Medications:  acetaminophen 650 mg Oral Q6H PRN New Willams MD    aspirin 81 mg Oral Daily New Willams MD    atorvastatin 80 mg Oral HS New Willams MD    docusate sodium 100 mg Oral BID New Willams MD    furosemide 20 mg/hr Intravenous Continuous Vern York MD Last Rate: 20 mg/hr (20 1258)   heparin (porcine) 3-20 Units/kg/hr (Order-Specific) Intravenous Titrated SIMONA Munoz Last Rate: 15 8 Units/kg/hr (20 1257) heparin (porcine) 2,000 Units Intravenous Q1H PRN Marysol Santos PA-C    heparin (porcine) 4,000 Units Intravenous Q1H PRN Marysol Santos PA-C    levothyroxine 50 mcg Oral Early Morning Theo Styles MD    milrinone [facility]) infusion 0 13 mcg/kg/min Intravenous Continuous SIMONA Garzon Last Rate: 0 13 mcg/kg/min (02/22/20 0458)   mycophenolate 750 mg Oral BID Theo Styles MD    nystatin  Topical BID Theo Styles MD    polyethylene glycol 17 g Oral Daily PRN Theo Styles MD    potassium chloride 20 mEq Oral TID With Meals SIMONA Garzon      Continuous Infusions:    furosemide 20 mg/hr Last Rate: 20 mg/hr (02/21/20 1258)   heparin (porcine) 3-20 Units/kg/hr (Order-Specific) Last Rate: 15 8 Units/kg/hr (02/21/20 1257)   milrinone (PRIMACOR) infusion 0 13 mcg/kg/min Last Rate: 0 13 mcg/kg/min (02/22/20 0458)     PRN Meds:     acetaminophen 650 mg Q6H PRN   heparin (porcine) 2,000 Units Q1H PRN   heparin (porcine) 4,000 Units Q1H PRN   polyethylene glycol 17 g Daily PRN       Allergies   No Known Allergies  ---------------------------------------------------------------------------------------  Advance Directive and Living Will:      Power of :    POLST:    ---------------------------------------------------------------------------------------  Care Time Delivered:   No Critical Care time spent     SIMONA Garzon      Portions of the record may have been created with voice recognition software  Occasional wrong word or "sound a like" substitutions may have occurred due to the inherent limitations of voice recognition software    Read the chart carefully and recognize, using context, where substitutions have occurred

## 2020-02-23 NOTE — PROGRESS NOTES
NEPHROLOGY PROGRESS NOTE    Alta Baumann 75 y o  male MRN: [id number]  Unit/Bed#: ICU 10 Encounter: [id number]  Reason for Consult:  Acute renal failure    Patient is awake and alert looking good no respiratory distress able to talk full sentences  Overall no other changes overnight  ASSESSMENT/PLAN:  1  Renal    Patient acute on chronic kidney disease baseline creatinine is 1 3  He was recently hospitalized with sepsis and was discharged and came back with volume overload was found to have significant reduction in left ventricular ejection fraction and heart failure  He had acute renal failure was placed in intensive care unit on loop diuretic infusion and I inotropic support and he has responded well with aggressive diuresis over the last few days and marked improvement in his volume status and respiratory status  Creatinine is also approaching baseline  I reviewed Cardiology note and they are going to stop the Lasix drip and initiate intermittent diuretics  They also are continuing milrinone but tapering it slowly  Monitor renal function and volume status  2  Cardiac    The patient had cardiogenic shock and recent MI on admission  There was also LV thrombus according to Cardiology  They will be managing his eye inotropic taper and also conversion to intermittent diuretic dosing  Markedly improved clinically  3  History of myasthenia gravis on mycophenolate and follows Neurology as an outpatient  SUBJECTIVE:  Review of Systems   Constitution: Negative for chills, decreased appetite, fever and night sweats  HENT: Negative  Eyes: Negative  Cardiovascular: Negative for chest pain, dyspnea on exertion, leg swelling and orthopnea  Respiratory: Negative  Negative for cough, shortness of breath, sputum production and wheezing  Skin: Negative  Gastrointestinal: Negative  Negative for bloating, abdominal pain, diarrhea, nausea and vomiting  Genitourinary:         Howard catheter  No gross hematuria  Neurological: Negative  Psychiatric/Behavioral: Negative  OBJECTIVE:  Current Weight: Weight - Scale: 77 9 kg (171 lb 11 8 oz)  Vitals:Temp (24hrs), Av 6 °F (36 4 °C), Min:97 3 °F (36 3 °C), Max:98 2 °F (36 8 °C)  Current: Temperature: 97 6 °F (36 4 °C)   Blood pressure 93/55, pulse 88, temperature 97 6 °F (36 4 °C), temperature source Oral, resp  rate 18, height 5' 9" (1 753 m), weight 77 9 kg (171 lb 11 8 oz), SpO2 96 %  , Body mass index is 25 36 kg/m²  Intake/Output Summary (Last 24 hours) at 2020 1132  Last data filed at 2020 1000  Gross per 24 hour   Intake 1414 17 ml   Output 4825 ml   Net -3410 83 ml       Physical Exam: BP 93/55   Pulse 88   Temp 97 6 °F (36 4 °C) (Oral)   Resp 18   Ht 5' 9" (1 753 m)   Wt 77 9 kg (171 lb 11 8 oz)   SpO2 96%   BMI 25 36 kg/m²   Physical Exam   Constitutional: He is oriented to person, place, and time  Non-toxic appearance  He does not appear ill  No distress  HENT:   Head: Atraumatic  Mouth/Throat: Oropharynx is clear and moist    Eyes: EOM are normal    Neck: Neck supple  No JVD present  Cardiovascular: Normal rate and regular rhythm  Exam reveals no gallop and no friction rub  Pulmonary/Chest: Effort normal and breath sounds normal  No respiratory distress  He has no decreased breath sounds  He has no wheezes  He has no rhonchi  He has no rales  Abdominal: Soft  Bowel sounds are normal  He exhibits no distension  There is no tenderness  Neurological: He is alert and oriented to person, place, and time  Psychiatric: He has a normal mood and affect         Medications:    Current Facility-Administered Medications:     acetaminophen (TYLENOL) tablet 650 mg, 650 mg, Oral, Q6H PRN, Keren Chacon MD, 650 mg at 20    aspirin chewable tablet 81 mg, 81 mg, Oral, Daily, Keren Chacon MD, 81 mg at 20 0849    atorvastatin (LIPITOR) tablet 80 mg, 80 mg, Oral, HS, Keren Chacon MD, 80 mg at 02/22/20 2210    docusate sodium (COLACE) capsule 100 mg, 100 mg, Oral, BID, Carlos Ohara MD, 100 mg at 02/20/20 1803    furosemide (LASIX) 500 mg infusion 50 mL, 10 mg/hr, Intravenous, Continuous, Vinie [place], CRNP, Last Rate: 1 mL/hr at 02/22/20 1401, 10 mg/hr at 02/22/20 1401    heparin (porcine) 25,000 units in 250 mL infusion (premix), 3-20 Units/kg/hr (Order-Specific), Intravenous, Titrated, Razia Santiago Spiron[place], CRNP, Last Rate: 13 4 mL/hr at 02/23/20 0455, 15 8 Units/kg/hr at 02/23/20 0455    heparin (porcine) injection 2,000 Units, 2,000 Units, Intravenous, Q1H PRN, JONY Mcmillan-VINNIE    heparin (porcine) injection 4,000 Units, 4,000 Units, Intravenous, Q1H PRN, Reynaldo Jaegre PA-C, 4,000 Units at 02/21/20 9299    levothyroxine tablet 50 mcg, 50 mcg, Oral, Early Morning, Carlos Ohara MD, 50 mcg at 02/23/20 0455    milrinone (PRIMACOR) 20 mg in 100 mL infusion (premix), 0 13 mcg/kg/min, Intravenous, Continuous, Vinie [place], CRNP, Last Rate: 3 5 mL/hr at 02/22/20 0832, 0 13 mcg/kg/min at 02/22/20 3278    mycophenolate (CELLCEPT) capsule 750 mg, 750 mg, Oral, BID, Carlos Ohara MD, 750 mg at 02/23/20 1019    nystatin (MYCOSTATIN) powder, , Topical, BID, Carlos Ohara MD, 1 application at 20/88/40 0848    polyethylene glycol (MIRALAX) packet 17 g, 17 g, Oral, Daily PRN, Carlos Ohara MD    Laboratory Results:  Lab Results   Component Value Date    WBC 11 44 (H) 02/22/2020    HGB 12 6 02/22/2020    HCT 40 2 02/22/2020    MCV 91 02/22/2020     02/22/2020     Lab Results   Component Value Date    SODIUM 142 02/23/2020    K 4 0 02/23/2020     02/23/2020    CO2 38 (H) 02/23/2020    BUN 44 (H) 02/23/2020    CREATININE 1 47 (H) 02/23/2020    GLUC 118 02/23/2020    CALCIUM 7 6 (L) 02/23/2020     Lab Results   Component Value Date    CALCIUM 7 6 (L) 02/23/2020    PHOS 3 4 07/10/2016     No results found for: LABPROT

## 2020-02-23 NOTE — PROGRESS NOTES
Daily Progress Note - Critical Care   Jessica Flaherty 75 y o  male MRN: [id number]  Unit/Bed#: ICU 10 Encounter: [id number]        ----------------------------------------------------------------------------------------  HPI/24hr events: remains on milrinone and lasix  Doing well  No acute events  ---------------------------------------------------------------------------------------  SUBJECTIVE  Patient reports feeling much better  Review of Systems   Constitutional: Negative for chills and fever  Eyes: Negative for redness  Respiratory: Negative for cough and shortness of breath  Cardiovascular: Negative for chest pain, palpitations and leg swelling  Gastrointestinal: Negative for abdominal distention and abdominal pain  Genitourinary: Negative for difficulty urinating and dysuria  Musculoskeletal: Negative for back pain and myalgias  Skin: Negative for color change and rash  Neurological: Negative for dizziness and headaches  Review of systems was reviewed and negative unless stated above in HPI/24-hour events   ---------------------------------------------------------------------------------------  Assessment and Plan:     Neuro:   · Diagnosis:  Myasthenia gravis  ? Plan:  Continue mycophenolate        CV:   · Diagnosis:  Cardiogenic shock  ? Plan:  Continue Milrinone , closely follow intake and output, appreciate Cardiology recommendations  · Diagnosis:  Acute on chronic combined heart failure  ? Plan:  Daily weights, start bolus Lasix today  · Diagnosis:  MI type 2 secondary to acute illness   ? Plan:  Medical management  · Diagnosis:  Multivessel coronary artery disease  ? Plan:  Continue aspirin statin        Pulm:  · Diagnosis:  Pulmonary insufficiency  ? Plan:  Wean oxygen for SpO2 greater than 92%        GI:   ? Plan:  Routine colostomy care        :   · Diagnosis:  Acute kidney injury on chronic kidney disease stage 3  ?  Plan:  Close intake and output, daily weights, trend serum creatinine, expect improvement in renal function with continued diuresis        F/E/N:   · Plan:  Oral diet, continue to the check and replete electrolytes q 6 hours while on Lasix infusion        Heme/Onc:   · Diagnosis:  LV thrombus  ? Plan:  Continue heparin, will discuss with cardiology transitioning to an oral agent        Endo:   ? Plan:  Follow blood sugars as needed        ID:   ? Plan:  Follow temperature and white count, present leukocytosis likely secondary to stress heart failure exacerbation        MSK/Skin:   ? Plan:  Out of bed as tolerated, PT/OT        Disposition: Continue Critical Care   Code Status: Level 1 - Full Code  ---------------------------------------------------------------------------------------  ICU CORE MEASURES    Prophylaxis   VTE Pharmacologic Prophylaxis: Heparin  VTE Mechanical Prophylaxis: sequential compression device  Stress Ulcer Prophylaxis: Prophylaxis Not Indicated     ABCDE Protocol (if indicated)  Plan to perform spontaneous awakening trial today? Not applicable  Plan to perform spontaneous breathing trial today? Not applicable  Obvious barriers to extubation? Not applicable  CAM-ICU: Negative    Invasive Devices Review  Invasive Devices     Peripheral Intravenous Line            Peripheral IV 02/20/20 Right Antecubital 2 days    Peripheral IV 02/20/20 Right Forearm 2 days    Long-Dwell Peripheral IV (Midline) 49/29/12 Left Basilic 1 day          Drain            Colostomy  -- days    Colostomy LLQ 1377 days    Urethral Catheter Latex 18 Fr  10 days              Can any invasive devices be discontinued today?  Not applicable  ---------------------------------------------------------------------------------------  OBJECTIVE    Vitals   Vitals:    02/23/20 0200 02/23/20 0308 02/23/20 0400 02/23/20 0514   BP: 97/59 95/58 93/57 103/62   BP Location: Right arm Right arm Right arm Right arm   Pulse: 81 79 78 83   Resp: 16 16 14 (!) 25   Temp:       TempSrc:       SpO2: 97% 98% 98% 98%   Weight:       Height:         Temp (24hrs), Av 6 °F (36 4 °C), Min:97 3 °F (36 3 °C), Max:98 2 °F (36 8 °C)  Current: Temperature: 98 2 °F (36 8 °C)  HR: 83  BP: 103/92  RR: 25  SpO2: 98    Physical Exam   Constitutional: He is oriented to person, place, and time  No distress  HENT:   Head: Normocephalic and atraumatic  Cardiovascular: Normal rate and regular rhythm  Pulmonary/Chest: Effort normal and breath sounds normal    Abdominal:   Colostomy bag   Musculoskeletal: He exhibits no edema or tenderness  Neurological: He is alert and oriented to person, place, and time  Skin: Skin is warm  He is not diaphoretic  Psychiatric: He has a normal mood and affect  His behavior is normal    Nursing note and vitals reviewed          Respiratory:  SpO2: SpO2: 98 %  O2 Flow Rate (L/min): 3 L/min    Invasive/non-invasive ventilation settings   Respiratory    Lab Data (Last 4 hours)    None         O2/Vent Data (Last 4 hours)    None                Laboratory and Diagnostics:  Results from last 7 days   Lab Units 20  0517 20  0541 20  1059 20  0613   WBC Thousand/uL 11 44* 13 80* 15 45* 8 54   HEMOGLOBIN g/dL 12 6 13 3 13 6 13 2   HEMATOCRIT % 40 2 42 9 44 9 43 0   PLATELETS Thousands/uL 202 236 253 207   NEUTROS PCT % 87* 86* 87* 77*   MONOS PCT % 4 5 5 8     Results from last 7 days   Lab Units 20  0451 20  2357 20  1805 20  1249 20  0458 20  2218 20  1643   SODIUM mmol/L 142 145 141 142 143 142 140   POTASSIUM mmol/L 4 0 3 9 3 7 3 4* 3 5 3 5 3 7   CHLORIDE mmol/L 100 102 99* 99* 103 104 102   CO2 mmol/L 38* 35* 33* 32 31 27 24   ANION GAP mmol/L 4 8 9 11 9 11 14*   BUN mg/dL 44* 48* 51* 56* 59* 60* 61*   CREATININE mg/dL 1 47* 1 55* 1 57* 1 75* 1 83* 2 01* 2 04*   CALCIUM mg/dL 7 6* 7 2* 7 5* 7 4* 7 4* 7 1* 7 6*   GLUCOSE RANDOM mg/dL 118 131 115 120 123 141* 133     Results from last 7 days   Lab Units 20  0459 20  2357 20  1805 20  1249 20  0458 20  2218 20  1643   MAGNESIUM mg/dL 2 5 2 6 2 8* 1 7 2 0 2 1 2 2      Results from last 7 days   Lab Units 20  0451 20  0458 20  1833 20  1245 20  0541 20  2311 20  1646 20  1059   INR  1 24* 1 34*  --   --   --   --   --  1 39*   PTT seconds 82* 62* 61* 86* 35 70* 30 29      Results from last 7 days   Lab Units 20  1646 20  1059   TROPONIN I ng/mL 15 54* 16 09* 14 15*         ABG:  Results from last 7 days   Lab Units 20  0752   PH ART  7 428   PCO2 ART mm Hg 32 5*   PO2 ART mm Hg 93 7   HCO3 ART mmol/L 21 0*   BASE EXC ART mmol/L -2 4   ABG SOURCE  Radial, Right     VBG:  Results from last 7 days   Lab Units 20  0451  20  0752   PH ELIZABETH  7 419*   < >  --    PCO2 ELIZABETH mm Hg 60 0*   < >  --    PO2 ELIZABETH mm Hg 31 3*   < >  --    HCO3 ELIZABETH mmol/L 38 0*   < >  --    BASE EXC ELIZABETH mmol/L 11 1   < >  --    ABG SOURCE   --   --  Radial, Right    < > = values in this interval not displayed  Micro  Results from last 7 days   Lab Units 20  1642   BLOOD CULTURE  No Growth at 24 hrs  No Growth at 24 hrs  EKG:   Imagin/22- chest x-ray shows bilateral pleural effusions I have personally reviewed pertinent reports  Intake and Output  I/O        07 -  0700  07 -  0700    P  O  640 1080    I V  (mL/kg) 433 1 (5) 398 5 (4 6)    Total Intake(mL/kg) 1073 1 (12 3) 1478 5 (17)    Urine (mL/kg/hr) 6280 (3) 4825 (2 3)    Stool 75 50    Total Output 6355 4875    Net -5281 9 -3396 5              UOP: 4825 ml/hr     Height and Weights   Height: 5' 9" (175 3 cm)  IBW: 70 7 kg  Body mass index is 28 39 kg/m²    Weight (last 2 days)     Date/Time   Weight    20 0600   87 2 (192 24)                Nutrition       Diet Orders   (From admission, onward)             Start     Ordered    20 1411  Room Service  Once Question:  Type of Service  Answer:  Room Service - Appropriate with Assistance    02/20/20 1411    02/20/20 1349  Diet Cardiovascular; Sodium 2 GM; Fluid Restriction 1500 ML  Diet effective now     Question Answer Comment   Diet Type Cardiovascular    Cardiac Sodium 2 GM    Other Restriction(s): Fluid Restriction 1500 ML    RD to adjust diet per protocol?  Yes        02/20/20 1348                  Active Medications  Scheduled Meds:  Current Facility-Administered Medications:  acetaminophen 650 mg Oral Q6H PRN Luke Patton MD    aspirin 81 mg Oral Daily Luke Patton MD    atorvastatin 80 mg Oral HS Luke Patton MD    docusate sodium 100 mg Oral BID Luke Patton MD    furosemide 10 mg/hr Intravenous Continuous Orvel SIMONA Espinosa Last Rate: 10 mg/hr (02/22/20 1401)   heparin (porcine) 3-20 Units/kg/hr (Order-Specific) Intravenous Titrated JudSIMONA Silveira Last Rate: 15 8 Units/kg/hr (02/23/20 0455)   heparin (porcine) 2,000 Units Intravenous Q1H PRN Hernan Murrieta PA-C    heparin (porcine) 4,000 Units Intravenous Q1H PRN Hernan Murrieta PA-C    levothyroxine 50 mcg Oral Early Morning Luke Patton MD    milrinone [facility]) infusion 0 13 mcg/kg/min Intravenous Continuous Orvel SIMONA Espinosa Last Rate: 0 13 mcg/kg/min (02/22/20 1347)   mycophenolate 750 mg Oral BID Luke Patton MD    nystatin  Topical BID Luke Patton MD    polyethylene glycol 17 g Oral Daily PRN Luke Patton MD      Continuous Infusions:    furosemide 10 mg/hr Last Rate: 10 mg/hr (02/22/20 1401)   heparin (porcine) 3-20 Units/kg/hr (Order-Specific) Last Rate: 15 8 Units/kg/hr (02/23/20 0455)   milrinone (PRIMACOR) infusion 0 13 mcg/kg/min Last Rate: 0 13 mcg/kg/min (02/22/20 0832)     PRN Meds:     acetaminophen 650 mg Q6H PRN   heparin (porcine) 2,000 Units Q1H PRN   heparin (porcine) 4,000 Units Q1H PRN   polyethylene glycol 17 g Daily PRN       Allergies   No Known Allergies  ---------------------------------------------------------------------------------------  Advance Directive and Living Will:      Power of :    POLST:    ---------------------------------------------------------------------------------------      Sharyn Goss MD      Portions of the record may have been created with voice recognition software  Occasional wrong word or "sound a like" substitutions may have occurred due to the inherent limitations of voice recognition software    Read the chart carefully and recognize, using context, where substitutions have occurred

## 2020-02-23 NOTE — PROGRESS NOTES
Cardiology Progress Note - Kwaku Corbett 75 y o  male MRN: [id number]    Unit/Bed#: ICU 10 Encounter: [id number]      Assessment:  Principal Problem:    Acute on chronic combined systolic and diastolic heart failure (HCC)  Active Problems:    Coronary artery disease    Hyperlipidemia    Essential hypertension    Gastrocutaneous fistula    Acute renal failure (HCC)    H/O myasthenia gravis    Gram-negative bacteremia    Right inguinal hernia    Urinary obstruction    Elevated troponin I level    Hypervolemia      Plan:    1  Cardiogenic shock and acute on chronic combined systolic/diastolic CHF - Associated with an ischemic cardiomyopathy and a severely reduced ejection fraction around 20%  Had a very good response to IV Milrinone and IV Lasix drip  Yesterday we decreased the drip down to 10  We will discontinue this now, and use intermittent dosing, and following urine output and creatinine closely  We will continue Milrinone 1 more day, likely discontinuing it tomorrow  2   Multivessel CAD - This was diagnosed back in 2017 but never went through with CABG  Continue medical management as prescribed otherwise  3   LV thrombus - IV heparin to Coumadin  4   ANDREA on CKD - Cr now improving, and associated with cardiorenal in cardiogenic shock  Nephrology following  Continue IV Milrinone as stated above  Subjective:   Patient seen and examined  No significant events overnight  Improving with IV Milrinone  Improved urine output, creatinine improving as his mixed venous blood gas  Objective:     Vitals: Blood pressure 105/64, pulse 98, temperature (!) 97 3 °F (36 3 °C), temperature source Oral, resp  rate (!) 29, height 5' 9" (1 753 m), weight 77 9 kg (171 lb 11 8 oz), SpO2 96 %  , Body mass index is 25 36 kg/m² ,   Orthostatic Blood Pressures      Most Recent Value   Blood Pressure  105/64 filed at 02/23/2020 0900   Patient Position - Orthostatic VS  Lying filed at 02/23/2020 0700 Intake/Output Summary (Last 24 hours) at 2/23/2020 1018  Last data filed at 2/23/2020 0901  Gross per 24 hour   Intake 1774 17 ml   Output 4525 ml   Net -2750 83 ml       No significant arrhythmias seen on telemetry review  Sinus rhythm with PVCs and PACs  Physical Exam:    GEN: Michael Medina appears well, alert and oriented x 3, pleasant and cooperative   HEENT: pupils equal, round, and reactive to light; extraocular muscles intact  NECK: supple, no carotid bruits  Improved JVP   HEART: regular rhythm, distant S1 and S2, no murmurs, clicks, gallops or rubs   LUNGS:  Diminished bilaterally; no wheezes, rales, or rhonchi   ABDOMEN: normal bowel sounds, soft, no tenderness, no distention  EXTREMITIES: peripheral pulses normal; no clubbing, cyanosis    Trace edema - improved  NEURO: no focal findings   SKIN: normal without suspicious lesions on exposed skin    Medications:      Current Facility-Administered Medications:     acetaminophen (TYLENOL) tablet 650 mg, 650 mg, Oral, Q6H PRN, Mone Laura MD, 650 mg at 02/22/20 2127    aspirin chewable tablet 81 mg, 81 mg, Oral, Daily, Mone Laura MD, 81 mg at 02/23/20 0849    atorvastatin (LIPITOR) tablet 80 mg, 80 mg, Oral, HS, Mone Laura MD, 80 mg at 02/22/20 2210    docusate sodium (COLACE) capsule 100 mg, 100 mg, Oral, BID, Mone Laura MD, 100 mg at 02/20/20 1803    furosemide (LASIX) 500 mg infusion 50 mL, 10 mg/hr, Intravenous, Continuous, Phylliss Prime, CRNP, Last Rate: 1 mL/hr at 02/22/20 1401, 10 mg/hr at 02/22/20 1401    heparin (porcine) 25,000 units in 250 mL infusion (premix), 3-20 Units/kg/hr (Order-Specific), Intravenous, Titrated, Steven Fan Spironello, CRNP, Last Rate: 13 4 mL/hr at 02/23/20 0455, 15 8 Units/kg/hr at 02/23/20 0455    heparin (porcine) injection 2,000 Units, 2,000 Units, Intravenous, Q1H PRN, Amita Snider PA-C    heparin (porcine) injection 4,000 Units, 4,000 Units, Intravenous, Q1H PRN, Zayda Bosch LUIS A Joyner, 4,000 Units at 02/21/20 8975    levothyroxine tablet 50 mcg, 50 mcg, Oral, Early Morning, Will Dya MD, 50 mcg at 02/23/20 0455    milrinone (PRIMACOR) 20 mg in 100 mL infusion (premix), 0 13 mcg/kg/min, Intravenous, Continuous, MillieSIMONA Ramos, Last Rate: 3 5 mL/hr at 02/22/20 0832, 0 13 mcg/kg/min at 02/22/20 5260    mycophenolate (CELLCEPT) capsule 750 mg, 750 mg, Oral, BID, Will Day MD, 750 mg at 02/22/20 1756    nystatin (MYCOSTATIN) powder, , Topical, BID, Will Day MD, 1 application at 36/87/34 0848    polyethylene glycol (MIRALAX) packet 17 g, 17 g, Oral, Daily PRN, Will Day MD     Labs & Results:    Results from last 7 days   Lab Units 02/20/20 2003 02/20/20  1646 02/20/20  1059   TROPONIN I ng/mL 15 54* 16 09* 14 15*     Results from last 7 days   Lab Units 02/22/20  0517 02/21/20  0541 02/20/20  1059   WBC Thousand/uL 11 44* 13 80* 15 45*   HEMOGLOBIN g/dL 12 6 13 3 13 6   HEMATOCRIT % 40 2 42 9 44 9   PLATELETS Thousands/uL 202 236 253     Results from last 7 days   Lab Units 02/21/20  0541   TRIGLYCERIDES mg/dL 97   HDL mg/dL 23*     Results from last 7 days   Lab Units 02/23/20  0451 02/22/20  2357 02/22/20  1805   POTASSIUM mmol/L 4 0 3 9 3 7   CHLORIDE mmol/L 100 102 99*   CO2 mmol/L 38* 35* 33*   BUN mg/dL 44* 48* 51*   CREATININE mg/dL 1 47* 1 55* 1 57*   CALCIUM mg/dL 7 6* 7 2* 7 5*     Results from last 7 days   Lab Units 02/23/20  0451 02/22/20  0458 02/21/20  1833  02/20/20  1059   INR  1 24* 1 34*  --   --  1 39*   PTT seconds 82* 62* 61*   < > 29    < > = values in this interval not displayed  Results from last 7 days   Lab Units 02/23/20  0451 02/22/20  2357 02/22/20  1805   MAGNESIUM mg/dL 2 5 2 6 2 8*         EKG personally reviewed by Nighat Ba MD   Sinus tachycardia, prior anterior infarct and nonspecific ST changes  Counseling / Coordination of Care  Total floor / unit time spent today 30 minutes    Greater than 50% of total time was spent with the patient and / or family counseling and / or coordination of care

## 2020-02-23 NOTE — PLAN OF CARE
Problem: Potential for Falls  Goal: Patient will remain free of falls  Description  INTERVENTIONS:  - Assess patient frequently for physical needs  -  Identify cognitive and physical deficits and behaviors that affect risk of falls    -  [place] fall precautions as indicated by assessment   - Educate patient/family on patient safety including physical limitations  - Instruct patient to call for assistance with activity based on assessment  - Modify environment to reduce risk of injury  - Consider OT/PT consult to assist with strengthening/mobility  Outcome: Not Progressing

## 2020-02-23 NOTE — PLAN OF CARE
Problem: Potential for Falls  Goal: Patient will remain free of falls  Description  INTERVENTIONS:  - Assess patient frequently for physical needs  -  Identify cognitive and physical deficits and behaviors that affect risk of falls    -  [place] fall precautions as indicated by assessment   - Educate patient/family on patient safety including physical limitations  - Instruct patient to call for assistance with activity based on assessment  - Modify environment to reduce risk of injury  - Consider OT/PT consult to assist with strengthening/mobility  Outcome: Progressing     Problem: PAIN - ADULT  Goal: Verbalizes/displays adequate comfort level or baseline comfort level  Description  Interventions:  - Encourage patient to monitor pain and request assistance  - Assess pain using appropriate pain scale  - Administer analgesics based on type and severity of pain and evaluate response  - Implement non-pharmacological measures as appropriate and evaluate response  - Consider cultural and social influences on pain and pain management  - Notify physician/advanced practitioner if interventions unsuccessful or patient reports new pain  Outcome: Progressing     Problem: INFECTION - ADULT  Goal: Absence or prevention of progression during hospitalization  Description  INTERVENTIONS:  - Assess and monitor for signs and symptoms of infection  - Monitor lab/diagnostic results  - Monitor all insertion sites, i e  indwelling lines, tubes, and drains  - Monitor endotracheal if appropriate and nasal secretions for changes in amount and color  - [place] appropriate cooling/warming therapies per order  - Administer medications as ordered  - Instruct and encourage patient and family to use good hand hygiene technique  - Identify and instruct in appropriate isolation precautions for identified infection/condition  Outcome: Progressing     Problem: SAFETY ADULT  Goal: Maintain or return to baseline ADL function  Description  INTERVENTIONS:  -  Assess patient's ability to carry out ADLs; assess patient's baseline for ADL function and identify physical deficits which impact ability to perform ADLs (bathing, care of mouth/teeth, toileting, grooming, dressing, etc )  - Assess/evaluate cause of self-care deficits   - Assess range of motion  - Assess patient's mobility; develop plan if impaired  - Assess patient's need for assistive devices and provide as appropriate  - Encourage maximum independence but intervene and supervise when necessary  - Involve family in performance of ADLs  - Assess for home care needs following discharge   - Consider OT consult to assist with ADL evaluation and planning for discharge  - Provide patient education as appropriate  Outcome: Progressing  Goal: Maintain or return mobility status to optimal level  Description  INTERVENTIONS:  - Assess patient's baseline mobility status (ambulation, transfers, stairs, etc )    - Identify cognitive and physical deficits and behaviors that affect mobility  - Identify mobility aids required to assist with transfers and/or ambulation (gait belt, sit-to-stand, lift, walker, cane, etc )  - [place] fall precautions as indicated by assessment  - Record patient progress and toleration of activity level on Mobility SBAR; progress patient to next Phase/Stage  - Instruct patient to call for assistance with activity based on assessment  - Consider rehabilitation consult to assist with strengthening/weightbearing, etc   Outcome: Progressing     Problem: DISCHARGE PLANNING  Goal: Discharge to home or other facility with appropriate resources  Description  INTERVENTIONS:  - Identify barriers to discharge w/patient and caregiver  - Arrange for needed discharge resources and transportation as appropriate  - Identify discharge learning needs (meds, wound care, etc )  - Arrange for interpretive services to assist at discharge as needed  - Refer to Case Management Department for coordinating discharge planning if the patient needs post-hospital services based on physician/advanced practitioner order or complex needs related to functional status, cognitive ability, or social support system  Outcome: Progressing     Problem: Knowledge Deficit  Goal: Patient/family/caregiver demonstrates understanding of disease process, treatment plan, medications, and discharge instructions  Description  Complete learning assessment and assess knowledge base    Interventions:  - Provide teaching at level of understanding  - Provide teaching via preferred learning methods  Outcome: Progressing     Problem: CARDIOVASCULAR - ADULT  Goal: Maintains optimal cardiac output and hemodynamic stability  Description  INTERVENTIONS:  - Monitor I/O, vital signs and rhythm  - Monitor for S/S and trends of decreased cardiac output  - Administer and titrate ordered vasoactive medications to optimize hemodynamic stability  - Assess quality of pulses, skin color and temperature  - Assess for signs of decreased coronary artery perfusion  - Instruct patient to report change in severity of symptoms  Outcome: Progressing  Goal: Absence of cardiac dysrhythmias or at baseline rhythm  Description  INTERVENTIONS:  - Continuous cardiac monitoring, vital signs, obtain 12 lead EKG if ordered  - Administer antiarrhythmic and heart rate control medications as ordered  - Monitor electrolytes and administer replacement therapy as ordered  Outcome: Progressing     Problem: RESPIRATORY - ADULT  Goal: Achieves optimal ventilation and oxygenation  Description  INTERVENTIONS:  - Assess for changes in respiratory status  - Assess for changes in mentation and behavior  - Position to facilitate oxygenation and minimize respiratory effort  - Oxygen administered by appropriate delivery if ordered  - Initiate smoking cessation education as indicated  - Encourage broncho-pulmonary hygiene including cough, deep breathe, Incentive Spirometry  - Assess the need for suctioning and aspirate as needed  - Assess and instruct to report SOB or any respiratory difficulty  - Respiratory Therapy support as indicated  Outcome: Progressing

## 2020-02-24 PROBLEM — I51.3 LV (LEFT VENTRICULAR) MURAL THROMBUS: Status: ACTIVE | Noted: 2020-01-01

## 2020-02-24 NOTE — ASSESSMENT & PLAN NOTE
· Patient was hospitalized from 02/11/20-2/17-20 for severe sepsis secondary to Gram-negative bacteremia with Proteus mirabilis and E coli infection    He was discharged on cefpodoxime through 2/21 to complete 10 day course  · He currently is afebrile without active signs of infection

## 2020-02-24 NOTE — PLAN OF CARE
Problem: Potential for Falls  Goal: Patient will remain free of falls  Description  INTERVENTIONS:  - Assess patient frequently for physical needs  -  Identify cognitive and physical deficits and behaviors that affect risk of falls    -  [place] fall precautions as indicated by assessment   - Educate patient/family on patient safety including physical limitations  - Instruct patient to call for assistance with activity based on assessment  - Modify environment to reduce risk of injury  - Consider OT/PT consult to assist with strengthening/mobility  Outcome: Not Progressing

## 2020-02-24 NOTE — ASSESSMENT & PLAN NOTE
· Patient has reported history of coronary artery disease and underwent stenting x2 many years ago - does not remember details  He states he was recommended for CABG in the past   · Switched from warfarin to enoxaparin- cardiology recommends catheterization  · Troponin is elevated this admission to 14 15  He is without chest pain  · EKG shows sinus tachycardia with nonspecific ST abnormalities without ST depressions or elevations    There is however Q-waves noted did in his anterior leads which were not seen on prior EKG on 2/12/20  · Telemetry monitoring  · Cardiology consulted

## 2020-02-24 NOTE — ASSESSMENT & PLAN NOTE
· Patient was followed by Nephrology last admission for acute renal failure  Baseline creatinine prior to patient's last hospitalization was 0 9-1 1  His creatinine on discharge from last hospitalization on 02/17 was 1 3  His acute renal failure was thought to be multifactorial last admission secondary to intravascular volume depletion, ATN from sepsis as well as bladder outlet obstruction/urethral obstruction from large hernia and BPH  · Creatinine is back up to 2 03, patient has an anion gap metabolic acidosis of 17 and a bicarb of 20    K is 5  · No IV fluids  · Avoid nephrotoxic agents  · Avoid hypotension  · Nephrology following  · Renal ultrasound showed no hydronephrosis  · Check UA  · Maintain Lawrence catheter that patient was discharged with last admission

## 2020-02-24 NOTE — PROGRESS NOTES
Transfer Note Zoran Query 1945, 75 y o  male MRN: [id number]    Unit/Bed#: ICU 10 Encounter: [id number]    Primary Care Provider: Franklin Wong MD   Date and time admitted to hospital: 2/20/2020  9:52 AM      LV (left ventricular) mural thrombus  Assessment & Plan  · Left ventricular thrombus noted on echo  · Continue Eliquis      Elevated troponin I level  Assessment & Plan  MI type 2 secondary to acute illness    Urinary obstruction  Assessment & Plan  · urinary obstruction secondary to large right inguinal hernia with urethral obstruction and bilateral hydronephrosis  Seen by urology last hospitalization  · Patient is planned for Lawrence exchange on 3/12/20 with Urology in the office  They plan on keeping Lawrence in place until hernia is surgically repaired    Right inguinal hernia  Assessment & Plan  · Patient follows with surgery for hernia repair in the future though there is no definitive plans for surgical repair at this time until his cardiac issues are addressed per outpatient records    Gram-negative bacteremia  Assessment & Plan  · Patient was hospitalized from 02/11/20-2/17-20 for severe sepsis secondary to Gram-negative bacteremia with Proteus mirabilis and E coli infection  He was discharged on cefpodoxime through 2/21 to complete 10 day course  · He currently is afebrile without active signs of infection      H/O myasthenia gravis  Assessment & Plan  Continue mycophenolate    Acute renal failure [facility])  Assessment & Plan  · Patient was followed by Nephrology last admission for acute renal failure  Baseline creatinine prior to patient's last hospitalization was 0 9-1 1  His creatinine on discharge from last hospitalization on 02/17 was 1 3    His acute renal failure was thought to be multifactorial last admission secondary to intravascular volume depletion, ATN from sepsis as well as bladder outlet obstruction/urethral obstruction from large hernia and BPH  · Creatinine is back up to 2 03, patient has an anion gap metabolic acidosis of 17 and a bicarb of 20  K is 5  · No IV fluids  · Avoid nephrotoxic agents  · Avoid hypotension  · Nephrology following  · Renal ultrasound showed no hydronephrosis  · Check UA  · Maintain Lawrence catheter that patient was discharged with last admission    Gastrocutaneous fistula  Assessment & Plan  Routine colostomy care    Essential hypertension  Assessment & Plan  · Currently not on antihypertensive medication regimen  Blood pressures are currently acceptable  · Continue to monitor    Hyperlipidemia  Assessment & Plan  Continue statin    Coronary artery disease  Assessment & Plan  · Patient has reported history of coronary artery disease and underwent stenting x2 many years ago - does not remember details  He states he was recommended for CABG in the past   · Switched from warfarin to enoxaparin- cardiology recommends catheterization  · Troponin is elevated this admission to 14 15  He is without chest pain  · EKG shows sinus tachycardia with nonspecific ST abnormalities without ST depressions or elevations  There is however Q-waves noted did in his anterior leads which were not seen on prior EKG on 2/12/20  · Telemetry monitoring  · Cardiology consulted    * Acute on chronic combined systolic and diastolic heart failure (HCC)  Assessment & Plan  Wt Readings from Last 3 Encounters:   02/24/20 76 kg (167 lb 8 8 oz)   02/11/20 78 6 kg (173 lb 4 5 oz)   06/24/19 79 2 kg (174 lb 9 6 oz)     · Iv Lasix 80 mg BID   · Monitor I/O  · Was on Milrinone discontinued this morning  · Spoke with cardiology patient will need cardiac catheterization/mri/CABG    Patient reluctant to continue the conversation  · Likely to need LifeVest     · Cardiology approves transfer to the floor as long as blood pressures are stable and producing urine          Code Status: Level 1 - Full Code  POA:    POLST:      Reason for ICU admission:   Shortness of breath    Active problems:   Principal Problem:    Acute on chronic combined systolic and diastolic heart failure (HCC)  Active Problems:    Coronary artery disease    Hyperlipidemia    Essential hypertension    Gastrocutaneous fistula    Acute renal failure (HCC)    H/O myasthenia gravis    Gram-negative bacteremia    Right inguinal hernia    Urinary obstruction    Elevated troponin I level    LV (left ventricular) mural thrombus  Resolved Problems:    Hypervolemia      Consultants:   Cardiology  Nephrology  Critical care    History of Present Illness:   Tho March is a 75 y o  male with a past medical history of combined systolic and diastolic congestive heart failure, remote coronary artery disease status post stenting many years ago, hypertension, hyperlipidemia, BPH, CKD, myasthenia gravis, urinary retention status post Lawrence who presents with complaints of worsening shortness of breath and lower extremity edema/weight gain since being discharged on 2/17/20  Patient was recently admitted from 2/11/20 to 2/17/20 for severe sepsis secondary to Gram-negative bacteremia  His hospitalization was complicated by progressive renal dysfunction thought to be secondary to intravascular volume depletion, ATN from sepsis  During patient's last hospitalization, he had multiple medications and adjustments including discontinuation of his isosorbide and antihypertensive medication regimen including amlodipine and lisinopril as well as metoprolol  He also had a Lawrence catheter placed last admission due to urethral obstruction from large inguinal hernia  Patient was seen by home visiting nurse complaining of exertional dyspnea and 16 lb weight gain  This is 3 days S/P discharge from hospital   Patient was admitted and had an echo which showed ejection fraction of 15%  Patient was transferred to critical care to monitor blood pressures InCase pressors were needed  Summary of clinical course:   Patient was started on Lasix drip for CHF    Lasix alone not produce enough diuresis the patient was started on Milrinone as well  Following addition of Milrinone patient began diuresing  On 02/23 Lasix drip was stopped and started on IV 80 mg b i d     We discontinued Milrinone on 10/24  Recent or scheduled procedures:   Echocardiogram    Outstanding/pending diagnostics:   None    Cultures:   None       Mobilization Plan:   As tolerated    Nutrition Plan:   Cardiac diet, fluid restriction    VTE Pharmacologic Prophylaxis: Apixaban (Eliquis)  VTE Mechanical Prophylaxis: sequential compression device    Discharge Plan:   Patient should be ready for discharge to [facility] on 02/24    Initial Physical Therapy Recommendations: ADL retraining;Functional transfer training;LE strengthening/ROM; Elevations; Therapeutic exercise; Endurance training;Bed mobility;Gait training; Initial Occupational Therapy Recommendations:  3-5 X/week  Initial /Plan:  Continuing follow-up for care coordination    Home medications that are not reordered and reason why:         Spoke with Dr Alex Cassidy regarding transfer  Please call [phone] with any questions or concerns  Portions of the record may have been created with voice recognition software  Occasional wrong word or "sound a like" substitutions may have occurred due to the inherent limitations of voice recognition software  Read the chart carefully and recognize, using context, where substitutions have occurred

## 2020-02-24 NOTE — ASSESSMENT & PLAN NOTE
· Currently not on antihypertensive medication regimen    Blood pressures are currently acceptable  · Continue to monitor

## 2020-02-24 NOTE — PROGRESS NOTES
Cardiology Progress Note - Jessica Flaherty 75 y o  male MRN: [id number]    Unit/Bed#: ICU 10 Encounter: [id number]        Subjective:    No significant events overnight  Feeling better  Review of Systems   Cardiovascular: Negative for chest pain, leg swelling and palpitations  Respiratory: Positive for shortness of breath  Objective:   Vitals: Blood pressure 108/69, pulse 72, temperature 97 8 °F (36 6 °C), temperature source Oral, resp  rate 16, height 5' 9" (1 753 m), weight 76 kg (167 lb 8 8 oz), SpO2 98 %  , Body mass index is 24 74 kg/m² , Orthostatic Blood Pressures      Most Recent Value   Blood Pressure  108/69 filed at 02/24/2020 5860   Patient Position - Orthostatic VS  Lying filed at 02/24/2020 7972         Systolic (58FHS), HLX:013 , Min:90 , NDE:072     Diastolic (81NPJ), HYS:86, Min:50, Max:81      Intake/Output Summary (Last 24 hours) at 2/24/2020 7733  Last data filed at 2/24/2020 0800  Gross per 24 hour   Intake 1604 03 ml   Output 4120 ml   Net -2515 97 ml     Weight (last 2 days)     Date/Time   Weight    02/24/20 0600   76 (167 55)    02/23/20 0600   77 9 (171 74)    02/22/20 0600   87 2 (192 24)                  Telemetry Review: Occasional SVT/NSVT    Physical Exam   Constitutional: He is oriented to person, place, and time  He appears well-developed  HENT:   Head: Atraumatic  Eyes: EOM are normal    Neck: Normal range of motion  Cardiovascular: Normal rate, regular rhythm and normal heart sounds  Exam reveals no gallop  No murmur heard  Pulmonary/Chest: Effort normal and breath sounds normal    Abdominal: Soft  Musculoskeletal: Normal range of motion  Neurological: He is alert and oriented to person, place, and time  Skin: Skin is warm and dry  Psychiatric: He has a normal mood and affect           Laboratory Results:  Results from last 7 days   Lab Units 02/20/20 2003 02/20/20  1646 02/20/20  1059   TROPONIN I ng/mL 15 54* 16 09* 14 15*       CBC with diff: Results from last 7 days   Lab Units 02/22/20  0517 02/21/20  0541 02/20/20  1059   WBC Thousand/uL 11 44* 13 80* 15 45*   HEMOGLOBIN g/dL 12 6 13 3 13 6   HEMATOCRIT % 40 2 42 9 44 9   MCV fL 91 92 94   PLATELETS Thousands/uL 202 236 253   MCH pg 28 6 28 5 28 5   MCHC g/dL 31 3* 31 0* 30 3*   RDW % 15 2* 14 9 14 7   MPV fL 10 2 10 3 10 1   NRBC AUTO /100 WBCs 1 1 1         CMP:  Results from last 7 days   Lab Units 02/24/20  0532 02/23/20  1813 02/23/20  1203 02/23/20  0451 02/22/20  2357 02/22/20  1805 02/22/20  1249   POTASSIUM mmol/L 4 2 3 7 3 7 4 0 3 9 3 7 3 4*   CHLORIDE mmol/L 100 98* 100 100 102 99* 99*   CO2 mmol/L 36* 38* 37* 38* 35* 33* 32   BUN mg/dL 37* 40* 41* 44* 48* 51* 56*   CREATININE mg/dL 1 32* 1 43* 1 47* 1 47* 1 55* 1 57* 1 75*   CALCIUM mg/dL 7 9* 7 5* 7 3* 7 6* 7 2* 7 5* 7 4*   EGFR ml/min/1 73sq m 52 48 46 46 43 42 37         BMP:  Results from last 7 days   Lab Units 02/24/20  0532 02/23/20  1813 02/23/20  1203 02/23/20  0451 02/22/20  2357 02/22/20  1805 02/22/20  1249   POTASSIUM mmol/L 4 2 3 7 3 7 4 0 3 9 3 7 3 4*   CHLORIDE mmol/L 100 98* 100 100 102 99* 99*   CO2 mmol/L 36* 38* 37* 38* 35* 33* 32   BUN mg/dL 37* 40* 41* 44* 48* 51* 56*   CREATININE mg/dL 1 32* 1 43* 1 47* 1 47* 1 55* 1 57* 1 75*   CALCIUM mg/dL 7 9* 7 5* 7 3* 7 6* 7 2* 7 5* 7 4*       BNP:     Magnesium:   Results from last 7 days   Lab Units 02/24/20  0532 02/23/20  1813 02/23/20  1203 02/23/20  0451 02/22/20  2357 02/22/20  1805 02/22/20  1249   MAGNESIUM mg/dL 2 5 1 9 2 1 2 5 2 6 2 8* 1 7       Coags:   Results from last 7 days   Lab Units 02/24/20  0532 02/23/20  0451 02/22/20  0458 02/21/20  1833 02/21/20  1245 02/21/20  0541 02/20/20  2311  02/20/20  1059   PTT seconds 82* 82* 62* 61* 86* 35 70*   < > 29   INR   --  1 24* 1 34*  --   --   --   --   --  1 39*    < > = values in this interval not displayed         TSH:       Lipid Profile:   Results from last 7 days   Lab Units 02/21/20  0541   TRIGLYCERIDES mg/dL 97   HDL mg/dL 23*           Cardiac testing:   Results for orders placed during the hospital encounter of 20   Echo complete with contrast if indicated    Narrative [facility] [address], [address] [facility]  [phone]    Transthoracic Echocardiogram  2D, M-mode, Doppler, and Color Doppler    Study date:  2020    Patient: Jeremy Bacon  MR number: [id number]  Account number: [de-identified]  : 1945  Age: 75 years  Gender: Male  Status: Inpatient  Location: Bedside  Height: 69 in  Weight: 196 lb  BP: 90/ 55 mmHg    Indications: Assess left ventricular function  Diagnoses: I50 9 - Heart failure, unspecified    Sonographer:  Geronimo Opitz, RDCS  Primary Physician:  Luke Humphrey MD  Referring Physician:  Cathy Casey DO  Group:  [facility]  Interpreting Physician:  Cathy Casey DO    SUMMARY    LEFT VENTRICLE:  Systolic function was severely reduced  Ejection fraction was estimated to be 15 %  There was severe diffuse hypokinesis with regional variations  Wall thickness was mildly increased  Doppler parameters were consistent with high ventricular filling pressure  There was a definite, medium-sized mass on the apical wall  It represents a thrombus  LEFT ATRIUM:  The atrium was mildly dilated  MITRAL VALVE:  There was moderate regurgitation  AORTIC VALVE:  The valve was trileaflet  Leaflets exhibited moderately increased thickness and moderately reduced cuspal separation  There was moderate stenosis  Low LV stroke volume  Estimated aortic valve area (by VTI) was 1 04 cmï¾²  Estimated aortic valve area (by Vmax) was 0 91 cmï¾²  Doppler stroke volume was 21 99 ml  Doppler cardiac output was 2 33 L/min, using LVOT flow data  Doppler cardiac index was 1 14 L/min/mï¾², using LVOT flow data  TRICUSPID VALVE:  There was mild regurgitation  PERICARDIUM:  There was a left pleural effusion      HISTORY: PRIOR HISTORY: CAD, hyperlipidemia    PROCEDURE: The procedure was performed at the bedside  This was a routine study  The transthoracic approach was used  The study included complete 2D imaging, M-mode, complete spectral Doppler, and color Doppler  The heart rate was 125 bpm,  at the start of the study  Images were obtained from the parasternal, apical, subcostal, and suprasternal notch acoustic windows  Intravenous contrast ( 0 4 ml definity in NSS) was administered to opacify the left ventricle  This was a  technically difficult study  LEFT VENTRICLE: Size was normal  Systolic function was severely reduced  Ejection fraction was estimated to be 15 %  There was severe diffuse hypokinesis with regional variations  Wall thickness was mildly increased  There was a definite,  medium-sized mass on the apical wall  It represents a thrombus  DOPPLER: Left ventricular diastolic function parameters were abnormal  Doppler parameters were consistent with high ventricular filling pressure  RIGHT VENTRICLE: The size was normal  Systolic function was normal  Wall thickness was normal     LEFT ATRIUM: The atrium was mildly dilated  RIGHT ATRIUM: Size was normal     MITRAL VALVE: Valve structure was normal  There was normal leaflet separation  DOPPLER: The transmitral velocity was within the normal range  There was no evidence for stenosis  There was moderate regurgitation  AORTIC VALVE: The valve was trileaflet  Leaflets exhibited moderately increased thickness and moderately reduced cuspal separation  DOPPLER: Transaortic velocity was within the normal range  There was moderate stenosis  Low LV stroke  volume There was no regurgitation  TRICUSPID VALVE: The valve structure was normal  There was normal leaflet separation  DOPPLER: The transtricuspid velocity was within the normal range  There was no evidence for stenosis  There was mild regurgitation      PULMONIC VALVE: Leaflets exhibited normal thickness, no calcification, and normal cuspal separation  DOPPLER: The transpulmonic velocity was within the normal range  There was no regurgitation  PERICARDIUM: There was no pericardial effusion  There was a left pleural effusion  The pericardium was normal in appearance  AORTA: The root exhibited normal size  SYSTEMIC VEINS: IVC: The inferior vena cava was not well visualized  MEASUREMENT TABLES    2D MEASUREMENTS  LVOT   (Reference normals)  Diam   20 mm   (--)    DOPPLER MEASUREMENTS  LVOT   (Reference normals)  Peak zaire   50 cm/s   (--)  VTI   7 cm   (--)  R-R interval   566 ms   (--)  HR   106 bpm   (--)  Stroke vol   21 99 ml   (--)  Cardiac output   2 33 L/min   (--)  Cardiac index   1 14 L/min/mï¾²   (--)  Stroke index   0 13 ml/mï¾²   (--)  Aortic valve   (Reference normals)  Peak zaire   170 cm/s   (--)  VTI   21 cm   (--)  Obstr index, VTI   0 33    (--)  Valve area, VTI   1 04 cmï¾²   (--)  Area index, VTI   0 51 cmï¾²/mï¾²   (--)  Obstr index, Vmax   0 29    (--)  Valve area, Vmax   0 91 cmï¾²   (--)  Area index, Vmax   0 44 cmï¾²/mï¾²   (--)    SYSTEM MEASUREMENT TABLES    2D  %FS: 15 4 %  Ao Diam: 3 6 cm  EDV(Teich): 133 78 ml  EF(Teich): 32 28 %  ESV(Teich): 90 59 ml  IVSd: 1 03 cm  LA Diam: 4 14 cm  LVIDd: 5 27 cm  LVIDs: 4 46 cm  LVPWd: 1 27 cm  SV(Teich): 43 19 ml    CW  TR MaxP 98 mmHg  TR Vmax: 2 91 m/s    MM  TAPSE: 1 32 cm    PW  LVOT Env  Ti: 244 52 ms  LVOT VTI: 5 9 cm  LVOT Vmax: 0 41 m/s  LVOT Vmean: 0 24 m/s  LVOT maxP 67 mmHg  LVOT meanP 28 mmHg  MV A [place]: 0 63 m/s  MV Dec [place]: 10 25 m/s2  MV DecT: 60 31 ms  MV E [place]: 0 62 m/s  MV E/A Ratio: 0 98  MV PHT: 17 49 ms  MVA By PHT: 12 58 cm2    [address] Accredited Echocardiography [facility]    Prepared and electronically signed by    Jaida Amor DO  Signed 2020 16:49:07         Meds/Allergies     Current Facility-Administered Medications:  acetaminophen 650 mg Oral Q6H PRN CARMEL CRUZ MD    aspirin 81 mg Oral Daily Billie Stuart MD    atorvastatin 80 mg Oral HS Billie Stuart MD    belladonna-opium 30 mg Rectal Q8H PRN SIMONA Galeas    docusate sodium 100 mg Oral BID Billie Stuart MD    furosemide 80 mg Intravenous BID (diuretic) Felton Alcala MD    heparin (porcine) 3-20 Units/kg/hr (Order-Specific) Intravenous Titrated SIMONA Alexander Last Rate: 15 8 Units/kg/hr (20 0041)   heparin (porcine) 2,000 Units Intravenous Q1H PRN Lalit Orellana PA-C    heparin (porcine) 4,000 Units Intravenous Q1H PRN Lalit Orellana PA-C    levothyroxine 50 mcg Oral Early Morning Billie Stuart MD    milrinone [facility]) infusion 0 13 mcg/kg/min Intravenous Continuous SIMONA Galeas Last Rate: 0 13 mcg/kg/min (20 1157)   mycophenolate 750 mg Oral BID Billie Stuart MD    nystatin  Topical BID Billie Stuart MD    polyethylene glycol 17 g Oral Daily PRN Billie Stuart MD    warfarin 5 mg Oral Daily (warfarin) Robin Bess PA-C        heparin (porcine) 3-20 Units/kg/hr (Order-Specific) Last Rate: 15 8 Units/kg/hr (20 004)   milrinone (PRIMACOR) infusion 0 13 mcg/kg/min Last Rate: 0 13 mcg/kg/min (20 1157)     Medications Prior to Admission   Medication    aspirin 81 MG tablet    atorvastatin (LIPITOR) 80 mg tablet    [] cefpodoxime (VANTIN) 200 mg tablet    levothyroxine 25 mcg tablet    mycophenolate (CELLCEPT) 250 mg capsule    nystatin (MYCOSTATIN) powder    Ostomy Supplies (PREMIER COLOSTOMY/ILEOSTOMY) KIT    potassium chloride (KLOR-CON) 20 mEq packet       Assessment:  Principal Problem:    Acute on chronic combined systolic and diastolic heart failure (HCC)  Active Problems:    Coronary artery disease    Hyperlipidemia    Essential hypertension    Gastrocutaneous fistula    Acute renal failure ([facility] [address] )    H/O myasthenia gravis    Gram-negative bacteremia    Right inguinal hernia    Urinary obstruction    Elevated troponin I level Hypervolemia      Impression:  1  Cardiogenic shock from acute systolic heart failure - slowly improving mixed venous O2 on milrinone gtt  Diuresing well  EF 20%  2  Multivessel CAD - did not undergo CABG  3  LV thrombus - on heparin gtt/warfarin  4  ANDREA on CKD - improving  5  SVT/NSVT - likely due to milrinone  Plan:  1  D/C milrinone gtt  2  Continue intermittent diuretics  3  Consider low-dose ARB/Entresto/b-blockers tomorrow if BP stable  4  Watch renal function  5  Continue anticoagulation - switch to Eliquis for LV thrombus  Will hold warfarin as may undergo coronary angiography  6  Repeat coronary angiography to evaluate for progression of CAD  7  May need cardiac MRI to evaluate myocardial viability

## 2020-02-24 NOTE — ASSESSMENT & PLAN NOTE
Wt Readings from Last 3 Encounters:   02/24/20 76 kg (167 lb 8 8 oz)   02/11/20 78 6 kg (173 lb 4 5 oz)   06/24/19 79 2 kg (174 lb 9 6 oz)     · Iv Lasix 80 mg BID   · Monitor I/O  · Was on Milrinone discontinued this morning  · Spoke with cardiology patient will need cardiac catheterization/mri/CABG    Patient reluctant to continue the conversation  · Likely to need LifeVest     · Cardiology approves transfer to the floor as long as blood pressures are stable and producing urine

## 2020-02-24 NOTE — PROGRESS NOTES
NEPHROLOGY PROGRESS NOTE   Kerwin Salter 75 y o  male MRN: [id number]  Unit/Bed#: ICU 10 Encounter: [id number]  Reason for Consult: ANDREA/CKD    ASSESSMENT/PLAN:  This is a 75year old male who presented with shortness of breath and weight gain of 16 pounds  He was started on IV lasix and is feeling much better  He was recently discharged from [facility] for bilateral hydronephrosis with urinary retention and sepsis  1  Acute Kidney Injury, POA- secondary to volume overload  - creatinine improving  - continue IV lasix, reduced 2/23 by cardiology from drip to intermittent  - good urine output  - avoid IV contrast, avoid hypotension, avoid nephrotoxins  2  Chronic Kidney Disease stage II/III- Baseline creatinine 1-1 3   3  Urinary Retention with bilateral hydronephrosis- secondary to large inguinal hernia pressure  - s/p cameron catheter, needs outpatient follow up with urology on discharge  4  Hypertension- BP soft, monitor  - avoid hypotension  5  Cardiogenic Shock with EF 55% and acute systolic heart failure- per cardiology  - diuretics: lasix 80mg IV BID  - off milrinone  - heparin drip for LV thrombus  - for coronary angiography eventually  6  Hypokalemia- replete as needed      SUBJECTIVE:  Patient eating breakfast in bedside chair  Daughter at bedside      OBJECTIVE:  Current Weight: Weight - Scale: 76 kg (167 lb 8 8 oz)  Vitals:    02/24/20 0836 02/24/20 0900 02/24/20 1000 02/24/20 1100   BP: 108/69 115/69 92/50 101/55   BP Location:       Pulse: 72 85 87 91   Resp: 16 (!) 23 (!) 24 (!) 26   Temp:       TempSrc:       SpO2: 98% 97% 96% 97%   Weight:       Height:           Intake/Output Summary (Last 24 hours) at 2/24/2020 1114  Last data filed at 2/24/2020 1012  Gross per 24 hour   Intake 1675 03 ml   Output 4045 ml   Net -2369 97 ml     General: NAD  Skin: no rash  Eyes: anicteric  ENMT: mm moist  Neck: no masses  Respiratory: CTAB  Cardiac: RRR  Extremities: no edema  GI: soft nt nd  Neuro: alert awake  Psych: mood and affect appropriate    Medications:    Current Facility-Administered Medications:     acetaminophen (TYLENOL) tablet 650 mg, 650 mg, Oral, Q6H PRN, New Willams MD, 650 mg at 02/22/20 2127    aspirin chewable tablet 81 mg, 81 mg, Oral, Daily, New Willams MD, 81 mg at 02/24/20 0834    atorvastatin (LIPITOR) tablet 80 mg, 80 mg, Oral, HS, New Willams MD, 80 mg at 02/23/20 2119    belladonna-opium (B&O SUPPOSITORY) 16 2-30 mg suppository 1 suppository, 30 mg, Rectal, Q8H PRN, SIMONA Nunez, Stopped at 02/23/20 2117    docusate sodium (COLACE) capsule 100 mg, 100 mg, Oral, BID, New Willams MD, 100 mg at 02/24/20 7147    furosemide (LASIX) injection 80 mg, 80 mg, Intravenous, BID (diuretic), Mehrdad Fuentes MD    heparin (porcine) 25,000 units in 250 mL infusion (premix), 3-20 Units/kg/hr (Order-Specific), Intravenous, Titrated, SIMONA Ellis, Last Rate: 13 4 mL/hr at 02/24/20 0041, 15 8 Units/kg/hr at 02/24/20 0041    heparin (porcine) injection 2,000 Units, 2,000 Units, Intravenous, Q1H PRN, Norma Ray PA-C    heparin (porcine) injection 4,000 Units, 4,000 Units, Intravenous, Q1H PRN, Norma Ray PA-C, 4,000 Units at 02/21/20 0486    levothyroxine tablet 50 mcg, 50 mcg, Oral, Early Morning, New Willams MD, 50 mcg at 02/24/20 0601    mycophenolate (CELLCEPT) capsule 750 mg, 750 mg, Oral, BID, New Willams MD, 750 mg at 02/24/20 5475    nystatin (MYCOSTATIN) powder, , Topical, BID, New Willams MD, 1 application at 72/10/34 7125    polyethylene glycol (MIRALAX) packet 17 g, 17 g, Oral, Daily PRN, New Willams MD    Laboratory Results:  Results from last 7 days   Lab Units 02/24/20  0532 02/23/20  1813 02/23/20  1203 02/23/20  0451 02/22/20  2357 02/22/20  1805 02/22/20  1249 02/22/20  0517  02/21/20  0541 02/20/20  1059   WBC Thousand/uL  --   --   --   --   --   --   --  11 44*  --  13 80* 15 45*   HEMOGLOBIN g/dL  --   --   -- --   --   --   --  12 6  --  13 3 13 6   HEMATOCRIT %  --   --   --   --   --   --   --  40 2  --  42 9 44 9   PLATELETS Thousands/uL  --   --   --   --   --   --   --  202  --  236 253   POTASSIUM mmol/L 4 2 3 7 3 7 4 0 3 9 3 7 3 4*  --    < > 4 4 5 0   CHLORIDE mmol/L 100 98* 100 100 102 99* 99*  --    < > 102 102   CO2 mmol/L 36* 38* 37* 38* 35* 33* 32  --    < > 22 20*   BUN mg/dL 37* 40* 41* 44* 48* 51* 56*  --    < > 60* 52*   CREATININE mg/dL 1 32* 1 43* 1 47* 1 47* 1 55* 1 57* 1 75*  --    < > 2 06* 2 03*   CALCIUM mg/dL 7 9* 7 5* 7 3* 7 6* 7 2* 7 5* 7 4*  --    < > 8 2* 8 6   MAGNESIUM mg/dL 2 5 1 9 2 1 2 5 2 6 2 8* 1 7  --    < > 2 4  --     < > = values in this interval not displayed

## 2020-02-24 NOTE — PROGRESS NOTES
Daily Progress Note - Critical Care   Dre Salter 75 y o  male MRN: [id number]  Unit/Bed#: ICU 10 Encounter: [id number]        ----------------------------------------------------------------------------------------  HPI/24hr events:  Switched Lasix drip to scheduled Lasix  He continued on Milrinone  Doing well  No acute events  ---------------------------------------------------------------------------------------  SUBJECTIVE  Patient feeling well  Review of Systems   Constitutional: Negative for chills and fever  Eyes: Negative for redness  Respiratory: Negative for cough and shortness of breath  Cardiovascular: Negative for chest pain, palpitations and leg swelling  Gastrointestinal: Negative for abdominal distention and abdominal pain  Genitourinary: Negative for difficulty urinating and dysuria  Musculoskeletal: Negative for back pain and myalgias  Skin: Negative for color change and rash  Neurological: Negative for dizziness and headaches  Review of systems was reviewed and negative unless stated above in HPI/24-hour events   ---------------------------------------------------------------------------------------  Assessment and Plan:     Neuro:   · Diagnosis:  Myasthenia gravis  ? Plan:  Continue mycophenolate     CV:   · Diagnosis:  Cardiogenic shock  ? Plan:   plan to discontinue Milrinone    · Diagnosis:  Acute on chronic combined heart failure  ? Continue scheduled Lasix  ? Monitor I&O  · Diagnosis:  MI type 2 secondary to acute illness   ? Plan:   elevated troponins on admission  ? Continue medical management  · Diagnosis:  Multivessel coronary artery disease  ? Plan:  Continue aspirin statin        Pulm:  · Diagnosis:  Pulmonary insufficiency  ? Plan:   1 L nasal cannula oxygen     GI:   ? Plan:  Routine colostomy care        :   · Diagnosis:  Acute kidney injury on chronic kidney disease stage 3  ?  Plan:   monitor I&O  ? trend serum creatinine        F/E/N:   · Plan: electrolyte replenishment last night        Heme/Onc:   · Diagnosis:  LV thrombus   Plan:  Continue heparin     Endo:   ? Plan:  Follow blood sugars as needed        ID:   ? Plan:  Follow temperature and white count        MSK/Skin:   ? Plan:  Out of bed as tolerated, PT/OT        Disposition: Transfer to Med-Surg   Code Status: Level 1 - Full Code  ---------------------------------------------------------------------------------------  ICU CORE MEASURES    Prophylaxis   VTE Pharmacologic Prophylaxis: Heparin  VTE Mechanical Prophylaxis: sequential compression device  Stress Ulcer Prophylaxis: Prophylaxis Not Indicated     ABCDE Protocol (if indicated)  Plan to perform spontaneous awakening trial today? Not applicable  Plan to perform spontaneous breathing trial today? Not applicable  Obvious barriers to extubation? Not applicable  CAM-ICU: Negative    Invasive Devices Review  Invasive Devices     Peripheral Intravenous Line            Long-Dwell Peripheral IV (Midline) 27 Left Basilic 2 days    Peripheral IV 20 Distal;Right;Upper;Ventral (anterior) Arm less than 1 day          Drain            Colostomy  -- days    Colostomy LLQ 1378 days    Urethral Catheter Latex 18 Fr  11 days              Can any invasive devices be discontinued today? Not applicable  ---------------------------------------------------------------------------------------  OBJECTIVE    Vitals   Vitals:    20 0200 20 0300 20 0500 20 0600   BP: 93/61 97/64 100/63 107/81   BP Location: Right arm Right arm Right arm Right arm   Pulse: 79 75 82 77   Resp: 20 16 20 (!) 26   Temp:       TempSrc:       SpO2: 94% 94% 97% 97%   Weight:    76 kg (167 lb 8 8 oz)   Height:         Temp (24hrs), Av 8 °F (36 6 °C), Min:97 3 °F (36 3 °C), Max:98 °F (36 7 °C)  Current: Temperature: 97 8 °F (36 6 °C)  HR: 77  BP: 107/81  RR: 26  SpO2: 97    Physical Exam   Constitutional: He is oriented to person, place, and time   No distress  HENT:   Head: Normocephalic and atraumatic  Cardiovascular: Normal rate and regular rhythm  Pulmonary/Chest: Effort normal and breath sounds normal    Abdominal:   Colostomy bag   Musculoskeletal: He exhibits no edema or tenderness  Neurological: He is alert and oriented to person, place, and time  Skin: Skin is warm  He is not diaphoretic  Psychiatric: He has a normal mood and affect  His behavior is normal    Nursing note and vitals reviewed          Respiratory:  SpO2: SpO2: 97 %  O2 Flow Rate (L/min): 1 L/min    Invasive/non-invasive ventilation settings   Respiratory    Lab Data (Last 4 hours)    None         O2/Vent Data (Last 4 hours)    None                Laboratory and Diagnostics:  Results from last 7 days   Lab Units 02/22/20  0517 02/21/20  0541 02/20/20  1059   WBC Thousand/uL 11 44* 13 80* 15 45*   HEMOGLOBIN g/dL 12 6 13 3 13 6   HEMATOCRIT % 40 2 42 9 44 9   PLATELETS Thousands/uL 202 236 253   NEUTROS PCT % 87* 86* 87*   MONOS PCT % 4 5 5     Results from last 7 days   Lab Units 02/24/20  0532 02/23/20  1813 02/23/20  1203 02/23/20  0451 02/22/20  2357 02/22/20  1805 02/22/20  1249   SODIUM mmol/L 140 142 140 142 145 141 142   POTASSIUM mmol/L 4 2 3 7 3 7 4 0 3 9 3 7 3 4*   CHLORIDE mmol/L 100 98* 100 100 102 99* 99*   CO2 mmol/L 36* 38* 37* 38* 35* 33* 32   ANION GAP mmol/L 4 6 3* 4 8 9 11   BUN mg/dL 37* 40* 41* 44* 48* 51* 56*   CREATININE mg/dL 1 32* 1 43* 1 47* 1 47* 1 55* 1 57* 1 75*   CALCIUM mg/dL 7 9* 7 5* 7 3* 7 6* 7 2* 7 5* 7 4*   GLUCOSE RANDOM mg/dL 119 109 118 118 131 115 120     Results from last 7 days   Lab Units 02/23/20  1813 02/23/20  1203 02/23/20  0451 02/22/20  2357 02/22/20  1805 02/22/20  1249 02/22/20  0458   MAGNESIUM mg/dL 1 9 2 1 2 5 2 6 2 8* 1 7 2 0      Results from last 7 days   Lab Units 02/23/20  0451 02/22/20  0458 02/21/20  1833 02/21/20  1245 02/21/20  0541 02/20/20  2311 02/20/20  1646 02/20/20  1059   INR  1 24* 1 34*  --   --   --   -- --  1 39*   PTT seconds 82* 62* 61* 86* 35 70* 30 29      Results from last 7 days   Lab Units 20  1646 20  1059   TROPONIN I ng/mL 15 54* 16 09* 14 15*         ABG:  Results from last 7 days   Lab Units 20  0752   PH ART  7 428   PCO2 ART mm Hg 32 5*   PO2 ART mm Hg 93 7   HCO3 ART mmol/L 21 0*   BASE EXC ART mmol/L -2 4   ABG SOURCE  Radial, Right     VBG:  Results from last 7 days   Lab Units 20  0532  20  0752   PH ELIZABETH  7 414*   < >  --    PCO2 ELIZABETH mm Hg 61 8*   < >  --    PO2 ELIZABETH mm Hg 34 4*   < >  --    HCO3 ELIZABETH mmol/L 38 6*   < >  --    BASE EXC ELIZABETH mmol/L 11 5   < >  --    ABG SOURCE   --   --  Radial, Right    < > = values in this interval not displayed  Micro  Results from last 7 days   Lab Units 20  1642   BLOOD CULTURE  No Growth at 48 hrs  No Growth at 48 hrs  EKG:   Imagin/22- chest x-ray shows bilateral pleural effusions I have personally reviewed pertinent reports  Intake and Output  I/O       701 -  0700 701 -  0700    P  O  640 1080    I V  (mL/kg) 433 1 (5) 398 5 (4 6)    Total Intake(mL/kg) 1073 1 (12 3) 1478 5 (17)    Urine (mL/kg/hr) 6280 (3) 4825 (2 3)    Stool 75 50    Total Output 6355 4875    Net -5285 9 -3396 5              UOP: 3595 ml/hr     Height and Weights   Height: 5' 9" (175 3 cm)  IBW: 70 7 kg  Body mass index is 24 74 kg/m²  Weight (last 2 days)     Date/Time   Weight    20 0600   76 (167 55)    20 0600   77 9 (171 74)    20 0600   87 2 (192 24)                Nutrition       Diet Orders   (From admission, onward)             Start     Ordered    20 1411  Room Service  Once     Question:  Type of Service  Answer:  Room Service - Appropriate with Assistance    20 1411    20 1349  Diet Cardiovascular; Sodium 2 GM;  Fluid Restriction 1500 ML  Diet effective now     Question Answer Comment   Diet Type Cardiovascular    Cardiac Sodium 2 GM    Other Restriction(s): Fluid Restriction 1500 ML    RD to adjust diet per protocol?  Yes        02/20/20 1348                  Active Medications  Scheduled Meds:    Current Facility-Administered Medications:  acetaminophen 650 mg Oral Q6H PRN Judy Soliman MD    aspirin 81 mg Oral Daily Judy Soliman MD    atorvastatin 80 mg Oral HS Judy Soliman MD    belladonna-opium 30 mg Rectal Q8H PRN SIMONA Garcia    docusate sodium 100 mg Oral BID Judy Soliman MD    furosemide 80 mg Intravenous BID (diuretic) Haja Gonzales MD    heparin (porcine) 3-20 Units/kg/hr (Order-Specific) Intravenous Titrated SIMONA Mukherjee Last Rate: 15 8 Units/kg/hr (02/24/20 0041)   heparin (porcine) 2,000 Units Intravenous Q1H PRN Sofie Cooks, PA-C    heparin (porcine) 4,000 Units Intravenous Q1H PRN Sofie Cooks, PA-C    levothyroxine 50 mcg Oral Early Morning Judy Soliman MD    milrinone [facility]) infusion 0 13 mcg/kg/min Intravenous Continuous SIMONA Garcia Last Rate: 0 13 mcg/kg/min (02/23/20 1157)   mycophenolate 750 mg Oral BID Judy Soliman MD    nystatin  Topical BID Judy Soliman MD    polyethylene glycol 17 g Oral Daily PRN Judy Soliman MD    warfarin 5 mg Oral Daily (warfarin) Trinidad Malagon PA-C      Continuous Infusions:    heparin (porcine) 3-20 Units/kg/hr (Order-Specific) Last Rate: 15 8 Units/kg/hr (02/24/20 0041)   milrinone (PRIMACOR) infusion 0 13 mcg/kg/min Last Rate: 0 13 mcg/kg/min (02/23/20 1157)     PRN Meds:     acetaminophen 650 mg Q6H PRN   belladonna-opium 30 mg Q8H PRN   heparin (porcine) 2,000 Units Q1H PRN   heparin (porcine) 4,000 Units Q1H PRN   polyethylene glycol 17 g Daily PRN       Allergies   No Known Allergies  ---------------------------------------------------------------------------------------  Advance Directive and Living Will:      Power of :    POLST: ---------------------------------------------------------------------------------------      Annabel Alfonso MD      Portions of the record may have been created with voice recognition software  Occasional wrong word or "sound a like" substitutions may have occurred due to the inherent limitations of voice recognition software    Read the chart carefully and recognize, using context, where substitutions have occurred

## 2020-02-25 PROBLEM — R78.81 GRAM-NEGATIVE BACTEREMIA: Status: RESOLVED | Noted: 2020-01-01 | Resolved: 2020-01-01

## 2020-02-25 NOTE — PROGRESS NOTES
Cardiology Progress Note - Kwaku Corbett 75 y o  male MRN: [id number]    Unit/Bed#: ICU 10 Encounter: [id number]        Subjective:    No significant events overnight  Feeling better  Only c/o is discomfort at hernia site  Review of Systems   Cardiovascular: Negative for chest pain, leg swelling and palpitations  Respiratory: Positive for shortness of breath  Objective:   Vitals: Blood pressure 101/69, pulse 88, temperature (!) 97 4 °F (36 3 °C), temperature source Oral, resp  rate 18, height 5' 9" (1 753 m), weight 74 5 kg (164 lb 3 9 oz), SpO2 96 %  , Body mass index is 24 25 kg/m² ,   Orthostatic Blood Pressures      Most Recent Value   Blood Pressure  101/69 filed at 02/25/2020 0300   Patient Position - Orthostatic VS  Lying filed at 02/25/2020 4915         Systolic (17LBQ), QBN:962 , Min:92 , UWQ:723     Diastolic (66YZJ), QLX:14, Min:50, Max:69      Intake/Output Summary (Last 24 hours) at 2/25/2020 0829  Last data filed at 2/25/2020 0600  Gross per 24 hour   Intake 1174 8 ml   Output 2075 ml   Net -900 2 ml     Weight (last 2 days)     Date/Time   Weight    02/25/20 0600   74 5 (164 24)    02/24/20 0600   76 (167 55)    02/23/20 0600   77 9 (171 74)                  Telemetry Review: NSR     Physical Exam   Constitutional: He is oriented to person, place, and time  He appears well-developed  HENT:   Head: Atraumatic  Eyes: EOM are normal    Neck: Normal range of motion  Cardiovascular: Normal rate, regular rhythm and normal heart sounds  Exam reveals no gallop  No murmur heard  Pulmonary/Chest: Effort normal and breath sounds normal    Abdominal: Soft  Musculoskeletal: Normal range of motion  Neurological: He is alert and oriented to person, place, and time  Skin: Skin is warm and dry  Psychiatric: He has a normal mood and affect           Laboratory Results:  Results from last 7 days   Lab Units 02/20/20 2003 02/20/20  1646 02/20/20  1059   TROPONIN I ng/mL 15 54* 16 09* 14 15* CBC with diff:   Results from last 7 days   Lab Units 02/22/20  0517 02/21/20  0541 02/20/20  1059   WBC Thousand/uL 11 44* 13 80* 15 45*   HEMOGLOBIN g/dL 12 6 13 3 13 6   HEMATOCRIT % 40 2 42 9 44 9   MCV fL 91 92 94   PLATELETS Thousands/uL 202 236 253   MCH pg 28 6 28 5 28 5   MCHC g/dL 31 3* 31 0* 30 3*   RDW % 15 2* 14 9 14 7   MPV fL 10 2 10 3 10 1   NRBC AUTO /100 WBCs 1 1 1         CMP:  Results from last 7 days   Lab Units 02/25/20  0541 02/24/20  0532 02/23/20  1813 02/23/20  1203 02/23/20  0451 02/22/20 2357 02/22/20  1805   POTASSIUM mmol/L 4 2 4 2 3 7 3 7 4 0 3 9 3 7   CHLORIDE mmol/L 99* 100 98* 100 100 102 99*   CO2 mmol/L 34* 36* 38* 37* 38* 35* 33*   BUN mg/dL 31* 37* 40* 41* 44* 48* 51*   CREATININE mg/dL 1 22 1 32* 1 43* 1 47* 1 47* 1 55* 1 57*   CALCIUM mg/dL 8 1* 7 9* 7 5* 7 3* 7 6* 7 2* 7 5*   EGFR ml/min/1 73sq m 58 52 48 46 46 43 42         BMP:  Results from last 7 days   Lab Units 02/25/20  0541 02/24/20  0532 02/23/20 1813 02/23/20  1203 02/23/20 0451 02/22/20 2357 02/22/20  1805   POTASSIUM mmol/L 4 2 4 2 3 7 3 7 4 0 3 9 3 7   CHLORIDE mmol/L 99* 100 98* 100 100 102 99*   CO2 mmol/L 34* 36* 38* 37* 38* 35* 33*   BUN mg/dL 31* 37* 40* 41* 44* 48* 51*   CREATININE mg/dL 1 22 1 32* 1 43* 1 47* 1 47* 1 55* 1 57*   CALCIUM mg/dL 8 1* 7 9* 7 5* 7 3* 7 6* 7 2* 7 5*       BNP:     Magnesium:   Results from last 7 days   Lab Units 02/24/20  0532 02/23/20  1813 02/23/20  1203 02/23/20  0451 02/22/20  2357 02/22/20  1805 02/22/20  1249   MAGNESIUM mg/dL 2 5 1 9 2 1 2 5 2 6 2 8* 1 7       Coags:   Results from last 7 days   Lab Units 02/25/20  0541 02/24/20  0532 02/23/20  0451 02/22/20  0458 02/21/20  1833 02/21/20  1245 02/21/20  0541  02/20/20  1059   PTT seconds 103* 82* 82* 62* 61* 86* 35   < > 29   INR   --   --  1 24* 1 34*  --   --   --   --  1 39*    < > = values in this interval not displayed         TSH:       Lipid Profile:   Results from last 7 days   Lab Units 02/21/20  0541 TRIGLYCERIDES mg/dL 97   HDL mg/dL 23*           Cardiac testing:   Results for orders placed during the hospital encounter of 20   Echo complete with contrast if indicated    Narrative [facility] [facility]  [phone]    Transthoracic Echocardiogram  2D, M-mode, Doppler, and Color Doppler    Study date:  2020    Patient: Axel Hill  MR number: [id number]  Account number: [de-identified]  : 1945  Age: 75 years  Gender: Male  Status: Inpatient  Location: Bedside  Height: 69 in  Weight: 196 lb  BP: 90/ 55 mmHg    Indications: Assess left ventricular function  Diagnoses: I50 9 - Heart failure, unspecified    Sonographer:  Armen Mike RDCS  Primary Physician:  Christen Saunders MD  Referring Physician:  Jaida Amor DO  Group:  [facility]  Interpreting Physician:  Jaida Amor DO    SUMMARY    LEFT VENTRICLE:  Systolic function was severely reduced  Ejection fraction was estimated to be 15 %  There was severe diffuse hypokinesis with regional variations  Wall thickness was mildly increased  Doppler parameters were consistent with high ventricular filling pressure  There was a definite, medium-sized mass on the apical wall  It represents a thrombus  LEFT ATRIUM:  The atrium was mildly dilated  MITRAL VALVE:  There was moderate regurgitation  AORTIC VALVE:  The valve was trileaflet  Leaflets exhibited moderately increased thickness and moderately reduced cuspal separation  There was moderate stenosis  Low LV stroke volume  Estimated aortic valve area (by VTI) was 1 04 cmï¾²  Estimated aortic valve area (by Vmax) was 0 91 cmï¾²  Doppler stroke volume was 21 99 ml  Doppler cardiac output was 2 33 L/min, using LVOT flow data  Doppler cardiac index was 1 14 L/min/mï¾², using LVOT flow data  TRICUSPID VALVE:  There was mild regurgitation      PERICARDIUM:  There was a left pleural effusion  HISTORY: PRIOR HISTORY: CAD, hyperlipidemia    PROCEDURE: The procedure was performed at the bedside  This was a routine study  The transthoracic approach was used  The study included complete 2D imaging, M-mode, complete spectral Doppler, and color Doppler  The heart rate was 125 bpm,  at the start of the study  Images were obtained from the parasternal, apical, subcostal, and suprasternal notch acoustic windows  Intravenous contrast ( 0 4 ml definity in NSS) was administered to opacify the left ventricle  This was a  technically difficult study  LEFT VENTRICLE: Size was normal  Systolic function was severely reduced  Ejection fraction was estimated to be 15 %  There was severe diffuse hypokinesis with regional variations  Wall thickness was mildly increased  There was a definite,  medium-sized mass on the apical wall  It represents a thrombus  DOPPLER: Left ventricular diastolic function parameters were abnormal  Doppler parameters were consistent with high ventricular filling pressure  RIGHT VENTRICLE: The size was normal  Systolic function was normal  Wall thickness was normal     LEFT ATRIUM: The atrium was mildly dilated  RIGHT ATRIUM: Size was normal     MITRAL VALVE: Valve structure was normal  There was normal leaflet separation  DOPPLER: The transmitral velocity was within the normal range  There was no evidence for stenosis  There was moderate regurgitation  AORTIC VALVE: The valve was trileaflet  Leaflets exhibited moderately increased thickness and moderately reduced cuspal separation  DOPPLER: Transaortic velocity was within the normal range  There was moderate stenosis  Low LV stroke  volume There was no regurgitation  TRICUSPID VALVE: The valve structure was normal  There was normal leaflet separation  DOPPLER: The transtricuspid velocity was within the normal range  There was no evidence for stenosis  There was mild regurgitation      PULMONIC VALVE: Leaflets exhibited normal thickness, no calcification, and normal cuspal separation  DOPPLER: The transpulmonic velocity was within the normal range  There was no regurgitation  PERICARDIUM: There was no pericardial effusion  There was a left pleural effusion  The pericardium was normal in appearance  AORTA: The root exhibited normal size  SYSTEMIC VEINS: IVC: The inferior vena cava was not well visualized  MEASUREMENT TABLES    2D MEASUREMENTS  LVOT   (Reference normals)  Diam   20 mm   (--)    DOPPLER MEASUREMENTS  LVOT   (Reference normals)  Peak zaire   50 cm/s   (--)  VTI   7 cm   (--)  R-R interval   566 ms   (--)  HR   106 bpm   (--)  Stroke vol   21 99 ml   (--)  Cardiac output   2 33 L/min   (--)  Cardiac index   1 14 L/min/mï¾²   (--)  Stroke index   0 13 ml/mï¾²   (--)  Aortic valve   (Reference normals)  Peak zaire   170 cm/s   (--)  VTI   21 cm   (--)  Obstr index, VTI   0 33    (--)  Valve area, VTI   1 04 cmï¾²   (--)  Area index, VTI   0 51 cmï¾²/mï¾²   (--)  Obstr index, Vmax   0 29    (--)  Valve area, Vmax   0 91 cmï¾²   (--)  Area index, Vmax   0 44 cmï¾²/mï¾²   (--)    SYSTEM MEASUREMENT TABLES    2D  %FS: 15 4 %  Ao Diam: 3 6 cm  EDV(Teich): 133 78 ml  EF(Teich): 32 28 %  ESV(Teich): 90 59 ml  IVSd: 1 03 cm  LA Diam: 4 14 cm  LVIDd: 5 27 cm  LVIDs: 4 46 cm  LVPWd: 1 27 cm  SV(Teich): 43 19 ml    CW  TR MaxP 98 mmHg  TR Vmax: 2 91 m/s    MM  TAPSE: 1 32 cm    PW  LVOT Env  Ti: 244 52 ms  LVOT VTI: 5 9 cm  LVOT Vmax: 0 41 m/s  LVOT Vmean: 0 24 m/s  LVOT maxP 67 mmHg  LVOT meanP 28 mmHg  MV A Z[place]: 0 63 m/s  MV Dec [place]: 10 25 m/s2  MV DecT: 60 31 ms  MV E [place]: 0 62 m/s  MV E/A Ratio: 0 98  MV PHT: 17 49 ms  MVA By PHT: 12 58 cm2    [address] [facility]redited Ech[facility]    Prepared and electronically signed by    Mark Love DO  Signed 2020 16:49:07         Meds/Allergies     Current Facility-Administered Medications:  acetaminophen 650 mg Oral Q6H PRN Keren Chacon MD    aspirin 81 mg Oral Daily Keren Chacon MD    atorvastatin 80 mg Oral HS Keren Chacon MD    belladonna-opium 30 mg Rectal Q8H PRN SIMONA Castaneda    docusate sodium 100 mg Oral BID Keren Chacon MD    furosemide 80 mg Intravenous BID (diuretic) Sohail Jimenez MD    heparin (porcine) 3-20 Units/kg/hr (Order-Specific) Intravenous Titrated SIMONA Sal Last Rate: 15 8 Units/kg/hr (20)   heparin (porcine) 2,000 Units Intravenous Q1H PRN Alireza Ravi PA-C    heparin (porcine) 4,000 Units Intravenous Q1H PRN Alireza Ravi PA-C    levothyroxine 50 mcg Oral Early Morning Keren Chacon MD    mycophenolate 750 mg Oral BID Keren Chacon MD    nystatin  Topical BID Keren Chacon MD    oxyCODONE 5 mg Oral Q6H PRN Keren Chacon MD    polyethylene glycol 17 g Oral Daily PRN Keren Chacon MD        heparin (porcine) 3-20 Units/kg/hr (Order-Specific) Last Rate: 15 8 Units/kg/hr (20)     Medications Prior to Admission   Medication    aspirin 81 MG tablet    atorvastatin (LIPITOR) 80 mg tablet    [] cefpodoxime (VANTIN) 200 mg tablet    levothyroxine 25 mcg tablet    mycophenolate (CELLCEPT) 250 mg capsule    nystatin (MYCOSTATIN) powder    Ostomy Supplies (PREMIER COLOSTOMY/ILEOSTOMY) KIT    potassium chloride (KLOR-CON) 20 mEq packet       Assessment:  Principal Problem:    Acute on chronic combined systolic and diastolic heart failure (HCC)  Active Problems:    Coronary artery disease    Hyperlipidemia    Essential hypertension    Gastrocutaneous fistula    Acute renal failure ([address] )    H/O myasthenia gravis    Gram-negative bacteremia    Right inguinal hernia    Urinary obstruction    Elevated troponin I level    LV (left ventricular) mural thrombus      Impression:  1  Cardiogenic shock from acute systolic heart failure - weaned off milrinone gtt    2  Multivessel CAD - did not undergo CABG when recommended      3  LV thrombus - on heparin gtt  4  ANDREA on CKD - improving  5  SVT/NSVT - resolved    Plan:  1  Continue IV diuretics  2  Continue heparin gtt  Switch to Eliquis once decision made concerning revascularization  3  Will plan on coronary angiography tomorrow  4  Watch renal function  5  May need cardiac MRI to evaluate myocardial viability depending on cath results

## 2020-02-25 NOTE — ASSESSMENT & PLAN NOTE
· Patient currently upset that we are doing nothing for his hernia  · He is given status healing reason he is in the hospital and feels his main issues are being ordered  · We did have discussion regarding his mural thrombus and need for cardiac catheterization  To which she replied I might as well go home with her not going to do anything  · I did educate the patient on his risk for surgery and the fact that his hernia is quite large and would be a difficult repair    I tried to explain that he is clinically unstable for such surgery at this time and that any surgical intervention would need to be entertained in the future

## 2020-02-25 NOTE — ASSESSMENT & PLAN NOTE
· Currently on IV heparin  · Plan to change to Eliquis per Cardiology  · Discussed Cardiology depending on timing of cardiac catheterization

## 2020-02-25 NOTE — PROGRESS NOTES
NEPHROLOGY PROGRESS NOTE   Lorenzo Gottlieb 75 y o  male MRN: [id number]  Unit/Bed#: ICU 10 Encounter: [id number]  Reason for Consult: ANDREA/CKD    ASSESSMENT/PLAN:  This is a 75year old male who presented with shortness of breath and weight gain of 16 pounds  He was started on IV lasix and is feeling much better  He was recently discharged from [address] [place] [address] for bilateral hydronephrosis with urinary retention and sepsis      1  Acute Kidney Injury, POA- secondary to volume overload  - creatinine improving  - continue IV lasix, reduced 2/23 by cardiology from drip to intermittent  - good urine output  - avoid IV contrast, avoid hypotension, avoid nephrotoxins  2  Chronic Kidney Disease stage II/III- Baseline creatinine 1-1 3   3  Urinary Retention with bilateral hydronephrosis- secondary to large inguinal hernia pressure  - continue cameron catheter, needs outpatient follow up with urology on discharge  4  Hypertension- BP soft, monitor  - avoid hypotension  5  Cardiogenic Shock with EF 24% and acute systolic heart failure- per cardiology  - diuretics: lasix 80mg IV BID (hold pm dose 2/25 for cardiac cath)  - off milrinone  - heparin drip for LV thrombus  - for cardiac catheterization tomorrow 2/26  6  Hypokalemia- replete as needed    Disposition:  Discussed with cardiology  Will hold lasix tonight and tomorrow AM   Can likely restart after cardiac catheterization  Will also start mucomyst     SUBJECTIVE:  Patient without acute complaints      OBJECTIVE:  Current Weight: Weight - Scale: 74 5 kg (164 lb 3 9 oz)  Vitals:    02/25/20 0600 02/25/20 0700 02/25/20 0717 02/25/20 0820   BP:   95/68 98/73   BP Location:       Pulse:  88 91 84   Resp:  18 22 16   Temp:  (!) 97 4 °F (36 3 °C)     TempSrc:  Oral     SpO2:  96% 98% 96%   Weight: 74 5 kg (164 lb 3 9 oz)      Height:           Intake/Output Summary (Last 24 hours) at 2/25/2020 0954  Last data filed at 2/25/2020 0800  Gross per 24 hour   Intake 1441 6 ml   Output 2160 ml   Net -718 4 ml     General: NAD  Skin: no rash  Eyes: anicteric  ENMT: mm moist  Neck: no masses  Respiratory: CTAB  Cardiac: RRR  Extremities: no edema  GI: soft nt nd  Neuro: alert awake  Psych: mood and affect appropriate    Medications:    Current Facility-Administered Medications:     acetaminophen (TYLENOL) tablet 650 mg, 650 mg, Oral, Q6H PRN, Nahum Haney MD, 650 mg at 02/22/20 2127    aspirin chewable tablet 81 mg, 81 mg, Oral, Daily, Nahum Haney MD, 81 mg at 02/25/20 0293    atorvastatin (LIPITOR) tablet 80 mg, 80 mg, Oral, HS, Nahum Haney MD, 80 mg at 02/24/20 2237    belladonna-opium (B&O SUPPOSITORY) 16 2-30 mg suppository 1 suppository, 30 mg, Rectal, Q8H PRN, SIMONA Flower, Stopped at 02/23/20 2117    docusate sodium (COLACE) capsule 100 mg, 100 mg, Oral, BID, Nahum Haney MD, 100 mg at 02/24/20 1707    furosemide (LASIX) injection 80 mg, 80 mg, Intravenous, BID (diuretic), Zion Johnson MD, 80 mg at 02/25/20 8626    heparin (porcine) 25,000 units in 250 mL infusion (premix), 3-20 Units/kg/hr (Order-Specific), Intravenous, Titrated, SIMONA Baeza, Last Rate: 11 7 mL/hr at 02/25/20 0800, 13 8 Units/kg/hr at 02/25/20 0800    heparin (porcine) injection 2,000 Units, 2,000 Units, Intravenous, Q1H PRN, Tia Posey PA-C    heparin (porcine) injection 4,000 Units, 4,000 Units, Intravenous, Q1H PRN, Tia Posey PA-C, 4,000 Units at 02/21/20 4367    levothyroxine tablet 50 mcg, 50 mcg, Oral, Early Morning, Nahum Haney MD, 50 mcg at 02/25/20 0541    mycophenolate (CELLCEPT) capsule 750 mg, 750 mg, Oral, BID, Nahum Haney MD, 750 mg at 02/25/20 6747    nystatin (MYCOSTATIN) powder, , Topical, BID, Nahum Haney MD, 1 application at 39/47/17 6273    oxyCODONE (ROXICODONE) IR tablet 5 mg, 5 mg, Oral, Q6H PRN, Nahum Haney MD, 5 mg at 02/24/20 1453    polyethylene glycol (MIRALAX) packet 17 g, 17 g, Oral, Daily PRN, Nahum Haney, MD    Laboratory Results:  Results from last 7 days   Lab Units 02/25/20  0541 02/24/20  0532 02/23/20  1813 02/23/20  1203 02/23/20  0451 02/22/20  2357 02/22/20  1805 02/22/20  1249 02/22/20  0517  02/21/20  0541 02/20/20  1059   WBC Thousand/uL  --   --   --   --   --   --   --   --  11 44*  --  13 80* 15 45*   HEMOGLOBIN g/dL  --   --   --   --   --   --   --   --  12 6  --  13 3 13 6   HEMATOCRIT %  --   --   --   --   --   --   --   --  40 2  --  42 9 44 9   PLATELETS Thousands/uL  --   --   --   --   --   --   --   --  202  --  236 253   POTASSIUM mmol/L 4 2 4 2 3 7 3 7 4 0 3 9 3 7 3 4*  --    < > 4 4 5 0   CHLORIDE mmol/L 99* 100 98* 100 100 102 99* 99*  --    < > 102 102   CO2 mmol/L 34* 36* 38* 37* 38* 35* 33* 32  --    < > 22 20*   BUN mg/dL 31* 37* 40* 41* 44* 48* 51* 56*  --    < > 60* 52*   CREATININE mg/dL 1 22 1 32* 1 43* 1 47* 1 47* 1 55* 1 57* 1 75*  --    < > 2 06* 2 03*   CALCIUM mg/dL 8 1* 7 9* 7 5* 7 3* 7 6* 7 2* 7 5* 7 4*  --    < > 8 2* 8 6   MAGNESIUM mg/dL  --  2 5 1 9 2 1 2 5 2 6 2 8* 1 7  --    < > 2 4  --     < > = values in this interval not displayed

## 2020-02-25 NOTE — ASSESSMENT & PLAN NOTE
· Lawrence catheter maintained for urinary retention  · Renal function improving  · Appreciate nephrology follow-up  · Continue dosing of Lasix

## 2020-02-25 NOTE — PROGRESS NOTES
Progress Note Sathya Steele 1945, 75 y o  male MRN: [id number]    Unit/Bed#: ICU 10 Encounter: [id number]    Primary Care Provider: Alison Valle MD   Date and time admitted to hospital: 2/20/2020  9:52 AM        * Acute on chronic combined systolic and diastolic heart failure ([facility] [address] )  Assessment & Plan  Wt Readings from Last 3 Encounters:   02/25/20 74 5 kg (164 lb 3 9 oz)   02/11/20 78 6 kg (173 lb 4 5 oz)   06/24/19 79 2 kg (174 lb 9 6 oz)     · Milrinone discontinued  · Continuing dosing Lasix  · Decreasing weight noted    Acute renal failure ([facility] [address] )  Assessment & Plan  · Lawrence catheter maintained for urinary retention  · Renal function improving  · Appreciate nephrology follow-up  · Continue dosing of Lasix    Coronary artery disease  Assessment & Plan  · Case discussed with cardiology [place]  · Potential cardiac catheterization    LV (left ventricular) mural thrombus  Assessment & Plan  · Currently on IV heparin  · Plan to change to Eliquis per Cardiology  · Discussed Cardiology depending on timing of cardiac catheterization    Right inguinal hernia  Assessment & Plan  · Patient currently upset that we are doing nothing for his hernia  · He is given status healing reason he is in the hospital and feels his main issues are being ordered  · We did have discussion regarding his mural thrombus and need for cardiac catheterization  To which she replied I might as well go home with her not going to do anything  · I did educate the patient on his risk for surgery and the fact that his hernia is quite large and would be a difficult repair  I tried to explain that he is clinically unstable for such surgery at this time and that any surgical intervention would need to be entertained in the future    Urinary obstruction  Assessment & Plan  · urinary obstruction secondary to large right inguinal hernia with urethral obstruction and bilateral hydronephrosis    Seen by urology last hospitalization  · Patient is planned for Lawrence exchange on 3/12/20 with Urology in the office  They plan on keeping Lawrence in place until hernia is surgically repaired      VTE Pharmacologic Prophylaxis:   Pharmacologic: Heparin Drip  Mechanical VTE Prophylaxis in Place: Yes    Patient Centered Rounds: I have performed bedside rounds with nursing staff today  Discussions with Specialists or Other Care Team Provider:  Case discussed with cardiology    Education and Discussions with Family / Patient:  I offered to call patient's family patient refused    Time Spent for Care: 30 minutes  More than 50% of total time spent on counseling and coordination of care as described above  Current Length of Stay: 5 day(s)    Current Patient Status: Inpatient   Certification Statement: The patient will continue to require additional inpatient hospital stay due to Need for IV heparin at this time and possible cardiac catheterization    Discharge Plan:  Depending on decisions regarding cardiac catheterization    Code Status: Level 1 - Full Code      Subjective:   Patient comfortable reports discomfort from hernia and occasional bladder spasms    Objective:     Vitals:   Temp (24hrs), Av 1 °F (36 7 °C), Min:97 4 °F (36 3 °C), Max:99 9 °F (37 7 °C)    Temp:  [97 4 °F (36 3 °C)-99 9 °F (37 7 °C)] 97 4 °F (36 3 °C)  HR:  [77-95] 88  Resp:  [17-26] 18  BP: ()/(50-69) 101/69  SpO2:  [90 %-97 %] 96 %  Body mass index is 24 25 kg/m²  Input and Output Summary (last 24 hours): Intake/Output Summary (Last 24 hours) at 2020 5535  Last data filed at 2020 0600  Gross per 24 hour   Intake 1174 8 ml   Output 2075 ml   Net -900 2 ml       Physical Exam:     Physical Exam   Constitutional: He is oriented to person, place, and time  No distress  Cardiovascular: Normal rate  Exam reveals no gallop and no friction rub  No murmur heard  Pulmonary/Chest: Effort normal and breath sounds normal  No stridor  No respiratory distress   He has no wheezes  He has no rales  Abdominal: Soft  Bowel sounds are normal  He exhibits no distension and no mass  There is no tenderness  There is no rebound and no guarding  Genitourinary:   Genitourinary Comments: Massive inguinal hernia noted   Musculoskeletal: He exhibits no edema or tenderness  Neurological: He is alert and oriented to person, place, and time  Skin: He is not diaphoretic  Additional Data:     Labs:    Results from last 7 days   Lab Units 02/22/20  0517   WBC Thousand/uL 11 44*   HEMOGLOBIN g/dL 12 6   HEMATOCRIT % 40 2   PLATELETS Thousands/uL 202   NEUTROS PCT % 87*   LYMPHS PCT % 7*   MONOS PCT % 4   EOS PCT % 1     Results from last 7 days   Lab Units 02/25/20  0541   SODIUM mmol/L 137   POTASSIUM mmol/L 4 2   CHLORIDE mmol/L 99*   CO2 mmol/L 34*   BUN mg/dL 31*   CREATININE mg/dL 1 22   ANION GAP mmol/L 4   CALCIUM mg/dL 8 1*   GLUCOSE RANDOM mg/dL 106     Results from last 7 days   Lab Units 02/23/20  0451   INR  1 24*                       * I Have Reviewed All Lab Data Listed Above  * Additional Pertinent Lab Tests Reviewed: All Labs Within Last 24 Hours Reviewed    Imaging:    Imaging Reports Reviewed Today Include:   Imaging Personally Reviewed by Myself Includes:      Recent Cultures (last 7 days):     Results from last 7 days   Lab Units 02/21/20  1642   BLOOD CULTURE  No Growth at 72 hrs  No Growth at 72 hrs         Last 24 Hours Medication List:     Current Facility-Administered Medications:  acetaminophen 650 mg Oral Q6H PRN Elisabeth Bianchi MD    aspirin 81 mg Oral Daily Elisabeth Bianchi MD    atorvastatin 80 mg Oral HS Elisabeth Bianchi MD    belladonna-opium 30 mg Rectal Q8H PRN Unknown Steele, CRNP    docusate sodium 100 mg Oral BID Elisabeth Bianchi MD    furosemide 80 mg Intravenous BID (diuretic) Erica Concepcion MD    heparin (porcine) 3-20 Units/kg/hr (Order-Specific) Intravenous Titrated SIMONA Prado Last Rate: 13 8 Units/kg/hr (02/25/20 0800)   heparin (porcine) 2,000 Units Intravenous Q1H PRN Pepe Bellamy PA-C    heparin (porcine) 4,000 Units Intravenous Q1H PRN Pepe Bellamy PA-C    levothyroxine 50 mcg Oral Early Morning Alba Kingsley MD    mycophenolate 750 mg Oral BID Alba Kingsley MD    nystatin  Topical BID Alba Kingsley MD    oxyCODONE 5 mg Oral Q6H PRN Alba Kingsley MD    polyethylene glycol 17 g Oral Daily PRN Alba Kingsley MD         Today, Patient Was Seen By: Desi Krishnamurthy MD    ** Please Note: Dictation voice to text software may have been used in the creation of this document   **

## 2020-02-25 NOTE — ASSESSMENT & PLAN NOTE
Wt Readings from Last 3 Encounters:   02/25/20 74 5 kg (164 lb 3 9 oz)   02/11/20 78 6 kg (173 lb 4 5 oz)   06/24/19 79 2 kg (174 lb 9 6 oz)     · Milrinone discontinued  · Continuing dosing Lasix  · Decreasing weight noted

## 2020-02-26 NOTE — PHYSICAL THERAPY NOTE
Physical Therapy Cancellation Note        Attempted to see pt for PT intervention but pt is presently off floor at procedure  Will follow and continue PT as appropriate      Pietro Acevedo, PT

## 2020-02-26 NOTE — PROGRESS NOTES
Cardiology Progress Note - Sejal Paris 75 y o  male MRN: [id number]    Unit/Bed#: ICU 10 Encounter: [id number]        Subjective:   Coronary angiogram demonstrated severe 3VD, but good targets  No dyspnea  Review of Systems   Cardiovascular: Negative for chest pain, leg swelling and palpitations  Respiratory: Negative for shortness of breath  Objective:   Vitals: Blood pressure 97/72, pulse 84, temperature 97 8 °F (36 6 °C), temperature source Oral, resp  rate 16, height 5' 9" (1 753 m), weight 74 1 kg (163 lb 5 8 oz), SpO2 99 %  , Body mass index is 24 12 kg/m² ,   Orthostatic Blood Pressures      Most Recent Value   Blood Pressure  97/72 filed at 2020 0845   Patient Position - Orthostatic VS  Lying filed at 2020 6148         Systolic (91YSU), WUY:266 , Min:91 , LNN:875     Diastolic (32NDG), UH, Min:55, Max:78      Intake/Output Summary (Last 24 hours) at 2020 0854  Last data filed at 2020 0800  Gross per 24 hour   Intake 1024 7 ml   Output 1750 ml   Net -725 3 ml     Weight (last 2 days)     Date/Time   Weight    20 0600   74 1 (163 36)    20 0600   74 5 (164 24)    20 0600   76 (167 55)                  Telemetry Review: NSR     Physical Exam   Constitutional: He is oriented to person, place, and time  He appears well-developed  HENT:   Head: Atraumatic  Eyes: EOM are normal    Neck: Normal range of motion  Cardiovascular: Normal rate, regular rhythm and normal heart sounds  Exam reveals no gallop  No murmur heard  Pulmonary/Chest: Effort normal and breath sounds normal    Abdominal: Soft  Musculoskeletal: Normal range of motion  Neurological: He is alert and oriented to person, place, and time  Skin: Skin is warm and dry  Psychiatric: He has a normal mood and affect           Laboratory Results:  Results from last 7 days   Lab Units 20  1646 20  1059   TROPONIN I ng/mL 15 54* 16 09* 14 15*       CBC with diff: Results from last 7 days   Lab Units 02/26/20  0525 02/22/20  0517 02/21/20  0541 02/20/20  1059   WBC Thousand/uL 9 04 11 44* 13 80* 15 45*   HEMOGLOBIN g/dL 13 1 12 6 13 3 13 6   HEMATOCRIT % 43 1 40 2 42 9 44 9   MCV fL 94 91 92 94   PLATELETS Thousands/uL 196 202 236 253   MCH pg 28 4 28 6 28 5 28 5   MCHC g/dL 30 4* 31 3* 31 0* 30 3*   RDW % 15 6* 15 2* 14 9 14 7   MPV fL 10 2 10 2 10 3 10 1   NRBC AUTO /100 WBCs  --  1 1 1         CMP:  Results from last 7 days   Lab Units 02/26/20  0525 02/25/20  0541 02/24/20  0532 02/23/20  1813 02/23/20  1203 02/23/20  0451 02/22/20  2357   POTASSIUM mmol/L 4 1 4 2 4 2 3 7 3 7 4 0 3 9   CHLORIDE mmol/L 99* 99* 100 98* 100 100 102   CO2 mmol/L 35* 34* 36* 38* 37* 38* 35*   BUN mg/dL 28* 31* 37* 40* 41* 44* 48*   CREATININE mg/dL 1 24 1 22 1 32* 1 43* 1 47* 1 47* 1 55*   CALCIUM mg/dL 8 1* 8 1* 7 9* 7 5* 7 3* 7 6* 7 2*   EGFR ml/min/1 73sq m 57 58 52 48 46 46 43         BMP:  Results from last 7 days   Lab Units 02/26/20  0525 02/25/20  0541 02/24/20  0532 02/23/20  1813 02/23/20  1203 02/23/20  0451 02/22/20  2357   POTASSIUM mmol/L 4 1 4 2 4 2 3 7 3 7 4 0 3 9   CHLORIDE mmol/L 99* 99* 100 98* 100 100 102   CO2 mmol/L 35* 34* 36* 38* 37* 38* 35*   BUN mg/dL 28* 31* 37* 40* 41* 44* 48*   CREATININE mg/dL 1 24 1 22 1 32* 1 43* 1 47* 1 47* 1 55*   CALCIUM mg/dL 8 1* 8 1* 7 9* 7 5* 7 3* 7 6* 7 2*       BNP:     Magnesium:   Results from last 7 days   Lab Units 02/24/20  0532 02/23/20  1813 02/23/20  1203 02/23/20  0451 02/22/20  2357 02/22/20  1805 02/22/20  1249   MAGNESIUM mg/dL 2 5 1 9 2 1 2 5 2 6 2 8* 1 7       Coags:   Results from last 7 days   Lab Units 02/26/20  0525 02/25/20  2116 02/25/20  1523 02/25/20  0541 02/24/20  0532 02/23/20  0451 02/22/20  0458  02/20/20  1059   PTT seconds 74* 62* 62* 103* 82* 82* 62*   < > 29   INR   --   --   --   --   --  1 24* 1 34*  --  1 39*    < > = values in this interval not displayed         TSH:       Lipid Profile:   Results from last 7 days   Lab Units 20  0541   TRIGLYCERIDES mg/dL 97   HDL mg/dL 23*           Cardiac testing:   Results for orders placed during the hospital encounter of 20   Echo complete with contrast if indicated    Narrative [address], [address] [facility]  [phone]    Transthoracic Echocardiogram  2D, M-mode, Doppler, and Color Doppler    Study date:  2020    Patient: Viki López  MR number: [id number]  Account number: [de-identified]  : 1945  Age: 75 years  Gender: Male  Status: Inpatient  Location: Bedside  Height: 69 in  Weight: 196 lb  BP: 90/ 55 mmHg    Indications: Assess left ventricular function  Diagnoses: I50 9 - Heart failure, unspecified    Sonographer:  Maribell Dotson [facility]  Primary Physician:  Michelle Bruce MD  Referring Physician:  Aruna Pennington DO  Group:  [facility]  Interpreting Physician:  Aruna Pennington DO    SUMMARY    LEFT VENTRICLE:  Systolic function was severely reduced  Ejection fraction was estimated to be 15 %  There was severe diffuse hypokinesis with regional variations  Wall thickness was mildly increased  Doppler parameters were consistent with high ventricular filling pressure  There was a definite, medium-sized mass on the apical wall  It represents a thrombus  LEFT ATRIUM:  The atrium was mildly dilated  MITRAL VALVE:  There was moderate regurgitation  AORTIC VALVE:  The valve was trileaflet  Leaflets exhibited moderately increased thickness and moderately reduced cuspal separation  There was moderate stenosis  Low LV stroke volume  Estimated aortic valve area (by VTI) was 1 04 cmï¾²  Estimated aortic valve area (by Vmax) was 0 91 cmï¾²  Doppler stroke volume was 21 99 ml  Doppler cardiac output was 2 33 L/min, using LVOT flow data  Doppler cardiac index was 1 14 L/min/mï¾², using LVOT flow data      TRICUSPID VALVE:  There was mild regurgitation  PERICARDIUM:  There was a left pleural effusion  HISTORY: PRIOR HISTORY: CAD, hyperlipidemia    PROCEDURE: The procedure was performed at the bedside  This was a routine study  The transthoracic approach was used  The study included complete 2D imaging, M-mode, complete spectral Doppler, and color Doppler  The heart rate was 125 bpm,  at the start of the study  Images were obtained from the parasternal, apical, subcostal, and suprasternal notch acoustic windows  Intravenous contrast ( 0 4 ml definity in NSS) was administered to opacify the left ventricle  This was a  technically difficult study  LEFT VENTRICLE: Size was normal  Systolic function was severely reduced  Ejection fraction was estimated to be 15 %  There was severe diffuse hypokinesis with regional variations  Wall thickness was mildly increased  There was a definite,  medium-sized mass on the apical wall  It represents a thrombus  DOPPLER: Left ventricular diastolic function parameters were abnormal  Doppler parameters were consistent with high ventricular filling pressure  RIGHT VENTRICLE: The size was normal  Systolic function was normal  Wall thickness was normal     LEFT ATRIUM: The atrium was mildly dilated  RIGHT ATRIUM: Size was normal     MITRAL VALVE: Valve structure was normal  There was normal leaflet separation  DOPPLER: The transmitral velocity was within the normal range  There was no evidence for stenosis  There was moderate regurgitation  AORTIC VALVE: The valve was trileaflet  Leaflets exhibited moderately increased thickness and moderately reduced cuspal separation  DOPPLER: Transaortic velocity was within the normal range  There was moderate stenosis  Low LV stroke  volume There was no regurgitation  TRICUSPID VALVE: The valve structure was normal  There was normal leaflet separation  DOPPLER: The transtricuspid velocity was within the normal range  There was no evidence for stenosis   There was mild regurgitation  PULMONIC VALVE: Leaflets exhibited normal thickness, no calcification, and normal cuspal separation  DOPPLER: The transpulmonic velocity was within the normal range  There was no regurgitation  PERICARDIUM: There was no pericardial effusion  There was a left pleural effusion  The pericardium was normal in appearance  AORTA: The root exhibited normal size  SYSTEMIC VEINS: IVC: The inferior vena cava was not well visualized  MEASUREMENT TABLES    2D MEASUREMENTS  LVOT   (Reference normals)  Diam   20 mm   (--)    DOPPLER MEASUREMENTS  LVOT   (Reference normals)  Peak zaire   50 cm/s   (--)  VTI   7 cm   (--)  R-R interval   566 ms   (--)  HR   106 bpm   (--)  Stroke vol   21 99 ml   (--)  Cardiac output   2 33 L/min   (--)  Cardiac index   1 14 L/min/mï¾²   (--)  Stroke index   0 13 ml/mï¾²   (--)  Aortic valve   (Reference normals)  Peak zaire   170 cm/s   (--)  VTI   21 cm   (--)  Obstr index, VTI   0 33    (--)  Valve area, VTI   1 04 cmï¾²   (--)  Area index, VTI   0 51 cmï¾²/mï¾²   (--)  Obstr index, Vmax   0 29    (--)  Valve area, Vmax   0 91 cmï¾²   (--)  Area index, Vmax   0 44 cmï¾²/mï¾²   (--)    SYSTEM MEASUREMENT TABLES    2D  %FS: 15 4 %  Ao Diam: 3 6 cm  EDV(Teich): 133 78 ml  EF(Teich): 32 28 %  ESV(Teich): 90 59 ml  IVSd: 1 03 cm  LA Diam: 4 14 cm  LVIDd: 5 27 cm  LVIDs: 4 46 cm  LVPWd: 1 27 cm  SV(Teich): 43 19 ml    CW  TR MaxP 98 mmHg  TR Vmax: 2 91 m/s    MM  TAPSE: 1 32 cm    PW  LVOT Env  Ti: 244 52 ms  LVOT VTI: 5 9 cm  LVOT Vmax: 0 41 m/s  LVOT Vmean: 0 24 m/s  LVOT maxP 67 mmHg  LVOT meanP 28 mmHg  MV A [place]: 0 63 m/s  MV Dec [place]: 10 25 m/s2  MV DecT: 60 31 ms  MV E [place]: 0 62 m/s  MV E/A Ratio: 0 98  MV PHT: 17 49 ms  MVA By PHT: 12 58 cm2    [address] [facility]redited [facility]    Prepared and electronically signed by    Merry Barcenas DO  Signed 2020 16:49:07         Meds/Allergies     Current Facility-Administered Medications:  acetaminophen 650 mg Oral Q6H PRN Susan Lara, MD    acetylcysteine 1,200 mg Oral BID [facility], LUIS A    aspirin 81 mg Oral Daily Susan Lara, MD    atorvastatin 80 mg Oral HS Renelda Block, MD    belladonna-opium 30 mg Rectal Q8H PRN SIMONA Knott    docusate sodium 100 mg Oral BID Susan Lara MD    heparin (porcine) 3-20 Units/kg/hr (Order-Specific) Intravenous Titrated Roxana Stuart, CRNP Last Rate: Stopped (20 0750)   heparin (porcine) 2,000 Units Intravenous Q1H PRN Carmen Blind, LUIS A    heparin (porcine) 4,000 Units Intravenous Q1H PRN Carmen Blind, LUIS A    levothyroxine 50 mcg Oral Early Morning Nestorelda Block, MD    mycophenolate 750 mg Oral BID Renelda Block, MD    nystatin  Topical BID Renelda Block, MD    oxyCODONE 5 mg Oral Q6H PRN Nestorelda Block, MD    polyethylene glycol 17 g Oral Daily PRN Susan Block, MD    sodium chloride 25 mL/hr Intravenous Continuous Juan Holding, DO Last Rate: Stopped (20 0841)   sodium chloride 100 mL/hr Intravenous Continuous Jean-Claude Curiel MD Last Rate: 100 mL/hr (20 0842)       heparin (porcine) 3-20 Units/kg/hr (Order-Specific) Last Rate: Stopped (20 0750)   sodium chloride 25 mL/hr Last Rate: Stopped (20 0841)   sodium chloride 100 mL/hr Last Rate: 100 mL/hr (20 0842)     Medications Prior to Admission   Medication    aspirin 81 MG tablet    atorvastatin (LIPITOR) 80 mg tablet    [] cefpodoxime (VANTIN) 200 mg tablet    levothyroxine 25 mcg tablet    mycophenolate (CELLCEPT) 250 mg capsule    nystatin (MYCOSTATIN) powder    Ostomy Supplies (PREMIER COLOSTOMY/ILEOSTOMY) KIT    potassium chloride (KLOR-CON) 20 mEq packet       Assessment:  Principal Problem:    Acute on chronic combined systolic and diastolic heart failure (HCC)  Active Problems:    Coronary artery disease    Hyperlipidemia    Essential hypertension    Gastrocutaneous fistula Acute renal failure (HCC)    Right inguinal hernia    Urinary obstruction    Elevated troponin I level    LV (left ventricular) mural thrombus      Impression:  1  Cardiogenic shock from acute systolic heart failure - improved  2  Multivessel CAD - will need cardiac MRI for evaluation for possible CABG  3  LV thrombus - on heparin gtt  Will transition to Eliquis  4  ANDREA on CKD - improving  5  SVT/NSVT - resolved    Plan:  1  Continue oral diuretics this PM    2  Start Eliquis  3  Continue remainder of medications  4  Watch renal function  5  Cardiac MRI to evaluate myocardial viability

## 2020-02-26 NOTE — ASSESSMENT & PLAN NOTE
Wt Readings from Last 3 Encounters:   02/26/20 74 1 kg (163 lb 5 8 oz)   02/11/20 78 6 kg (173 lb 4 5 oz)   06/24/19 79 2 kg (174 lb 9 6 oz)     · Milrinone discontinued  · Cardiology switching him this afternoon to oral diuretic  Will continue to monitor I/O on oral diuretic      · Monitor renal function - BMP in am     · Monitor weights - Decreasing weight noted

## 2020-02-26 NOTE — PROGRESS NOTES
NEPHROLOGY PROGRESS NOTE   Michael Medina 75 y o  male MRN: [id number]  Unit/Bed#: ICU 10 Encounter: [id number]  Reason for Consult: ANDREA    ASSESSMENT/PLAN:  This is a 75year old male who presented with shortness of breath and weight gain of 16 pounds  [facility] was started on IV lasix and is feeling much better  [facility] was recently discharged from [facility] for bilateral hydronephrosis with urinary retention and sepsis      1  Acute Kidney Injury, POA- secondary to volume overload  - creatinine improving  - switch to oral diuretics today  - good urine output  - avoid IV contrast, avoid hypotension, avoid nephrotoxins  2  Chronic Kidney Disease stage II/III- Baseline creatinine 1-1 3   3  Urinary Retention with bilateral hydronephrosis- secondary to large inguinal hernia pressure  - continue cameron catheter, needs outpatient follow up with urology on discharge  4  Hypertension- BP soft, monitor  - avoid hypotension  5  Cardiogenic Shock with EF 62% and acute systolic heart failure- per cardiology  - diuretics: lasix 80mg IV BID (hold pm dose 2/25 for cardiac cath)  - restart oral diuretics 2/26 pm (lasix 40mg BID)  - off milrinone  - heparin drip for LV thrombus  - s/p cardiac catheterization 2/26   - 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 with L to R and R to R collaterals  - for cardiac MRI to evaluate for potential CABG  6  Hypokalemia- replete as needed    SUBJECTIVE:  Patient returned to room from cardiac catheterization earlier this morning  Denies sob  Denies pain  States he is hungry      OBJECTIVE:  Current Weight: Weight - Scale: 74 1 kg (163 lb 5 8 oz)  Vitals:    02/26/20 0600 02/26/20 0700 02/26/20 0755 02/26/20 0845   BP:  123/78 105/70 97/72   BP Location:       Pulse:  82 88 84   Resp:  22 16    Temp:  97 8 °F (36 6 °C)     TempSrc:  Oral     SpO2:  98% 99%    Weight: 74 1 kg (163 lb 5 8 oz)      Height:           Intake/Output Summary (Last 24 hours) at 2/26/2020 3257  Last data filed at 2/26/2020 0800  Gross per 24 hour   Intake 1024 7 ml   Output 1750 ml   Net -725 3 ml     General: NAD  Skin: no rash  Eyes: anicteric  ENMT: mm moist  Neck: no masses  Respiratory: CTAB  Cardiac: RRR  Extremities: no edema  GI: soft nt nd  Neuro: alert awake  Psych: mood and affect appropriate    Medications:    Current Facility-Administered Medications:     acetaminophen (TYLENOL) tablet 650 mg, 650 mg, Oral, Q6H PRN, Billie Stuart MD, 650 mg at 02/22/20 2127    acetylcysteine (MUCOMYST) 200 mg/mL oral solution 1,200 mg, 1,200 mg, Oral, BID, [facility], 1,200 mg at 02/25/20 1804    ALPRAZolam (XANAX) tablet 0 25 mg, 0 25 mg, Oral, Once in imaging, Neville Murrieta MD    apixaban (ELIQUIS) tablet 5 mg, 5 mg, Oral, BID, Neville Murrieta MD    aspirin chewable tablet 81 mg, 81 mg, Oral, Daily, Billie Stuart MD, 81 mg at 02/25/20 3339    atorvastatin (LIPITOR) tablet 80 mg, 80 mg, Oral, HS, Billie Stuart MD, 80 mg at 02/25/20 2116    belladonna-opium (B&O SUPPOSITORY) 16 2-30 mg suppository 1 suppository, 30 mg, Rectal, Q8H PRN, SIMONA Galeas, Stopped at 02/23/20 2117    docusate sodium (COLACE) capsule 100 mg, 100 mg, Oral, BID, Billie Stuart MD, 100 mg at 02/24/20 1707    furosemide (LASIX) tablet 40 mg, 40 mg, Oral, BID (diuretic), Neville Murrieta MD    levothyroxine tablet 50 mcg, 50 mcg, Oral, Early Morning, Billie Stuart MD, Stopped at 02/26/20 0600    mycophenolate (CELLCEPT) capsule 750 mg, 750 mg, Oral, BID, Billie Stuart MD, 750 mg at 02/25/20 1803    nystatin (MYCOSTATIN) powder, , Topical, BID, Billie Stuart MD, 1 application at 16/57/09 1808    oxyCODONE (ROXICODONE) IR tablet 5 mg, 5 mg, Oral, Q6H PRN, Billie Stuart MD, 5 mg at 02/24/20 1453    polyethylene glycol (MIRALAX) packet 17 g, 17 g, Oral, Daily PRN, Billie Stuart MD    sodium chloride 0 9 % infusion, 25 mL/hr, Intravenous, Continuous, Jackelyn Salts, DO, Stopped at 02/26/20 0841    sodium chloride 0 9 % infusion, 100 mL/hr, Intravenous, Continuous, Gus Cifuentes MD, Last Rate: 100 mL/hr at 02/26/20 0842, 100 mL/hr at 02/26/20 0842    Laboratory Results:  Results from last 7 days   Lab Units 02/26/20  0525 02/25/20  0541 02/24/20  0532 02/23/20  1813 02/23/20  1203 02/23/20  0451 02/22/20  2357 02/22/20  1805 02/22/20  1249 02/22/20  0517  02/21/20  0541 02/20/20  1059   WBC Thousand/uL 9 04  --   --   --   --   --   --   --   --  11 44*  --  13 80* 15 45*   HEMOGLOBIN g/dL 13 1  --   --   --   --   --   --   --   --  12 6  --  13 3 13 6   HEMATOCRIT % 43 1  --   --   --   --   --   --   --   --  40 2  --  42 9 44 9   PLATELETS Thousands/uL 196  --   --   --   --   --   --   --   --  202  --  236 253   POTASSIUM mmol/L 4 1 4 2 4 2 3 7 3 7 4 0 3 9 3 7 3 4*  --    < > 4 4 5 0   CHLORIDE mmol/L 99* 99* 100 98* 100 100 102 99* 99*  --    < > 102 102   CO2 mmol/L 35* 34* 36* 38* 37* 38* 35* 33* 32  --    < > 22 20*   BUN mg/dL 28* 31* 37* 40* 41* 44* 48* 51* 56*  --    < > 60* 52*   CREATININE mg/dL 1 24 1 22 1 32* 1 43* 1 47* 1 47* 1 55* 1 57* 1 75*  --    < > 2 06* 2 03*   CALCIUM mg/dL 8 1* 8 1* 7 9* 7 5* 7 3* 7 6* 7 2* 7 5* 7 4*  --    < > 8 2* 8 6   MAGNESIUM mg/dL  --   --  2 5 1 9 2 1 2 5 2 6 2 8* 1 7  --    < > 2 4  --     < > = values in this interval not displayed

## 2020-02-26 NOTE — ASSESSMENT & PLAN NOTE
· Lawrence catheter maintained for urinary retention - see urinary retention below  · Monitor for any contrast nephropathy  · BMP in am  · Monitor I/O

## 2020-02-26 NOTE — ASSESSMENT & PLAN NOTE
· Currently not on antihypertensive medication regimen  Blood pressures are currently acceptable  · Monitor with oral diuretics  · Continue to monitor blood pressures

## 2020-02-26 NOTE — PROGRESS NOTES
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

## 2020-02-26 NOTE — SOCIAL WORK
Patient remains not medically stable for discharge today  Patient is status post cardiac catheterization  PT and OT will continue to work with him now that he is more medically optimized to assist with a safe discharge plan  Currently a referral is in place for [facility]  CM department will continue to follow to coordinate

## 2020-02-26 NOTE — ASSESSMENT & PLAN NOTE
· Outpatient follow up for hernia  No current interventions  No signs / symptoms of obstruction currently

## 2020-02-26 NOTE — OCCUPATIONAL THERAPY NOTE
Occupational Therapy Cancellation Note         Patient Name: Haile Bernard  ONNCM'O Date: 2/26/2020    Chart review completed and spoke w/ PT, [address]  Attempted to see pt for OT tx session  Pt currently off floor for procedure  Will continue to follow as appropriate and schedule allows      Caitie Ribeiro OTR/L

## 2020-02-26 NOTE — ASSESSMENT & PLAN NOTE
· Cardiac catheterization today through femoral access  Multivessel disease noted  · Switched to eliquis and off heparin drip  · MRI cardiac viability study ordered and likely for later today  · Cardiology following

## 2020-02-26 NOTE — PROGRESS NOTES
Progress Note Ronnell Alireza 1945, 75 y o  male MRN: [id number]    Unit/Bed#: ICU 10 Encounter: [id number]    Primary Care Provider: Carrillo Higuera MD   Date and time admitted to hospital: 2/20/2020  9:52 AM        * Acute on chronic combined systolic and diastolic heart failure [facility])  Assessment & Plan  Wt Readings from Last 3 Encounters:   02/26/20 74 1 kg (163 lb 5 8 oz)   02/11/20 78 6 kg (173 lb 4 5 oz)   06/24/19 79 2 kg (174 lb 9 6 oz)     · Milrinone discontinued  · Cardiology switching him this afternoon to oral diuretic  Will continue to monitor I/O on oral diuretic  · Monitor renal function - BMP in am     · Monitor weights - Decreasing weight noted    Multiple vessel coronary artery disease  Assessment & Plan  · Cardiac catheterization today through femoral access  Multivessel disease noted  · Switched to eliquis and off heparin drip  · MRI cardiac viability study ordered and likely for later today  · Cardiology following  LV (left ventricular) mural thrombus  Assessment & Plan  · Eliquis will be continued  · Monitor for symptomatology  Acute renal failure ([address] )  Assessment & Plan  · Lawrence catheter maintained for urinary retention - see urinary retention below  · Monitor for any contrast nephropathy  · BMP in am  · Monitor I/O  Urinary obstruction  Assessment & Plan  · urinary obstruction secondary to large right inguinal hernia with urethral obstruction and bilateral hydronephrosis  Seen by urology last hospitalization  · Patient is planned for Lawrence exchange on 3/12/20 with Urology in the office  They plan on keeping Lawrence in place until hernia is surgically repaired    Right inguinal hernia  Assessment & Plan  · Outpatient follow up for hernia  No current interventions  No signs / symptoms of obstruction currently      Gastrocutaneous fistula  Assessment & Plan  · 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  · Outpatient follow-up    Essential hypertension  Assessment & Plan  · Currently not on antihypertensive medication regimen  Blood pressures are currently acceptable  · Monitor with oral diuretics  · Continue to monitor blood pressures  Hyperlipidemia  Assessment & Plan  · Atorvastatin 80 mg daily        VTE Pharmacologic Prophylaxis:   Pharmacologic: Apixaban (Eliquis)  Mechanical VTE Prophylaxis in Place: Yes    Patient Centered Rounds: I have performed bedside rounds with nursing staff today  Discussions with Specialists or Other Care Team Provider:  None    Education and Discussions with Family / Patient:  Family updated at bedside    Time Spent for Care: 30 minutes  More than 50% of total time spent on counseling and coordination of care as described above  Current Length of Stay: 6 day(s)  Current Patient Status: Inpatient     Certification Statement: The patient will continue to require additional inpatient hospital stay due to Continued evaluation and treatment as noted above  Discharge Plan / Estimated Discharge Date:  Anticipate within the next 48-72 hours possibly  Code Status: Level 1 - Full Code      Subjective: The patient states that he is eager to be able to get out of the hospital   He currently denies any shortness of breath but is still on oxygen  He denies any chest pain or palpitations  I did review the cardiac catheterization results and the plan for the MRI  The patient states that he has had an MRI before and while he does not like MRIs, he has not required any sedation for MRIs in the past   The patient denies any nausea vomiting  He has no abdominal pain  Objective:     Vitals:   Temp (24hrs), Av 9 °F (36 6 °C), Min:97 5 °F (36 4 °C), Max:98 3 °F (36 8 °C)    Temp:  [97 5 °F (36 4 °C)-98 3 °F (36 8 °C)] 97 8 °F (36 6 °C)  HR:  [80-95] 93  Resp:  [16-22] 20  BP: ()/(55-87) 112/63  SpO2:  [92 %-99 %] 93 %  Body mass index is 24 12 kg/m²       Input and Output Summary (last 24 hours): Intake/Output Summary (Last 24 hours) at 2/26/2020 1526  Last data filed at 2/26/2020 1525  Gross per 24 hour   Intake 1371 78 ml   Output 1350 ml   Net 21 78 ml       Physical Exam:     Physical Exam   Constitutional: He is oriented to person, place, and time  No distress  HENT:   Mouth/Throat: Oropharynx is clear and moist  No oropharyngeal exudate  Eyes: Pupils are equal, round, and reactive to light  Neck: No JVD present  Cardiovascular: Normal rate and regular rhythm  Slight murmur noted   Pulmonary/Chest: Effort normal  No respiratory distress  He has no wheezes  He has rales (Mainly on the left base but to a lesser degree in the right base  )  Abdominal: Soft  Bowel sounds are normal  He exhibits no distension  There is no tenderness  Musculoskeletal: He exhibits edema  Lymphadenopathy:     He has no cervical adenopathy  Neurological: He is alert and oriented to person, place, and time  Vitals reviewed  Additional Data:     Labs:    Results from last 7 days   Lab Units 02/26/20  0525 02/22/20  0517   WBC Thousand/uL 9 04 11 44*   HEMOGLOBIN g/dL 13 1 12 6   HEMATOCRIT % 43 1 40 2   PLATELETS Thousands/uL 196 202   NEUTROS PCT %  --  87*   LYMPHS PCT %  --  7*   MONOS PCT %  --  4   EOS PCT %  --  1     Results from last 7 days   Lab Units 02/26/20  0525   POTASSIUM mmol/L 4 1   CHLORIDE mmol/L 99*   CO2 mmol/L 35*   BUN mg/dL 28*   CREATININE mg/dL 1 24   CALCIUM mg/dL 8 1*     Results from last 7 days   Lab Units 02/23/20  0451   INR  1 24*       * I Have Reviewed All Lab Data Listed Above  * Additional Pertinent Lab Tests Reviewed: All Labs For Current Hospital Admission Reviewed    Imaging:    Imaging Reports Reviewed Today Include: All imaging for this admission  Imaging Personally Reviewed by Myself Includes:  None    Recent Cultures (last 7 days):     Results from last 7 days   Lab Units 02/21/20  1642   BLOOD CULTURE  No Growth After 4 Days    No Growth After 4 Days  Last 24 Hours Medication List:     Current Facility-Administered Medications:  acetaminophen 650 mg Oral Q6H PRN Concepcion Varela MD    acetylcysteine 1,200 mg Oral BID [facility], PABRENDA    ALPRAZolam 0 25 mg Oral Once in imaging Ronny Santos MD    apixaban 5 mg Oral BID Ronny Santos MD    aspirin 81 mg Oral Daily Concepcion Varela MD    atorvastatin 80 mg Oral HS Concepcion Varela MD    belladonna-opium 30 mg Rectal Q8H PRN Concepcion Varela MD    docusate sodium 100 mg Oral BID Concepcion Varela MD    furosemide 40 mg Oral BID (diuretic) Ronny Santos MD    levothyroxine 50 mcg Oral Early Morning Concepcion Varela MD    mycophenolate 750 mg Oral BID Concepcion Varela MD    nystatin  Topical BID Concepcion Varela MD    oxyCODONE 5 mg Oral Q6H PRN Taya Garcia MD    polyethylene glycol 17 g Oral Daily PRN Concepcion Varela MD    sodium chloride 25 mL/hr Intravenous Continuous Rowan Matias DO Last Rate: Stopped (02/26/20 0841)        Today, Patient Was Seen By: Axel Ponce DO    ** Please Note: This note has been constructed using a voice recognition system   **

## 2020-02-27 NOTE — ASSESSMENT & PLAN NOTE
Gastrocutaneous fistula  Patient has history of diverticulitis complicated by perforation status post colostomy  Outpatient follow up

## 2020-02-27 NOTE — ASSESSMENT & PLAN NOTE
Wt Readings from Last 3 Encounters:   02/27/20 76 6 kg (168 lb 14 oz)   02/27/20 83 3 kg (183 lb 10 3 oz)   02/11/20 78 6 kg (173 lb 4 5 oz)     Acute on chronic combined systolic and diastolic heart failure  Patient was on milronone drip initially and now off  Treated with iv diuretics, now on po  Monitor in and out  Daily weights  Cardiology consulted  Telemetry monitoring

## 2020-02-27 NOTE — PROGRESS NOTES
NEPHROLOGY PROGRESS NOTE   Janae Correa 75 y o  male MRN: [id number]  Unit/Bed#: ICU 10 Encounter: [id number]  Reason for Consult: ANDREA    ASSESSMENT/PLAN:  This is a 75year old male who presented with shortness of breath and weight gain of 16 pounds   He was started on IV lasix and appropriately Flori Elizabeth was recently discharged from [facility] for bilateral hydronephrosis with urinary retention and sepsis      1  Acute Kidney Injury, POA- secondary to volume overload  - creatinine back to baseline  - switch to oral diuretics 2/26  - good urine output  - avoid IV contrast, avoid hypotension, avoid nephrotoxins  2  Chronic Kidney Disease stage II/III- Baseline creatinine 1-1 3   3  Urinary Retention with bilateral hydronephrosis- secondary to large inguinal hernia pressure  - continue cameron catheter, needs outpatient follow up with urology on discharge  4  Hypertension- BP soft, monitor  - avoid hypotension  5  Cardiogenic Shock with EF 52% and acute systolic heart failure- per cardiology  - continue oral diuretics lasix 40mg BID  - off milrinone  - heparin drip for LV thrombus, plan to transition to eliquis  - s/p cardiac catheterization 2/26              - 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 with L to R and R to R collaterals  - possible CABG which patient will discuss further with family  6  Hypokalemia- replete as needed    SUBJECTIVE:  Patient feeling well  Denies acute complaints      OBJECTIVE:  Current Weight: Weight - Scale: 83 3 kg (183 lb 10 3 oz)  Vitals:    02/27/20 0359 02/27/20 0600 02/27/20 0700 02/27/20 0813   BP: 110/66  97/53 117/68   BP Location: Left arm      Pulse:   84    Resp:   21    Temp: (!) 97 4 °F (36 3 °C)  97 6 °F (36 4 °C)    TempSrc: Oral  Axillary    SpO2:   90%    Weight:  83 3 kg (183 lb 10 3 oz)     Height:           Intake/Output Summary (Last 24 hours) at 2/27/2020 0919  Last data filed at 2/27/2020 0800  Gross per 24 hour   Intake 747 08 ml   Output 1400 ml   Net -652 92 ml     General: NAD  Skin: no rash  Eyes: anicteric  ENMT: mm moist  Neck: no masses  Respiratory: CTAB  Cardiac: RRR  Extremities: no edema  GI: soft nt nd  Neuro: alert awake  Psych: mood and affect appropriate    Medications:    Current Facility-Administered Medications:     acetaminophen (TYLENOL) tablet 650 mg, 650 mg, Oral, Q6H PRN, Dixon Santana MD, 650 mg at 02/22/20 2127    apixaban (ELIQUIS) tablet 5 mg, 5 mg, Oral, BID, Geno Matthews MD, 5 mg at 02/27/20 2123    aspirin chewable tablet 81 mg, 81 mg, Oral, Daily, Dixon Santana MD, 81 mg at 02/27/20 0855    atorvastatin (LIPITOR) tablet 80 mg, 80 mg, Oral, HS, Dixon Santana MD, 80 mg at 02/26/20 2300    belladonna-opium (B&O SUPPOSITORY) 16 2-30 mg suppository 1 suppository, 30 mg, Rectal, Q8H PRN, Dixon Santana MD, Stopped at 02/23/20 2117    docusate sodium (COLACE) capsule 100 mg, 100 mg, Oral, BID, Dixon Santana MD, 100 mg at 02/26/20 1029    furosemide (LASIX) tablet 40 mg, 40 mg, Oral, BID (diuretic), Geno Matthews MD, 40 mg at 02/27/20 0855    levothyroxine tablet 50 mcg, 50 mcg, Oral, Early Morning, Dixon Santana MD, 50 mcg at 02/27/20 0620    mycophenolate (CELLCEPT) capsule 750 mg, 750 mg, Oral, BID, Dixon Santana MD, 750 mg at 02/27/20 0855    nystatin (MYCOSTATIN) powder, , Topical, BID, Dixon Santana MD    oxyCODONE (ROXICODONE) IR tablet 5 mg, 5 mg, Oral, Q6H PRN, Diana Arreola MD, 5 mg at 02/24/20 1453    polyethylene glycol (MIRALAX) packet 17 g, 17 g, Oral, Daily PRN, Dixon Santana MD    Laboratory Results:  Results from last 7 days   Lab Units 02/27/20  0406 02/26/20  9264 02/25/20  0541 02/24/20  0532 02/23/20  1813 02/23/20  1203 02/23/20  0451 02/22/20  2357 02/22/20  1805  02/22/20  0517  02/21/20  0541 02/20/20  1059   WBC Thousand/uL 8 02 9 04  --   --   --   --   --   --   --   --  11 44*  --  13 80* 15 45*   HEMOGLOBIN g/dL 13 0 13 1  --   --   --   --   --   --   --   --  12 6  --  13 3 13 6   HEMATOCRIT % 43 6 43 1  --   --   --   --   --   --   --   --  40 2  --  42 9 44 9   PLATELETS Thousands/uL 170 196  --   --   --   --   --   --   --   --  202  --  236 253   POTASSIUM mmol/L 4 3 4 1 4 2 4 2 3 7 3 7 4 0 3 9 3 7   < >  --    < > 4 4 5 0   CHLORIDE mmol/L 101 99* 99* 100 98* 100 100 102 99*   < >  --    < > 102 102   CO2 mmol/L 34* 35* 34* 36* 38* 37* 38* 35* 33*   < >  --    < > 22 20*   BUN mg/dL 25 28* 31* 37* 40* 41* 44* 48* 51*   < >  --    < > 60* 52*   CREATININE mg/dL 1 26 1 24 1 22 1 32* 1 43* 1 47* 1 47* 1 55* 1 57*   < >  --    < > 2 06* 2 03*   CALCIUM mg/dL 8 3 8 1* 8 1* 7 9* 7 5* 7 3* 7 6* 7 2* 7 5*   < >  --    < > 8 2* 8 6   MAGNESIUM mg/dL 2 0  --   --  2 5 1 9 2 1 2 5 2 6 2 8*   < >  --    < > 2 4  --     < > = values in this interval not displayed

## 2020-02-27 NOTE — ASSESSMENT & PLAN NOTE
Urinary obstruction  Secondary to large right inguinal hernia with hydronephrosis and urethral obstruction  Plan for cameron exchange 3/12/2020 with urology in the office     Cameron in place now

## 2020-02-27 NOTE — ASSESSMENT & PLAN NOTE
· Cardiac catheterization noted with multivessel disease  · MRI for cardiac liability noted, per discussion with Cardiology good evidence of viability appropriate for CABG evaluation being transferred to [facility] for same

## 2020-02-27 NOTE — PROGRESS NOTES
Cardiology Progress Note - Boo Mckeon 75 y o  male MRN: [id number]    Unit/Bed#: ICU 10 Encounter: [id number]        Subjective:   Coronary angiogram demonstrated severe 3VD, but good targets  No dyspnea  Cardiac MRI demonstrated subendocardial scarring of the basal to distal lateral, inferior, and septal walls, suggestive of moderate viability in these territories, with the anterior wall being completely viable  Review of Systems   Cardiovascular: Negative for chest pain, leg swelling and palpitations  Respiratory: Negative for shortness of breath  Objective:   Vitals: Blood pressure 97/53, pulse 84, temperature 97 6 °F (36 4 °C), temperature source Axillary, resp  rate 21, height 5' 9" (1 753 m), weight 83 3 kg (183 lb 10 3 oz), SpO2 90 %  , Body mass index is 27 12 kg/m² ,   Orthostatic Blood Pressures      Most Recent Value   Blood Pressure  97/53 filed at 02/27/2020 0700   Patient Position - Orthostatic VS  Lying filed at 02/27/2020 8634         Systolic (46YGO), RNM:212 , Min:92 , YYR:758     Diastolic (27HRJ), UST:90, Min:53, Max:87      Intake/Output Summary (Last 24 hours) at 2/27/2020 0825  Last data filed at 2/27/2020 0800  Gross per 24 hour   Intake 747 08 ml   Output 1400 ml   Net -652 92 ml     Weight (last 2 days)     Date/Time   Weight    02/27/20 0600   83 3 (183 64)    02/26/20 0600   74 1 (163 36)    02/25/20 0600   74 5 (164 24)                  Telemetry Review: NSR     Physical Exam   Constitutional: He is oriented to person, place, and time  He appears well-developed  HENT:   Head: Atraumatic  Eyes: EOM are normal    Neck: Normal range of motion  Cardiovascular: Normal rate, regular rhythm and normal heart sounds  Exam reveals no gallop  No murmur heard  Pulmonary/Chest: Effort normal and breath sounds normal    Abdominal: Soft  Musculoskeletal: Normal range of motion  Neurological: He is alert and oriented to person, place, and time  Skin: Skin is warm and dry  Psychiatric: He has a normal mood and affect           Laboratory Results:  Results from last 7 days   Lab Units 02/20/20 2003 02/20/20  1646 02/20/20  1059   TROPONIN I ng/mL 15 54* 16 09* 14 15*       CBC with diff:   Results from last 7 days   Lab Units 02/27/20  0406 02/26/20  0525 02/22/20  0517 02/21/20  0541 02/20/20  1059   WBC Thousand/uL 8 02 9 04 11 44* 13 80* 15 45*   HEMOGLOBIN g/dL 13 0 13 1 12 6 13 3 13 6   HEMATOCRIT % 43 6 43 1 40 2 42 9 44 9   MCV fL 94 94 91 92 94   PLATELETS Thousands/uL 170 196 202 236 253   MCH pg 28 1 28 4 28 6 28 5 28 5   MCHC g/dL 29 8* 30 4* 31 3* 31 0* 30 3*   RDW % 15 6* 15 6* 15 2* 14 9 14 7   MPV fL 9 7 10 2 10 2 10 3 10 1   NRBC AUTO /100 WBCs  --   --  1 1 1         CMP:  Results from last 7 days   Lab Units 02/27/20  0406 02/26/20  0525 02/25/20  0541 02/24/20  0532 02/23/20  1813 02/23/20  1203 02/23/20  0451   POTASSIUM mmol/L 4 3 4 1 4 2 4 2 3 7 3 7 4 0   CHLORIDE mmol/L 101 99* 99* 100 98* 100 100   CO2 mmol/L 34* 35* 34* 36* 38* 37* 38*   BUN mg/dL 25 28* 31* 37* 40* 41* 44*   CREATININE mg/dL 1 26 1 24 1 22 1 32* 1 43* 1 47* 1 47*   CALCIUM mg/dL 8 3 8 1* 8 1* 7 9* 7 5* 7 3* 7 6*   EGFR ml/min/1 73sq m 55 57 58 52 48 46 46         BMP:  Results from last 7 days   Lab Units 02/27/20  0406 02/26/20  0525 02/25/20  0541 02/24/20  0532 02/23/20  1813 02/23/20  1203 02/23/20  0451   POTASSIUM mmol/L 4 3 4 1 4 2 4 2 3 7 3 7 4 0   CHLORIDE mmol/L 101 99* 99* 100 98* 100 100   CO2 mmol/L 34* 35* 34* 36* 38* 37* 38*   BUN mg/dL 25 28* 31* 37* 40* 41* 44*   CREATININE mg/dL 1 26 1 24 1 22 1 32* 1 43* 1 47* 1 47*   CALCIUM mg/dL 8 3 8 1* 8 1* 7 9* 7 5* 7 3* 7 6*       BNP:     Magnesium:   Results from last 7 days   Lab Units 02/27/20  0406 02/24/20  0532 02/23/20  1813 02/23/20  1203 02/23/20  0451 02/22/20  2357 02/22/20  1805   MAGNESIUM mg/dL 2 0 2 5 1 9 2 1 2 5 2 6 2 8*       Coags:   Results from last 7 days   Lab Units 02/26/20  0525 02/25/20  2116 02/25/20  1523 20  0541 20  0532 20  0451 20  0458  20  1059   PTT seconds 74* 62* 62* 103* 82* 82* 62*   < > 29   INR   --   --   --   --   --  1 24* 1 34*  --  1 39*    < > = values in this interval not displayed  TSH:       Lipid Profile:   Results from last 7 days   Lab Units 20  0541   TRIGLYCERIDES mg/dL 97   HDL mg/dL 23*           Cardiac testing:   Results for orders placed during the hospital encounter of 20   Echo complete with contrast if indicated    Narrative [facility], [address] [facility]  [phone]    Transthoracic Echocardiogram  2D, M-mode, Doppler, and Color Doppler    Study date:  2020    Patient: Jeremy Bacon  MR number: [id number]  Account number: [de-identified]  : 1945  Age: 75 years  Gender: Male  Status: Inpatient  Location: Bedside  Height: 69 in  Weight: 196 lb  BP: 90/ 55 mmHg    Indications: Assess left ventricular function  Diagnoses: I50 9 - Heart failure, unspecified    Sonographer:  Geronimo Opitz, RDCS  Primary Physician:  Luke Humphrey MD  Referring Physician:  Cathy Casey DO  Group:  [facility]  Interpreting Physician:  Cathy Casey DO    SUMMARY    LEFT VENTRICLE:  Systolic function was severely reduced  Ejection fraction was estimated to be 15 %  There was severe diffuse hypokinesis with regional variations  Wall thickness was mildly increased  Doppler parameters were consistent with high ventricular filling pressure  There was a definite, medium-sized mass on the apical wall  It represents a thrombus  LEFT ATRIUM:  The atrium was mildly dilated  MITRAL VALVE:  There was moderate regurgitation  AORTIC VALVE:  The valve was trileaflet  Leaflets exhibited moderately increased thickness and moderately reduced cuspal separation  There was moderate stenosis  Low LV stroke volume  Estimated aortic valve area (by VTI) was 1 04 cmï¾²    Estimated aortic valve area (by Vmax) was 0 91 cmï¾²  Doppler stroke volume was 21 99 ml  Doppler cardiac output was 2 33 L/min, using LVOT flow data  Doppler cardiac index was 1 14 L/min/mï¾², using LVOT flow data  TRICUSPID VALVE:  There was mild regurgitation  PERICARDIUM:  There was a left pleural effusion  HISTORY: PRIOR HISTORY: CAD, hyperlipidemia    PROCEDURE: The procedure was performed at the bedside  This was a routine study  The transthoracic approach was used  The study included complete 2D imaging, M-mode, complete spectral Doppler, and color Doppler  The heart rate was 125 bpm,  at the start of the study  Images were obtained from the parasternal, apical, subcostal, and suprasternal notch acoustic windows  Intravenous contrast ( 0 4 ml definity in NSS) was administered to opacify the left ventricle  This was a  technically difficult study  LEFT VENTRICLE: Size was normal  Systolic function was severely reduced  Ejection fraction was estimated to be 15 %  There was severe diffuse hypokinesis with regional variations  Wall thickness was mildly increased  There was a definite,  medium-sized mass on the apical wall  It represents a thrombus  DOPPLER: Left ventricular diastolic function parameters were abnormal  Doppler parameters were consistent with high ventricular filling pressure  RIGHT VENTRICLE: The size was normal  Systolic function was normal  Wall thickness was normal     LEFT ATRIUM: The atrium was mildly dilated  RIGHT ATRIUM: Size was normal     MITRAL VALVE: Valve structure was normal  There was normal leaflet separation  DOPPLER: The transmitral velocity was within the normal range  There was no evidence for stenosis  There was moderate regurgitation  AORTIC VALVE: The valve was trileaflet  Leaflets exhibited moderately increased thickness and moderately reduced cuspal separation  DOPPLER: Transaortic velocity was within the normal range  There was moderate stenosis   Low LV stroke  volume There was no regurgitation  TRICUSPID VALVE: The valve structure was normal  There was normal leaflet separation  DOPPLER: The transtricuspid velocity was within the normal range  There was no evidence for stenosis  There was mild regurgitation  PULMONIC VALVE: Leaflets exhibited normal thickness, no calcification, and normal cuspal separation  DOPPLER: The transpulmonic velocity was within the normal range  There was no regurgitation  PERICARDIUM: There was no pericardial effusion  There was a left pleural effusion  The pericardium was normal in appearance  AORTA: The root exhibited normal size  SYSTEMIC VEINS: IVC: The inferior vena cava was not well visualized  MEASUREMENT TABLES    2D MEASUREMENTS  LVOT   (Reference normals)  Diam   20 mm   (--)    DOPPLER MEASUREMENTS  LVOT   (Reference normals)  Peak zaire   50 cm/s   (--)  VTI   7 cm   (--)  R-R interval   566 ms   (--)  HR   106 bpm   (--)  Stroke vol   21 99 ml   (--)  Cardiac output   2 33 L/min   (--)  Cardiac index   1 14 L/min/mï¾²   (--)  Stroke index   0 13 ml/mï¾²   (--)  Aortic valve   (Reference normals)  Peak zaire   170 cm/s   (--)  VTI   21 cm   (--)  Obstr index, VTI   0 33    (--)  Valve area, VTI   1 04 cmï¾²   (--)  Area index, VTI   0 51 cmï¾²/mï¾²   (--)  Obstr index, Vmax   0 29    (--)  Valve area, Vmax   0 91 cmï¾²   (--)  Area index, Vmax   0 44 cmï¾²/mï¾²   (--)    SYSTEM MEASUREMENT TABLES    2D  %FS: 15 4 %  Ao Diam: 3 6 cm  EDV(Teich): 133 78 ml  EF(Teich): 32 28 %  ESV(Teich): 90 59 ml  IVSd: 1 03 cm  LA Diam: 4 14 cm  LVIDd: 5 27 cm  LVIDs: 4 46 cm  LVPWd: 1 27 cm  SV(Teich): 43 19 ml    CW  TR MaxP 98 mmHg  TR Vmax: 2 91 m/s    MM  TAPSE: 1 32 cm    PW  LVOT Env  Ti: 244 52 ms  LVOT VTI: 5 9 cm  LVOT Vmax: 0 41 m/s  LVOT Vmean: 0 24 m/s  LVOT maxP 67 mmHg  LVOT meanP 28 mmHg  MV A Z[place]: 0 63 m/s  MV Dec [place]: 10 25 m/s2  MV DecT: 60 31 ms  MV E [place]: 0 62 m/s  MV E/A Ratio: 0 98  MV PHT: 17 49 ms  MVA By PHT: 12 58 cm2    [facility] Accredited Echocardiography Laboratory    Prepared and electronically signed by    Clara Brown DO  Signed 2020 16:49:07         Meds/Allergies     Current Facility-Administered Medications:  acetaminophen 650 mg Oral Q6H PRN Reillyscott Estrada, MD   apixaban 5 mg Oral BID Praful Hughes MD   aspirin 81 mg Oral Daily Reilly Resides, MD   atorvastatin 80 mg Oral HS Reilly Resides, MD   belladonna-opium 30 mg Rectal Q8H PRN Reilly Resides, MD   docusate sodium 100 mg Oral BID Reilly Resides, MD   furosemide 40 mg Oral BID (diuretic) Praful Hughes MD   levothyroxine 50 mcg Oral Early Morning Reilly Resides, MD   mycophenolate 750 mg Oral BID [facility], [facility]   nystatin  Topical BID Reilly Resides, MD   oxyCODONE 5 mg Oral Q6H PRN Alba Kingsley MD   polyethylene glycol 17 g Oral Daily PRN [facility], MD        Medications Prior to Admission   Medication    aspirin 81 MG tablet    atorvastatin (LIPITOR) 80 mg tablet    [] cefpodoxime (VANTIN) 200 mg tablet    levothyroxine 25 mcg tablet    mycophenolate (CELLCEPT) 250 mg capsule    nystatin (MYCOSTATIN) powder    Ostomy Supplies (PREMIER COLOSTOMY/ILEOSTOMY) KIT    potassium chloride (KLOR-CON) 20 mEq packet       Assessment:  Principal Problem:    Acute on chronic combined systolic and diastolic heart failure (HCC)  Active Problems:    Multiple vessel coronary artery disease    Hyperlipidemia    Essential hypertension    Gastrocutaneous fistula    Acute renal failure (HCC)    Right inguinal hernia    Urinary obstruction    LV (left ventricular) mural thrombus      Impression:  1  Cardiogenic shock from acute systolic heart failure - improved  2  Multivessel CAD - cardiac MRI demonstrated moderate viability of lateral, inferior, and septal walls, and complete viability of anterior wall    Would benefit from CABG, but he wishes to discuss with family  3  LV thrombus - on heparin gtt  Will transition to Eliquis  4  ANDREA on CKD - improving  5  SVT/NSVT - resolved    Plan:  1  Continue current medications  2  Would transfer to [facility] for cardiac surgical evaluation if patient is amenable    He wishes to discuss with family first

## 2020-02-27 NOTE — ASSESSMENT & PLAN NOTE
Wt Readings from Last 3 Encounters:   02/27/20 83 3 kg (183 lb 10 3 oz)   02/11/20 78 6 kg (173 lb 4 5 oz)   06/24/19 79 2 kg (174 lb 9 6 oz)     · Patient is off Milrinone for several days  · Case reviewed with cardiology transfer [facility] for CABG evaluation  · Continue current dosing of diuretics

## 2020-02-27 NOTE — H&P
ANSLEY&Carmen Mateo 1945, 75 y o  male MRN: [id number]    Unit/Bed#: [facility] 702-01 Encounter: [id number]    Primary Care Provider: Luis Enrique Crowder MD   Date and time admitted to hospital: 2/27/2020  4:17 PM        LV (left ventricular) mural thrombus  Assessment & Plan    LV mural thrombus  Currently on eliquis will continue  Monitor for symptoms    Urinary obstruction  Assessment & Plan  Urinary obstruction  Secondary to large right inguinal hernia with hydronephrosis and urethral obstruction  Plan for cameron exchange 3/12/2020 with urology in the office  Cameron in place now      Right inguinal hernia  Assessment & Plan  Right inguinal hernia  Outpatient follow up, no current intervention        Gastrocutaneous fistula  Assessment & Plan  Gastrocutaneous fistula  Patient has history of diverticulitis complicated by perforation status post colostomy  Outpatient follow up      Multiple vessel coronary artery disease  Assessment & Plan  Multiple vessel coronary artery disease  Cardiac cath on 2/26/2020, noted to have multiple vessels involved  Patient transferred for CABG evaluation    * Acute on chronic combined systolic and diastolic heart failure ([address] )  Assessment & Plan  Wt Readings from Last 3 Encounters:   02/27/20 76 6 kg (168 lb 14 oz)   02/27/20 83 3 kg (183 lb 10 3 oz)   02/11/20 78 6 kg (173 lb 4 5 oz)     Acute on chronic combined systolic and diastolic heart failure  Patient was on milronone drip initially and now off  Treated with iv diuretics, now on po  Monitor in and out  Daily weights  Cardiology consulted  Telemetry monitoring                VTE Prophylaxis: Apixaban (Eliquis)  / sequential compression device   Code Status: full code  POLST: POLST is not applicable to this patient  Discussion with family:      Anticipated Length of Stay:  Patient will be admitted on an Inpatient basis with an anticipated length of stay of  More than  2 midnights     Justification for Hospital Stay: for CABG evaluation    Total Time for Visit, including Counseling / Coordination of Care: 45 minutes  Greater than 50% of this total time spent on direct patient counseling and coordination of care  Chief Complaint:   SOB    History of Present Illness:    Lorenzo Gottlieb is a 28-year-old male with past medical history of combined systolic diastolic congestive heart failure, hypertension, hyperlipidemia and BPH, CKD, mice tenia gravis, urinary retention presented with shortness of breath and lower extremity edema and weight gain  He was noted to have elevated BNP to 80,770  Troponin elevation to 16 09, he was thought tot be in heart failure and troponin elevation is secondary to CHF  He was diuresed with lasix and subsequent improvement of his viability of wall motion  He went under cath on 2/26, noted to have multiple vessel disease  He was recommended to come to [place] for evaluation by Cardiothoracic surgery for CABG  Review of Systems:    Review of Systems   Constitutional: Negative  HENT: Negative  Eyes: Negative  Respiratory: Positive for shortness of breath  Cardiovascular: Positive for chest pain  Gastrointestinal: Positive for abdominal distention  Endocrine: Negative  Genitourinary: Negative  Musculoskeletal: Negative  Skin: Negative  Allergic/Immunologic: Negative  Neurological: Positive for weakness  Hematological: Negative  Psychiatric/Behavioral: Negative          Past Medical and Surgical History:     Past Medical History:   Diagnosis Date    Cardiac disease     Carotid stenosis     CHF (congestive heart failure) (HCC)     Compression fracture of lumbar vertebra (HCC)     Coronary artery disease     Disease of thyroid gland     Diverticulitis     Hernia, diaphragmatic, without obstruction     Hyperlipidemia     Hypertension     Inguinal hernia     Right    Ischemic cardiomyopathy     MI [facility] infarction) [facility] [facility]     [facility] Past Surgical History:   Procedure Laterality Date    ANGIOPLASTY / STENTING FEMORAL      CATARACT EXTRACTION      COLON SURGERY      colostomy    COLOSTOMY      ESOPHAGOGASTRODUODENOSCOPY N/A 3/29/2016    Procedure: ESOPHAGOGASTRODUODENOSCOPY (EGD); Surgeon: Jose Carlos Alanis MD;  [facility]; Service:     GASTROSTOMY TUBE PLACEMENT N/A 3/29/2016    Procedure: PEG CHANGE-LOW PROFILE TUBE ;  Surgeon: Jose Carlos Alanis MD;  [facility]; Service:     LAPAROTOMY N/A 5/17/2016    Procedure: LAPAROTOMY EXPLORATORY; RESSECTION OF GASTRO-CUTANEOUS FISTULA ;  Surgeon: Dottie Desai DO;  Location: [facility];  Service:     PEG TUBE PLACEMENT      PEG TUBE REMOVAL      PA ESOPHAGOGASTRODUODENOSCOPY TRANSORAL DIAGNOSTIC N/A 5/12/2016    Procedure: ESOPHAGOGASTRODUODENOSCOPY w 12-6 gc clip,anchor ;  Surgeon: Jose Carlos Alanis MD;  [facility]: [facility]; Service: Gastroenterology       Meds/Allergies:    Prior to Admission medications    Medication Sig Start Date End Date Taking? Authorizing Provider   aspirin 81 MG tablet Take 81 mg by mouth daily  Historical Provider, MD   atorvastatin (LIPITOR) 80 mg tablet Take 80 mg by mouth daily at bedtime     Historical Provider, MD   levothyroxine 25 mcg tablet Take 50 mcg by mouth daily  Historical Provider, MD   mycophenolate (CELLCEPT) 250 mg capsule TAKE 3 CAPSULES TWICE A DAY 12/31/19   Jade Pack DO   nystatin (MYCOSTATIN) powder Apply topically 2 (two) times a day for 12 days 2/17/20 2/29/20  Sabas Babinski Jahoor, DO   Ostomy Supplies (PREMIER COLOSTOMY/ILEOSTOMY) KIT Change as needed    Sensura 2 piece coloplast  2 1/4   Box of 5 11/16/17   Maryjane Narayan MD   potassium chloride (KLOR-CON) 20 mEq packet Take 20 mEq by mouth daily  Historical Provider, MD     I have reviewed home medications with patient personally      Allergies: No Known Allergies    Social History:     Marital Status: /Civil Union   Occupation:    Patient Pre-hospital Living Situation: lives at home   Patient Pre-hospital Level of Mobility: able to ambulate  Patient Pre-hospital Diet Restrictions: fluid restriction and low sodium   Substance Use History:   Social History     Substance and Sexual Activity   Alcohol Use Yes    Comment: 1 per month     Social History     Tobacco Use   Smoking Status Former Smoker    Last attempt to quit: 1973    Years since quittin 0   Smokeless Tobacco Never Used     Social History     Substance and Sexual Activity   Drug Use No       Family History:    Family History   Problem Relation Age of Onset    Heart disease Mother     Hypertension Mother        Physical Exam:     Vitals:   Blood Pressure: 97/69 (20)  Pulse: 105 (20)  Temperature: 98 8 °F (37 1 °C) (20)  Height: 5' 9" (175 3 cm) (20)  Weight - Scale: 76 6 kg (168 lb 14 oz) (20)  SpO2: 98 % (20)    Physical Exam   Constitutional: He is oriented to person, place, and time  He appears well-developed and well-nourished  No distress  HENT:   Head: Normocephalic  Right Ear: External ear normal    Mouth/Throat: No oropharyngeal exudate  Eyes: Pupils are equal, round, and reactive to light  Conjunctivae and EOM are normal  Right eye exhibits no discharge  Left eye exhibits no discharge  No scleral icterus  Neck: Normal range of motion  Neck supple  No JVD present  No tracheal deviation present  No thyromegaly present  Cardiovascular: Normal rate  Murmur heard  Abdominal: Soft  Bowel sounds are normal  He exhibits distension  A hernia is present  Musculoskeletal: Normal range of motion  He exhibits no edema, tenderness or deformity  Neurological: He is alert and oriented to person, place, and time  He displays normal reflexes  No cranial nerve deficit  Coordination normal    Skin: Skin is warm and dry  He is not diaphoretic  Psychiatric: He has a normal mood and affect     Nursing note and vitals reviewed  Additional Data:     Lab Results: I have personally reviewed pertinent reports  Results from last 7 days   Lab Units 02/27/20  0406  02/22/20  0517   WBC Thousand/uL 8 02   < > 11 44*   HEMOGLOBIN g/dL 13 0   < > 12 6   HEMATOCRIT % 43 6   < > 40 2   PLATELETS Thousands/uL 170   < > 202   NEUTROS PCT %  --   --  87*   LYMPHS PCT %  --   --  7*   MONOS PCT %  --   --  4   EOS PCT %  --   --  1    < > = values in this interval not displayed  Results from last 7 days   Lab Units 02/27/20  0406   SODIUM mmol/L 139   POTASSIUM mmol/L 4 3   CHLORIDE mmol/L 101   CO2 mmol/L 34*   BUN mg/dL 25   CREATININE mg/dL 1 26   ANION GAP mmol/L 4   CALCIUM mg/dL 8 3   GLUCOSE RANDOM mg/dL 113     Results from last 7 days   Lab Units 02/23/20  0451   INR  1 24*                   Imaging: I have personally reviewed pertinent reports  MRI cardiac  w wo contrast    (Results Pending)       EKG, Pathology, and Other Studies Reviewed on Admission:   EKG:  HR of 110, no ST or T wave elevation   Allscripts / Epic Records Reviewed: Yes     ** Please Note: This note has been constructed using a voice recognition system   **

## 2020-02-27 NOTE — PLAN OF CARE
Problem: Potential for Falls  Goal: Patient will remain free of falls  Description  INTERVENTIONS:  - Assess patient frequently for physical needs  -  Identify cognitive and physical deficits and behaviors that affect risk of falls    -  [place] fall precautions as indicated by assessment   - Educate patient/family on patient safety including physical limitations  - Instruct patient to call for assistance with activity based on assessment  - Modify environment to reduce risk of injury  - Consider OT/PT consult to assist with strengthening/mobility  Outcome: Progressing     Problem: PAIN - ADULT  Goal: Verbalizes/displays adequate comfort level or baseline comfort level  Description  Interventions:  - Encourage patient to monitor pain and request assistance  - Assess pain using appropriate pain scale  - Administer analgesics based on type and severity of pain and evaluate response  - Implement non-pharmacological measures as appropriate and evaluate response  - Consider cultural and social influences on pain and pain management  - Notify physician/advanced practitioner if interventions unsuccessful or patient reports new pain  Outcome: Progressing     Problem: INFECTION - ADULT  Goal: Absence or prevention of progression during hospitalization  Description  INTERVENTIONS:  - Assess and monitor for signs and symptoms of infection  - Monitor lab/diagnostic results  - Monitor all insertion sites, i e  indwelling lines, tubes, and drains  - Monitor endotracheal if appropriate and nasal secretions for changes in amount and color  - [place] appropriate cooling/warming therapies per order  - Administer medications as ordered  - Instruct and encourage patient and family to use good hand hygiene technique  - Identify and instruct in appropriate isolation precautions for identified infection/condition  Outcome: Progressing     Problem: SAFETY ADULT  Goal: Maintain or return to baseline ADL function  Description  INTERVENTIONS:  -  Assess patient's ability to carry out ADLs; assess patient's baseline for ADL function and identify physical deficits which impact ability to perform ADLs (bathing, care of mouth/teeth, toileting, grooming, dressing, etc )  - Assess/evaluate cause of self-care deficits   - Assess range of motion  - Assess patient's mobility; develop plan if impaired  - Assess patient's need for assistive devices and provide as appropriate  - Encourage maximum independence but intervene and supervise when necessary  - Involve family in performance of ADLs  - Assess for home care needs following discharge   - Consider OT consult to assist with ADL evaluation and planning for discharge  - Provide patient education as appropriate  Outcome: Progressing  Goal: Maintain or return mobility status to optimal level  Description  INTERVENTIONS:  - Assess patient's baseline mobility status (ambulation, transfers, stairs, etc )    - Identify cognitive and physical deficits and behaviors that affect mobility  - Identify mobility aids required to assist with transfers and/or ambulation (gait belt, sit-to-stand, lift, walker, cane, etc )  - [place] fall precautions as indicated by assessment  - Record patient progress and toleration of activity level on Mobility SBAR; progress patient to next Phase/Stage  - Instruct patient to call for assistance with activity based on assessment  - Consider rehabilitation consult to assist with strengthening/weightbearing, etc   Outcome: Progressing     Problem: DISCHARGE PLANNING  Goal: Discharge to home or other facility with appropriate resources  Description  INTERVENTIONS:  - Identify barriers to discharge w/patient and caregiver  - Arrange for needed discharge resources and transportation as appropriate  - Identify discharge learning needs (meds, wound care, etc )  - Arrange for interpretive services to assist at discharge as needed  - Refer to Case Management Department for coordinating discharge planning if the patient needs post-hospital services based on physician/advanced practitioner order or complex needs related to functional status, cognitive ability, or social support system  Outcome: Progressing     Problem: Knowledge Deficit  Goal: Patient/family/caregiver demonstrates understanding of disease process, treatment plan, medications, and discharge instructions  Description  Complete learning assessment and assess knowledge base    Interventions:  - Provide teaching at level of understanding  - Provide teaching via preferred learning methods  Outcome: Progressing     Problem: CARDIOVASCULAR - ADULT  Goal: Maintains optimal cardiac output and hemodynamic stability  Description  INTERVENTIONS:  - Monitor I/O, vital signs and rhythm  - Monitor for S/S and trends of decreased cardiac output  - Administer and titrate ordered vasoactive medications to optimize hemodynamic stability  - Assess quality of pulses, skin color and temperature  - Assess for signs of decreased coronary artery perfusion  - Instruct patient to report change in severity of symptoms  Outcome: Progressing  Goal: Absence of cardiac dysrhythmias or at baseline rhythm  Description  INTERVENTIONS:  - Continuous cardiac monitoring, vital signs, obtain 12 lead EKG if ordered  - Administer antiarrhythmic and heart rate control medications as ordered  - Monitor electrolytes and administer replacement therapy as ordered  Outcome: Progressing     Problem: RESPIRATORY - ADULT  Goal: Achieves optimal ventilation and oxygenation  Description  INTERVENTIONS:  - Assess for changes in respiratory status  - Assess for changes in mentation and behavior  - Position to facilitate oxygenation and minimize respiratory effort  - Oxygen administered by appropriate delivery if ordered  - Initiate smoking cessation education as indicated  - Encourage broncho-pulmonary hygiene including cough, deep breathe, Incentive Spirometry  - Assess the need for suctioning and aspirate as needed  - Assess and instruct to report SOB or any respiratory difficulty  - Respiratory Therapy support as indicated  Outcome: Progressing     Problem: Prexisting or High Potential for Compromised Skin Integrity  Goal: Skin integrity is maintained or improved  Description  INTERVENTIONS:  - Identify patients at risk for skin breakdown  - Assess and monitor skin integrity  - Assess and monitor nutrition and hydration status  - Monitor labs   - Assess for incontinence   - Turn and reposition patient  - Assist with mobility/ambulation  - Relieve pressure over bony prominences  - Avoid friction and shearing  - Provide appropriate hygiene as needed including keeping skin clean and dry  - Evaluate need for skin moisturizer/barrier cream  - Collaborate with interdisciplinary team   - Patient/family teaching  - Consider wound care consult   Outcome: Progressing

## 2020-02-27 NOTE — DISCHARGE SUMMARY
Discharge- Dre Salter 1945, 75 y o  male MRN: [id number]    Unit/Bed#: ICU 10 Encounter: [id number]    Primary Care Provider: Jose Cerda MD   Date and time admitted to hospital: 2/20/2020  9:52 AM        * Acute on chronic combined systolic and diastolic heart failure ([facility] [address] )  Assessment & Plan  Wt Readings from Last 3 Encounters:   02/27/20 83 3 kg (183 lb 10 3 oz)   02/11/20 78 6 kg (173 lb 4 5 oz)   06/24/19 79 2 kg (174 lb 9 6 oz)     · Patient is off Milrinone for several days  · Case reviewed with cardiology transfer [facility] for CABG evaluation  · Continue current dosing of diuretics    Acute renal failure ([facility] )  Assessment & Plan  · Lawrence catheter maintained for urinary retention - see urinary retention below  · Renal functions remained stable  Multiple vessel coronary artery disease  Assessment & Plan  · Cardiac catheterization noted with multivessel disease  · MRI for cardiac liability noted, per discussion with Cardiology good evidence of viability appropriate for CABG evaluation being transferred to [facility] for same    LV (left ventricular) mural thrombus  Assessment & Plan  · Eliquis will be continued  Right inguinal hernia  Assessment & Plan  · Outpatient follow up for hernia  No current interventions  Urinary obstruction  Assessment & Plan  · urinary obstruction secondary to large right inguinal hernia with urethral obstruction and bilateral hydronephrosis  Seen by urology last hospitalization  · Patient is planned for Lawrence exchange on 3/12/20 with Urology in the office    They plan on keeping Lawrence in place until hernia is surgically repaired    Gastrocutaneous fistula  Assessment & Plan  · 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  · Outpatient follow-up      Elevated troponin C/W type 2 myocardial infarction      VTE Pharmacologic Prophylaxis:   Pharmacologic: Apixaban (Eliquis)  Mechanical VTE Prophylaxis in Place: Yes    Patient Centered Rounds: I have performed bedside rounds with nursing staff today  Discussions with Specialists or Other Care Team Provider:  Cardiology    Education and Discussions with Family / Patient:  I offered to call patient's family for discussion patient was unwilling  Case was briefly discussed with patient's daughter low at the bedside    Time Spent for Care: 30 minutes  More than 50% of total time spent on counseling and coordination of care as described above  Current Length of Stay: 7 day(s)    Current Patient Status: Inpatient   Certification Statement: The patient will continue to require additional inpatient hospital stay due to Patient requires evaluation for possible CABG    Discharge Plan:  Transfer [facility]    Code Status: Level 1 - Full Code      Subjective:   Patient comfortable no pain    Objective:     Vitals:   Temp (24hrs), Av 9 °F (36 6 °C), Min:97 4 °F (36 3 °C), Max:98 5 °F (36 9 °C)    Temp:  [97 4 °F (36 3 °C)-98 5 °F (36 9 °C)] 97 6 °F (36 4 °C)  HR:  [80-93] 84  Resp:  [20-22] 21  BP: ()/(53-87) 117/68  SpO2:  [90 %-100 %] 90 %  Body mass index is 27 12 kg/m²  Input and Output Summary (last 24 hours): Intake/Output Summary (Last 24 hours) at 2020 1150  Last data filed at 2020 0800  Gross per 24 hour   Intake 747 08 ml   Output 1400 ml   Net -652 92 ml       Physical Exam:     Physical Exam   Constitutional: He is oriented to person, place, and time  HENT:   Head: Normocephalic and atraumatic  Cardiovascular: Normal rate, regular rhythm and intact distal pulses  Exam reveals no gallop and no friction rub  No murmur heard  Pulmonary/Chest: Effort normal and breath sounds normal  No stridor  No respiratory distress  He has no wheezes  He has no rales  Abdominal: Soft  Bowel sounds are normal  He exhibits no distension and no mass  There is no tenderness  There is no rebound and no guarding     Neurological: He is alert and oriented to person, place, and time  Additional Data:     Labs:    Results from last 7 days   Lab Units 02/27/20  0406  02/22/20  0517   WBC Thousand/uL 8 02   < > 11 44*   HEMOGLOBIN g/dL 13 0   < > 12 6   HEMATOCRIT % 43 6   < > 40 2   PLATELETS Thousands/uL 170   < > 202   NEUTROS PCT %  --   --  87*   LYMPHS PCT %  --   --  7*   MONOS PCT %  --   --  4   EOS PCT %  --   --  1    < > = values in this interval not displayed  Results from last 7 days   Lab Units 02/27/20  0406   SODIUM mmol/L 139   POTASSIUM mmol/L 4 3   CHLORIDE mmol/L 101   CO2 mmol/L 34*   BUN mg/dL 25   CREATININE mg/dL 1 26   ANION GAP mmol/L 4   CALCIUM mg/dL 8 3   GLUCOSE RANDOM mg/dL 113     Results from last 7 days   Lab Units 02/23/20  0451   INR  1 24*                       * I Have Reviewed All Lab Data Listed Above  * Additional Pertinent Lab Tests Reviewed: All Labs Within Last 24 Hours Reviewed    Imaging:    Imaging Reports Reviewed Today Include:   Imaging Personally Reviewed by Myself Includes:      Recent Cultures (last 7 days):     Results from last 7 days   Lab Units 02/21/20  1642   BLOOD CULTURE  No Growth After 5 Days  No Growth After 5 Days         Last 24 Hours Medication List:     Current Facility-Administered Medications:  acetaminophen 650 mg Oral Q6H PRN Rebecca Rosales MD   apixaban 5 mg Oral BID Miles Cordero MD   aspirin 81 mg Oral Daily Rebecca Rosales MD   atorvastatin 80 mg Oral HS Rebecca Rosales MD   belladonna-opium 30 mg Rectal Q8H PRN Rebecca Rosales MD   docusate sodium 100 mg Oral BID Rebecca Rosales MD   furosemide 40 mg Oral BID (diuretic) Miles Cordero MD   levothyroxine 50 mcg Oral Early Morning Rebecca Rosales MD   mycophenolate 750 mg Oral BID Rebecca Rosales MD   nystatin  Topical BID Rebecca Rosaels MD   oxyCODONE 5 mg Oral Q6H PRN Wei Bella MD   polyethylene glycol 17 g Oral Daily PRN Prachi Amor Gladys Mcdonald MD        Today, Patient Was Seen By: Alesha Acosta MD    ** Please Note: Dictation voice to text software may have been used in the creation of this document   **

## 2020-02-27 NOTE — PLAN OF CARE
Problem: Potential for Falls  Goal: Patient will remain free of falls  Description  INTERVENTIONS:  - Assess patient frequently for physical needs  -  Identify cognitive and physical deficits and behaviors that affect risk of falls    -  [place] fall precautions as indicated by assessment   - Educate patient/family on patient safety including physical limitations  - Instruct patient to call for assistance with activity based on assessment  - Modify environment to reduce risk of injury  - Consider OT/PT consult to assist with strengthening/mobility  Outcome: Progressing     Problem: PAIN - ADULT  Goal: Verbalizes/displays adequate comfort level or baseline comfort level  Description  Interventions:  - Encourage patient to monitor pain and request assistance  - Assess pain using appropriate pain scale  - Administer analgesics based on type and severity of pain and evaluate response  - Implement non-pharmacological measures as appropriate and evaluate response  - Consider cultural and social influences on pain and pain management  - Notify physician/advanced practitioner if interventions unsuccessful or patient reports new pain  Outcome: Progressing     Problem: INFECTION - ADULT  Goal: Absence or prevention of progression during hospitalization  Description  INTERVENTIONS:  - Assess and monitor for signs and symptoms of infection  - Monitor lab/diagnostic results  - Monitor all insertion sites, i e  indwelling lines, tubes, and drains  - [place] appropriate cooling/warming therapies per order  - Administer medications as ordered  - Instruct and encourage patient and family to use good hand hygiene technique  - Identify and instruct in appropriate isolation precautions for identified infection/condition   Outcome: Progressing     Problem: SAFETY ADULT  Goal: Patient will remain free of falls  Description  INTERVENTIONS:  - Assess patient frequently for physical needs  -  Identify cognitive and physical deficits and behaviors that affect risk of falls  -  [place] fall precautions as indicated by assessment   - Educate patient/family on patient safety including physical limitations  - Instruct patient to call for assistance with activity based on assessment  - Modify environment to reduce risk of injury  - Consider OT/PT consult to assist with strengthening/mobility  Outcome: Progressing     Problem: DISCHARGE PLANNING  Goal: Discharge to home or other facility with appropriate resources  Description  INTERVENTIONS:  - Identify barriers to discharge w/patient and caregiver  - Arrange for needed discharge resources and transportation as appropriate  - Identify discharge learning needs (meds, wound care, etc )  - Arrange for interpretive services to assist at discharge as needed  - Refer to Case Management Department for coordinating discharge planning if the patient needs post-hospital services based on physician/advanced practitioner order or complex needs related to functional status, cognitive ability, or social support system  Outcome: Progressing     Problem: Knowledge Deficit  Goal: Patient/family/caregiver demonstrates understanding of disease process, treatment plan, medications, and discharge instructions  Description  Complete learning assessment and assess knowledge base    Interventions:  - Provide teaching at level of understanding  - Provide teaching via preferred learning methods  Outcome: Progressing     Problem: CARDIOVASCULAR - ADULT  Goal: Maintains optimal cardiac output and hemodynamic stability  Description  INTERVENTIONS:  - Monitor I/O, vital signs and rhythm  - Monitor for S/S and trends of decreased cardiac output  - Administer and titrate ordered vasoactive medications to optimize hemodynamic stability  - Assess quality of pulses, skin color and temperature  - Assess for signs of decreased coronary artery perfusion  - Instruct patient to report change in severity of symptoms  Outcome: Progressing  Goal: Absence of cardiac dysrhythmias or at baseline rhythm  Description  INTERVENTIONS:  - Continuous cardiac monitoring, vital signs, obtain 12 lead EKG if ordered  - Administer antiarrhythmic and heart rate control medications as ordered  - Monitor electrolytes and administer replacement therapy as ordered  Outcome: Progressing     Problem: GASTROINTESTINAL - ADULT  Goal: Establish and maintain optimal ostomy function  Description  INTERVENTIONS:  - Assess bowel function  - Encourage oral fluids to ensure adequate hydration  - Administer IV fluids if ordered to ensure adequate hydration   - Administer ordered medications as needed  - Encourage mobilization and activity  - Nutrition services referral to assist patient with appropriate food choices  - Assess stoma site  - Consider wound care consult   Outcome: Progressing     Problem: GENITOURINARY - ADULT  Goal: Urinary catheter remains patent  Description  INTERVENTIONS:  - Assess patency of urinary catheter  - If patient has a chronic cameron, consider changing catheter if non-functioning  - Follow guidelines for intermittent irrigation of non-functioning urinary catheter  Outcome: Progressing     Problem: SKIN/TISSUE INTEGRITY - ADULT  Goal: Skin integrity remains intact  Description  INTERVENTIONS  - Identify patients at risk for skin breakdown  - Assess and monitor skin integrity  - Assess and monitor nutrition and hydration status  - Monitor labs (i e  albumin)  - Assess for incontinence   - Turn and reposition patient  - Assist with mobility/ambulation  - Relieve pressure over bony prominences  - Avoid friction and shearing  - Provide appropriate hygiene as needed including keeping skin clean and dry  - Evaluate need for skin moisturizer/barrier cream  - Collaborate with interdisciplinary team (i e  Nutrition, Rehabilitation, etc )   - Patient/family teaching  Outcome: Progressing     Problem: RESPIRATORY - ADULT  Goal: Achieves optimal ventilation and oxygenation  Description  INTERVENTIONS:  - Assess for changes in respiratory status  - Assess for changes in mentation and behavior  - Position to facilitate oxygenation and minimize respiratory effort  - Oxygen administered by appropriate delivery if ordered  - Encourage broncho-pulmonary hygiene including cough, deep breathe, Incentive Spirometry  - Assess and instruct to report SOB or any respiratory difficulty  - Respiratory Therapy support as indicated   Outcome: Progressing

## 2020-02-27 NOTE — ASSESSMENT & PLAN NOTE
Multiple vessel coronary artery disease  Cardiac cath on 2/26/2020, noted to have multiple vessels involved  Patient transferred for CABG evaluation

## 2020-02-27 NOTE — ASSESSMENT & PLAN NOTE
· Lawrence catheter maintained for urinary retention - see urinary retention below  · Renal functions remained stable

## 2020-02-28 NOTE — ASSESSMENT & PLAN NOTE
Wt Readings from Last 3 Encounters:   02/28/20 75 2 kg (165 lb 12 6 oz)   02/27/20 83 3 kg (183 lb 10 3 oz)   02/11/20 78 6 kg (173 lb 4 5 oz)     · Patient admitted initially to the [address] [place] [address] with shortness of breath and lower extremity edema as well as weight gain  He was diuresed with intravenous Lasix  He underwent cardiac catheterization which showed triple-vessel disease and was transferred to [facility] for CT surgery evaluation  · Cardiology continues to follow here  Now on oral Lasix 40 mg p o  B i d   This is being held intermittently secondary to soft blood pressure below parameters  · He is being started on Isordil 10 mg p o  T i d  Today  · Will continue to monitor weights, I&Os    · Low-sodium diet  · Monitor volume status

## 2020-02-28 NOTE — CONSULTS
Consultation - Cardiothoracic Surgery   Kelvin Ayala 75 y o  male MRN: [id number]  Unit/Bed#: [facility] 165-24 Encounter: [id number]    Physician Requesting Consult: Ahmet Yadav MD    Reason for Consult / Principal Problem:  Coronary artery disease    Inpatient consult to Cardiothoracic Surgery  Consult performed by: Chelo Winn PA-C  Consult ordered by: Corey Wray MD        History of Present Illness: Kelvin Ayala is a 75y o  year old male with complex cardiovascular past medical history  Approximately 5 years ago Franca Galeas had a myocardial infarction  At that time he was critically ill, requiring prolonged intubation  Initial cardiac surgery  intervention at that time was deferred for further recovery  He ultimately followed up in 2013 for outpatient cardiac surgery evaluation  Documentation at that time suggested the patient refused surgical intervention  More recently Franca Galeas was hospitalized in February of this year with initial complaints of urinary retention and urosepsis  New diagnosis of hydronephrosis was established he was treated with antimicrobial therapy  During his hospitalization he experienced some angina  He was ultimately discharged and then readmitted 2 days later with 16 lb weight gain  Evaluation at this time included echocardiogram which demonstrates severely diminished ejection fraction of 15% with at least moderate mitral regurgitation  BNP was also elevated at 8000  Cardiac catheterization was pursued at this time and severe 3 vessel coronary disease was identified  Cardiac MRI was also completed and did suggests moderate myocardial viability of the mid to distal lateral, inferior, and septal walls  The anterior wall is completely viable  At this point trace and was transferred to [place] for cardiac surgery consultation    His past medical history is also otherwise significant complicated by chronic myasthenia gravis which is currently felt to be in remission  He has had diverticulitis requiring call us colostomy  Initial plan was to reverse colostomy however this has not been done secondary to concerns of coronary disease and anesthesia risk  He previously had PEG tube inserted for dysphagia related to his myasthenia; it has since been removed  On admission he was found have chronic kidney disease creatinine has normalized to 1 26  He has significant urinary retention and friendly has indwelling Lawrence catheter  Past Medical History:  Past Medical History:   Diagnosis Date    Cardiac disease     Carotid stenosis     [facility]     Compression fracture of lumbar vertebra [facility])     Coronary artery disease     Disease of thyroid gland     Diverticulitis     Hernia, diaphragmatic, without obstruction     Hyperlipidemia     Hypertension     Inguinal hernia     Right    Ischemic cardiomyopathy     [facility]) [facility] [address] [facility]     [facility]        Past Surgical History:   Past Surgical History:   Procedure Laterality Date    ANGIOPLASTY / STENTING FEMORAL      CATARACT EXTRACTION      COLON SURGERY      colostomy    COLOSTOMY      ESOPHAGOGASTRODUODENOSCOPY N/A 3/29/2016    Procedure: ESOPHAGOGASTRODUODENOSCOPY (EGD); Surgeon: Avery Vail MD;  [facility]; Service:     GASTROSTOMY TUBE PLACEMENT N/A 3/29/2016    Procedure: PEG CHANGE-LOW PROFILE TUBE ;  Surgeon: Avery Vail MD;  [facility]; Service:     LAPAROTOMY N/A 5/17/2016    Procedure: LAPAROTOMY EXPLORATORY; RESSECTION OF GASTRO-CUTANEOUS FISTULA ;  Surgeon: Ethan Beckman DO;  [facility];  Service:     PEG TUBE PLACEMENT      PEG TUBE REMOVAL      NE ESOPHAGOGASTRODUODENOSCOPY TRANSORAL DIAGNOSTIC N/A 5/12/2016    Procedure: ESOPHAGOGASTRODUODENOSCOPY w 12-6 gc clip,anchor ;  Surgeon: Avery Vail MD;  [facility];   Service: Gastroenterology       Family History:  Family History   Problem Relation Age of Onset    Heart disease Mother     Hypertension Mother        Social History:  Social History     Substance and Sexual Activity   Alcohol Use Yes    Comment: 1 per month     Social History     Substance and Sexual Activity   Drug Use No     Social History     Tobacco Use   Smoking Status Former Smoker    Last attempt to quit: 1973    Years since quittin 0   Smokeless Tobacco Never Used     Marital Status: /Civil Union    Home Medications:   Prior to Admission medications    Medication Sig Start Date End Date Taking? Authorizing Provider   aspirin 81 MG tablet Take 81 mg by mouth daily  Historical Provider, MD   atorvastatin (LIPITOR) 80 mg tablet Take 80 mg by mouth daily at bedtime     Historical Provider, MD   levothyroxine 25 mcg tablet Take 50 mcg by mouth daily  Historical Provider, MD   mycophenolate (CELLCEPT) 250 mg capsule TAKE 3 CAPSULES TWICE A DAY 19   Jade Pack DO   nystatin (MYCOSTATIN) powder Apply topically 2 (two) times a day for 12 days 20  Heladio Fermin DO   Ostomy Supplies (PREMIER COLOSTOMY/ILEOSTOMY) KIT Change as needed    Sensura 2 piece coloplast  2    Box of 5 17   Rafael Vazquez MD   potassium chloride (KLOR-CON) 20 mEq packet Take 20 mEq by mouth daily      Historical Provider, MD       Inpatient Medications:  Scheduled Meds:   Current Facility-Administered Medications:  acetaminophen 650 mg Oral Q6H PRN Nigel Woodson MD   aspirin 81 mg Oral Daily Nigel Woodson MD   atorvastatin 80 mg Oral HS Nigel Woodson MD   belladonna-opium 30 mg Rectal Q8H PRN Nigel Woodson MD   docusate sodium 100 mg Oral BID Nigel Woodson MD   furosemide 40 mg Oral BID (diuretic) Nigel Woodson MD   heparin (porcine) 3-20 Units/kg/hr (Order-Specific) Intravenous Titrated Jeremy Baires PA-C   heparin (porcine) 2,000 Units Intravenous PRN Jeremy Baires PA-C   heparin (porcine) 4,000 Units Intravenous PRN Jeremy Baires LUIS A   isosorbide dinitrate 10 mg Oral TID after meals Luis A Ochoa MD   levothyroxine 50 mcg Oral Early Morning Darlin Elliott MD   melatonin 3 mg Oral HS PRN Marrianne Bernheim, PA-C   mycophenolate 750 mg Oral BID Darlin Elliott MD   nystatin  Topical BID Darlin Elliott MD   oxyCODONE 5 mg Oral Q6H PRN Darlin Elliott MD   polyethylene glycol 17 g Oral Daily PRN Darlin Elliott MD     Continuous Infusions:   heparin (porcine) 3-20 Units/kg/hr (Order-Specific)     PRN Meds:    acetaminophen 650 mg Q6H PRN   belladonna-opium 30 mg Q8H PRN   heparin (porcine) 2,000 Units PRN   heparin (porcine) 4,000 Units PRN   melatonin 3 mg HS PRN   oxyCODONE 5 mg Q6H PRN   polyethylene glycol 17 g Daily PRN       Allergies:  No Known Allergies    Review of Systems:  Review of Systems   Constitutional: Positive for fatigue and unexpected weight change  HENT: Negative  Eyes: Negative  Respiratory: Positive for cough and shortness of breath  Negative for chest tightness  Cardiovascular: Positive for chest pain, palpitations and leg swelling  Endocrine: Negative  Genitourinary: Negative  Musculoskeletal: Positive for arthralgias and back pain  Skin: Negative  Neurological: Negative  Psychiatric/Behavioral: Negative  Vital Signs:     Vitals:    02/27/20 1850 02/27/20 2147 02/28/20 0600 02/28/20 0846   BP: 96/71 97/70  98/70   BP Location:    Right arm   Pulse: 99 (!) 107  100   Resp:    18   Temp:    98 5 °F (36 9 °C)   TempSrc:    Axillary   SpO2: 100% 98%  98%   Weight:   75 2 kg (165 lb 12 6 oz)    Height:         Invasive Devices     Peripheral Intravenous Line            Long-Dwell Peripheral IV (Midline) 86/95/37 Left Basilic 7 days          Drain            Colostomy  -- days    Colostomy LLQ 1382 days    Urethral Catheter Latex 18 Fr  15 days                Physical Exam:  Physical Exam   Constitutional: He is oriented to person, place, and time   Vital signs are normal  He appears well-developed  HENT:   Head: Normocephalic and atraumatic  Eyes: Pupils are equal, round, and reactive to light  Conjunctivae are normal    Neck: Normal range of motion and full passive range of motion without pain  JVD present  Carotid bruit is not present  No tracheal deviation present  No thyromegaly present  Cardiovascular: Normal rate, regular rhythm, S1 normal and S2 normal    Pulses:       Carotid pulses are 2+ on the right side, and 2+ on the left side  Dorsalis pedis pulses are 2+ on the right side, and 2+ on the left side  Posterior tibial pulses are 2+ on the right side, and 2+ on the left side  Pulmonary/Chest: No accessory muscle usage  No respiratory distress  He has no wheezes  He has rales  He exhibits no tenderness  Abdominal: Soft  Normal appearance and bowel sounds are normal  There is no tenderness  There is no CVA tenderness  Colostomy site clean dry and intact  Large inguinal hernia region on the right side   Musculoskeletal: Normal range of motion  Neurological: He is alert and oriented to person, place, and time  He has normal strength  No cranial nerve deficit or sensory deficit  Skin: Skin is warm and dry  Psychiatric: He has a normal mood and affect   His speech is normal and behavior is normal        Lab Results:     Results from last 7 days   Lab Units 02/27/20  0406 02/26/20  0525 02/22/20  0517   WBC Thousand/uL 8 02 9 04 11 44*   HEMOGLOBIN g/dL 13 0 13 1 12 6   HEMATOCRIT % 43 6 43 1 40 2   PLATELETS Thousands/uL 170 196 202     Results from last 7 days   Lab Units 02/28/20  0509 02/27/20  0406 02/26/20  0525   POTASSIUM mmol/L 4 2 4 3 4 1   CHLORIDE mmol/L 102 101 99*   CO2 mmol/L 31 34* 35*   BUN mg/dL 28* 25 28*   CREATININE mg/dL 1 10 1 26 1 24   CALCIUM mg/dL 8 5 8 3 8 1*     Results from last 7 days   Lab Units 02/26/20  0525 02/25/20  2116 02/25/20  1523  02/23/20  0451 02/22/20  0458   INR   --   --   --   --  1 24* 1 34*   PTT seconds 74* 62* 62*   < > 82* 62*    < > = values in this interval not displayed  Lab Results   Component Value Date    HGBA1C 6 3 (H) 10/31/2014     Lab Results   Component Value Date    TROPONINI 15 54 (H) 02/20/2020       Imaging Studies:     Cardiac Catheterization: Left main: 80 % stenosis at the ostium  Proximal LAD: There was a tubular 50 % stenosis  Mid LAD: There was a discrete 80 % stenosis  Distal LAD: There was a discrete 50 % stenosis  1st obtuse marginal: There was a tubular 90 % stenosis in the proximal third of the vessel segment  RCA: The vessel was medium sized and heavily calcified  The vessel is 100 % occluded in the mid segment   Echocardiogram: EF 15%  Moderate MR  Moderate AS  TERESA 0 9  1  Mild TR  (EF 50% in 2017)    I have personally reviewed pertinent reports  Assessment:  Principal Problem:    Acute on chronic combined systolic and diastolic heart failure (HCC)  Active Problems:    Multiple vessel coronary artery disease    Hyperlipidemia    Essential hypertension    Gastrocutaneous fistula    Acute renal failure (HCC)    H/O myasthenia gravis    Right inguinal hernia    Urinary obstruction    LV (left ventricular) mural thrombus    Severe coronary artery disease; Ongoing CABG workup    Plan:  Risks and benefits of coronary artery bypass grafting were discussed in detail today with the patient  Surgical risk is elevated secondary to significant cardiomyopathy, mitral regurgitation, and significant medical co morbidities  He would also be at risk for limited rehabilitation potential secondary to sedentary activity level  Case discussed with TERESA Chou was comfortable with our recommendations, and their questions were answered to their satisfaction  We will continue to evaluate the patient daily with further recommendations as work up is completed  Thank you for allowing us to participate in the care of this patient       SIGNATURE: Yu Ojeda LUIS A  DATE: February 28, 2020  TIME: 11:41 AM

## 2020-02-28 NOTE — SOCIAL WORK
Initial interview:     CM met with the patient and wife Kvng Gutierrez, at bedside, to review the CM role and discuss possible dc needs  Pt lives with his wife in a 2 story home in [place], [place]  [address]  1st floor bedroom and bathroom  Pt is independent, retired and drives  Pt has, if needed, a SPC and a RW  No other DME  Hx of [facility]  No hx of IP rehab  Pt denied drug, etoh or mental illness history  Wife is POA and pt has a LW; copy in chart  Prescriptions are filled at [place] [address] in [facility]; [employer] order  Main contact:   Wife / Chandler Mask [phone], [phone]    DC Plan - home via wife  Pt was open to [facility] at time of admission and pt is requesting continued services at [place]  ECIN sent to [facility]  Wife, at bedside, requested assist with her FMLA papers to assist with short-term care of her   CM gave paperwork to Tika Cunningham and received completed copy  Completed paperwork given to Wife Kvng Gutierrez; copy placed in medical record bin  1425 hrs: completed Sick leave / FMLA paperwork faxed to wife's employer, [employer], at [phone]  CM reviewed d/c planning process including the following: identifying help at home, patient preference for d/c planning needs, Discharge Lounge, [facility] Meds to Bed program, availability of treatment team to discuss questions or concerns patient and/or family may have regarding understanding medications and recognizing signs and symptoms once discharged  CM also encouraged patient to follow up with all recommended appointments after discharge  Patient advised of importance for patient and family to participate in managing patients medical well being

## 2020-02-28 NOTE — OCCUPATIONAL THERAPY NOTE
OT CANCEL NOTE:    Orders for OT evaluation received  Pt is pending cardiothoracic surgery evaluation for possible CABG  OT will hold off on evaluation for now pending cardiothoracic surgery consult  Will continue to follow

## 2020-02-28 NOTE — PHYSICAL THERAPY NOTE
Physical Therapy Cancellation Note    Orders received  Chart reviewed  Patient is pending cardiothoracic evaluation for possible CABG  PT will hold off on evaluation at this time         Clara Doyle PT, DPT

## 2020-02-28 NOTE — PROGRESS NOTES
[address] Internal Medicine    Progress Note April Perla 1945, 75 y o  male MRN: [id number]    Unit/Bed#: [facility] 702-01 Encounter: [id number]    Primary Care Provider: Suri Ogden MD   Date and time admitted to hospital: 2/27/2020  4:17 PM    * Acute on chronic combined systolic and diastolic heart failure [facility])  Assessment & Plan  Wt Readings from Last 3 Encounters:   02/28/20 75 2 kg (165 lb 12 6 oz)   02/27/20 83 3 kg (183 lb 10 3 oz)   02/11/20 78 6 kg (173 lb 4 5 oz)     · Patient admitted initially to the [facility] with shortness of breath and lower extremity edema as well as weight gain  He was diuresed with intravenous Lasix  He underwent cardiac catheterization which showed triple-vessel disease and was transferred to [facility] for CT surgery evaluation  · Cardiology continues to follow here  Now on oral Lasix 40 mg p o  B i d   This is being held intermittently secondary to soft blood pressure below parameters  · He is being started on Isordil 10 mg p o  T i d  Today  · Will continue to monitor weights, I&Os  · Low-sodium diet  · Monitor volume status      Multiple vessel coronary artery disease  Assessment & Plan  · Cardiac catheterization 02/26/2020 with multivessel disease  Transferred to [place] for CT surgery evaluation  CT surgery recommending CABG however surgical risk is elevated given his comorbidities  Await final recommendations from CT surgery team however patient is likely not a surgical candidate  · Continue aspirin, statin  Not on beta-blocker secondary to low pressures  LV (left ventricular) mural thrombus  Assessment & Plan  · Currently on IV heparin  Will continue pending plans for possible surgical intervention  CKD (chronic kidney disease)  Assessment & Plan  · Creatinine 1 10 today    · Avoid nephrotoxins and hypotension  · Monitor closely    Essential hypertension  Assessment & Plan  · Patient with documented history of hypertension however now with soft blood pressures  · Monitor closely    Gastrocutaneous fistula  Assessment & Plan  · Gastrocutaneous fistula  · Patient has history of diverticulitis complicated by perforation status post colostomy  · Outpatient follow up      H/O myasthenia gravis  Assessment & Plan  · Noted    Hyperlipidemia  Assessment & Plan  · statin    Urinary obstruction  Assessment & Plan  · Urinary obstruction  · Secondary to large right inguinal hernia with hydronephrosis and urethral obstruction  · Plan for cameron exchange 3/12/2020 with urology in the office  · Cameron in place now      Pharmacologic: Heparin Drip  Mechanical VTE Prophylaxis in Place: Yes    Patient Centered Rounds: I have performed bedside rounds with nursing staff today  Discussions with Specialists or Other Care Team Provider: nursing, case management, cardiology     Education and Discussions with Family / Patient: patient     Time Spent for Care: 30 minutes  More than 50% of total time spent on counseling and coordination of care as described above  Current Length of Stay: 1 day(s)    Current Patient Status: Inpatient   Certification Statement: The patient will continue to require additional inpatient hospital stay due to Additional Cardiology recommendations, final CT surgery recommendations    Discharge Plan / Estimated Discharge Date:  Not medically stable, await final CT surgery recommendations  Code Status: Level 1 - Full Code      Subjective:   Patient offers no acute complaints  No chest pain or shortness of breath  Objective:     Vitals:   Temp (24hrs), Av 3 °F (36 8 °C), Min:97 7 °F (36 5 °C), Max:98 8 °F (37 1 °C)    Temp:  [97 7 °F (36 5 °C)-98 8 °F (37 1 °C)] 98 5 °F (36 9 °C)  HR:  [] 100  Resp:  [18-20] 18  BP: ()/(69-86) 98/70  SpO2:  [98 %-100 %] 98 %  Body mass index is 24 48 kg/m²  Input and Output Summary (last 24 hours):        Intake/Output Summary (Last 24 hours) at 2020 1347  Last data filed at 2/28/2020 0800  Gross per 24 hour   Intake 120 ml   Output 675 ml   Net -555 ml       Physical Exam:     Physical Exam   Constitutional: He is oriented to person, place, and time  No distress  HENT:   Head: Normocephalic  Eyes: Conjunctivae are normal    Neck: Normal range of motion  Cardiovascular: Normal rate  Pulmonary/Chest: Breath sounds normal    Abdominal: Bowel sounds are normal    Colostomy noted    Genitourinary:   Genitourinary Comments: Lawrence catheter noted with dark yellow urine   Musculoskeletal: Normal range of motion  He exhibits no edema  Neurological: He is alert and oriented to person, place, and time  Skin: Skin is warm  Psychiatric: He has a normal mood and affect  Nursing note and vitals reviewed  Additional Data:     Labs:    Results from last 7 days   Lab Units 02/28/20  1208  02/22/20  0517   WBC Thousand/uL 7 78   < > 11 44*   HEMOGLOBIN g/dL 12 6   < > 12 6   HEMATOCRIT % 41 9   < > 40 2   PLATELETS Thousands/uL 165   < > 202   NEUTROS PCT %  --   --  87*   LYMPHS PCT %  --   --  7*   MONOS PCT %  --   --  4   EOS PCT %  --   --  1    < > = values in this interval not displayed  Results from last 7 days   Lab Units 02/28/20  0509   POTASSIUM mmol/L 4 2   CHLORIDE mmol/L 102   CO2 mmol/L 31   BUN mg/dL 28*   CREATININE mg/dL 1 10   CALCIUM mg/dL 8 5     Results from last 7 days   Lab Units 02/28/20  1208   INR  1 34*         Recent Cultures (last 7 days):     Results from last 7 days   Lab Units 02/21/20  1642   BLOOD CULTURE  No Growth After 5 Days  No Growth After 5 Days         Last 24 Hours Medication List:     Current Facility-Administered Medications:  acetaminophen 650 mg Oral Q6H PRN Michele Bonilla MD    aspirin 81 mg Oral Daily Michele Bonilla MD    atorvastatin 80 mg Oral HS Michele Bonilla MD    belladonna-opium 30 mg Rectal Q8H PRN Michele Bonilla MD    docusate sodium 100 mg Oral BID Michele Bonilla MD    furosemide 40 mg Oral BID (diuretic) Vinod Herrera MD    heparin (porcine) 3-20 Units/kg/hr (Order-Specific) Intravenous Titrated Cressey JONY Rose-VINNIE Last Rate: 12 Units/kg/hr (02/28/20 1209)   heparin (porcine) 2,000 Units Intravenous PRN William Rose PA-C    heparin (porcine) 4,000 Units Intravenous PRN Cressey Rose PA-C    isosorbide dinitrate 10 mg Oral TID after meals Vinod Herrera MD    levothyroxine 50 mcg Oral Early Morning Brian Flores MD    melatonin 3 mg Oral HS PRN Russ Pereira PA-C    mycophenolate 750 mg Oral BID Brian Flores MD    nystatin  Topical BID Brian Flores MD    oxyCODONE 5 mg Oral Q6H PRN Brian Flores MD    polyethylene glycol 17 g Oral Daily PRN Brian Flores MD         Today, Patient Was Seen By: SIMONA Norris    ** Please Note: Dragon 360 Dictation voice to text software may have been used in the creation of this document   **

## 2020-02-28 NOTE — PROGRESS NOTES
General Cardiology   Progress Note -  [facility] One   David Barahona 75 y o  male MRN: [id number]    [place] [facility] [place]counter: [id number]    Assessment:  1  Cardiogenic shock from Acute systolic heart failure:  EF 15%  Initially requiring IV Milrinone (discontinued 2/24)and IV diuretics with significant improvement  Transitioned to oral diuretics 2/26 with Lasix 40 mg BID with net negative output prior to transfer  Current net output -675 mL  2  NSTEMI/Multivessel CAD:  Diagnosed 2017 but did not undergo CABG at this time  Peak troponin 16  Cardiac catheterization 2/26 with 80% ostial left main, 80% mid LAD, 90% OM1, 100% RCA  Cardiac MRI demonstrated moderate viability of lateral, inferior, and septal walls, and complete viability of anterior wall  Transferred to [facility] for CABG evaluation  · Remains on aspirin, statin  · Not on a beta-blocker due to soft blood pressures  3  Ischemic cardiomyopathy:  EF 15% with severe diffuse hypokinesis  Soft blood pressures preclude initiation of beta-blocker, ACE/ARB  4  LV thrombus:  Was on heparin infusion  Started on Eliquis 5 mg BID last evening, last dose this morning  5  ANDREA on CKD:  Now resolved with creatinine at baseline which is 1 0-1 3    6  Moderate AS  7  SVT/NSVT:  Asymptomatic, 4 beats NSVT noted overnight     Plan/Recommendations:  · Discontinue Eliquis and start heparin infusion as patient was transferred for CABG evaluation  · Continue aspirin and statin  · Continue oral diuretics and closely monitor I&Os, renal function, electrolytes and standing weight  · Maintain potassium >4 and magnesium >2  · CT surgery consult pending    ______________________________________________________________________    Subjective    Patient seen and examined  No acute events overnight  He denies any chest pain, shortness of breath, palpitations  He states he is lightheaded most of the time which at times is worse with changes in position      Cardiac catheterization 02/26/2020:    CORONARY VESSELS:     --  Left main: The vessel was medium sized and heavily calcified  There was a 80 % stenosis at the ostium of the vessel segment  --  LAD: The vessel was medium sized and heavily calcified  There are L to R collaterals to the distal RCA system  --  Proximal LAD: There was a tubular 50 % stenosis  The lesion was irregularly contoured  --  Mid LAD: There was a discrete 80 % stenosis  --  Distal LAD: There was a discrete 50 % stenosis  --  1st diagonal: The vessel was small sized  --  Circumflex: The vessel was medium sized and heavily calcified  --  1st obtuse marginal: There was a tubular 90 % stenosis in the proximal third of the vessel segment  --  RCA: The vessel was medium sized and heavily calcified  The vessel is 100 % occluded in the mid segment   There are R to R collaterals to the distal RCA      The PDA is a medium to large sized vessel which is graftable      IMPRESSIONS:  There is significant triple vessel coronary artery disease      RECOMMENDATIONS  Consultation with a cardiac surgeon be obtained for coronary artery bypass grafting  Echocardiogram 02/20/2020:  Severely reduced left ventricular systolic function with EF of 15% with severe diffuse hypokinesis with regional variations, normal RV size and function, mild left atrial dilatation, moderate MR, moderate AS with TERESA 0 91 cm2, low LV stroke volume    Review of Systems   Constitution: Negative  Negative for chills  Cardiovascular: Negative for chest pain, dyspnea on exertion, leg swelling, near-syncope, orthopnea, palpitations, paroxysmal nocturnal dyspnea and syncope  Respiratory: Negative  Negative for shortness of breath  Gastrointestinal: Negative for diarrhea, nausea and vomiting  Neurological: Positive for light-headedness  Negative for dizziness and weakness  Psychiatric/Behavioral: Negative for altered mental status  All other systems reviewed and are negative        Objective: Vitals: Blood pressure 98/70, pulse 100, temperature 98 5 °F (36 9 °C), temperature source Axillary, resp  rate 18, height 5' 9" (1 753 m), weight 75 2 kg (165 lb 12 6 oz), SpO2 98 %  ,       Body mass index is 24 48 kg/m²  ,     Systolic (71WRU), SSS:740 , Min:96 , CDS:124     Diastolic (52IPO), DZD:96, Min:66, Max:86          Intake/Output Summary (Last 24 hours) at 2/28/2020 0858  Last data filed at 2/27/2020 1900  Gross per 24 hour   Intake    Output 675 ml   Net -675 ml     Weight (last 2 days)     Date/Time   Weight    02/28/20 0600   75 2 (165 79) PHYSICAL THERAPY TO GET STANDING WGT    Weight: PHYSICAL THERAPY TO GET STANDING WGT at 02/28/20 0600    02/27/20 16:27:57   76 6 (168 87)            Telemetry Review:  Normal sinus rhythm with 4 beat run of an NSVT      Physical Exam   Constitutional: He is oriented to person, place, and time  He appears well-developed and well-nourished    2 L NC in NAD   HENT:   Head: Normocephalic and atraumatic  Neck: Normal range of motion  Neck supple  No JVD present  Cardiovascular: Normal rate and regular rhythm  Murmur heard  2/6 systolic ejection murmur heard at the RUSB   Pulmonary/Chest: Effort normal and breath sounds normal  No respiratory distress  He has no wheezes  He has no rales  Abdominal: Soft  Bowel sounds are normal    Musculoskeletal: Normal range of motion  He exhibits no edema  Neurological: He is alert and oriented to person, place, and time  Skin: Skin is warm and dry  Psychiatric: He has a normal mood and affect  His behavior is normal  Judgment and thought content normal    Nursing note and vitals reviewed        LABORATORY RESULTS      CBC with diff:   Results from last 7 days   Lab Units 02/27/20  0406 02/26/20  0525 02/22/20  0517   WBC Thousand/uL 8 02 9 04 11 44*   HEMOGLOBIN g/dL 13 0 13 1 12 6   HEMATOCRIT % 43 6 43 1 40 2   MCV fL 94 94 91   PLATELETS Thousands/uL 170 196 202   MCH pg 28 1 28 4 28 6   MCHC g/dL 29 8* 30 4* 31 3* RDW % 15 6* 15 6* 15 2*   MPV fL 9 7 10 2 10 2   NRBC AUTO /100 WBCs  --   --  1       CMP:  Results from last 7 days   Lab Units 02/28/20  0509 02/27/20  0406 02/26/20  0525 02/25/20  0541 02/24/20  0532 02/23/20  1813 02/23/20  1203   POTASSIUM mmol/L 4 2 4 3 4 1 4 2 4 2 3 7 3 7   CHLORIDE mmol/L 102 101 99* 99* 100 98* 100   CO2 mmol/L 31 34* 35* 34* 36* 38* 37*   BUN mg/dL 28* 25 28* 31* 37* 40* 41*   CREATININE mg/dL 1 10 1 26 1 24 1 22 1 32* 1 43* 1 47*   CALCIUM mg/dL 8 5 8 3 8 1* 8 1* 7 9* 7 5* 7 3*   EGFR ml/min/1 73sq m 65 55 57 58 52 48 46       BMP:  Results from last 7 days   Lab Units 02/28/20  0509 02/27/20  0406 02/26/20  0525 02/25/20  0541 02/24/20  0532 02/23/20  1813 02/23/20  1203   POTASSIUM mmol/L 4 2 4 3 4 1 4 2 4 2 3 7 3 7   CHLORIDE mmol/L 102 101 99* 99* 100 98* 100   CO2 mmol/L 31 34* 35* 34* 36* 38* 37*   BUN mg/dL 28* 25 28* 31* 37* 40* 41*   CREATININE mg/dL 1 10 1 26 1 24 1 22 1 32* 1 43* 1 47*   CALCIUM mg/dL 8 5 8 3 8 1* 8 1* 7 9* 7 5* 7 3*       Lab Results   Component Value Date    NTBNP 80,770 (H) 02/20/2020             Results from last 7 days   Lab Units 02/27/20  0406 02/24/20  0532 02/23/20  1813 02/23/20  1203 02/23/20  0451 02/22/20  2357 02/22/20  1805   MAGNESIUM mg/dL 2 0 2 5 1 9 2 1 2 5 2 6 2 8*                     Results from last 7 days   Lab Units 02/23/20  0451 02/22/20  0458   INR  1 24* 1 34*       Lipid Profile:   Lab Results   Component Value Date    CHOL 219 01/08/2016    CHOL 132 01/14/2014     Lab Results   Component Value Date    HDL 23 (L) 02/21/2020    HDL 49 04/23/2018     01/08/2016     Lab Results   Component Value Date    LDLCALC 53 02/21/2020    LDLCALC 57 04/23/2018    LDLCALC 89 01/08/2016     Lab Results   Component Value Date    TRIG 97 02/21/2020    TRIG 77 04/23/2018    TRIG 54 01/08/2016       Cardiac testing:   Results for orders placed during the hospital encounter of 02/20/20   Echo complete with contrast if indicated    Narrative [facility], [address] [facility]  [phone]    Transthoracic Echocardiogram  2D, M-mode, Doppler, and Color Doppler    Study date:  2020    Patient: Elyce Prader  MR number: [id number]  Account number: [de-identified]  : 1945  Age: 75 years  Gender: Male  Status: Inpatient  Location: Bedside  Height: 69 in  Weight: 196 lb  BP: 90/ 55 mmHg    Indications: Assess left ventricular function  Diagnoses: I50 9 - Heart failure, unspecified    Sonographer:  Delmi Jaramillo RDCS  Primary Physician:  Shruit Acevedo MD  Referring Physician:  Marcel Perez DO  Group:  [facility]  Interpreting Physician:  Marcel Perez DO    SUMMARY    LEFT VENTRICLE:  Systolic function was severely reduced  Ejection fraction was estimated to be 15 %  There was severe diffuse hypokinesis with regional variations  Wall thickness was mildly increased  Doppler parameters were consistent with high ventricular filling pressure  There was a definite, medium-sized mass on the apical wall  It represents a thrombus  LEFT ATRIUM:  The atrium was mildly dilated  MITRAL VALVE:  There was moderate regurgitation  AORTIC VALVE:  The valve was trileaflet  Leaflets exhibited moderately increased thickness and moderately reduced cuspal separation  There was moderate stenosis  Low LV stroke volume  Estimated aortic valve area (by VTI) was 1 04 cmï¾²  Estimated aortic valve area (by Vmax) was 0 91 cmï¾²  Doppler stroke volume was 21 99 ml  Doppler cardiac output was 2 33 L/min, using LVOT flow data  Doppler cardiac index was 1 14 L/min/mï¾², using LVOT flow data  TRICUSPID VALVE:  There was mild regurgitation  PERICARDIUM:  There was a left pleural effusion  HISTORY: PRIOR HISTORY: CAD, hyperlipidemia    PROCEDURE: The procedure was performed at the bedside  This was a routine study  The transthoracic approach was used   The study included complete 2D imaging, M-mode, complete spectral Doppler, and color Doppler  The heart rate was 125 bpm,  at the start of the study  Images were obtained from the parasternal, apical, subcostal, and suprasternal notch acoustic windows  Intravenous contrast ( 0 4 ml definity in NSS) was administered to opacify the left ventricle  This was a  technically difficult study  LEFT VENTRICLE: Size was normal  Systolic function was severely reduced  Ejection fraction was estimated to be 15 %  There was severe diffuse hypokinesis with regional variations  Wall thickness was mildly increased  There was a definite,  medium-sized mass on the apical wall  It represents a thrombus  DOPPLER: Left ventricular diastolic function parameters were abnormal  Doppler parameters were consistent with high ventricular filling pressure  RIGHT VENTRICLE: The size was normal  Systolic function was normal  Wall thickness was normal     LEFT ATRIUM: The atrium was mildly dilated  RIGHT ATRIUM: Size was normal     MITRAL VALVE: Valve structure was normal  There was normal leaflet separation  DOPPLER: The transmitral velocity was within the normal range  There was no evidence for stenosis  There was moderate regurgitation  AORTIC VALVE: The valve was trileaflet  Leaflets exhibited moderately increased thickness and moderately reduced cuspal separation  DOPPLER: Transaortic velocity was within the normal range  There was moderate stenosis  Low LV stroke  volume There was no regurgitation  TRICUSPID VALVE: The valve structure was normal  There was normal leaflet separation  DOPPLER: The transtricuspid velocity was within the normal range  There was no evidence for stenosis  There was mild regurgitation  PULMONIC VALVE: Leaflets exhibited normal thickness, no calcification, and normal cuspal separation  DOPPLER: The transpulmonic velocity was within the normal range  There was no regurgitation  PERICARDIUM: There was no pericardial effusion  There was a left pleural effusion  The pericardium was normal in appearance  AORTA: The root exhibited normal size  SYSTEMIC VEINS: IVC: The inferior vena cava was not well visualized  MEASUREMENT TABLES    2D MEASUREMENTS  LVOT   (Reference normals)  Diam   20 mm   (--)    DOPPLER MEASUREMENTS  LVOT   (Reference normals)  Peak zaire   50 cm/s   (--)  VTI   7 cm   (--)  R-R interval   566 ms   (--)  HR   106 bpm   (--)  Stroke vol   21 99 ml   (--)  Cardiac output   2 33 L/min   (--)  Cardiac index   1 14 L/min/mï¾²   (--)  Stroke index   0 13 ml/mï¾²   (--)  Aortic valve   (Reference normals)  Peak zaire   170 cm/s   (--)  VTI   21 cm   (--)  Obstr index, VTI   0 33    (--)  Valve area, VTI   1 04 cmï¾²   (--)  Area index, VTI   0 51 cmï¾²/mï¾²   (--)  Obstr index, Vmax   0 29    (--)  Valve area, Vmax   0 91 cmï¾²   (--)  Area index, Vmax   0 44 cmï¾²/mï¾²   (--)    SYSTEM MEASUREMENT TABLES    2D  %FS: 15 4 %  Ao Diam: 3 6 cm  EDV(Teich): 133 78 ml  EF(Teich): 32 28 %  ESV(Teich): 90 59 ml  IVSd: 1 03 cm  LA Diam: 4 14 cm  LVIDd: 5 27 cm  LVIDs: 4 46 cm  LVPWd: 1 27 cm  SV(Teich): 43 19 ml    CW  TR MaxP 98 mmHg  TR Vmax: 2 91 m/s    MM  TAPSE: 1 32 cm    PW  LVOT Env  Ti: 244 52 ms  LVOT VTI: 5 9 cm  LVOT Vmax: 0 41 m/s  LVOT Vmean: 0 24 m/s  LVOT maxP 67 mmHg  LVOT meanP 28 mmHg  MV A [place]: 0 63 m/s  MV Dec [place]: 10 25 m/s2  MV DecT: 60 31 ms  MV E [place]: 0 62 m/s  MV E/A Ratio: 0 98  MV PHT: 17 49 ms  MVA By PHT: 12 58 cm2    [address] [facility]redited [facility]    Prepared and electronically signed by    Selina Gudino DO  Signed 2020 16:49:07       No results found for this or any previous visit  No results found for this or any previous visit  No procedure found  No results found for this or any previous visit        Meds/Allergies   all current active meds have been reviewed, current meds:   Current Facility-Administered Medications   Medication Dose Route Frequency    acetaminophen (TYLENOL) tablet 650 mg  650 mg Oral Q6H PRN    apixaban (ELIQUIS) tablet 5 mg  5 mg Oral BID    aspirin chewable tablet 81 mg  81 mg Oral Daily    atorvastatin (LIPITOR) tablet 80 mg  80 mg Oral HS    belladonna-opium (B&O SUPPOSITORY) 16 2-30 mg suppository 1 suppository  30 mg Rectal Q8H PRN    docusate sodium (COLACE) capsule 100 mg  100 mg Oral BID    furosemide (LASIX) tablet 40 mg  40 mg Oral BID (diuretic)    levothyroxine tablet 50 mcg  50 mcg Oral Early Morning    melatonin tablet 3 mg  3 mg Oral HS PRN    mycophenolate (CELLCEPT) capsule 750 mg  750 mg Oral BID    nystatin (MYCOSTATIN) powder   Topical BID    oxyCODONE (ROXICODONE) IR tablet 5 mg  5 mg Oral Q6H PRN    polyethylene glycol (MIRALAX) packet 17 g  17 g Oral Daily PRN    and PTA meds:   Prior to Admission Medications   Prescriptions Last Dose Informant Patient Reported? Taking? Ostomy Supplies (PREMIER COLOSTOMY/ILEOSTOMY) KIT   No No   Sig: Change as needed    Sensura 2 piece coloplast  2 1/4   Box of 5   aspirin 81 MG tablet   Yes No   Sig: Take 81 mg by mouth daily  atorvastatin (LIPITOR) 80 mg tablet   Yes No   Sig: Take 80 mg by mouth daily at bedtime    levothyroxine 25 mcg tablet   Yes No   Sig: Take 50 mcg by mouth daily  mycophenolate (CELLCEPT) 250 mg capsule   No No   Sig: TAKE 3 CAPSULES TWICE A DAY   nystatin (MYCOSTATIN) powder   No No   Sig: Apply topically 2 (two) times a day for 12 days   potassium chloride (KLOR-CON) 20 mEq packet   Yes No   Sig: Take 20 mEq by mouth daily        Facility-Administered Medications: None     Medications Prior to Admission   Medication    aspirin 81 MG tablet    atorvastatin (LIPITOR) 80 mg tablet    levothyroxine 25 mcg tablet    mycophenolate (CELLCEPT) 250 mg capsule    nystatin (MYCOSTATIN) powder    Ostomy Supplies (PREMIER COLOSTOMY/ILEOSTOMY) KIT    potassium chloride (KLOR-CON) 20 mEq packet            Counseling / Coordination of Care  Total floor / unit time spent today 20 minutes  Greater than 50% of total time was spent with the patient and / or family counseling and / or coordination of care  ** Please Note: Dragon 360 Dictation voice to text software may have been used in the creation of this document   **

## 2020-02-28 NOTE — ASSESSMENT & PLAN NOTE
· Cardiac catheterization 02/26/2020 with multivessel disease  Transferred to [place] for CT surgery evaluation  CT surgery recommending CABG however surgical risk is elevated given his comorbidities  Await final recommendations from CT surgery team however patient is likely not a surgical candidate  · Continue aspirin, statin  Not on beta-blocker secondary to low pressures

## 2020-02-28 NOTE — PROGRESS NOTES
Progress Note - Cardiology   Marlen Fabian 75 y o  male MRN: [id number]  Encounter: [id number]  02/28/20  11:34 AM        Assessment/Plan:    1  Multivessel CAD  On aspirin, statin, IV heparin  Not currently on beta blocker due to borderline BP  CT surgery evaluation pending  Cardiac MRI showed moderate viability of mid to distal lateral, inferior, and septal walls, and complete viability of anterior wall  2  Acute/chronic combined systolic/diastolic HF with EF 25% by echo  Diuresed with assistance of IV Milrinone, now on oral Lasix 40 bid, but did not receive this AM's dose due to BP below holding parameters, will adjust so he receives  Will also start low dose vasodilator (isordil 10 tid) and see how he tolerates this  3  ANDREA/CKD III  Currently creatinine 1 0 - 1 2, had been as high as 2 0 in 2/20  4  Apical LV thrombus  On IV heparin  5  HL  On Atorvastatin 80, which is home med  Lipid panel 2/20 total 95, TG 97, HDL 23, LDL 53  Has followed with Dr Trina Motley in past, last in 2017  Subjective/Objective   Chief Complaint: No chief complaint on file  Subjective: 75 y o  man with known CAD (per prior notes patient refused CABG in 2013 or 2014), ICM with prior EF 50% by echo 2017, HTN, HL, admitted to [address] [place] [address] with worsening SOB and weight gain in setting of recent treatment for sepsis/ANDREA and discontinuation of his antihypertensives/diuretics  CXR showed small bilateral pleural effusions, echo showed EF down to 15% with severe diffuse hypokinesis, and apical thrombus, moderate MR  Moderate AS with low stroke volume  Normal RV size and function  Mild TR with PASP 35-40 mm Hg  Due to his severe LV dysfunction he was started on IV milrinone drip and diuresed  Cardiac catheterization 2/26/20 showed 80% ostial left main, 50% proximal LAD, 80% mid LAD, 50% distal LAD, 90% proximal OM1, 100% occlusion of mid RCA with right to right collaterals to distal RCA   He was able to be weaned off Milrinone, but no cardiac medications other than diuretics were able to be started due to his borderline low BP  He denies any current CP or SOB  On supplemental O2  No dizziness or lightheadedness  No fevers  No nausea/vomiting  Extremities are warm         Patient Active Problem List   Diagnosis    Multiple vessel coronary artery disease    Hyperlipidemia    Essential hypertension    Gastrocutaneous fistula    Acute renal failure (HCC)    H/O myasthenia gravis    Hydronephrosis    Right inguinal hernia    Acute on chronic combined systolic and diastolic heart failure ([facility])    Urinary obstruction    Elevated troponin I level    LV (left ventricular) mural thrombus     Past Medical History:   Diagnosis Date    Cardiac disease     Carotid stenosis     CHF [facility]congestive heart failure) [facility])     Compression fracture of lumbar vertebra ([facility])     Coronary artery disease     Disease of thyroid gland     Diverticulitis     Hernia, diaphragmatic, without obstruction     Hyperlipidemia     Hypertension     Inguinal hernia     Right    Ischemic cardiomyopathy     MI (myocardial infarction) ([facility] )     Myasthenia gravis ([facility] )        No Known Allergies    Current Facility-Administered Medications   Medication Dose Route Frequency Provider Last Rate Last Dose    acetaminophen (TYLENOL) tablet 650 mg  650 mg Oral Q6H PRN Milka Stone MD        aspirin chewable tablet 81 mg  81 mg Oral Daily Milka Stone MD   81 mg at 02/28/20 0850    atorvastatin (LIPITOR) tablet 80 mg  80 mg Oral HS Milka Stone MD   80 mg at 02/27/20 2207    belladonna-opium (B&O SUPPOSITORY) 16 2-30 mg suppository 1 suppository  30 mg Rectal Q8H PRN Milka Stone MD        docusate sodium (COLACE) capsule 100 mg  100 mg Oral BID Milka Stone MD        furosemide (LASIX) tablet 40 mg  40 mg Oral BID (diuretic) Milka Stone MD        heparin (porcine) 25,000 units in 250 mL infusion (premix) 3-20 Units/kg/hr (Order-Specific) Intravenous Titrated Boris Mohamud PA-C        heparin (porcine) injection 2,000 Units  2,000 Units Intravenous PRN Boris Mohamud PA-C        heparin (porcine) injection 4,000 Units  4,000 Units Intravenous PRN Boris Mohamud PA-C        isosorbide dinitrate (ISORDIL) tablet 10 mg  10 mg Oral TID after meals Sunny Pearce MD        levothyroxine tablet 50 mcg  50 mcg Oral Early Morning Jesus Alberto Mejía MD   50 mcg at 02/28/20 0543    melatonin tablet 3 mg  3 mg Oral HS PRN LUIS A Faye   3 mg at 02/28/20 0157    mycophenolate (CELLCEPT) capsule 750 mg  750 mg Oral BID Jesus Alberto Mejía MD   750 mg at 02/28/20 0850    nystatin (MYCOSTATIN) powder   Topical BID Jesus Alberto Mejía MD        oxyCODONE (ROXICODONE) IR tablet 5 mg  5 mg Oral Q6H PRN Jesus Alberto Mejía MD        polyethylene glycol (MIRALAX) packet 17 g  17 g Oral Daily PRN Jesus Alberto Mejía MD           Vitals: BP 98/70 (BP Location: Right arm)   Pulse 100   Temp 98 5 °F (36 9 °C) (Axillary)   Resp 18   Ht 5' 9" (1 753 m)   Wt 75 2 kg (165 lb 12 6 oz) Comment: PHYSICAL THERAPY TO GET STANDING WGT  SpO2 98%   BMI 24 48 kg/m²     Intake/Output Summary (Last 24 hours) at 2/28/2020 1134  Last data filed at 2/27/2020 1900  Gross per 24 hour   Intake    Output 675 ml   Net -675 ml     Wt Readings from Last 3 Encounters:   02/28/20 75 2 kg (165 lb 12 6 oz)   02/27/20 83 3 kg (183 lb 10 3 oz)   02/11/20 78 6 kg (173 lb 4 5 oz)       Body mass index is 24 48 kg/m²  ,     Vitals:    02/27/20 1627 02/27/20 1850 02/27/20 2147 02/28/20 0846   BP: 97/69 96/71 97/70 98/70   Pulse: 105 99 (!) 107 100   Patient Position - Orthostatic VS:    Lying       Physical Exam:     GEN: Awake, alert, in no acute distress  HEENT: Sclera anicteric, conjunctivae pink, mucous membranes moist   NECK: Supple, no carotid bruits, no significant JVD  HEART: Regular rhythm, normal S1 and S2, no murmurs, clicks, gallops or rubs  LUNGS: Clear but distant to auscultation bilaterally; no wheezes, rales, or rhonchi   ABDOMEN: Soft, nontender, nondistended, normoactive bowel sounds  EXTREMITIES: Skin warm and well perfused, no clubbing, cyanosis, or edema  NEURO: No focal findings  SKIN: Normal without suspicious lesions on exposed skin  Lab Results:     BMP:  Results from last 7 days   Lab Units 02/28/20  0509 02/27/20  0406 02/26/20  0525 02/25/20  0541 02/24/20  0532 02/23/20  1813 02/23/20  1203   POTASSIUM mmol/L 4 2 4 3 4 1 4 2 4 2 3 7 3 7   CHLORIDE mmol/L 102 101 99* 99* 100 98* 100   CO2 mmol/L 31 34* 35* 34* 36* 38* 37*   BUN mg/dL 28* 25 28* 31* 37* 40* 41*   CREATININE mg/dL 1 10 1 26 1 24 1 22 1 32* 1 43* 1 47*   CALCIUM mg/dL 8 5 8 3 8 1* 8 1* 7 9* 7 5* 7 3*       CBC:   Results from last 7 days   Lab Units 02/27/20  0406 02/26/20  0525 02/22/20  0517   WBC Thousand/uL 8 02 9 04 11 44*   HEMOGLOBIN g/dL 13 0 13 1 12 6   HEMATOCRIT % 43 6 43 1 40 2   MCV fL 94 94 91   PLATELETS Thousands/uL 170 196 202   MCH pg 28 1 28 4 28 6   MCHC g/dL 29 8* 30 4* 31 3*   RDW % 15 6* 15 6* 15 2*   MPV fL 9 7 10 2 10 2   NRBC AUTO /100 WBCs  --   --  1        Results from last 7 days   Lab Units 02/27/20  0406 02/24/20  0532 02/23/20  1813   MAGNESIUM mg/dL 2 0 2 5 1 9       INR:   Results from last 7 days   Lab Units 02/23/20  0451 02/22/20  0458   INR  1 24* 1 34*       Lipid Profile:   Lab Results   Component Value Date    CHOL 219 01/08/2016    CHOL 132 01/14/2014     Lab Results   Component Value Date    HDL 23 (L) 02/21/2020    HDL 49 04/23/2018     01/08/2016     Lab Results   Component Value Date    LDLCALC 53 02/21/2020    LDLCALC 57 04/23/2018    LDLCALC 89 01/08/2016     Lab Results   Component Value Date    TRIG 97 02/21/2020    TRIG 77 04/23/2018    TRIG 54 01/08/2016         Hgb A1c:         Telemetry: personally reviewed, SR, up to 4 beats NSVT

## 2020-02-28 NOTE — ASSESSMENT & PLAN NOTE
· Gastrocutaneous fistula  · Patient has history of diverticulitis complicated by perforation status post colostomy  · Outpatient follow up

## 2020-02-28 NOTE — ASSESSMENT & PLAN NOTE
· Patient with documented history of hypertension however now with soft blood pressures    · Monitor closely

## 2020-02-28 NOTE — RESTORATIVE TECHNICIAN NOTE
Restorative Specialist Mobility Note       Activity: Ambulate in room, Bathroom privileges, Chair, Dangle, Stand at bedside(Educated/encouraged pt to ambulate with assistance 3-4 x's/day  Chair alarm on   Pt callbell, phone/tray within reach )     Assistive Device: Front wheel walker, Other (Comment)(Assist x1-2 with NC O2 on 2L)      Gretel FRAZIER, Restorative Technician, [employer]

## 2020-02-28 NOTE — PLAN OF CARE
Problem: Potential for Falls  Goal: Patient will remain free of falls  Description  INTERVENTIONS:  - Assess patient frequently for physical needs  -  Identify cognitive and physical deficits and behaviors that affect risk of falls    -  [place] fall precautions as indicated by assessment   - Educate patient/family on patient safety including physical limitations  - Instruct patient to call for assistance with activity based on assessment  - Modify environment to reduce risk of injury  - Consider OT/PT consult to assist with strengthening/mobility  2/28/2020 1034 by Sandeep Baldwin RN  Outcome: Progressing  2/28/2020 1034 by Sandeep Baldwin RN  Outcome: Progressing     Problem: PAIN - ADULT  Goal: Verbalizes/displays adequate comfort level or baseline comfort level  Description  Interventions:  - Encourage patient to monitor pain and request assistance  - Assess pain using appropriate pain scale  - Administer analgesics based on type and severity of pain and evaluate response  - Implement non-pharmacological measures as appropriate and evaluate response  - Consider cultural and social influences on pain and pain management  - Notify physician/advanced practitioner if interventions unsuccessful or patient reports new pain  2/28/2020 1034 by Sandeep Baldwin RN  Outcome: Progressing  2/28/2020 1034 by Sandeep Baldwin RN  Outcome: Progressing     Problem: INFECTION - ADULT  Goal: Absence or prevention of progression during hospitalization  Description  INTERVENTIONS:  - Assess and monitor for signs and symptoms of infection  - Monitor lab/diagnostic results  - Monitor all insertion sites, i e  indwelling lines, tubes, and drains  - [place] appropriate cooling/warming therapies per order  - Administer medications as ordered  - Instruct and encourage patient and family to use good hand hygiene technique  - Identify and instruct in appropriate isolation precautions for identified infection/condition 2/28/2020 1034 by Sandeep Baldwin RN  Outcome: Progressing  2/28/2020 1034 by Sandeep Baldwin RN  Outcome: Progressing     Problem: SAFETY ADULT  Goal: Patient will remain free of falls  Description  INTERVENTIONS:  - Assess patient frequently for physical needs  -  Identify cognitive and physical deficits and behaviors that affect risk of falls  -  [place] fall precautions as indicated by assessment   - Educate patient/family on patient safety including physical limitations  - Instruct patient to call for assistance with activity based on assessment  - Modify environment to reduce risk of injury  - Consider OT/PT consult to assist with strengthening/mobility  2/28/2020 1034 by Sandeep Baldwin RN  Outcome: Progressing  2/28/2020 1034 by Sandeep Baldwin RN  Outcome: Progressing     Problem: DISCHARGE PLANNING  Goal: Discharge to home or other facility with appropriate resources  Description  INTERVENTIONS:  - Identify barriers to discharge w/patient and caregiver  - Arrange for needed discharge resources and transportation as appropriate  - Identify discharge learning needs (meds, wound care, etc )  - Arrange for interpretive services to assist at discharge as needed  - Refer to Case Management Department for coordinating discharge planning if the patient needs post-hospital services based on physician/advanced practitioner order or complex needs related to functional status, cognitive ability, or social support system  2/28/2020 1034 by Sandeep Baldwin RN  Outcome: Progressing  2/28/2020 1034 by Sandeep Baldwin RN  Outcome: Progressing     Problem: Knowledge Deficit  Goal: Patient/family/caregiver demonstrates understanding of disease process, treatment plan, medications, and discharge instructions  Description  Complete learning assessment and assess knowledge base    Interventions:  - Provide teaching at level of understanding  - Provide teaching via preferred learning methods  2/28/2020 1034 by Mary Alice Putnam RN  Outcome: Progressing  2/28/2020 1034 by Mary Alice Putnam RN  Outcome: Progressing     Problem: CARDIOVASCULAR - ADULT  Goal: Maintains optimal cardiac output and hemodynamic stability  Description  INTERVENTIONS:  - Monitor I/O, vital signs and rhythm  - Monitor for S/S and trends of decreased cardiac output  - Administer and titrate ordered vasoactive medications to optimize hemodynamic stability  - Assess quality of pulses, skin color and temperature  - Assess for signs of decreased coronary artery perfusion  - Instruct patient to report change in severity of symptoms  2/28/2020 1034 by Mary Alice Putnam RN  Outcome: Progressing  2/28/2020 1034 by Mary Alice Putnam RN  Outcome: Progressing  Goal: Absence of cardiac dysrhythmias or at baseline rhythm  Description  INTERVENTIONS:  - Continuous cardiac monitoring, vital signs, obtain 12 lead EKG if ordered  - Administer antiarrhythmic and heart rate control medications as ordered  - Monitor electrolytes and administer replacement therapy as ordered  2/28/2020 1034 by Mary Alice Putnam RN  Outcome: Progressing  2/28/2020 1034 by Mary Alice Putnam RN  Outcome: Progressing     Problem: GASTROINTESTINAL - ADULT  Goal: Establish and maintain optimal ostomy function  Description  INTERVENTIONS:  - Assess bowel function  - Encourage oral fluids to ensure adequate hydration  - Administer IV fluids if ordered to ensure adequate hydration   - Administer ordered medications as needed  - Encourage mobilization and activity  - Nutrition services referral to assist patient with appropriate food choices  - Assess stoma site  - Consider wound care consult   2/28/2020 1034 by Mary Alice Putnam RN  Outcome: Progressing  2/28/2020 1034 by Mary Alice Putnam RN  Outcome: Progressing     Problem: GENITOURINARY - ADULT  Goal: Urinary catheter remains patent  Description  INTERVENTIONS:  - Assess patency of urinary catheter  - If patient has a chronic cameron, consider changing catheter if non-functioning  - Follow guidelines for intermittent irrigation of non-functioning urinary catheter  2/28/2020 1034 by Edin Cooper RN  Outcome: Progressing  2/28/2020 1034 by Edin Cooper RN  Outcome: Progressing     Problem: SKIN/TISSUE INTEGRITY - ADULT  Goal: Skin integrity remains intact  Description  INTERVENTIONS  - Identify patients at risk for skin breakdown  - Assess and monitor skin integrity  - Assess and monitor nutrition and hydration status  - Monitor labs (i e  albumin)  - Assess for incontinence   - Turn and reposition patient  - Assist with mobility/ambulation  - Relieve pressure over bony prominences  - Avoid friction and shearing  - Provide appropriate hygiene as needed including keeping skin clean and dry  - Evaluate need for skin moisturizer/barrier cream  - Collaborate with interdisciplinary team (i e  Nutrition, Rehabilitation, etc )   - Patient/family teaching  2/28/2020 1034 by Edin Cooper RN  Outcome: Progressing  2/28/2020 1034 by Edin Cooper RN  Outcome: Progressing     Problem: RESPIRATORY - ADULT  Goal: Achieves optimal ventilation and oxygenation  Description  INTERVENTIONS:  - Assess for changes in respiratory status  - Assess for changes in mentation and behavior  - Position to facilitate oxygenation and minimize respiratory effort  - Oxygen administered by appropriate delivery if ordered  - Encourage broncho-pulmonary hygiene including cough, deep breathe, Incentive Spirometry  - Assess and instruct to report SOB or any respiratory difficulty  - Respiratory Therapy support as indicated   2/28/2020 1034 by Edin Cooper RN  Outcome: Progressing  2/28/2020 1034 by Edin Cooper RN  Outcome: Progressing     Problem: Prexisting or High Potential for Compromised Skin Integrity  Goal: Skin integrity is maintained or improved  Description  INTERVENTIONS:  - Identify patients at risk for skin breakdown  - Assess and monitor skin integrity  - Assess and monitor nutrition and hydration status  - Monitor labs   - Assess for incontinence   - Turn and reposition patient  - Assist with mobility/ambulation  - Relieve pressure over bony prominences  - Avoid friction and shearing  - Provide appropriate hygiene as needed including keeping skin clean and dry  - Evaluate need for skin moisturizer/barrier cream  - Collaborate with interdisciplinary team   - Patient/family teaching  - Consider wound care consult   2/28/2020 1034 by Aneesh Silverio RN  Outcome: Progressing  2/28/2020 1034 by Aneesh Silverio RN  Outcome: Progressing

## 2020-02-28 NOTE — ASSESSMENT & PLAN NOTE
· Urinary obstruction  · Secondary to large right inguinal hernia with hydronephrosis and urethral obstruction  · Plan for cameron exchange 3/12/2020 with urology in the office     · Cameron in place now

## 2020-02-29 NOTE — PHYSICAL THERAPY NOTE
Physical Therapy Evaluation    Patient's Name: Dora Bates    Admitting Diagnosis  Acute on chronic combined systolic and diastolic heart failure ([facility] ) [I50 43]    Problem List  Patient Active Problem List   Diagnosis    Multiple vessel coronary artery disease    Hyperlipidemia    Essential hypertension    Gastrocutaneous fistula    CKD (chronic kidney disease)    H/O myasthenia gravis    Hydronephrosis    Right inguinal hernia    Acute on chronic combined systolic and diastolic heart failure (HCC)    Urinary obstruction    Elevated troponin I level    LV (left ventricular) mural thrombus       Past Medical History  Past Medical History:   Diagnosis Date    Cardiac disease     Carotid stenosis     [facility]     Compression fracture of lumbar vertebra ([facility])     Coronary artery disease     Disease of thyroid gland     Diverticulitis     Hernia, diaphragmatic, without obstruction     Hyperlipidemia     Hypertension     Inguinal hernia     Right    Ischemic cardiomyopathy     [facility]     Myasthenia gravis ([facility] )        Past Surgical History  Past Surgical History:   Procedure Laterality Date    ANGIOPLASTY / STENTING FEMORAL      CATARACT EXTRACTION      COLON SURGERY      colostomy    COLOSTOMY      ESOPHAGOGASTRODUODENOSCOPY N/A 3/29/2016    Procedure: ESOPHAGOGASTRODUODENOSCOPY (EGD); Surgeon: Mauro Alcala MD;  [facility]; Service:     GASTROSTOMY TUBE PLACEMENT N/A 3/29/2016    Procedure: PEG CHANGE-LOW PROFILE TUBE ;  Surgeon: Mauro Alcala MD;  [facility];   Service:     LAPAROTOMY N/A 5/17/2016    Procedure: LAPAROTOMY EXPLORATORY; RESSECTION OF GASTRO-CUTANEOUS FISTULA ;  Surgeon: Prasanna Riley DO;  [facility];  Service:     PEG TUBE PLACEMENT      PEG TUBE REMOVAL      IN ESOPHAGOGASTRODUODENOSCOPY TRANSORAL DIAGNOSTIC N/A 5/12/2016    Procedure: ESOPHAGOGASTRODUODENOSCOPY w 12-6 gc clip,anchor ;  Surgeon: Rome Kulkarni MD;  [facility]; Service: Gastroenterology          02/29/20 9493   Note Type   Note type Eval only   Pain Assessment   Pain Assessment 0-10   Pain Score 3   Pain Type Acute pain   Pain Location Generalized   Patient's Stated Pain Goal No pain   Hospital Pain Intervention(s) Ambulation/increased activity;Repositioned   Response to Interventions unchanged   Home Living   Type of Home House   Additional Comments Resides w/ wife in 2 story home w/ first floor setup but w/ 3-4 stairs to living area  Indep self care, ambulates w/ out device   Prior Function   Level of [place] Independent with ADLs and functional mobility   Falls in the last 6 months 0   Restrictions/Precautions   Weight Bearing Precautions Per Order No   Other Precautions Pain; Fall Risk;Multiple lines   General   Family/Caregiver Present No   Cognition   Overall Cognitive Status WFL   Arousal/Participation Responsive   Attention Attends with cues to redirect   Orientation Level Oriented X4   Memory Unable to assess   Following Commands Follows one step commands without difficulty   RLE Assessment   RLE Assessment   (strength grossly 4-/5)   LLE Assessment   LLE Assessment   (strength grossly 4-/5)   Coordination   Movements are Fluid and Coordinated 0   Coordination and Movement Description B LE ataxia   Bed Mobility   Supine to Sit 4  Minimal assistance   Additional items Assist x 2   Transfers   Sit to Stand 4  Minimal assistance   Additional items Assist x 2   Stand to Sit 4  Minimal assistance   Additional items Assist x 2   Ambulation/Elevation   Gait pattern   (slow, ataxia, short step length)   Gait Assistance 4  Minimal assist   Additional items Assist x 2   Assistive Device Rolling walker   Distance 5'x1 from bed to chair   Balance   Static Sitting Good   Dynamic Sitting Fair -   Static Standing Poor +   Dynamic Standing Poor +   Ambulatory Poor +   Endurance Deficit   Endurance Deficit Yes Endurance Deficit Description fatigue, weakness, pain, motivation   Activity Tolerance   Activity Tolerance Patient tolerated treatment well;Patient limited by fatigue;Patient limited by pain;Treatment limited secondary to medical complications (Comment)   Nurse Made Aware yes   Assessment   Prognosis Good   Problem List Decreased strength;Decreased endurance; Impaired balance;Decreased mobility; Decreased cognition; Impaired judgement;Decreased safety awareness;Decreased coordination;Pain   Assessment Pt seen for high complexity physical therapy evaluation  Pt is a 76 y/o male w/ history/comorbidities of CHF, HTN, HLD, BPH, CKD who is now admitted as a transfer from [place] w/ worsening SOB, LE edema, wt gain  Dx include CHF, multivessel CAD  Transferred here for cardiac surgical w/u, but deemed to not be a surgical candidate  Due to acute medical issues, need for transfer to higher level of care, pain, fall risk, note unstable clinical picture  PT consulted to assess mobility, d/c needs  Pt presents w/ decreased functional mob, standing balance, endurance, B LE strength , barriers at home  will benefit from skilled PT to correct for the above problems  recommend rehab at d/c   Goals   Patient Goals to go home   STG Expiration Date 03/14/20   Short Term Goal #1 1-2 wks: bed mob and transfers w/ indep, standing balance to good/normal w/ device, ambulate 100-200 ft w/ RW and S, increase B LE strength by 1/2 -1 grade, ambulate 3-5 stairs w/ S   PT Treatment Day 0   Plan   Treatment/Interventions LE strengthening/ROM; Functional transfer training; Therapeutic exercise;Elevations; Endurance training;Patient/family training;Equipment eval/education; Bed mobility;Gait training   PT Frequency Other (Comment)  (3-5x/wk)   Recommendation   Recommendation Post acute IP rehab   PT - OK to Discharge Yes  (when stable to rehab)   Modified Bremer Scale   Modified Martin Scale 4   Barthel Index   Feeding 10   Bathing 0 Grooming Score 5   Dressing Score 5   Bladder Score 10   Bowels Score 10   Toilet Use Score 5   Transfers (Bed/Chair) Score 5   Mobility (Level Surface) Score 0   Stairs Score 0   Barthel Index Score 50         Nayely Akhtar PT, DPT, CSRS

## 2020-02-29 NOTE — PLAN OF CARE
Problem: OCCUPATIONAL THERAPY ADULT  Goal: Performs self-care activities at highest level of function for planned discharge setting  See evaluation for individualized goals  Description  Treatment Interventions: ADL retraining, Functional transfer training, Endurance training, Patient/family training, Equipment evaluation/education, Compensatory technique education, Continued evaluation, Activityengagement, Energy conservation          See flowsheet documentation for full assessment, interventions and recommendations  Note:   Limitation: Decreased ADL status, Decreased UE strength, Decreased Safe judgement during ADL, Decreased cognition, Decreased endurance, Decreased self-care trans, Decreased high-level ADLs  Prognosis: Fair  Assessment: Pt is a 66year old male seen for initial OT evaluation s/p admission to [facility] as a transfer [place] [employer] [facility] [place] with knee pain and 16 lb weight gain since recent d/c from hospital (2/17/20)  Transferred for CT surgery evaluation  PMHx includes: combined systolic diastolic congestive heart failure, HTN, HLD, BPH, CKD, myasthenia gravis, and urinary retention  Pt was hospitalized 2/11/20-2/17/20 for severe sepsis and hospitalization complicated by renal dysfunction secondary to intravascular volume depletion, ATN from sepsis, cameron catheter placed due to urethral obstruction from large inguinal hernia  Pt with active OT orders and cleared by RN for OT evaluation and OOB mobility  Pt resides with his wife in a split level home with 0STE and 4 steps to bedroom  Pt reports being I with ADLs, IADLs, and functional mobility without use of DME PTA  Pt is currently demonstrating the following occupational deficits: grooming with set-up, UB bathing with Argenis, LB bathing with modA, UB dressing with Argenis, LB dressing with maxA, toileting with maxA, bed mobility with minAx2, functional transfers with minAx2, and functional mobility with minAx2    Factors currently limiting pt's independence in these areas include: generalized weakness, SHAW, endurance, balance, functional mobility, and unsupportive home environment  From an OT standpoint, recommend STR upon d/c  Pt is to continue to benefit from skilled occupational therapy services while in the hospital to maximize functioning and independence with daily activities  See below for OT goals to be addressed 3-5x/wk       OT Discharge Recommendation: Short Term Rehab  OT - OK to Discharge: Yes(to STR when medically stable)

## 2020-02-29 NOTE — OCCUPATIONAL THERAPY NOTE
Occupational Therapy Evaluation     Patient Name: Prashant CORCORAN Date: 2/29/2020  Problem List  Principal Problem:    Acute on chronic combined systolic and diastolic heart failure ([facility] [address] )  Active Problems:    Multiple vessel coronary artery disease    Hyperlipidemia    Essential hypertension    Gastrocutaneous fistula    CKD (chronic kidney disease)    H/O myasthenia gravis    Right inguinal hernia    Urinary obstruction    LV (left ventricular) mural thrombus    Past Medical History  Past Medical History:   Diagnosis Date    Cardiac disease     Carotid stenosis     [facility]     Compression fracture of lumbar vertebra ([facility]     Coronary artery disease     Disease of thyroid gland     Diverticulitis     Hernia, diaphragmatic, without obstruction     Hyperlipidemia     Hypertension     Inguinal hernia     Right    Ischemic cardiomyopathy     MI (myocardial infarction) ([facility] [address] )     Myasthenia gravis ([facility] )      Past Surgical History  Past Surgical History:   Procedure Laterality Date    ANGIOPLASTY / STENTING FEMORAL      CATARACT EXTRACTION      COLON SURGERY      colostomy    COLOSTOMY      ESOPHAGOGASTRODUODENOSCOPY N/A 3/29/2016    Procedure: ESOPHAGOGASTRODUODENOSCOPY (EGD); Surgeon: Delroy Rosales MD;  [facility]; Service:     GASTROSTOMY TUBE PLACEMENT N/A 3/29/2016    Procedure: PEG CHANGE-LOW PROFILE TUBE ;  Surgeon: Delroy Rosales MD;  [facility]; Service:     LAPAROTOMY N/A 5/17/2016    Procedure: LAPAROTOMY EXPLORATORY; RESSECTION OF GASTRO-CUTANEOUS FISTULA ;  Surgeon: Paty Mckeon DO;  Location: [facility];  Service:     PEG TUBE PLACEMENT      PEG TUBE REMOVAL      MD ESOPHAGOGASTRODUODENOSCOPY TRANSORAL DIAGNOSTIC N/A 5/12/2016    Procedure: ESOPHAGOGASTRODUODENOSCOPY w 12-6 gc clip,anchor ;  Surgeon: Delroy Rosales MD;  [facility];   Service: Gastroenterology        02/29/20 0945   Note Type   Note type Eval only Restrictions/Precautions   Weight Bearing Precautions Per Order No   Other Precautions Cognitive; Chair Alarm; Bed Alarm;Multiple lines;Telemetry; Fall Risk;Pain   Pain Assessment   Pain Assessment No/denies pain   Pain Score No Pain   Home Living   Type of Home House  (Split level house with 0STE; 3-4 steps to bedroom)   Home Layout Two level   Bathroom Shower/Tub Tub/shower unit   Bathroom Toilet Standard   Bathroom Equipment Grab bars in shower   Home Equipment Walker;Cane   Additional Comments Pt resides with his wife in a split level home with 0STE and 3-4 steps to bedroom  Prior Function   Level of [place] Independent with ADLs and functional mobility   Lives With Spouse   ADL Assistance Independent   IADLs Independent   Falls in the last 6 months 0   Vocational Retired   Lifestyle   Autonomy Pt reports being I with ADLs, IADLs, and functional mobility without use of DME PTA  Reciprocal Relationships wife can assist and is a retired RN  Daughter is an OT    Service to Others retired   Intrinsic Gratification sedentary   Psychosocial   Psychosocial (WDL) Yasmine Mckeon "Summer" 103 5  [address] [facility] 4  [address] 3  Moderate Assistance   [address] 4  C/ [place] [address] 2  Maximal [address]  2  Maximal Assistance   Bed Mobility   Supine to Sit 4  Minimal assistance   Additional items Assist x 2; Increased time required;Verbal cues;LE management   Transfers   Sit to Stand 4  Minimal assistance   Additional items Assist x 2; Increased time required;Verbal cues   Stand to Sit 4  Minimal assistance   Additional items Assist x 2; Increased time required;Verbal cues   Functional Mobility   Additional Comments minAx2 to take few small steps towards recliner    unsteady and fatigues quickly   Balance   Static Sitting Good   Dynamic Sitting Fair -   Static Standing Poor +   Dynamic Standing Poor +   Ambulatory Poor +   Activity Tolerance   Activity Tolerance Patient tolerated treatment well   Medical Staff Made Aware PT   Nurse Made Aware pt cleared by RN for OT evaluation and OOB mobility   RUE Assessment   RUE Assessment WFL   LUE Assessment   LUE Assessment WFL   Cognition   Arousal/Participation Alert; Responsive   Attention Attends with cues to redirect   Orientation Level Oriented X4   Memory Decreased recall of precautions   Following Commands Follows one step commands with increased time or repetition   Comments Requires increased time for processing  Irritable but willing to participate   Assessment   Limitation Decreased ADL status; Decreased UE strength;Decreased Safe judgement during ADL;Decreased cognition;Decreased endurance;Decreased self-care trans;Decreased high-level ADLs   Prognosis Fair   Assessment Pt is a 66year old male seen for initial OT evaluation s/p admission to [facility] as a transfer from [place] 2/27/20 with knee pain and 16 lb weight gain since recent d/c from hospital (2/17/20)  Transferred for CT surgery evaluation  PMHx includes: combined systolic diastolic congestive heart failure, HTN, HLD, BPH, CKD, myasthenia gravis, and urinary retention  Pt was hospitalized 2/11/20-2/17/20 for severe sepsis and hospitalization complicated by renal dysfunction secondary to intravascular volume depletion, ATN from sepsis, cameron catheter placed due to urethral obstruction from large inguinal hernia  Pt with active OT orders and cleared by RN for OT evaluation and OOB mobility  Pt resides with his wife in a split level home with 0STE and 4 steps to bedroom  Pt reports being I with ADLs, IADLs, and functional mobility without use of DME PTA    Pt is currently demonstrating the following occupational deficits: grooming with set-up, UB bathing with Argenis, LB bathing with modA, UB dressing with Argenis, LB dressing with maxA, toileting with maxA, bed mobility with minAx2, functional transfers with minAx2, and functional mobility with minAx2  Factors currently limiting pt's independence in these areas include: generalized weakness, SHAW, endurance, balance, functional mobility, and unsupportive home environment  From an OT standpoint, recommend STR upon d/c  Pt is to continue to benefit from skilled occupational therapy services while in the hospital to maximize functioning and independence with daily activities  See below for OT goals to be addressed 3-5x/wk  Goals   Patient Goals to go home   Plan   Treatment Interventions ADL retraining;Functional transfer training; Endurance training;Patient/family training;Equipment evaluation/education; Compensatory technique education;Continued evaluation; Activityengagement; Energy conservation   Goal Expiration Date 03/09/20   OT Treatment Day 1   OT Frequency 3-5x/wk   Recommendation   OT Discharge Recommendation Short Term Rehab   OT - OK to Discharge Yes  (to STR when medically stable)   Barthel Index   Feeding 10   Bathing 0   Grooming Score 5   Dressing Score 5   Bladder Score 10   Bowels Score 10   Toilet Use Score 5   Transfers (Bed/Chair) Score 5   Mobility (Level Surface) Score 0   Stairs Score 0   Barthel Index Score 50     Goals:    Pt will improve activity tolerance to G for min 30 min txment sessions for increase engagement in functional tasks  Pt will complete all self care tasks w/ Sondra w/ use of DME/AD as appropriate      Pt will improve functional transfers to Mod I on/off all surfaces using DME as needed w/ G balance/safety     Pt will improve functional mobility during ADL/IADL/leisure tasks to Mod I using DME as needed w/ G balance/safety     Pt will participate in simulated IADL management task to increase independence to Mod I w/ G safety and endurance    Pt will demonstrate G carryover of pt/caregiver education and training as appropriate w/o cues w/ good tolerance to increase safety during functional tasks    Pt will demonstrate 100% carryover of energy conservation techniques t/o functional I/ADL/leisure tasks w/o cues s/p skilled education to increase endurance during functional tasks    Pt will maintain standing upright I'ly or at least 10 minutes while completing a dynamic functional activity with good balance and endurance      Ulices Mcdonough, MOT, OTR/L

## 2020-02-29 NOTE — PROGRESS NOTES
Progress Note - Cardiology   Alee Second 75 y o  male MRN: [id number]  Encounter: [id number]  02/29/20  11:39 AM        Assessment/Plan:    1  Multivessel CAD  On aspirin, statin, IV heparin  Cardiac MRI showed moderate viability of mid to distal lateral, inferior, and septal walls, and complete viability of anterior wall  However, given his comorbidities and poor functional status, CT surgery deemed him a poor surgical candidate and recommended medical management  He is interested in considering high risk PCI if it is an option, explained to him that cath images will need to be reviewed by interventional cardiologist first as he would likely need left main PCI, and likely will not be done immediately, but possibly after discharge  He understands  2  Acute/chronic combined systolic/diastolic HF with EF 42% by echo  Diuresed with assistance of IV Milrinone, now on oral Lasix 40 bid  Started low dose vasodilator (isordil 10 tid) 2/28, thus far tolerating  Will start Metoprolol succinate 25 mg daily today as well to optimize his medical management as much as possible  Discussed option of LifeVest for discharge given his LVEF 15% and NYHA Class III-IV HF symptoms, he reports he would like to discuss with his wife first before making a decision  Sodium trending down, monitor  3  ANDREA/CKD III  Currently creatinine 1 0 - 1 2, had been as high as 2 0 in 2/20  4  Apical LV thrombus  On IV heparin  Initiating coumadin today, goal INR 2-3      5  HL  On Atorvastatin 80, which is home med  Lipid panel 2/20 total 95, TG 97, HDL 23, LDL 53  Has followed with Dr Alee Saleh in past, last in 2017  May need to be followed by HF team going forward  Subjective/Objective   Chief Complaint: No chief complaint on file          Subjective: 75 y o  man with known CAD (per prior notes patient refused CABG in 2013 or 2014), ICM with prior EF 50% by echo 2017, HTN, HL, admitted to [address] [place] [address] with worsening SOB and weight gain in setting of recent treatment for sepsis/ANDREA and discontinuation of his antihypertensives/diuretics  CXR showed small bilateral pleural effusions, echo showed EF down to 15% with severe diffuse hypokinesis, and apical thrombus, moderate MR  Moderate AS with low stroke volume  Normal RV size and function  Mild TR with PASP 35-40 mm Hg  Due to his severe LV dysfunction he was started on IV milrinone drip and diuresed  Cardiac catheterization 2/26/20 showed 80% ostial left main, 50% proximal LAD, 80% mid LAD, 50% distal LAD, 90% proximal OM1, 100% occlusion of mid RCA with right to right collaterals to distal RCA  He was able to be weaned off Milrinone, but no cardiac medications other than diuretics were able to be started due to his borderline low BP  He denies any current CP or SOB  On supplemental O2  No dizziness or lightheadedness  No fevers  No nausea/vomiting  Extremities are warm  Aware that he was deemed a poor surgical candidate by CT surgery, but interested in considering PCI options if available  Has Lawrence catheter in place         Patient Active Problem List   Diagnosis    Multiple vessel coronary artery disease    Hyperlipidemia    Essential hypertension    Gastrocutaneous fistula    CKD (chronic kidney disease)    H/O myasthenia gravis    Hydronephrosis    Right inguinal hernia    Acute on chronic combined systolic and diastolic heart failure (HCC)    Urinary obstruction    Elevated troponin I level    LV (left ventricular) mural thrombus     Past Medical History:   Diagnosis Date    Cardiac disease     Carotid stenosis     [facility]) [facility]     Compression fracture of lumbar vertebra ([facility]     Coronary artery disease     Disease of thyroid gland     Diverticulitis     Hernia, diaphragmatic, without obstruction     Hyperlipidemia     Hypertension     Inguinal hernia     Right    Ischemic cardiomyopathy     [facility]) [facility] [address] )  [facility]asthenia gravis ([facility] [address] )        No Known Allergies    Current Facility-Administered Medications   Medication Dose Route Frequency Provider Last Rate Last Dose    acetaminophen (TYLENOL) tablet 650 mg  650 mg Oral Q6H PRN Jonathan Huber MD        aspirin chewable tablet 81 mg  81 mg Oral Daily Jonathan Huber MD   81 mg at 02/29/20 0819    atorvastatin (LIPITOR) tablet 80 mg  80 mg Oral HS Jonathan Huber MD   80 mg at 02/28/20 2124    belladonna-opium (B&O SUPPOSITORY) 16 2-30 mg suppository 1 suppository  30 mg Rectal Q8H PRN Jonathan Huber MD        docusate sodium (COLACE) capsule 100 mg  100 mg Oral BID Jonathan Huber MD   100 mg at 02/28/20 1719    furosemide (LASIX) tablet 40 mg  40 mg Oral BID (diuretic) Audrey John MD   40 mg at 02/29/20 0819    heparin (porcine) 25,000 units in 250 mL infusion (premix)  3-20 Units/kg/hr (Order-Specific) Intravenous Titrated Vesna Smith PA-C 13 5 mL/hr at 02/29/20 0926 18 Units/kg/hr at 02/29/20 0926    heparin (porcine) injection 2,000 Units  2,000 Units Intravenous PRN Vesna Smith PA-C   2,000 Units at 02/29/20 0249    heparin (porcine) injection 4,000 Units  4,000 Units Intravenous PRN Vesna Smith PA-C   4,000 Units at 02/28/20 2126    isosorbide dinitrate (ISORDIL) tablet 10 mg  10 mg Oral TID after meals Audrey John MD   10 mg at 02/29/20 6658    levothyroxine tablet 50 mcg  50 mcg Oral Early Morning Jonathan Huber MD   50 mcg at 02/29/20 0553    melatonin tablet 3 mg  3 mg Oral HS PRN Roe Guadarrama PA-C   3 mg at 02/28/20 4317    metoprolol succinate (TOPROL-XL) 24 hr tablet 25 mg  25 mg Oral Daily Audrey John MD        mycophenolate (CELLCEPT) capsule 750 mg  750 mg Oral BID Jonathan Huber MD   750 mg at 02/29/20 8002    nystatin (MYCOSTATIN) powder   Topical BID Jonathan Huber MD        oxyCODONE (ROXICODONE) IR tablet 5 mg  5 mg Oral Q6H PRN Jonathan Huebr MD   5 mg at 02/29/20 0702    polyethylene glycol (MIRALAX) packet 17 g  17 g Oral Daily PRN Teo Cherry MD        warfarin (COUMADIN) tablet 5 mg  5 mg Oral Daily (warfarin) SIMONA Levine           Vitals: /65   Pulse (!) 107   Temp 97 8 °F (36 6 °C)   Resp 18   Ht 5' 9" (1 753 m)   Wt 76 8 kg (169 lb 5 oz)   SpO2 96%   BMI 25 00 kg/m²     Intake/Output Summary (Last 24 hours) at 2/29/2020 1139  Last data filed at 2/29/2020 0399  Gross per 24 hour   Intake 180 ml   Output 1400 ml   Net -1220 ml     Wt Readings from Last 3 Encounters:   02/29/20 76 8 kg (169 lb 5 oz)   02/27/20 83 3 kg (183 lb 10 3 oz)   02/11/20 78 6 kg (173 lb 4 5 oz)       Body mass index is 25 kg/m²  ,     Vitals:    02/29/20 0701 02/29/20 0715 02/29/20 0730 02/29/20 1130   BP: 101/65 101/65  100/65   Pulse:  100 (!) 108 (!) 107   Patient Position - Orthostatic VS:           Physical Exam:     GEN: Awake, alert, in no acute distress  HEENT: Sclera anicteric, conjunctivae pink, mucous membranes moist   NECK: Supple, no carotid bruits, no significant JVD  HEART: Regular rhythm, normal S1 and S2, no murmurs, clicks, gallops or rubs  LUNGS: Clear but distant to auscultation bilaterally; no wheezes, rales, or rhonchi   ABDOMEN: Soft, nontender, nondistended, normoactive bowel sounds  EXTREMITIES: Skin warm and well perfused, no clubbing, cyanosis, or edema  NEURO: No focal findings  SKIN: Normal without suspicious lesions on exposed skin        Lab Results:     BMP:  Results from last 7 days   Lab Units 02/29/20  0756 02/28/20  0509 02/27/20  0406 02/26/20  0525 02/25/20  0541 02/24/20  0532 02/23/20  1813   POTASSIUM mmol/L 3 9 4 2 4 3 4 1 4 2 4 2 3 7   CHLORIDE mmol/L 101 102 101 99* 99* 100 98*   CO2 mmol/L 28 31 34* 35* 34* 36* 38*   BUN mg/dL 30* 28* 25 28* 31* 37* 40*   CREATININE mg/dL 1 06 1 10 1 26 1 24 1 22 1 32* 1 43*   CALCIUM mg/dL 8 4 8 5 8 3 8 1* 8 1* 7 9* 7 5*       CBC:   Results from last 7 days   Lab Units 02/28/20  1208 02/27/20  0406 02/26/20  0525   WBC Thousand/uL 7 78 8 02 9 04   HEMOGLOBIN g/dL 12 6 13 0 13 1   HEMATOCRIT % 41 9 43 6 43 1   MCV fL 93 94 94   PLATELETS Thousands/uL 165 170 196   MCH pg 28 1 28 1 28 4   MCHC g/dL 30 1* 29 8* 30 4*   RDW % 15 5* 15 6* 15 6*   MPV fL 9 9 9 7 10 2        Results from last 7 days   Lab Units 02/27/20  0406 02/24/20  0532 02/23/20  1813   MAGNESIUM mg/dL 2 0 2 5 1 9       INR:   Results from last 7 days   Lab Units 02/28/20  1208 02/23/20  0451   INR  1 34* 1 24*       Lipid Profile:   Lab Results   Component Value Date    CHOL 219 01/08/2016    CHOL 132 01/14/2014     Lab Results   Component Value Date    HDL 23 (L) 02/21/2020    HDL 49 04/23/2018     01/08/2016     Lab Results   Component Value Date    LDLCALC 53 02/21/2020    LDLCALC 57 04/23/2018    LDLCALC 89 01/08/2016     Lab Results   Component Value Date    TRIG 97 02/21/2020    TRIG 77 04/23/2018    TRIG 54 01/08/2016         Hgb A1c:         Telemetry: personally reviewed, SR, sinus tachycardia, frequent up to 4 beats NSVT

## 2020-02-29 NOTE — OCCUPATIONAL THERAPY NOTE
Occupational Therapy Cancellation Note  Pt continues to be pending possible CT surgery  OT to hold off on initial OT evaluation at this time      FEDERICA Felton, OTR/L

## 2020-02-29 NOTE — PLAN OF CARE
Problem: Potential for Falls  Goal: Patient will remain free of falls  Description  INTERVENTIONS:  - Assess patient frequently for physical needs  -  Identify cognitive and physical deficits and behaviors that affect risk of falls    -  [place] fall precautions as indicated by assessment   - Educate patient/family on patient safety including physical limitations  - Instruct patient to call for assistance with activity based on assessment  - Modify environment to reduce risk of injury  - Consider OT/PT consult to assist with strengthening/mobility  Outcome: Progressing     Problem: PAIN - ADULT  Goal: Verbalizes/displays adequate comfort level or baseline comfort level  Description  Interventions:  - Encourage patient to monitor pain and request assistance  - Assess pain using appropriate pain scale  - Administer analgesics based on type and severity of pain and evaluate response  - Implement non-pharmacological measures as appropriate and evaluate response  - Consider cultural and social influences on pain and pain management  - Notify physician/advanced practitioner if interventions unsuccessful or patient reports new pain  Outcome: Progressing     Problem: INFECTION - ADULT  Goal: Absence or prevention of progression during hospitalization  Description  INTERVENTIONS:  - Assess and monitor for signs and symptoms of infection  - Monitor lab/diagnostic results  - Monitor all insertion sites, i e  indwelling lines, tubes, and drains  - [place] appropriate cooling/warming therapies per order  - Administer medications as ordered  - Instruct and encourage patient and family to use good hand hygiene technique  - Identify and instruct in appropriate isolation precautions for identified infection/condition   Outcome: Progressing     Problem: SAFETY ADULT  Goal: Patient will remain free of falls  Description  INTERVENTIONS:  - Assess patient frequently for physical needs  -  Identify cognitive and physical deficits and behaviors that affect risk of falls  -  [place] fall precautions as indicated by assessment   - Educate patient/family on patient safety including physical limitations  - Instruct patient to call for assistance with activity based on assessment  - Modify environment to reduce risk of injury  - Consider OT/PT consult to assist with strengthening/mobility  Outcome: Progressing     Problem: DISCHARGE PLANNING  Goal: Discharge to home or other facility with appropriate resources  Description  INTERVENTIONS:  - Identify barriers to discharge w/patient and caregiver  - Arrange for needed discharge resources and transportation as appropriate  - Identify discharge learning needs (meds, wound care, etc )  - Arrange for interpretive services to assist at discharge as needed  - Refer to Case Management Department for coordinating discharge planning if the patient needs post-hospital services based on physician/advanced practitioner order or complex needs related to functional status, cognitive ability, or social support system  Outcome: Progressing     Problem: Knowledge Deficit  Goal: Patient/family/caregiver demonstrates understanding of disease process, treatment plan, medications, and discharge instructions  Description  Complete learning assessment and assess knowledge base    Interventions:  - Provide teaching at level of understanding  - Provide teaching via preferred learning methods  Outcome: Progressing     Problem: CARDIOVASCULAR - ADULT  Goal: Maintains optimal cardiac output and hemodynamic stability  Description  INTERVENTIONS:  - Monitor I/O, vital signs and rhythm  - Monitor for S/S and trends of decreased cardiac output  - Administer and titrate ordered vasoactive medications to optimize hemodynamic stability  - Assess quality of pulses, skin color and temperature  - Assess for signs of decreased coronary artery perfusion  - Instruct patient to report change in severity of symptoms  Outcome: Progressing  Goal: Absence of cardiac dysrhythmias or at baseline rhythm  Description  INTERVENTIONS:  - Continuous cardiac monitoring, vital signs, obtain 12 lead EKG if ordered  - Administer antiarrhythmic and heart rate control medications as ordered  - Monitor electrolytes and administer replacement therapy as ordered  Outcome: Progressing     Problem: GASTROINTESTINAL - ADULT  Goal: Establish and maintain optimal ostomy function  Description  INTERVENTIONS:  - Assess bowel function  - Encourage oral fluids to ensure adequate hydration  - Administer IV fluids if ordered to ensure adequate hydration   - Administer ordered medications as needed  - Encourage mobilization and activity  - Nutrition services referral to assist patient with appropriate food choices  - Assess stoma site  - Consider wound care consult   Outcome: Progressing     Problem: GENITOURINARY - ADULT  Goal: Urinary catheter remains patent  Description  INTERVENTIONS:  - Assess patency of urinary catheter  - If patient has a chronic cameron, consider changing catheter if non-functioning  - Follow guidelines for intermittent irrigation of non-functioning urinary catheter  Outcome: Progressing     Problem: SKIN/TISSUE INTEGRITY - ADULT  Goal: Skin integrity remains intact  Description  INTERVENTIONS  - Identify patients at risk for skin breakdown  - Assess and monitor skin integrity  - Assess and monitor nutrition and hydration status  - Monitor labs (i e  albumin)  - Assess for incontinence   - Turn and reposition patient  - Assist with mobility/ambulation  - Relieve pressure over bony prominences  - Avoid friction and shearing  - Provide appropriate hygiene as needed including keeping skin clean and dry  - Evaluate need for skin moisturizer/barrier cream  - Collaborate with interdisciplinary team (i e  Nutrition, Rehabilitation, etc )   - Patient/family teaching  Outcome: Progressing     Problem: RESPIRATORY - ADULT  Goal: Achieves optimal ventilation and oxygenation  Description  INTERVENTIONS:  - Assess for changes in respiratory status  - Assess for changes in mentation and behavior  - Position to facilitate oxygenation and minimize respiratory effort  - Oxygen administered by appropriate delivery if ordered  - Encourage broncho-pulmonary hygiene including cough, deep breathe, Incentive Spirometry  - Assess and instruct to report SOB or any respiratory difficulty  - Respiratory Therapy support as indicated   Outcome: Progressing     Problem: Prexisting or High Potential for Compromised Skin Integrity  Goal: Skin integrity is maintained or improved  Description  INTERVENTIONS:  - Identify patients at risk for skin breakdown  - Assess and monitor skin integrity  - Assess and monitor nutrition and hydration status  - Monitor labs   - Assess for incontinence   - Turn and reposition patient  - Assist with mobility/ambulation  - Relieve pressure over bony prominences  - Avoid friction and shearing  - Provide appropriate hygiene as needed including keeping skin clean and dry  - Evaluate need for skin moisturizer/barrier cream  - Collaborate with interdisciplinary team   - Patient/family teaching  - Consider wound care consult   Outcome: Progressing

## 2020-02-29 NOTE — PLAN OF CARE
Problem: PHYSICAL THERAPY ADULT  Goal: Performs mobility at highest level of function for planned discharge setting  See evaluation for individualized goals  Description  Treatment/Interventions: LE strengthening/ROM, Functional transfer training, Therapeutic exercise, Elevations, Endurance training, Patient/family training, Equipment eval/education, Bed mobility, Gait training          See flowsheet documentation for full assessment, interventions and recommendations  Note:   Prognosis: Good  Problem List: Decreased strength, Decreased endurance, Impaired balance, Decreased mobility, Decreased cognition, Impaired judgement, Decreased safety awareness, Decreased coordination, Pain  Assessment: Pt seen for high complexity physical therapy evaluation  Pt is a 76 y/o male w/ history/comorbidities of CHF, HTN, HLD, BPH, CKD who is now admitted as a transfer from [facility] w/ worsening SOB, LE edema, wt gain  Dx include CHF, multivessel CAD  Transferred here for cardiac surgical w/u, but deemed to not be a surgical candidate  Due to acute medical issues, need for transfer to higher level of care, pain, fall risk, note unstable clinical picture  PT consulted to assess mobility, d/c needs  Pt presents w/ decreased functional mob, standing balance, endurance, B LE strength , barriers at home  will benefit from skilled PT to correct for the above problems  recommend rehab at d/c        Recommendation: Post acute IP rehab     PT - OK to Discharge: (S) Yes(when stable to rehab)    See flowsheet documentation for full assessment

## 2020-02-29 NOTE — ASSESSMENT & PLAN NOTE
Wt Readings from Last 3 Encounters:   02/29/20 76 8 kg (169 lb 5 oz)   02/27/20 83 3 kg (183 lb 10 3 oz)   02/11/20 78 6 kg (173 lb 4 5 oz)     · Patient admitted initially to the [facility] with shortness of breath and lower extremity edema as well as weight gain  He was diuresed with intravenous Lasix  He underwent cardiac catheterization which showed triple-vessel disease and was transferred to [facility] [place] for [place] surgery evaluation  · Cardiology continues to follow here  Now on oral Lasix 40 mg p o  B i d   This is being held intermittently secondary to soft blood pressure below parameters  · Started on Isordil 10 mg p o  T i d   · Will continue to monitor weights, I&Os  · Low-sodium diet  · Monitor volume status  · PT/OT recommending rehab  CM following

## 2020-02-29 NOTE — PROGRESS NOTES
[facility]    Progress Note Selene Rear 1945, 75 y o  male MRN: [id number]    [facility] Encounter: [id number]    Primary Care Provider: Adam Zamudio MD   Date and time admitted to hospital: 2/27/2020  4:17 PM    * Acute on chronic combined systolic and diastolic heart failure [facility])  Assessment & Plan  Wt Readings from Last 3 Encounters:   02/29/20 76 8 kg (169 lb 5 oz)   02/27/20 83 3 kg (183 lb 10 3 oz)   02/11/20 78 6 kg (173 lb 4 5 oz)     · Patient admitted initially to the [facility] with shortness of breath and lower extremity edema as well as weight gain  He was diuresed with intravenous Lasix  He underwent cardiac catheterization which showed triple-vessel disease and was transferred to [facility] for CT surgery evaluation  · Cardiology continues to follow here  Now on oral Lasix 40 mg p o  B i d   This is being held intermittently secondary to soft blood pressure below parameters  · Started on Isordil 10 mg p o  T i d   · Will continue to monitor weights, I&Os  · Low-sodium diet  · Monitor volume status  · PT/OT recommending rehab  CM following  Multiple vessel coronary artery disease  Assessment & Plan  · Cardiac catheterization 02/26/2020 with multivessel disease  Transferred to [facility] [place] for CT surgery evaluation however patient deemed to not be surgical candidate  · Continue aspirin, statin  Not on beta-blocker secondary to low pressures  LV (left ventricular) mural thrombus  Assessment & Plan  · Currently on IV heparin  Will start coumadin and continue heparin until INR therapeutic  CKD (chronic kidney disease)  Assessment & Plan  · Creatinine 1 06 today  · Avoid nephrotoxins and hypotension  · Monitor closely    Essential hypertension  Assessment & Plan  · Patient with documented history of hypertension however now with soft blood pressures    · Monitor closely    Gastrocutaneous fistula  Assessment & Plan  · Gastrocutaneous fistula  · Patient has history of diverticulitis complicated by perforation status post colostomy  · Outpatient follow up      H/O myasthenia gravis  Assessment & Plan  · Noted    Hyperlipidemia  Assessment & Plan  · statin    Urinary obstruction  Assessment & Plan  · Urinary obstruction  · Secondary to large right inguinal hernia with hydronephrosis and urethral obstruction  · Plan for cameron exchange 3/12/2020 with urology in the office  · Cameron in place now      Right inguinal hernia  Assessment & Plan  · Right inguinal hernia  · Outpatient follow up, no current intervention        Pharmacologic: Heparin Drip     Mechanical VTE Prophylaxis in Place: Yes    Patient Centered Rounds: I have performed bedside rounds with nursing staff today  Discussions with Specialists or Other Care Team Provider:  Nursing, cardiology (Dr Misty Michele) case management Chinyere Palma)    Education and Discussions with Family / Patient:  Patient and wife over phone     Time Spent for Care: 30 minutes  More than 50% of total time spent on counseling and coordination of care as described above  Current Length of Stay: 2 day(s)    Current Patient Status: Inpatient     Certification Statement: The patient will continue to require additional inpatient hospital stay due to Await cardiology clearance, PT/OT recommending rehab, safe discharge planning    Discharge Plan / Estimated Discharge Date:  Await cardiology clearance, discharge planning  Code Status: Level 1 - Full Code      Subjective:   Patient offers no acute complaints  Does any chest pain or palpitations  Some mild shortness of breath if he over exerts himself  Objective:     Vitals:   Temp (24hrs), Av 2 °F (36 8 °C), Min:97 9 °F (36 6 °C), Max:98 4 °F (36 9 °C)    Temp:  [97 9 °F (36 6 °C)-98 4 °F (36 9 °C)] 97 9 °F (36 6 °C)  HR:  [91-96] 96  Resp:  [18] 18  BP: ()/(64-67) 101/65  SpO2:  [96 %-97 %] 97 %  Body mass index is 25 kg/m²       Input and Output Summary (last 24 hours): Intake/Output Summary (Last 24 hours) at 2/29/2020 1000  Last data filed at 2/29/2020 4072  Gross per 24 hour   Intake 180 ml   Output 1400 ml   Net -1220 ml       Physical Exam:     Physical Exam   Constitutional: He is oriented to person, place, and time  No distress  HENT:   Head: Normocephalic  Eyes: Conjunctivae are normal    Neck: Normal range of motion  Cardiovascular: Tachycardia present  Murmur heard  Pulmonary/Chest: Breath sounds normal    Abdominal: Bowel sounds are normal    Ostomy noted    Genitourinary:   Genitourinary Comments: [place] catheter noted with dark yellow urine in drainage bag   Musculoskeletal: Normal range of motion  He exhibits no edema  Neurological: He is alert and oriented to person, place, and time  Skin: Skin is warm  Psychiatric: He has a normal mood and affect  Nursing note and vitals reviewed        Additional Data:     Labs:    Results from last 7 days   Lab Units 02/28/20  1208   WBC Thousand/uL 7 78   HEMOGLOBIN g/dL 12 6   HEMATOCRIT % 41 9   PLATELETS Thousands/uL 165     Results from last 7 days   Lab Units 02/29/20  0756   POTASSIUM mmol/L 3 9   CHLORIDE mmol/L 101   CO2 mmol/L 28   BUN mg/dL 30*   CREATININE mg/dL 1 06   CALCIUM mg/dL 8 4     Results from last 7 days   Lab Units 02/28/20  1208   INR  1 34*         Recent Cultures (last 7 days):           Last 24 Hours Medication List:     Current Facility-Administered Medications:  acetaminophen 650 mg Oral Q6H PRN Maxi King MD    aspirin 81 mg Oral Daily Maxi King MD    atorvastatin 80 mg Oral HS Maxi King MD    belladonna-opium 30 mg Rectal Q8H PRN Maxi King MD    docusate sodium 100 mg Oral BID Maxi King MD    furosemide 40 mg Oral BID (diuretic) Axel Vides MD    heparin (porcine) 3-20 Units/kg/hr (Order-Specific) Intravenous Titrated Fabienne Redd PA-C Last Rate: 18 Units/kg/hr (02/29/20 0926)   heparin (porcine) 2,000 Units Intravenous PRN Fabienne Redd PA-C    heparin (porcine) 4,000 Units Intravenous PRN Fabienne Redd PA-C    isosorbide dinitrate 10 mg Oral TID after meals Axel Vides MD    levothyroxine 50 mcg Oral Early Morning Maxi King MD    melatonin 3 mg Oral HS PRN Juliocesar Soriano PA-C    metoprolol succinate 25 mg Oral Daily Axel Vides MD    mycophenolate 750 mg Oral BID Maxi King MD    nystatin  Topical BID Maxi iKng MD    oxyCODONE 5 mg Oral Q6H PRN Maxi King MD    polyethylene glycol 17 g Oral Daily PRN Maxi King MD    warfarin 5 mg Oral Daily (warfarin) SIMONA Mares         Today, Patient Was Seen By: SIMONA Mcdaniels    ** Please Note: Dragon 360 Dictation voice to text software may have been used in the creation of this document   **

## 2020-02-29 NOTE — ASSESSMENT & PLAN NOTE
· Cardiac catheterization 02/26/2020 with multivessel disease  Transferred to [facility] for CT surgery evaluation however patient deemed to not be surgical candidate  · Continue aspirin, statin  Not on beta-blocker secondary to low pressures

## 2020-02-29 NOTE — PLAN OF CARE
Problem: Potential for Falls  Goal: Patient will remain free of falls  Description  INTERVENTIONS:  - Assess patient frequently for physical needs  -  Identify cognitive and physical deficits and behaviors that affect risk of falls    -  [place] fall precautions as indicated by assessment   - Educate patient/family on patient safety including physical limitations  - Instruct patient to call for assistance with activity based on assessment  - Modify environment to reduce risk of injury  - Consider OT/PT consult to assist with strengthening/mobility  Outcome: Progressing     Problem: PAIN - ADULT  Goal: Verbalizes/displays adequate comfort level or baseline comfort level  Description  Interventions:  - Encourage patient to monitor pain and request assistance  - Assess pain using appropriate pain scale  - Administer analgesics based on type and severity of pain and evaluate response  - Implement non-pharmacological measures as appropriate and evaluate response  - Consider cultural and social influences on pain and pain management  - Notify physician/advanced practitioner if interventions unsuccessful or patient reports new pain  Outcome: Progressing     Problem: INFECTION - ADULT  Goal: Absence or prevention of progression during hospitalization  Description  INTERVENTIONS:  - Assess and monitor for signs and symptoms of infection  - Monitor lab/diagnostic results  - Monitor all insertion sites, i e  indwelling lines, tubes, and drains  - [place] appropriate cooling/warming therapies per order  - Administer medications as ordered  - Instruct and encourage patient and family to use good hand hygiene technique  - Identify and instruct in appropriate isolation precautions for identified infection/condition   Outcome: Progressing     Problem: SAFETY ADULT  Goal: Patient will remain free of falls  Description  INTERVENTIONS:  - Assess patient frequently for physical needs  -  Identify cognitive and physical deficits and behaviors that affect risk of falls  -  [place] fall precautions as indicated by assessment   - Educate patient/family on patient safety including physical limitations  - Instruct patient to call for assistance with activity based on assessment  - Modify environment to reduce risk of injury  - Consider OT/PT consult to assist with strengthening/mobility  Outcome: Progressing     Problem: DISCHARGE PLANNING  Goal: Discharge to home or other facility with appropriate resources  Description  INTERVENTIONS:  - Identify barriers to discharge w/patient and caregiver  - Arrange for needed discharge resources and transportation as appropriate  - Identify discharge learning needs (meds, wound care, etc )  - Arrange for interpretive services to assist at discharge as needed  - Refer to Case Management Department for coordinating discharge planning if the patient needs post-hospital services based on physician/advanced practitioner order or complex needs related to functional status, cognitive ability, or social support system  Outcome: Progressing     Problem: Knowledge Deficit  Goal: Patient/family/caregiver demonstrates understanding of disease process, treatment plan, medications, and discharge instructions  Description  Complete learning assessment and assess knowledge base    Interventions:  - Provide teaching at level of understanding  - Provide teaching via preferred learning methods  Outcome: Progressing     Problem: CARDIOVASCULAR - ADULT  Goal: Maintains optimal cardiac output and hemodynamic stability  Description  INTERVENTIONS:  - Monitor I/O, vital signs and rhythm  - Monitor for S/S and trends of decreased cardiac output  - Administer and titrate ordered vasoactive medications to optimize hemodynamic stability  - Assess quality of pulses, skin color and temperature  - Assess for signs of decreased coronary artery perfusion  - Instruct patient to report change in severity of symptoms  Outcome: Progressing  Goal: Absence of cardiac dysrhythmias or at baseline rhythm  Description  INTERVENTIONS:  - Continuous cardiac monitoring, vital signs, obtain 12 lead EKG if ordered  - Administer antiarrhythmic and heart rate control medications as ordered  - Monitor electrolytes and administer replacement therapy as ordered  Outcome: Progressing     Problem: GASTROINTESTINAL - ADULT  Goal: Establish and maintain optimal ostomy function  Description  INTERVENTIONS:  - Assess bowel function  - Encourage oral fluids to ensure adequate hydration  - Administer IV fluids if ordered to ensure adequate hydration   - Administer ordered medications as needed  - Encourage mobilization and activity  - Nutrition services referral to assist patient with appropriate food choices  - Assess stoma site  - Consider wound care consult   Outcome: Progressing     Problem: GENITOURINARY - ADULT  Goal: Urinary catheter remains patent  Description  INTERVENTIONS:  - Assess patency of urinary catheter  - If patient has a chronic cameron, consider changing catheter if non-functioning  - Follow guidelines for intermittent irrigation of non-functioning urinary catheter  Outcome: Progressing     Problem: SKIN/TISSUE INTEGRITY - ADULT  Goal: Skin integrity remains intact  Description  INTERVENTIONS  - Identify patients at risk for skin breakdown  - Assess and monitor skin integrity  - Assess and monitor nutrition and hydration status  - Monitor labs (i e  albumin)  - Assess for incontinence   - Turn and reposition patient  - Assist with mobility/ambulation  - Relieve pressure over bony prominences  - Avoid friction and shearing  - Provide appropriate hygiene as needed including keeping skin clean and dry  - Evaluate need for skin moisturizer/barrier cream  - Collaborate with interdisciplinary team (i e  Nutrition, Rehabilitation, etc )   - Patient/family teaching  Outcome: Progressing     Problem: RESPIRATORY - ADULT  Goal: Achieves optimal ventilation and oxygenation  Description  INTERVENTIONS:  - Assess for changes in respiratory status  - Assess for changes in mentation and behavior  - Position to facilitate oxygenation and minimize respiratory effort  - Oxygen administered by appropriate delivery if ordered  - Encourage broncho-pulmonary hygiene including cough, deep breathe, Incentive Spirometry  - Assess and instruct to report SOB or any respiratory difficulty  - Respiratory Therapy support as indicated   Outcome: Progressing     Problem: Prexisting or High Potential for Compromised Skin Integrity  Goal: Skin integrity is maintained or improved  Description  INTERVENTIONS:  - Identify patients at risk for skin breakdown  - Assess and monitor skin integrity  - Assess and monitor nutrition and hydration status  - Monitor labs   - Assess for incontinence   - Turn and reposition patient  - Assist with mobility/ambulation  - Relieve pressure over bony prominences  - Avoid friction and shearing  - Provide appropriate hygiene as needed including keeping skin clean and dry  - Evaluate need for skin moisturizer/barrier cream  - Collaborate with interdisciplinary team   - Patient/family teaching  - Consider wound care consult   Outcome: Progressing

## 2020-03-01 NOTE — NURSING NOTE
Heparin gtt running at 18 units/kg/hr  PTT subtherapeutic at 41  Per algorithm, bolus with 4000 units heparin and increase by 4 units/kg/hr which puts the rate at 22 units/kg/hr which is outside the ordered parameters  Per Kate Schultz PA-C, bolus per protocol and increase gtt rate to max rate per protocol  Gtt infusing at 20 units/kg/hr, bolused with 4000 units heparin  Next PTT due at 1300

## 2020-03-01 NOTE — NURSING NOTE
3 RNs attempted to draw patient's PTT, unable to obtain PTT  P5 to come to attempt with norma monroy  [address] notified  Will continue to escalate as appropriate

## 2020-03-01 NOTE — ASSESSMENT & PLAN NOTE
Wt Readings from Last 3 Encounters:   03/01/20 76 1 kg (167 lb 12 3 oz)   02/27/20 83 3 kg (183 lb 10 3 oz)   02/11/20 78 6 kg (173 lb 4 5 oz)     · Patient admitted initially to the [facility] with shortness of breath and lower extremity edema as well as weight gain  He was diuresed with intravenous Lasix  He underwent cardiac catheterization which showed triple-vessel disease and was transferred to [facility] for CT surgery evaluation  · Cardiology continues to follow here  Now on oral Lasix 40 mg p o  B i d   · Started on Isordil 10 mg p o  T i d 2/28 and Metoprolol Succinate 2/29  · EF 15%  May need LifeVest on D/C  · Will continue to monitor weights, I&Os  · Low-sodium diet  · Monitor volume status  · PT/OT recommending rehab  CM following

## 2020-03-01 NOTE — ASSESSMENT & PLAN NOTE
· Cardiac catheterization 02/26/2020 with multivessel disease  Transferred to [facility] for CT surgery evaluation however patient deemed to not be surgical candidate  · Continue aspirin, statin  Started on BB  · There has been some discussion regarding high risk PCI to left main  Cardiac catheterization images will need to be reviewed by interventional cardiologist tomorrow per my discussion with Dr Raad Ma  This would likely not be done this admission but as outpatient

## 2020-03-01 NOTE — PROGRESS NOTES
Progress Note - Cardiology   Boo Mckeon 75 y o  male MRN: [id number]  Encounter: [id number]  03/01/20  11:03 AM        Assessment/Plan:    1  Multivessel CAD  On aspirin, statin, IV heparin  Cardiac MRI showed moderate viability of mid to distal lateral, inferior, and septal walls, and complete viability of anterior wall  However, given his comorbidities and poor functional status, CT surgery deemed him a poor surgical candidate and recommended medical management  He is interested in considering high risk PCI if it is an option, explained to him that cath images will need to be reviewed by interventional cardiologist first as he would likely need left main PCI, and likely will not be done immediately, but possibly after discharge  He understands  2  Acute/chronic combined systolic/diastolic HF with EF 81% by echo  Diuresed with assistance of IV Milrinone, now on oral Lasix 40 bid  Started low dose vasodilator (isordil 10 tid) 2/28, thus far tolerating  Start Metoprolol succinate 25 mg daily 2/29, tolerating thus far, will increase to 50 qd, as well to optimize his medical management as much as possible  Discussed option of LifeVest for discharge given his LVEF 15% and NYHA Class III-IV HF symptoms, he reports he would like to discuss with his wife first before making a decision, still has not made a decision  3  ANDREA/CKD III  Currently creatinine 1 0 - 1 2, had been as high as 2 0 in 2/20  May need to decrease Lasix to 40 mg daily depending on trend of creatinine  4  Apical LV thrombus  On IV heparin  Initiating coumadin starting 2/29, goal INR 2-3  Check daily INR  5  HL  On Atorvastatin 80, which is home med  Lipid panel 2/20 total 95, TG 97, HDL 23, LDL 53  Has followed with Dr Connie Hoffman in past, last in 2017  May need to be followed by HF team going forward given worsening LV function  Subjective/Objective   Chief Complaint: No chief complaint on file          Subjective: 75 y o  man with known CAD (per prior notes patient refused CABG in 2013 or 2014), ICM with prior EF 50% by echo 2017, HTN, HL, admitted to [facility] with worsening SOB and weight gain in setting of recent treatment for sepsis/ANDREA and discontinuation of his antihypertensives/diuretics  CXR showed small bilateral pleural effusions, echo showed EF down to 15% with severe diffuse hypokinesis, and apical thrombus, moderate MR  Moderate AS with low stroke volume  Normal RV size and function  Mild TR with PASP 35-40 mm Hg  Due to his severe LV dysfunction he was started on IV milrinone drip and diuresed  Cardiac catheterization 2/26/20 showed 80% ostial left main, 50% proximal LAD, 80% mid LAD, 50% distal LAD, 90% proximal OM1, 100% occlusion of mid RCA with right to right collaterals to distal RCA  He was able to be weaned off Milrinone, but no cardiac medications other than diuretics were able to be started due to his borderline low BP  He denies any current CP or SOB  No dizziness or lightheadedness  No fevers  No nausea/vomiting  Extremities are warm  Aware that he was deemed a poor surgical candidate by CT surgery, but interested in considering PCI options if available  Has not yet ambulated, reports he is "thinking about it" for later today   Has not yet made a decision regarding LifeVest        Patient Active Problem List   Diagnosis    Multiple vessel coronary artery disease    Hyperlipidemia    Essential hypertension    Gastrocutaneous fistula    CKD (chronic kidney disease)    H/O myasthenia gravis    Hydronephrosis    Right inguinal hernia    Acute on chronic combined systolic and diastolic heart failure (HCC)    Urinary obstruction    Elevated troponin I level    LV (left ventricular) mural thrombus     Past Medical History:   Diagnosis Date    Cardiac disease     Carotid stenosis     CHF (congestive heart failure) (HCC)     Compression fracture of lumbar vertebra (HCC)     Coronary artery disease     Disease of thyroid gland     Diverticulitis     Hernia, diaphragmatic, without obstruction     Hyperlipidemia     Hypertension     Inguinal hernia     Right    Ischemic cardiomyopathy     [facility]) ([facility] [address] )     Myasthenia gravis ([facility] )        No Known Allergies    Current Facility-Administered Medications   Medication Dose Route Frequency Provider Last Rate Last Dose    acetaminophen (TYLENOL) tablet 650 mg  650 mg Oral Q6H PRN Griselda Hill MD        aspirin (ECOTRIN LOW STRENGTH) EC tablet 81 mg  81 mg Oral Daily Manjeet Chatterjee MD   81 mg at 03/01/20 0916    atorvastatin (LIPITOR) tablet 80 mg  80 mg Oral HS Griselda Hill MD   80 mg at 02/29/20 2217    belladonna-opium (B&O SUPPOSITORY) 16 2-30 mg suppository 1 suppository  30 mg Rectal Q8H PRN Griselda Hill MD        docusate sodium (COLACE) capsule 100 mg  100 mg Oral BID Griselda Hill MD   100 mg at 02/28/20 1719    furosemide (LASIX) tablet 40 mg  40 mg Oral BID (diuretic) Manjeet Chatterjee MD   40 mg at 03/01/20 0916    heparin (porcine) 25,000 units in 250 mL infusion (premix)  3-20 Units/kg/hr (Order-Specific) Intravenous Titrated JONY Prather-C 15 mL/hr at 03/01/20 0804 20 Units/kg/hr at 03/01/20 0804    heparin (porcine) injection 2,000 Units  2,000 Units Intravenous PRN Evette Green PA-C   2,000 Units at 02/29/20 0249    heparin (porcine) injection 4,000 Units  4,000 Units Intravenous PRN Evette Hannah PA-C   4,000 Units at 03/01/20 0801    isosorbide dinitrate (ISORDIL) tablet 10 mg  10 mg Oral TID after meals Manjeet Chatterjee MD   10 mg at 03/01/20 6776    levothyroxine tablet 50 mcg  50 mcg Oral Early Morning Griselda Hill MD   50 mcg at 03/01/20 0511    melatonin tablet 3 mg  3 mg Oral HS PRN Tam Ferrer PA-C   3 mg at 02/28/20 9879    metoprolol succinate (TOPROL-XL) 24 hr tablet 25 mg  25 mg Oral Once Manjeet Chatterjee MD        [START ON 3/2/2020] metoprolol succinate (TOPROL-XL) 24 hr tablet 50 mg  50 mg Oral Daily Marco Antonio Cardoza MD        mycophenolate (CELLCEPT) capsule 750 mg  750 mg Oral BID Garfield Fierro MD   750 mg at 03/01/20 4992    nystatin (MYCOSTATIN) powder   Topical BID Garfield Fierro MD        oxyCODONE (ROXICODONE) IR tablet 5 mg  5 mg Oral Q6H PRN Garfield Fierro MD   5 mg at 02/29/20 2846    polyethylene glycol (MIRALAX) packet 17 g  17 g Oral Daily PRN Garfield Fierro MD        warfarin (COUMADIN) tablet 5 mg  5 mg Oral Daily (warfarin) SIMONA Anderson   5 mg at 02/29/20 1814       Vitals: /76   Pulse 100   Temp 97 9 °F (36 6 °C)   Resp 19   Ht 5' 9" (1 753 m)   Wt 76 1 kg (167 lb 12 3 oz)   SpO2 92%   BMI 24 78 kg/m²     Intake/Output Summary (Last 24 hours) at 3/1/2020 1103  Last data filed at 3/1/2020 0729  Gross per 24 hour   Intake 780 ml   Output 975 ml   Net -195 ml     Wt Readings from Last 3 Encounters:   03/01/20 76 1 kg (167 lb 12 3 oz)   02/27/20 83 3 kg (183 lb 10 3 oz)   02/11/20 78 6 kg (173 lb 4 5 oz)       Body mass index is 24 78 kg/m²  ,     Vitals:    02/29/20 1510 02/29/20 1912 02/29/20 2150 03/01/20 0729   BP: 97/64 96/65 97/67 101/76   Pulse: 103 (!) 107 104 100   Patient Position - Orthostatic VS:           Physical Exam:     GEN: Awake, alert, in no acute distress  HEENT: Sclera anicteric, conjunctivae pink, mucous membranes moist   NECK: Supple, no carotid bruits, no significant JVD  HEART: Regular rhythm, normal S1 and S2, no murmurs, clicks, gallops or rubs  LUNGS: Clear but distant to auscultation bilaterally; no wheezes, rales, or rhonchi   ABDOMEN: Soft, nontender, nondistended, normoactive bowel sounds  EXTREMITIES: Skin warm and well perfused, no clubbing, cyanosis, or edema  NEURO: No focal findings  SKIN: Normal without suspicious lesions on exposed skin        Lab Results:     BMP:  Results from last 7 days   Lab Units 03/01/20  0448 02/29/20  5405 02/28/20  1497 02/27/20  0406 02/26/20  2419 02/25/20  0541 02/24/20  0532   POTASSIUM mmol/L 3 7 3 9 4 2 4 3 4 1 4 2 4 2   CHLORIDE mmol/L 102 101 102 101 99* 99* 100   CO2 mmol/L 30 28 31 34* 35* 34* 36*   BUN mg/dL 32* 30* 28* 25 28* 31* 37*   CREATININE mg/dL 1 14 1 06 1 10 1 26 1 24 1 22 1 32*   CALCIUM mg/dL 8 4 8 4 8 5 8 3 8 1* 8 1* 7 9*       CBC:   Results from last 7 days   Lab Units 02/28/20  1208 02/27/20  0406 02/26/20  0525   WBC Thousand/uL 7 78 8 02 9 04   HEMOGLOBIN g/dL 12 6 13 0 13 1   HEMATOCRIT % 41 9 43 6 43 1   MCV fL 93 94 94   PLATELETS Thousands/uL 165 170 196   MCH pg 28 1 28 1 28 4   MCHC g/dL 30 1* 29 8* 30 4*   RDW % 15 5* 15 6* 15 6*   MPV fL 9 9 9 7 10 2        Results from last 7 days   Lab Units 02/27/20  0406 02/24/20  0532 02/23/20  1813   MAGNESIUM mg/dL 2 0 2 5 1 9       INR:   Results from last 7 days   Lab Units 03/01/20  0448 02/28/20  1208   INR  1 10 1 34*       Lipid Profile:   Lab Results   Component Value Date    CHOL 219 01/08/2016    CHOL 132 01/14/2014     Lab Results   Component Value Date    HDL 23 (L) 02/21/2020    HDL 49 04/23/2018     01/08/2016     Lab Results   Component Value Date    LDLCALC 53 02/21/2020    LDLCALC 57 04/23/2018    LDLCALC 89 01/08/2016     Lab Results   Component Value Date    TRIG 97 02/21/2020    TRIG 77 04/23/2018    TRIG 54 01/08/2016         Hgb A1c:         Telemetry: personally reviewed, SR, mild sinus tachycardia, 3-5 beats NSVT

## 2020-03-01 NOTE — PROGRESS NOTES
[address] Internal Medicine    Progress Note April Perla 1945, 75 y o  male MRN: [id number]    [facility] Encounter: [id number]    Primary Care Provider: Suri Ogden MD   Date and time admitted to hospital: 2/27/2020  4:17 PM        * Acute on chronic combined systolic and diastolic heart failure [facility])  Assessment & Plan  Wt Readings from Last 3 Encounters:   03/01/20 76 1 kg (167 lb 12 3 oz)   02/27/20 83 3 kg (183 lb 10 3 oz)   02/11/20 78 6 kg (173 lb 4 5 oz)     · Patient admitted initially to the [facility] with shortness of breath and lower extremity edema as well as weight gain  He was diuresed with intravenous Lasix  He underwent cardiac catheterization which showed triple-vessel disease and was transferred to [facility] for CT surgery evaluation  · Cardiology continues to follow here  Now on oral Lasix 40 mg p o  B i d   · Started on Isordil 10 mg p o  T i d 2/28 and Metoprolol Succinate 2/29  · EF 15%  May need LifeVest on D/C  · Will continue to monitor weights, I&Os  · Low-sodium diet  · Monitor volume status  · PT/OT recommending rehab  CM following  Multiple vessel coronary artery disease  Assessment & Plan  · Cardiac catheterization 02/26/2020 with multivessel disease  Transferred to [place] for CT surgery evaluation however patient deemed to not be surgical candidate  · Continue aspirin, statin  Started on BB  · There has been some discussion regarding high risk PCI to left main  Cardiac catheterization images will need to be reviewed by interventional cardiologist tomorrow per my discussion with Dr Mine Yadav  This would likely not be done this admission but as outpatient  LV (left ventricular) mural thrombus  Assessment & Plan  · Currently on IV heparin bridge to coumadin  Coumadin started 2/29  INR 1 10 today, continue 5 mg daily  · INR In AM    CKD (chronic kidney disease)  Assessment & Plan  · Creatinine 1 14 today    · Avoid nephrotoxins and hypotension  · Monitor closely    Essential hypertension  Assessment & Plan  · Patient with documented history of hypertension however now with soft blood pressures  · Monitor closely    Gastrocutaneous fistula  Assessment & Plan  · Gastrocutaneous fistula  · Patient has history of diverticulitis complicated by perforation status post colostomy  · Outpatient follow up      H/O myasthenia gravis  Assessment & Plan  · Noted    Hyperlipidemia  Assessment & Plan  · statin    Urinary obstruction  Assessment & Plan  · Urinary obstruction  · Secondary to large right inguinal hernia with hydronephrosis and urethral obstruction  · Plan for cameron exchange 3/12/2020 with urology in the office  · Cameron in place now      Right inguinal hernia  Assessment & Plan  · Right inguinal hernia  · Outpatient follow up, no current intervention        Pharmacologic: Heparin Drip/ Coumadin  Mechanical VTE Prophylaxis in Place: Yes    Patient Centered Rounds: I have performed bedside rounds with nursing staff today  Discussions with Specialists or Other Care Team Provider:  Nursing    Education and Discussions with Family / Patient:  Patient    Time Spent for Care: 30 minutes  More than 50% of total time spent on counseling and coordination of care as described above  Current Length of Stay: 3 day(s)    Current Patient Status: Inpatient   Certification Statement: The patient will continue to require additional inpatient hospital stay due to Await therapeutic INR, cardiology clearance, discharge planning    Discharge Plan / Estimated Discharge Date:  Await therapeutic INR, cardiology clearance, discharge planning--? Rehab  Code Status: Level 1 - Full Code      Subjective:   Patient offers no acute complaints  No chest pain, shortness of breath or palpitations  No lightheadedness or dizziness      Objective:     Vitals:   Temp (24hrs), Av 9 °F (36 6 °C), Min:97 8 °F (36 6 °C), Max:98 °F (36 7 °C)    Temp:  [97 8 °F (36 6 °C)-98 °F (36 7 °C)] 97 9 °F (36 6 °C)  HR:  [100-107] 100  Resp:  [19] 19  BP: ()/(64-76) 101/76  SpO2:  [92 %-97 %] 92 %  Body mass index is 24 78 kg/m²  Input and Output Summary (last 24 hours): Intake/Output Summary (Last 24 hours) at 3/1/2020 1104  Last data filed at 3/1/2020 0729  Gross per 24 hour   Intake 780 ml   Output 975 ml   Net -195 ml       Physical Exam:     Physical Exam   Constitutional: He is oriented to person, place, and time  No distress  HENT:   Head: Normocephalic  Eyes: Conjunctivae are normal    Neck: Normal range of motion  Cardiovascular: Normal rate  Murmur heard  Pulmonary/Chest: Breath sounds normal    Abdominal: Bowel sounds are normal    Ostomy noted   Genitourinary:   Genitourinary Comments: Lawrence catheter noted   Musculoskeletal: Normal range of motion  He exhibits no edema  Neurological: He is alert and oriented to person, place, and time  Skin: Skin is warm  Psychiatric: He has a normal mood and affect  Nursing note and vitals reviewed        Additional Data:     Labs:    Results from last 7 days   Lab Units 02/28/20  1208   WBC Thousand/uL 7 78   HEMOGLOBIN g/dL 12 6   HEMATOCRIT % 41 9   PLATELETS Thousands/uL 165     Results from last 7 days   Lab Units 03/01/20  0448   POTASSIUM mmol/L 3 7   CHLORIDE mmol/L 102   CO2 mmol/L 30   BUN mg/dL 32*   CREATININE mg/dL 1 14   CALCIUM mg/dL 8 4     Results from last 7 days   Lab Units 03/01/20  0448   INR  1 10         Recent Cultures (last 7 days):           Last 24 Hours Medication List:     Current Facility-Administered Medications:  acetaminophen 650 mg Oral Q6H PRN Hal Carrillo MD    aspirin 81 mg Oral Daily Noe Swann MD    atorvastatin 80 mg Oral HS Hal Carrillo MD    belladonna-opium 30 mg Rectal Q8H PRN Hal Carrillo MD    docusate sodium 100 mg Oral BID Hal Carrillo MD    furosemide 40 mg Oral BID (diuretic) Noe Swann MD    heparin (porcine) 3-20 Units/kg/hr (Order-Specific) Intravenous Titrated Marcelo Pappas PA-C Last Rate: 20 Units/kg/hr (03/01/20 0804)   heparin (porcine) 2,000 Units Intravenous PRN Marcelo Pappas PA-C    heparin (porcine) 4,000 Units Intravenous PRN Marcelo Pappas PA-C    isosorbide dinitrate 10 mg Oral TID after meals Aide Ritchie MD    levothyroxine 50 mcg Oral Early Morning Adrian Ash MD    melatonin 3 mg Oral HS PRN Yovany Maguire PA-C    metoprolol succinate 25 mg Oral Once MD Terra Clark ON 3/2/2020] metoprolol succinate 50 mg Oral Daily Aide Ritchie MD    mycophenolate 750 mg Oral BID Adrian Ash MD    nystatin  Topical BID Adrian Ash MD    oxyCODONE 5 mg Oral Q6H PRN Adrian Ash MD    polyethylene glycol 17 g Oral Daily PRN Adrian Ash MD    warfarin 5 mg Oral Daily (warfarin) SIMONA Barron         Today, Patient Was Seen By: SIMONA Matos    ** Please Note: Dragon 360 Dictation voice to text software may have been used in the creation of this document   **

## 2020-03-01 NOTE — PLAN OF CARE
Problem: PAIN - ADULT  Goal: Verbalizes/displays adequate comfort level or baseline comfort level  Description  Interventions:  - Encourage patient to monitor pain and request assistance  - Assess pain using appropriate pain scale  - Administer analgesics based on type and severity of pain and evaluate response  - Implement non-pharmacological measures as appropriate and evaluate response  - Consider cultural and social influences on pain and pain management  - Notify physician/advanced practitioner if interventions unsuccessful or patient reports new pain  Outcome: Progressing     Problem: DISCHARGE PLANNING  Goal: Discharge to home or other facility with appropriate resources  Description  INTERVENTIONS:  - Identify barriers to discharge w/patient and caregiver  - Arrange for needed discharge resources and transportation as appropriate  - Identify discharge learning needs (meds, wound care, etc )  - Arrange for interpretive services to assist at discharge as needed  - Refer to Case Management Department for coordinating discharge planning if the patient needs post-hospital services based on physician/advanced practitioner order or complex needs related to functional status, cognitive ability, or social support system  Outcome: Progressing     Problem: Knowledge Deficit  Goal: Patient/family/caregiver demonstrates understanding of disease process, treatment plan, medications, and discharge instructions  Description  Complete learning assessment and assess knowledge base    Interventions:  - Provide teaching at level of understanding  - Provide teaching via preferred learning methods  Outcome: Progressing     Problem: CARDIOVASCULAR - ADULT  Goal: Maintains optimal cardiac output and hemodynamic stability  Description  INTERVENTIONS:  - Monitor I/O, vital signs and rhythm  - Monitor for S/S and trends of decreased cardiac output  - Administer and titrate ordered vasoactive medications to optimize hemodynamic stability  - Assess quality of pulses, skin color and temperature  - Assess for signs of decreased coronary artery perfusion  - Instruct patient to report change in severity of symptoms  Outcome: Progressing  Goal: Absence of cardiac dysrhythmias or at baseline rhythm  Description  INTERVENTIONS:  - Continuous cardiac monitoring, vital signs, obtain 12 lead EKG if ordered  - Administer antiarrhythmic and heart rate control medications as ordered  - Monitor electrolytes and administer replacement therapy as ordered  Outcome: Progressing     Problem: GENITOURINARY - ADULT  Goal: Urinary catheter remains patent  Description  INTERVENTIONS:  - Assess patency of urinary catheter  - If patient has a chronic cameron, consider changing catheter if non-functioning  - Follow guidelines for intermittent irrigation of non-functioning urinary catheter  Outcome: Progressing     Problem: SKIN/TISSUE INTEGRITY - ADULT  Goal: Skin integrity remains intact  Description  INTERVENTIONS  - Identify patients at risk for skin breakdown  - Assess and monitor skin integrity  - Assess and monitor nutrition and hydration status  - Monitor labs (i e  albumin)  - Assess for incontinence   - Turn and reposition patient  - Assist with mobility/ambulation  - Relieve pressure over bony prominences  - Avoid friction and shearing  - Provide appropriate hygiene as needed including keeping skin clean and dry  - Evaluate need for skin moisturizer/barrier cream  - Collaborate with interdisciplinary team (i e  Nutrition, Rehabilitation, etc )   - Patient/family teaching  Outcome: Progressing     Problem: Prexisting or High Potential for Compromised Skin Integrity  Goal: Skin integrity is maintained or improved  Description  INTERVENTIONS:  - Identify patients at risk for skin breakdown  - Assess and monitor skin integrity  - Assess and monitor nutrition and hydration status  - Monitor labs   - Assess for incontinence   - Turn and reposition patient  - Assist with mobility/ambulation  - Relieve pressure over bony prominences  - Avoid friction and shearing  - Provide appropriate hygiene as needed including keeping skin clean and dry  - Evaluate need for skin moisturizer/barrier cream  - Collaborate with interdisciplinary team   - Patient/family teaching  - Consider wound care consult   Outcome: Progressing

## 2020-03-01 NOTE — ASSESSMENT & PLAN NOTE
· Currently on IV heparin bridge to coumadin  Coumadin started 2/29   INR 1 10 today, continue 5 mg daily  · INR In AM

## 2020-03-02 NOTE — PROGRESS NOTES
Advanced Heart Failure/Pulmonary Hypertension Consult Note - Yeimi Kaye 75 y o  male MRN: [id number]    Unit/Bed#: [facility] 5-12 Encounter: [id number]      Assessment:    Principal Problem:    Acute on chronic combined systolic and diastolic heart failure (HCC)  Active Problems:    Multiple vessel coronary artery disease    Hyperlipidemia    Essential hypertension    Gastrocutaneous fistula    CKD (chronic kidney disease)    H/O myasthenia gravis    Right inguinal hernia    Urinary obstruction    LV (left ventricular) mural thrombus      Plan: 1  Acute on chronic HFrEF, LVEF 15%, LVIDd 5 3 cm, NYHA Class II/III, ACC/AHA stage C: Etiology: iCM given multivessel CAD  Possible contributions from uncontrolled HTN over the years per notes  He is deemed a poor candidate for CABG given multiple comorbidities but may undergo future high risk PCI of the Left Main CA, will likely require Impella assistance  For now will continue to optimize HF medical therapy  Will stop isordil and start low dose ACEi as renal function appears to have stabilized  Continue daily weights, strict intake and output charting, daily BMP  Clinically warm, volume status improved however still somewhat tachy and hypotensive so likely still borderline low output  MAP stable  Continue BB, diuretic at same dose  Consider MRA if renal function remains stable  Life Vest on discharge  NT Pro BNP 80 K   -Nutrition consult for CHF diet teaching    TTE 2/20/20: LVEF 15%, LVIDd 5 3 cm, LV apical thrombus, moderate MR, moderate AS, mild TR, RV normal,   TTE 6/2017: LVEF 50%, LVIDd 4 1 cm, moderate MR, RV ok, trace TR, PASP 25 mmHtg,   TTE 2014, LVEF 65%    cMRI 2/26/20:  Impression:  1  Mildly dilated left ventricle with severely reduced left ventricular systolic function  2  Normal right ventricular size with mildly reduced right ventricular systolic function  3  Likely mild mitral regurgitation    4  Subendocardial scarring of the mid to distal lateral, inferior, and septal walls  This suggests moderate myocardial viability in these territories  The anterior wall is completely viable       [facility] 2/26/20:   --  LAD: The vessel was medium sized and heavily calcified  There are L to R collaterals to the distal RCA system  --  Proximal LAD: There was a tubular 50 % stenosis  The lesion was irregularly contoured  --  Mid LAD: There was a discrete 80 % stenosis  --  Distal LAD: There was a discrete 50 % stenosis  --  1st diagonal: The vessel was small sized  --  Circumflex: The vessel was medium sized and heavily calcified  --  1st obtuse marginal: There was a tubular 90 % stenosis in the proximal third of the vessel segment  --  RCA: The vessel was medium sized and heavily calcified  The vessel is 100 % occluded in the mid segment   There are R to R collaterals to the distal RCA      The PDA is a medium to large sized vessel which is graftable      IMPRESSIONS:  There is significant triple vessel coronary artery disease  2  Multivessel CAD  See above  Continue BB, ASA, statin  3  ANDREA on CKD  Resolved  4  HLD  5  HTN     HPI:   Inpatient cardiology consultation, SIMONA Rosenthal: 2/20/20     "Ace Cruz 75y o  year old male with a medical history of CAD/prior MI and remote stenting (20 yrs ago), ischemic cardiomyopathy (most recent 2D echo 2017--LVEF 50%, previously 45% in 2013), hypertension, dyslipidemia, morbid obesity, myasthenic gravis, and chronic colostomy     He previously followed with Dr Xavier, however has not been formally seen as an outpatient since May of 2017       Patient was recently hospitalized at the [facility] from 2/11 through 2/17/2020 with severe sepsis in the setting of UTI with subsequent development of ANDREA (creatinine 2 16 on admission but did bump to 2 74 during his hospitalization)  He was initiated on IV antibiotics and intravenous fluids    He did undergo a CT scan which demonstrated a markedly enlarged prostate with possible compression of the urethra by a large right-sided inguinal hernia  General surgery had deemed the patient to not be a surgical candidate secondary to his comorbid conditions  Urology was consulted at that time and recommended Lawrence catheter placement long-term  He was also found to be bacteremic with blood cultures positive for E coli  ID was consulted at this point in time for further management of IV antibiotic therapies  During the patient's hospitalization he was reported to have complaints of chest pain and was transferred to the telemetry unit, however cardiology was not consulted at this time  Reviewing a 12 lead ECG from 2/12/2020, patient did have anterior/lateral ST T-wave abnormalities  Patient did have persistent issues with hypotension during this hospital stay and was taken off of all his oral antihypertensive medications including oral diuretic on discharge  His discharge weight on 2/17/2020 was 173 lbs      He presented to the [facility] on 2/2020 with complaints of progressively worsening shortness of breath and significant weight gain over the past 2-3 days "    From there, patient was then transferred to the ICU as appeared to be in low output heart failure based on noninvasive assessment  Also found to have LV thrombus  Was started on a Lasix gtt followed by Milrinone gtt to facilitate better diuresis  Appeared to have good response to this  Subsequently underwent coronary angiogram which demonstrated severe 3VD, but with good targets followed by cardiac MRI which demonstrated subendocardial scarring of the basal to distal lateral, inferior, and septal walls, suggestive of moderate viability in these territories, with the anterior wall being completely viable  From there was transferred to the [facility] for CTS evaluation   Appears he was weaned off Milrinone prior to transfer but not started on any other cardiac medications due to hypotension       Was seen by CT surgery on 2/27/20  Due to multiple co-morbidities as well as poor functional status/worsening heart failure, was deemed a poor candidate for surgical intervention  Medical management was recommended  Patient expressed interest in undergoing possible high risk Left Main PCI which was discussed and to be reviewed by the interventional cardiologist  After [email or web address] was initiated on low dose  HF GDMT  Our team has been asked to weigh in on treatment options and further optimization  In discussion with patient today, he tells us that years ago he was offered CABG but was admittedly too scared to undergo the surgery  He had followed with Dr Krys Ordonez as an outpatient but ultimately stopped going to appointments as he did not feel the need to go  States at no point did he stop taking his medications  He is understanding at this point that he is not a surgical candidate due to his other illnesses               Past Medical History:   Diagnosis Date    Cardiac disease     Carotid stenosis     [facility]     Compression fracture of lumbar vertebra ([facility]     Coronary artery disease     Disease of thyroid gland     Diverticulitis     Hernia, diaphragmatic, without obstruction     Hyperlipidemia     Hypertension     Inguinal hernia     Right    Ischemic cardiomyopathy     MI [facility]myocardial infarction) ([facility]     Myasthenia gravis ([facility] [address] )        12 point ROS negative other than that stated in HPI    No Known Allergies      Current Facility-Administered Medications:     acetaminophen (TYLENOL) tablet 650 mg, 650 mg, Oral, Q6H PRN, Garfield Fierro MD    aspirin (ECOTRIN LOW STRENGTH) EC tablet 81 mg, 81 mg, Oral, Daily, Marco Antonio Cardoza MD, 81 mg at 03/02/20 0848    atorvastatin (LIPITOR) tablet 80 mg, 80 mg, Oral, HS, Garfield Fierro MD, 80 mg at 03/01/20 2228    belladonna-opium (B&O SUPPOSITORY) 16 2-30 mg suppository 1 suppository, 30 mg, Rectal, Q8H PRN, Latasha Osborne MD    docusate sodium (COLACE) capsule 100 mg, 100 mg, Oral, BID, Latasha Osborne MD, 100 mg at 02/28/20 1719    furosemide (LASIX) tablet 40 mg, 40 mg, Oral, BID (diuretic), Celestine Bronson MD, 40 mg at 03/02/20 0848    heparin (porcine) 25,000 units in 250 mL infusion (premix), 3-20 Units/kg/hr (Order-Specific), Intravenous, Titrated, June Mcduffie PA-C, Last Rate: 13 5 mL/hr at 03/02/20 0154, 18 Units/kg/hr at 03/02/20 0154    heparin (porcine) injection 2,000 Units, 2,000 Units, Intravenous, PRN, June Mcduffie PA-C, 2,000 Units at 02/29/20 0249    heparin (porcine) injection 4,000 Units, 4,000 Units, Intravenous, PRN, June Mcduffie PA-C, 4,000 Units at 03/01/20 0801    isosorbide dinitrate (ISORDIL) tablet 10 mg, 10 mg, Oral, TID after meals, Celestine Bronson MD, 10 mg at 03/02/20 0847    levothyroxine tablet 50 mcg, 50 mcg, Oral, Early Morning, Latasha Osborne MD, 50 mcg at 03/02/20 0521    melatonin tablet 3 mg, 3 mg, Oral, HS PRN, Belkis Forrest PA-C, 3 mg at 02/28/20 2359    metoprolol succinate (TOPROL-XL) 24 hr tablet 50 mg, 50 mg, Oral, Daily, Celestine Bronson MD, 50 mg at 03/02/20 0847    mycophenolate (CELLCEPT) capsule 750 mg, 750 mg, Oral, BID, Latasha Osborne MD, 750 mg at 03/02/20 0845    nystatin (MYCOSTATIN) powder, , Topical, BID, Latasha Osborne MD    oxyCODONE (ROXICODONE) IR tablet 5 mg, 5 mg, Oral, Q6H PRN, Latasha Osborne MD, 5 mg at 02/29/20 0702    polyethylene glycol (MIRALAX) packet 17 g, 17 g, Oral, Daily PRN, Latasha Osborne MD    warfarin (COUMADIN) tablet 5 mg, 5 mg, Oral, Daily (warfarin), SIMONA Levine, 5 mg at 03/01/20 1811    Social History     Socioeconomic History    Marital status: /Civil Union     Spouse name: Not on file    Number of children: Not on file    Years of education: Not on file    Highest education level: Not on file   Occupational History    Not on file Social Needs    Financial resource strain: Not on file    Food insecurity:     Worry: Not on file     Inability: Not on file    Transportation needs:     Medical: Not on file     Non-medical: Not on file   Tobacco Use    Smoking status: Former Smoker     Last attempt to quit: 1973     Years since quittin 3    Smokeless tobacco: Never Used   Substance and Sexual Activity    Alcohol use: Yes     Comment: 1 per month    Drug use: No    Sexual activity: Not on file   Lifestyle    Physical activity:     Days per week: Not on file     Minutes per session: Not on file    Stress: Not on file   Relationships    Social connections:     Talks on phone: Not on file     Gets together: Not on file     Attends Alevism service: Not on file     Active member of club or organization: Not on file     Attends meetings of clubs or organizations: Not on file     Relationship status: Not on file    Intimate partner violence:     Fear of current or ex partner: Not on file     Emotionally abused: Not on file     Physically abused: Not on file     Forced sexual activity: Not on file   Other Topics Concern    Not on file   Social History Narrative    Not on file       Family History   Problem Relation Age of Onset    Heart disease Mother     Hypertension Mother        Physical Exam:  Central Line (day, reason): Lawrence catheter (day, reason):    Vitals: Blood pressure 92/63, pulse 95, temperature 97 7 °F (36 5 °C), resp  rate 19, height 5' 9" (1 753 m), weight 75 5 kg (166 lb 7 2 oz), SpO2 93 %  , Body mass index is 24 58 kg/m² , I/O last 3 completed shifts:   In: 390 [P O :390]  Out: 1200 [Urine:1000; Stool:200]  I/O this shift:  In: 180 [P O :180]  Out: -   Wt Readings from Last 3 Encounters:   20 75 5 kg (166 lb 7 2 oz)   20 83 3 kg (183 lb 10 3 oz)   20 78 6 kg (173 lb 4 5 oz)       Intake/Output Summary (Last 24 hours) at 3/2/2020 1332  Last data filed at 3/2/2020 1001  Gross per 24 hour Intake 180 ml   Output 200 ml   Net -20 ml     I/O last 3 completed shifts: In: 390 [P O :390]  Out: 1200 [Urine:1000; Stool:200]    SR-ST with NSVT>     Vitals:    03/02/20 0600 03/02/20 0723 03/02/20 0724 03/02/20 1206   BP:  91/62 91/62 92/63   Pulse:  90 91 95   Resp:  18  19   Temp:  97 7 °F (36 5 °C) 97 7 °F (36 5 °C) 97 7 °F (36 5 °C)   TempSrc:       SpO2:  95% 95% 93%   Weight: 75 5 kg (166 lb 7 2 oz)      Height:           GEN: Felicitas Horowitz appears well, alert and oriented x 3, pleasant and cooperative   HEENT: pupils equal, round, and reactive to light; extraocular muscles intact  NECK: supple, no carotid bruits   HEART: regular rhythm, normal S1 and S2, no murmurs, clicks, gallops or rubs, JVP is down  LUNGS: clear to auscultation bilaterally; no wheezes, rales, or rhonchi   ABDOMEN: normal bowel sounds, soft, no tenderness, no distention  EXTREMITIES: warm,  peripheral pulses normal; no clubbing, cyanosis, or edema  NEURO: no focal findings   SKIN: normal without suspicious lesions on exposed skin    Labs & Results:        Results from last 7 days   Lab Units 02/28/20  1208 02/27/20  0406 02/26/20  0525   WBC Thousand/uL 7 78 8 02 9 04   HEMOGLOBIN g/dL 12 6 13 0 13 1   HEMATOCRIT % 41 9 43 6 43 1   PLATELETS Thousands/uL 165 170 196         Results from last 7 days   Lab Units 03/02/20  0841 03/01/20  0448 02/29/20  0756   POTASSIUM mmol/L 4 1 3 7 3 9   CHLORIDE mmol/L 103 102 101   CO2 mmol/L 27 30 28   BUN mg/dL 31* 32* 30*   CREATININE mg/dL 1 19 1 14 1 06   CALCIUM mg/dL 8 6 8 4 8 4     Results from last 7 days   Lab Units 03/02/20  0841 03/01/20  0448 02/28/20  1208   INR  1 14 1 10 1 34*       Chest X-Ray is obtained; result -reviewed    EKG personally reviewed by SIMONA Cason  Counseling / Coordination of Care  Total floor / unit time spent today 40 minutes  Greater than 50% of total time was spent with the patient and / or family counseling and / or coordination of care    A description of the counseling / coordination of care: 20  Thank you for the opportunity to participate in the care of this patient  Libby Rubio

## 2020-03-02 NOTE — OCCUPATIONAL THERAPY NOTE
Occupational Therapy Cancel Note:    Occupational therapy attempted however pt nurse reported pt not appropriate at this time 2* pt on BiPAP, lethargic and short of breath at this time  OT will attempt again next treatment       Kathie Padron

## 2020-03-02 NOTE — SOCIAL WORK
A post acute care recommendation was made by your care team for STR  Discussed Freedom of Choice with patient  List of facilities given to patient via in person  patient aware the list is custom filtered for them by zip code location and that [facility] post acute providers are designated  Pt is refusing rehab at this time  Provided list for pt to review  CM to follow up  Update 1125: S/w Dr Bernie Abrams from [facility] for care coordination  Aware pt refusing rehab at this time  CM to follow up for safe dc plan  Awaiting for wife to come  Pt not medically cleared for dc & will need at least another 48 hrs

## 2020-03-02 NOTE — SOCIAL WORK
Pt's wife here & reviewed rehab list with pt & wife, [facility]anda[facility]de & discussed rehab with them  Pt is continuing to refuse rehab at this time & wants CM to f/u tomorrow to see how he progresses with therapy tomorrow & does not want any referrals just yet      Messages sent to PT& OT to see pt in am  Cm to follow up after for plan   Per wife, she liked [facility] (near their home) & [id number]

## 2020-03-02 NOTE — PROGRESS NOTES
Cardiology Progress Note - Elida Norman 75 y o  male MRN: [id number]    Unit/Bed#: [facility] 702-01 Encounter: [id number]  Assessment and plan  1  Multivessel coronary disease  2  Ischemic cardiomyopathy LVEF 15%  3  Acute on chronic combined systolic and diastolic congestive heart failure  4  Acute kidney injury on CKD 3  5  LV apical thrombus  6  Hyperlipidemia    Recommendations: At this point time a ongoing medical therapy has been recommended he is not a open heart surgical candidate  We can consider PCI in the future however would like to try to improve him from a heart failure standpoint before considering will review films with Interventional Cardiology  Continue Toprol XL 50 mg daily  Isordil was added yesterday for afterload reduction  He is warm and well perfused on exam   He was diuresed with the aid of Milrinone prior to coming to this [place]  The heart failure service has been contacted will await their recommendations for any further medical management  Will need a life vest on discharge if he is agreeable currently weighing his options  Subjective:    No significant events overnight  Resting comfortably denies chest pain or dyspnea no significant lower extremity edema  Overall significantly improved from admission    ROS    Objective:   Vitals: Blood pressure 91/62, pulse 91, temperature 97 7 °F (36 5 °C), resp  rate 18, height 5' 9" (1 753 m), weight 75 5 kg (166 lb 7 2 oz), SpO2 95 %  , Body mass index is 24 58 kg/m² , Orthostatic Blood Pressures      Most Recent Value   Blood Pressure  91/62 filed at 03/02/2020 0724   Patient Position - Orthostatic VS  Lying filed at 02/28/2020 0519         Systolic (54WFT), TDJ:72 , Min:90 , TPL:14     Diastolic (29JVY), IEL:31, Min:62, Max:74      Intake/Output Summary (Last 24 hours) at 3/2/2020 0949  Last data filed at 3/2/2020 0628  Gross per 24 hour   Intake 240 ml   Output 575 ml   Net -335 ml     Weight (last 2 days)     Date/Time   Weight 03/02/20 0600   75 5 (166 45)    03/01/20 0600   76 1 (167 77)    02/29/20 0600   76 8 (169 31)                Telemetry Review: No significant arrhythmias seen on telemetry review  EKG personally reviewed by Gladys Barcenas DO  Physical Exam   Constitutional: He is oriented to person, place, and time  He appears well-developed and well-nourished  No distress  HENT:   Head: Normocephalic and atraumatic  Eyes: Pupils are equal, round, and reactive to light  Conjunctivae are normal    Neck: Neck supple  Cardiovascular: Normal rate, regular rhythm and normal heart sounds  Exam reveals no friction rub  No murmur heard  Pulmonary/Chest: Effort normal and breath sounds normal  No respiratory distress  He has no wheezes  He has no rales  Abdominal: Soft  Bowel sounds are normal  He exhibits no distension  There is no tenderness  There is no rebound  Musculoskeletal: Normal range of motion  He exhibits no edema, tenderness or deformity  Neurological: He is alert and oriented to person, place, and time  No cranial nerve deficit  Skin: Skin is warm and dry  No erythema  Psychiatric: He has a normal mood and affect  Nursing note and vitals reviewed          Laboratory Results:        CBC with diff: Results from last 7 days   Lab Units 02/28/20  1208 02/27/20  0406 02/26/20  0525   WBC Thousand/uL 7 78 8 02 9 04   HEMOGLOBIN g/dL 12 6 13 0 13 1   HEMATOCRIT % 41 9 43 6 43 1   MCV fL 93 94 94   PLATELETS Thousands/uL 165 170 196   MCH pg 28 1 28 1 28 4   MCHC g/dL 30 1* 29 8* 30 4*   RDW % 15 5* 15 6* 15 6*   MPV fL 9 9 9 7 10 2         CMP:  Results from last 7 days   Lab Units 03/02/20  0841 03/01/20  0448 02/29/20  0756 02/28/20  0509 02/27/20  0406 02/26/20  0525 02/25/20  0541   POTASSIUM mmol/L 4 1 3 7 3 9 4 2 4 3 4 1 4 2   CHLORIDE mmol/L 103 102 101 102 101 99* 99*   CO2 mmol/L 27 30 28 31 34* 35* 34*   BUN mg/dL 31* 32* 30* 28* 25 28* 31*   CREATININE mg/dL 1 19 1 14 1 06 1 10 1 26 1 24 1  22   CALCIUM mg/dL 8 6 8 4 8 4 8 5 8 3 8 1* 8 1*   EGFR ml/min/1 73sq m 59 63 68 65 55 57 58         BMP:  Results from last 7 days   Lab Units 20  0841 20  0448 20  0756 20  0509 20  0406 20  0525 20  0541   POTASSIUM mmol/L 4 1 3 7 3 9 4 2 4 3 4 1 4 2   CHLORIDE mmol/L 103 102 101 102 101 99* 99*   CO2 mmol/L 27 30 28 31 34* 35* 34*   BUN mg/dL 31* 32* 30* 28* 25 28* 31*   CREATININE mg/dL 1 19 1 14 1 06 1 10 1 26 1 24 1 22   CALCIUM mg/dL 8 6 8 4 8 4 8 5 8 3 8 1* 8 1*       BNP: No results for input(s): BNP in the last 72 hours  Magnesium:   Results from last 7 days   Lab Units 20  0406   MAGNESIUM mg/dL 2 0       Coags:   Results from last 7 days   Lab Units 20  0841 20  0110 20  1740 20  0449 20  0448 20  1433 20  0756 20  0154  20  1208   PTT seconds 89* 91* 90* 41*  --  67* 73* 59*   < > 35   INR  1 14  --   --   --  1 10  --   --   --   --  1 34*    < > = values in this interval not displayed  TSH:        Hemoglobin A1C       Lipid Profile:       Cardiac testing:   Results for orders placed during the hospital encounter of 20   Echo complete with contrast if indicated    Narrative [facility], [address] [facility]  [phone]    Transthoracic Echocardiogram  2D, M-mode, Doppler, and Color Doppler    Study date:  2020    Patient: Slime Sarabia  MR number: [id number]  Account number: [de-identified]  : 1945  Age: 75 years  Gender: Male  Status: Inpatient  Location: Bedside  Height: 69 in  Weight: 196 lb  BP: 90/ 55 mmHg    Indications: Assess left ventricular function      Diagnoses: I50 9 - Heart failure, unspecified    Sonographer:  Sierra Salmon RDCS  Primary Physician:  Cristina Desouza MD  Referring Physician:  Bessy Sawyer DO  Group:  [id number]  Interpreting Physician:  Bessy Sawyer DO    SUMMARY    LEFT VENTRICLE:  Systolic function was severely reduced  Ejection fraction was estimated to be 15 %  There was severe diffuse hypokinesis with regional variations  Wall thickness was mildly increased  Doppler parameters were consistent with high ventricular filling pressure  There was a definite, medium-sized mass on the apical wall  It represents a thrombus  LEFT ATRIUM:  The atrium was mildly dilated  MITRAL VALVE:  There was moderate regurgitation  AORTIC VALVE:  The valve was trileaflet  Leaflets exhibited moderately increased thickness and moderately reduced cuspal separation  There was moderate stenosis  Low LV stroke volume  Estimated aortic valve area (by VTI) was 1 04 cmï¾²  Estimated aortic valve area (by Vmax) was 0 91 cmï¾²  Doppler stroke volume was 21 99 ml  Doppler cardiac output was 2 33 L/min, using LVOT flow data  Doppler cardiac index was 1 14 L/min/mï¾², using LVOT flow data  TRICUSPID VALVE:  There was mild regurgitation  PERICARDIUM:  There was a left pleural effusion  HISTORY: PRIOR HISTORY: CAD, hyperlipidemia    PROCEDURE: The procedure was performed at the bedside  This was a routine study  The transthoracic approach was used  The study included complete 2D imaging, M-mode, complete spectral Doppler, and color Doppler  The heart rate was 125 bpm,  at the start of the study  Images were obtained from the parasternal, apical, subcostal, and suprasternal notch acoustic windows  Intravenous contrast ( 0 4 ml definity in NSS) was administered to opacify the left ventricle  This was a  technically difficult study  LEFT VENTRICLE: Size was normal  Systolic function was severely reduced  Ejection fraction was estimated to be 15 %  There was severe diffuse hypokinesis with regional variations  Wall thickness was mildly increased  There was a definite,  medium-sized mass on the apical wall  It represents a thrombus   DOPPLER: Left ventricular diastolic function parameters were abnormal  Doppler parameters were consistent with high ventricular filling pressure  RIGHT VENTRICLE: The size was normal  Systolic function was normal  Wall thickness was normal     LEFT ATRIUM: The atrium was mildly dilated  RIGHT ATRIUM: Size was normal     MITRAL VALVE: Valve structure was normal  There was normal leaflet separation  DOPPLER: The transmitral velocity was within the normal range  There was no evidence for stenosis  There was moderate regurgitation  AORTIC VALVE: The valve was trileaflet  Leaflets exhibited moderately increased thickness and moderately reduced cuspal separation  DOPPLER: Transaortic velocity was within the normal range  There was moderate stenosis  Low LV stroke  volume There was no regurgitation  TRICUSPID VALVE: The valve structure was normal  There was normal leaflet separation  DOPPLER: The transtricuspid velocity was within the normal range  There was no evidence for stenosis  There was mild regurgitation  PULMONIC VALVE: Leaflets exhibited normal thickness, no calcification, and normal cuspal separation  DOPPLER: The transpulmonic velocity was within the normal range  There was no regurgitation  PERICARDIUM: There was no pericardial effusion  There was a left pleural effusion  The pericardium was normal in appearance  AORTA: The root exhibited normal size  SYSTEMIC VEINS: IVC: The inferior vena cava was not well visualized      MEASUREMENT TABLES    2D MEASUREMENTS  LVOT   (Reference normals)  Diam   20 mm   (--)    DOPPLER MEASUREMENTS  LVOT   (Reference normals)  Peak bari   50 cm/s   (--)  VTI   7 cm   (--)  R-R interval   566 ms   (--)  HR   106 bpm   (--)  Stroke vol   21 99 ml   (--)  Cardiac output   2 33 L/min   (--)  Cardiac index   1 14 L/min/mï¾²   (--)  Stroke index   0 13 ml/mï¾²   (--)  Aortic valve   (Reference normals)  Peak bari   170 cm/s   (--)  VTI   21 cm   (--)  Obstr index, VTI   0 33    (--)  Valve area, VTI   1 04 cmï¾² (--)  Area index, VTI   0 51 cmï¾²/mï¾²   (--)  Obstr index, Vmax   0 29    (--)  Valve area, Vmax   0 91 cmï¾²   (--)  Area index, Vmax   0 44 cmï¾²/mï¾²   (--)    SYSTEM MEASUREMENT TABLES    2D  %FS: 15 4 %  Ao Diam: 3 6 cm  EDV(Teich): 133 78 ml  EF(Teich): 32 28 %  ESV(Teich): 90 59 ml  IVSd: 1 03 cm  LA Diam: 4 14 cm  LVIDd: 5 27 cm  LVIDs: 4 46 cm  LVPWd: 1 27 cm  SV(Teich): 43 19 ml    CW  TR MaxP 98 mmHg  TR Vmax: 2 91 m/s    MM  TAPSE: 1 32 cm    PW  LVOT Env  Ti: 244 52 ms  LVOT VTI: 5 9 cm  LVOT Vmax: 0 41 m/s  LVOT Vmean: 0 24 m/s  LVOT maxP 67 mmHg  LVOT meanP 28 mmHg  MV A [place]: 0 63 m/s  MV Dec [place]: 10 25 m/s2  MV DecT: 60 31 ms  MV E [place]: 0 62 m/s  MV E/A Ratio: 0 98  MV PHT: 17 49 ms  MVA By PHT: 12 58 cm2    [address] Accredited Echocardiography [facility]    Prepared and electronically signed by    Garrison Robb DO  Signed 2020 16:49:07       No results found for this or any previous visit  No results found for this or any previous visit  No results found for this or any previous visit      Meds/Allergies   all current active meds have been reviewed  Medications Prior to Admission   Medication    aspirin 81 MG tablet    atorvastatin (LIPITOR) 80 mg tablet    levothyroxine 25 mcg tablet    mycophenolate (CELLCEPT) 250 mg capsule    nystatin (MYCOSTATIN) powder    Ostomy Supplies (PREMIER COLOSTOMY/ILEOSTOMY) KIT    potassium chloride (KLOR-CON) 20 mEq packet         heparin (porcine) 3-20 Units/kg/hr (Order-Specific) Last Rate: 18 Units/kg/hr (20 0154)     Assessment:  Principal Problem:    Acute on chronic combined systolic and diastolic heart failure (HCC)  Active Problems:    Multiple vessel coronary artery disease    Hyperlipidemia    Essential hypertension    Gastrocutaneous fistula    CKD (chronic kidney disease)    H/O myasthenia gravis    Right inguinal hernia    Urinary obstruction    LV (left ventricular) mural thrombus

## 2020-03-03 NOTE — SOCIAL WORK
Met with pt and pt's wife to discuss therapies recommendation for rehab  Pt is agreeable  Freedom of choice discussed  Pt and wife prefer referrals to 1  [facility], 2  [facility], and 3  [facility] referrals sent  PASRR completed and sent with referral via [facility]

## 2020-03-03 NOTE — PROGRESS NOTES
Heart Failure/ Pulmonary Hypertension Progress Note - Nicolas Early 75 y o  male MRN: [id number]    Unit/Bed#: [facility] 702-01 Encounter: [id number]      Assessment:    Principal Problem:    Acute on chronic combined systolic and diastolic heart failure (HCC)  Active Problems:    Multiple vessel coronary artery disease    Hyperlipidemia    Essential hypertension    Gastrocutaneous fistula    CKD (chronic kidney disease)    H/O myasthenia gravis    Right inguinal hernia    Urinary obstruction    LV (left ventricular) mural thrombus      Subjective:   Patient seen and examined  No significant events overnight  Optimizing medical therapy  Does not want LIfeVEst      Objective: Intake/ Output:unclear  Weight: 166 lbs, around 170 lbs on admit  Tele: SR/ST    Plan: 1  Acute on chronic HFrEF, LVEF 15%, LVIDd 5 3 cm, NYHA Class II/III, ACC/AHA stage C: Etiology: iCM given multivessel CAD  Possible contributions from uncontrolled HTN over the years per notes  He is deemed a poor candidate for CABG given multiple comorbidities but may undergo future high risk PCI of the Left Main CA, will likely require Impella assistance  For now will continue to optimize HF medical therapy  Started on Lisinopril yesterday  Continue daily weights, strict intake and output charting, daily BMP  Clinically warm, volume status improved however still somewhat tachy and hypotensive so likely still borderline low output  MAP stable  Can uptitrate BB or change Lisinopril to BID dosing  Keep diuretic at same dose  Consider MRA if renal function remains stable as outpatient  Life Vest recommended on discharge but patient refusing       NT Pro BNP 80 K   -Nutrition consult for CHF diet teaching     TTE 2/20/20: LVEF 15%, LVIDd 5 3 cm, LV apical thrombus, moderate MR, moderate AS, mild TR, RV normal,   TTE 6/2017: LVEF 50%, LVIDd 4 1 cm, moderate MR, RV ok, trace TR, PASP 25 mmHtg,   TTE 2014, LVEF 65%     cMRI 2/26/20:  Impression:  1   Mildly dilated left ventricle with severely reduced left ventricular systolic function  2  Normal right ventricular size with mildly reduced right ventricular systolic function  3  Likely mild mitral regurgitation  4  Subendocardial scarring of the mid to distal lateral, inferior, and septal walls   This suggests moderate myocardial viability in these territories   The anterior wall is completely viable       [facility] 2/26/20:   --  LAD: The vessel was medium sized and heavily calcified  There are L to R collaterals to the distal RCA system  --  Proximal LAD: There was a tubular 50 % stenosis  The lesion was irregularly contoured  --  Mid LAD: There was a discrete 80 % stenosis  --  Distal LAD: There was a discrete 50 % stenosis  --  1st diagonal: The vessel was small sized  --  Circumflex: The vessel was medium sized and heavily calcified  --  1st obtuse marginal: There was a tubular 90 % stenosis in the proximal third of the vessel segment  --  RCA: The vessel was medium sized and heavily calcified  The vessel is 100 % occluded in the mid segment   There are R to R collaterals to the distal RCA      The PDA is a medium to large sized vessel which is graftable      IMPRESSIONS:  There is significant triple vessel coronary artery disease  Neurohormonal Blockade:  --Beta-Blocker: Toprol XL 50 mg daily  --ACEi, ARB or ARNi: lisinopril 2 5 mg daily    (or SVR reduction) Stopped Isordil;  --Aldosterone Receptor Blocker:  --Diuretic: furosemide 40 mg BID     Sudden Cardiac Death Risk Reduction:  --ICD: indicated  Life Vest as bridge recommended but patient refusing at this time       Electrical Resynchronization:  Narrow QRS     Advanced Therapies (If appropriate): --Inotrope:  --LVAD/Transplant Candidacy:    2  Multivessel CAD  See above  Continue BB, ASA, statin  3  ANDREA on CKD  Resolved  4  HLD  5  HTN  6 LV apical thrombus  Currently on Heparin bridge  INR subtherapeutic  7 Urinary obstruction   2/2 large right inguinal hernia with hydronephrosis and urethral obstruction  Chronic indwelling cameron in situ   8 Gastrocutaneouis fistula  With history of diverticulitis complicated by perforation  S/P colostomy  [facility] (day, reason): Cameron catheter (day, reason):    Vitals: Blood pressure 110/76, pulse 94, temperature 97 6 °F (36 4 °C), resp  rate 20, height 5' 9" (1 753 m), weight 75 5 kg (166 lb 7 2 oz), SpO2 91 % , Body mass index is 24 58 kg/m² , I/O last 3 completed shifts: In: 300 [P O :300]  Out: 450 [Stool:450]  I/O this shift:  In: 1243 2 [I V :1243 2]  Out: -   Wt Readings from Last 3 Encounters:   03/02/20 75 5 kg (166 lb 7 2 oz)   02/27/20 83 3 kg (183 lb 10 3 oz)   02/11/20 78 6 kg (173 lb 4 5 oz)       Intake/Output Summary (Last 24 hours) at 3/3/2020 1018  Last data filed at 3/3/2020 1001  Gross per 24 hour   Intake 1363 22 ml   Output 250 ml   Net 1113 22 ml     I/O last 3 completed shifts: In: 300 [P O :300]  Out: 450 [Stool:450]    SR, occ PVCs        Physical Exam:  Vitals:    03/02/20 1527 03/02/20 1634 03/02/20 1909 03/03/20 0831   BP: 94/64 112/81 109/77 110/76   Pulse: 95 94 93 94   Resp:   20    Temp: 97 9 °F (36 6 °C)  98 °F (36 7 °C) 97 6 °F (36 4 °C)   TempSrc:       SpO2: 94% 91% 93% 91%   Weight:       Height:           GEN: Nicolas Sy appears well, alert and oriented x 3, pleasant and cooperative   HEENT: pupils equal, round, and reactive to light; extraocular muscles intact  NECK: supple, no carotid bruits   HEART: regular rhythm, normal S1 and S2, no murmurs, clicks, gallops or rubs, JVP is  flat  LUNGS: clear to auscultation bilaterally; no wheezes, rales, or rhonchi   ABDOMEN: normal bowel sounds, soft, no tenderness, no distention  EXTREMITIES: warm, peripheral pulses normal; no clubbing, cyanosis, or edema  NEURO: no focal findings   SKIN: normal without suspicious lesions on exposed skin      Current Facility-Administered Medications:     acetaminophen (TYLENOL) tablet 650 mg, 650 mg, Oral, Q6H PRN, Hal Carrillo MD    aspirin (ECOTRIN LOW STRENGTH) EC tablet 81 mg, 81 mg, Oral, Daily, Noe Swann MD, 81 mg at 03/03/20 0836    atorvastatin (LIPITOR) tablet 80 mg, 80 mg, Oral, HS, Hal Carrillo MD, 80 mg at 03/03/20 0000    belladonna-opium (B&O SUPPOSITORY) 16 2-30 mg suppository 1 suppository, 30 mg, Rectal, Q8H PRN, Hal Carrillo MD    docusate sodium (COLACE) capsule 100 mg, 100 mg, Oral, BID, Hal Carrillo MD, 100 mg at 02/28/20 1719    furosemide (LASIX) tablet 40 mg, 40 mg, Oral, BID (diuretic), Noe Swann MD, 40 mg at 03/03/20 0837    heparin (porcine) 25,000 units in 250 mL infusion (premix), 3-20 Units/kg/hr (Order-Specific), Intravenous, Titrated, Zach Fuentes PA-C, Last Rate: 13 5 mL/hr at 03/03/20 0835, 18 Units/kg/hr at 03/03/20 0835    heparin (porcine) injection 2,000 Units, 2,000 Units, Intravenous, PRN, Zach Fuentes PA-C, 2,000 Units at 02/29/20 0249    heparin (porcine) injection 4,000 Units, 4,000 Units, Intravenous, PRN, Zach Fuentes PA-C, 4,000 Units at 03/01/20 0801    levothyroxine tablet 50 mcg, 50 mcg, Oral, Early Morning, Hal Carrillo MD, 50 mcg at 03/03/20 0615    lisinopril (ZESTRIL) tablet 2 5 mg, 2 5 mg, Oral, Daily, SIMONA Kulkarni, 2 5 mg at 03/03/20 0837    melatonin tablet 3 mg, 3 mg, Oral, HS PRN, JONY Sosa-C, 3 mg at 02/28/20 2359    metoprolol succinate (TOPROL-XL) 24 hr tablet 50 mg, 50 mg, Oral, Daily, Noe Swann MD, 50 mg at 03/03/20 0837    mycophenolate (CELLCEPT) capsule 750 mg, 750 mg, Oral, BID, Hal Carrillo MD, 750 mg at 03/03/20 0902    nystatin (MYCOSTATIN) powder, , Topical, BID, Hal Carrillo MD, 1 application at 94/41/80 0836    oxyCODONE (ROXICODONE) IR tablet 5 mg, 5 mg, Oral, Q6H PRN, Hal Carrillo MD, 5 mg at 02/29/20 0702    polyethylene glycol (MIRALAX) packet 17 g, 17 g, Oral, Daily PRN, Hal Carrillo MD    warfarin (COUMADIN) tablet 5 mg, 5 mg, Oral, Daily (warfarin), Keyla Nichols LATONIAOBDULIO, 5 mg at 03/02/20 1647      Labs & Results:        Results from last 7 days   Lab Units 02/28/20  1208 02/27/20  0406 02/26/20  0525   WBC Thousand/uL 7 78 8 02 9 04   HEMOGLOBIN g/dL 12 6 13 0 13 1   HEMATOCRIT % 41 9 43 6 43 1   PLATELETS Thousands/uL 165 170 196         Results from last 7 days   Lab Units 03/03/20  0903 03/02/20  0841 03/01/20  0448   POTASSIUM mmol/L 3 9 4 1 3 7   CHLORIDE mmol/L 103 103 102   CO2 mmol/L 28 27 30   BUN mg/dL 29* 31* 32*   CREATININE mg/dL 1 25 1 19 1 14   CALCIUM mg/dL 8 7 8 6 8 4     Results from last 7 days   Lab Units 03/03/20  0902 03/02/20  0841 03/01/20  0448   INR  1 14 1 14 1 10       Chest X-Ray is obtained; result - reviewed  Counseling / Coordination of Care  Total floor / unit time spent today 20 minutes  Greater than 50% of total time was spent with the patient and / or family counseling and / or coordination of care  A description of the counseling / coordination of care: Guillermina Simmons [address], [address]  Thank you for the opportunity to participate in the care of this patient  Karl VALADEZ    Director Heart Failure/ Medical Director [id number] [facility]

## 2020-03-03 NOTE — OCCUPATIONAL THERAPY NOTE
Occupational Therapy Treatment Note:       03/03/20 1034   Restrictions/Precautions   Other Precautions Multiple lines; Fall Risk;Pain   Pain Assessment   Pain Assessment 0-10   Pain Score 7   Pain Type Acute pain   Pain Location Head  ("headache")   Cognition   Overall Cognitive Status Impaired  (decreased comprehension)   Arousal/Participation Alert; Uncooperative   Attention Attends with cues to redirect   Orientation Level Oriented X4   Memory Decreased short term memory;Decreased recall of recent events;Decreased recall of precautions   Following Commands Follows one step commands without difficulty   Comments requires increased time to process; slight agitation x1   Activity Tolerance   Activity Tolerance Patient limited by pain; Other (Comment)  (irritable; "didnt sleep well"; "headache")   Medical Staff Made Aware cleared for OT by RN   Assessment   Assessment Patient educated in goals of therapy and reason for this session including function and requirements for consistent documentation of progress  Patient declined OOB secondary to "headach" and "didn't sleep well"  Patient declined bathing and dressing secondary to "are you going to do my colostomy bag, "no nursing will", "then no I won't do bathing and/or dressing with you"  Patient presents with decreased comprehsion during this education session despite different styles of verbalizations provided by therapist  Patient agreed to engaging in therapy tomorrow afternoon, however, forgot his committent that he made yesterday to engage in therapy today  Will continue to follow on caseload      Plan   Treatment Interventions ADL retraining   Goal Expiration Date 03/09/20   OT Treatment Day 2   OT Frequency 3-5x/wk   Recommendation   OT Discharge Recommendation Short Term Rehab   OT - OK to Discharge   (when medically cleared)   Ant Hernandez

## 2020-03-03 NOTE — PROGRESS NOTES
Progress Note Jordyn Hernandez 1945, 75 y o  male MRN: [id number]    [facility] Encounter: [id number]    Primary Care Provider: Zenia Rodriguez MD   Date and time admitted to hospital: 2/27/2020  4:17 PM        * Acute on chronic combined systolic and diastolic heart failure [facility])  Assessment & Plan  Wt Readings from Last 3 Encounters:   03/02/20 75 5 kg (166 lb 7 2 oz)   02/27/20 83 3 kg (183 lb 10 3 oz)   02/11/20 78 6 kg (173 lb 4 5 oz)     · Patient admitted initially to the [facility] with shortness of breath and lower extremity edema as well as weight gain  He was diuresed with intravenous Lasix  He underwent cardiac catheterization which showed triple-vessel disease and was transferred to [facility] for CT surgery evaluation  · Cardiology continues to follow here  · Continue oral Lasix 40 mg p o  B i d, near his baseline weight  · Started on Isordil 10 mg p o  T i d 2/28 and Metoprolol Succinate 2/29  · EF 15%  May need LifeVest on D/C - refuses  · Will continue to monitor weights, I&Os  · Discharge planning to inpatient rehab  · Low-sodium diet  · Monitor volume status        Multiple vessel coronary artery disease  Assessment & Plan  · Cardiac catheterization 02/26/2020 with multivessel disease  Transferred to [place] for CT surgery evaluation however patient deemed to not be surgical candidate  · Continue aspirin, statin  Started on BB  · Possibly a candidate for high risk PCI with impella support as an outpatient after cardiac thrombus has resolved as an outpatient  LV (left ventricular) mural thrombus  Assessment & Plan  · Currently on IV heparin bridge to coumadin  Coumadin started 2/29  I  · INR is subtherapeutic after 3 days of 5 mg, increased to 7 5 mg  · INR In AM, continue heparin bridge    Urinary obstruction  Assessment & Plan  · Urinary obstruction  · Secondary to large right inguinal hernia with hydronephrosis and urethral obstruction     · Plan for cameron exchange 3/12/2020 with urology in the office  · Continue chronic Cameron      Right inguinal hernia  Assessment & Plan  · Right inguinal hernia  · Outpatient follow up, no current intervention        H/O myasthenia gravis  Assessment & Plan  · Stable on mycophenolate    CKD (chronic kidney disease)  Assessment & Plan  · Creatinine is stable  · Avoid nephrotoxins and hypotension  · Monitor closely    Gastrocutaneous fistula  Assessment & Plan  · Gastrocutaneous fistula  · Patient has history of diverticulitis complicated by perforation status post colostomy  · Outpatient follow up      Essential hypertension  Assessment & Plan  · Patient with documented history of hypertension however now with soft blood pressures  · Monitor closely    Hyperlipidemia  Assessment & Plan  · Continue statin        VTE Pharmacologic Prophylaxis:   Pharmacologic: Heparin Drip  Mechanical VTE Prophylaxis in Place: Yes    Patient Centered Rounds: I have performed bedside rounds with nursing staff today  Discussions with Specialists or Other Care Team Provider:  Cardiology    Education and Discussions with Family / Patient:  Patient and family, plan of care    Time Spent for Care: 20 minutes  More than 50% of total time spent on counseling and coordination of care as described above  Current Length of Stay: 5 day(s)    Current Patient Status: Inpatient   Certification Statement: The patient will continue to require additional inpatient hospital stay due to IV heparin to warfarin bridging    Discharge Plan:  Inpatient rehabilitation    Code Status: Level 1 - Full Code      Subjective:   Reports that shortness of breath is improving, not at baseline  Tolerating diet  Reports normal stool output into his colostomy bag      Objective:     Vitals:   Temp (24hrs), Av 8 °F (36 6 °C), Min:97 6 °F (36 4 °C), Max:98 °F (36 7 °C)    Temp:  [97 6 °F (36 4 °C)-98 °F (36 7 °C)] 97 6 °F (36 4 °C)  HR:  [93-95] 94  Resp:  [20] 20  BP: ()/(64-81) 110/76  SpO2:  [91 %-94 %] 91 %  Body mass index is 24 58 kg/m²  Input and Output Summary (last 24 hours): Intake/Output Summary (Last 24 hours) at 3/3/2020 1433  Last data filed at 3/3/2020 1001  Gross per 24 hour   Intake 1243 22 ml   Output 250 ml   Net 993 22 ml       Physical Exam:     Physical Exam   Constitutional: He is oriented to person, place, and time  He appears well-developed and well-nourished  HENT:   Head: Normocephalic and atraumatic  Eyes: Pupils are equal, round, and reactive to light  EOM are normal    Neck: Neck supple  No JVD present  Cardiovascular: Normal rate and regular rhythm  Pulmonary/Chest: Effort normal  He has rales  Abdominal: He exhibits no distension  Genitourinary:   Genitourinary Comments: Lawrence catheter with clear yellow urine   Musculoskeletal: He exhibits no edema  Neurological: He is alert and oriented to person, place, and time  Skin: Skin is warm and dry  Additional Data:     Labs:    Results from last 7 days   Lab Units 02/28/20  1208   WBC Thousand/uL 7 78   HEMOGLOBIN g/dL 12 6   HEMATOCRIT % 41 9   PLATELETS Thousands/uL 165     Results from last 7 days   Lab Units 03/03/20  0903   SODIUM mmol/L 138   POTASSIUM mmol/L 3 9   CHLORIDE mmol/L 103   CO2 mmol/L 28   BUN mg/dL 29*   CREATININE mg/dL 1 25   ANION GAP mmol/L 7   CALCIUM mg/dL 8 7   GLUCOSE RANDOM mg/dL 119     Results from last 7 days   Lab Units 03/03/20  0902   INR  1 14     Results from last 7 days   Lab Units 03/01/20  1639   POC GLUCOSE mg/dl 137                   * I Have Reviewed All Lab Data Listed Above  * Additional Pertinent Lab Tests Reviewed:  All Labs Within Last 24 Hours Reviewed        Recent Cultures (last 7 days):           Last 24 Hours Medication List:     Current Facility-Administered Medications:  acetaminophen 650 mg Oral Q6H PRN Catrina Harris MD    aspirin 81 mg Oral Daily Ayush Hall MD    atorvastatin 80 mg Oral HS Declan MCCORMICK Rosy Maldonado MD    belladonna-opium 30 mg Rectal Q8H PRN Tyree Up MD    docusate sodium 100 mg Oral BID Tyree Up MD    furosemide 40 mg Oral BID (diuretic) Mey Howard MD    heparin (porcine) 3-20 Units/kg/hr (Order-Specific) Intravenous Titrated Glenn Child, PA-C Last Rate: 18 Units/kg/hr (03/03/20 0835)   heparin (porcine) 2,000 Units Intravenous PRN Glenn Child, PA-C    heparin (porcine) 4,000 Units Intravenous PRN Glenn Child, PA-C    levothyroxine 50 mcg Oral Early Morning Tyree Up MD    [START ON 3/4/2020] lisinopril 5 mg Oral Daily Eddie Velasco,     melatonin 3 mg Oral HS PRN JONY Dewitt-VINNIE    metoprolol succinate 50 mg Oral Daily Mey Howard MD    mycophenolate 750 mg Oral BID Tyree Up MD    nystatin  Topical BID Tyree Up MD    oxyCODONE 5 mg Oral Q6H PRN Tyree Up MD    polyethylene glycol 17 g Oral Daily PRN Tyree Up MD    warfarin 7 5 mg Oral Daily (warfarin) Kareen Kumar MD         Today, Patient Was Seen By: Dayanna Holt MD    ** Please Note: Dictation voice to text software may have been used in the creation of this document   **

## 2020-03-03 NOTE — PLAN OF CARE
Problem: OCCUPATIONAL THERAPY ADULT  Goal: Performs self-care activities at highest level of function for planned discharge setting  See evaluation for individualized goals  Description  Treatment Interventions: ADL retraining, Functional transfer training, Endurance training, Patient/family training, Equipment evaluation/education, Compensatory technique education, Continued evaluation, Activityengagement, Energy conservation          See flowsheet documentation for full assessment, interventions and recommendations  Outcome: Progressing  Note:   Limitation: Decreased ADL status, Decreased UE strength, Decreased Safe judgement during ADL, Decreased cognition, Decreased endurance, Decreased self-care trans, Decreased high-level ADLs  Prognosis: Fair  Assessment: Patient educated in goals of therapy and reason for this session including function and requirements for consistent documentation of progress  Patient declined OOB secondary to "headach" and "didn't sleep well"  Patient declined bathing and dressing secondary to "are you going to do my colostomy bag, "no nursing will", "then no I won't do bathing and/or dressing with you"  Patient presents with decreased comprehsion during this education session despite different styles of verbalizations provided by therapist  Patient agreed to engaging in therapy tomorrow afternoon, however, forgot his committent that he made yesterday to engage in therapy today  Will continue to follow on caseload        OT Discharge Recommendation: Short Term Rehab  OT - OK to Discharge: (when medically cleared)  Melani Peck

## 2020-03-03 NOTE — PROGRESS NOTES
Cardiology Progress Note - Haile Bernard 75 y o  male MRN: [id number]    Unit/Bed#: [facility] 702-01 Encounter: [id number]  Assessment and plan  1  Multivessel coronary disease  2  Ischemic cardiomyopathy LVEF 15%  3  Acute on chronic combined systolic and diastolic congestive heart failure  4  Acute kidney injury on CKD 3  5  LV apical thrombus  6  Hyperlipidemia    Recommendations:  Overall he is improved from a heart failure standpoint  Discussed the case with the interventionalist there are options including left main and LAD these are heavily calcified vessels will likely need rotational atherectomy and given severe LV dysfunction Impella assist   Would plan several weeks to allow some recovery of LV function and also hopeful resolution of the LV clot  Up titrate beta-blockers  Appreciate heart failure is input  Patient declined LifeVest   Continue current diuretics  Subjective:    No significant events overnight  Denies chest pain, shortness of breath, palpitations, lightheadedness, dizziness, or syncope  Case was discussed with the interventional cardiologist   Review of Systems   Constitution: Negative  HENT: Negative  Eyes: Negative  Cardiovascular: Negative  Respiratory: Negative  Endocrine: Negative  Hematologic/Lymphatic: Negative  Skin: Negative  Musculoskeletal: Negative  Gastrointestinal: Negative  Genitourinary: Negative  Neurological: Negative  Psychiatric/Behavioral: Negative  All other systems reviewed and are negative  Objective:   Vitals: Blood pressure 110/76, pulse 94, temperature 97 6 °F (36 4 °C), resp   rate 20, height 5' 9" (1 753 m), weight 75 5 kg (166 lb 7 2 oz), SpO2 91 % , Body mass index is 24 58 kg/m² ,   Orthostatic Blood Pressures      Most Recent Value   Blood Pressure  110/76 filed at 03/03/2020 0831   Patient Position - Orthostatic VS  Lying filed at 02/28/2020 7797         Systolic (12XAF), IHC:380 , Min:92 , Max:112 Diastolic (43XXQ), EWL:81, Min:63, Max:81      Intake/Output Summary (Last 24 hours) at 3/3/2020 1133  Last data filed at 3/3/2020 1001  Gross per 24 hour   Intake 1363 22 ml   Output 250 ml   Net 1113 22 ml     Weight (last 2 days)     Date/Time   Weight    03/02/20 0600   75 5 (166 45)    03/01/20 0600   76 1 (167 77)                Telemetry Review: No significant arrhythmias seen on telemetry review  EKG personally reviewed by Theodora Thornton DO  Physical Exam:  Vital signs reviewed  General:  Alert and cooperative, appears stated age, no acute distress  HEENT:  PERRLA, EOMI, no scleral icterus, no conjunctival pallor  Neck:  No lymphadenopathy, no thyromegaly, no carotid bruits, no elevated JVP  Heart:  Regular rate and rhythm, normal S1/S2, no S3/S4, no murmur, rubs or gallops  PMI nondisplaced  Lungs:  Clear to auscultation bilaterally, no wheezes rales or rhonchi  Abdomen:  Soft, non-tender, positive bowel sounds, no rebound or guarding,   no organomegaly   Extremities:  Normal range of motion    No clubbing, cyanosis or edema   Vascular:  2+ pedal pulses  Skin:  No rashes or lesions on exposed skin  Neurologic:  Cranial nerves II-XII grossly intact without focal deficits    Laboratory Results:        CBC with diff:   Results from last 7 days   Lab Units 02/28/20  1208 02/27/20  0406 02/26/20  0525   WBC Thousand/uL 7 78 8 02 9 04   HEMOGLOBIN g/dL 12 6 13 0 13 1   HEMATOCRIT % 41 9 43 6 43 1   MCV fL 93 94 94   PLATELETS Thousands/uL 165 170 196   MCH pg 28 1 28 1 28 4   MCHC g/dL 30 1* 29 8* 30 4*   RDW % 15 5* 15 6* 15 6*   MPV fL 9 9 9 7 10 2         CMP:  Results from last 7 days   Lab Units 03/03/20  0903 03/02/20  0841 03/01/20  0448 02/29/20  0756 02/28/20  0509 02/27/20  0406 02/26/20  0525   POTASSIUM mmol/L 3 9 4 1 3 7 3 9 4 2 4 3 4 1   CHLORIDE mmol/L 103 103 102 101 102 101 99*   CO2 mmol/L 28 27 30 28 31 34* 35*   BUN mg/dL 29* 31* 32* 30* 28* 25 28*   CREATININE mg/dL 1 25 1 19 1  14 1 06 1 10 1 26 1 24   CALCIUM mg/dL 8 7 8 6 8 4 8 4 8 5 8 3 8 1*   EGFR ml/min/1 73sq m 56 59 63 68 65 55 57         BMP:  Results from last 7 days   Lab Units 20  0903 20  0841 20  0448 20  0756 20  0509 20  0406 20  0525   POTASSIUM mmol/L 3 9 4 1 3 7 3 9 4 2 4 3 4 1   CHLORIDE mmol/L 103 103 102 101 102 101 99*   CO2 mmol/L 28 27 30 28 31 34* 35*   BUN mg/dL 29* 31* 32* 30* 28* 25 28*   CREATININE mg/dL 1 25 1 19 1 14 1 06 1 10 1 26 1 24   CALCIUM mg/dL 8 7 8 6 8 4 8 4 8 5 8 3 8 1*       BNP: No results for input(s): BNP in the last 72 hours  Magnesium:   Results from last 7 days   Lab Units 20  0406   MAGNESIUM mg/dL 2 0       Coags:   Results from last 7 days   Lab Units 20  0902 20  1505 20  0841 20  0110 20  1740 20  0449 20  0448 20  1433  20  1208   PTT seconds 62* 60* 89* 91* 90* 41*  --  67*   < > 35   INR  1 14  --  1 14  --   --   --  1 10  --   --  1 34*    < > = values in this interval not displayed  TSH:        Hemoglobin A1C       Lipid Profile:       Cardiac testing:   Results for orders placed during the hospital encounter of 20   Echo complete with contrast if indicated    Narrative [facility] [address], [address] [facility]  [phone]    Transthoracic Echocardiogram  2D, M-mode, Doppler, and Color Doppler    Study date:  2020    Patient: Jonel Whitney  MR number: [id number]  Account number: [de-identified]  : 1945  Age: 75 years  Gender: Male  Status: Inpatient  Location: Bedside  Height: 69 in  Weight: 196 lb  BP: 90/ 55 mmHg    Indications: Assess left ventricular function      Diagnoses: I50 9 - Heart failure, unspecified    Sonographer:  Kalin Mnotes RDCS  Primary Physician:  Jessica Burdick MD  Referring Physician:  Jessica Esqueda DO  Group:  [facility]  Interpreting Physician:  Jessica Esqueda, DO    SUMMARY    LEFT VENTRICLE:  Systolic function was severely reduced  Ejection fraction was estimated to be 15 %  There was severe diffuse hypokinesis with regional variations  Wall thickness was mildly increased  Doppler parameters were consistent with high ventricular filling pressure  There was a definite, medium-sized mass on the apical wall  It represents a thrombus  LEFT ATRIUM:  The atrium was mildly dilated  MITRAL VALVE:  There was moderate regurgitation  AORTIC VALVE:  The valve was trileaflet  Leaflets exhibited moderately increased thickness and moderately reduced cuspal separation  There was moderate stenosis  Low LV stroke volume  Estimated aortic valve area (by VTI) was 1 04 cmï¾²  Estimated aortic valve area (by Vmax) was 0 91 cmï¾²  Doppler stroke volume was 21 99 ml  Doppler cardiac output was 2 33 L/min, using LVOT flow data  Doppler cardiac index was 1 14 L/min/mï¾², using LVOT flow data  TRICUSPID VALVE:  There was mild regurgitation  PERICARDIUM:  There was a left pleural effusion  HISTORY: PRIOR HISTORY: CAD, hyperlipidemia    PROCEDURE: The procedure was performed at the bedside  This was a routine study  The transthoracic approach was used  The study included complete 2D imaging, M-mode, complete spectral Doppler, and color Doppler  The heart rate was 125 bpm,  at the start of the study  Images were obtained from the parasternal, apical, subcostal, and suprasternal notch acoustic windows  Intravenous contrast ( 0 4 ml definity in NSS) was administered to opacify the left ventricle  This was a  technically difficult study  LEFT VENTRICLE: Size was normal  Systolic function was severely reduced  Ejection fraction was estimated to be 15 %  There was severe diffuse hypokinesis with regional variations  Wall thickness was mildly increased  There was a definite,  medium-sized mass on the apical wall  It represents a thrombus   DOPPLER: Left ventricular diastolic function parameters were abnormal  Doppler parameters were consistent with high ventricular filling pressure  RIGHT VENTRICLE: The size was normal  Systolic function was normal  Wall thickness was normal     LEFT ATRIUM: The atrium was mildly dilated  RIGHT ATRIUM: Size was normal     MITRAL VALVE: Valve structure was normal  There was normal leaflet separation  DOPPLER: The transmitral velocity was within the normal range  There was no evidence for stenosis  There was moderate regurgitation  AORTIC VALVE: The valve was trileaflet  Leaflets exhibited moderately increased thickness and moderately reduced cuspal separation  DOPPLER: Transaortic velocity was within the normal range  There was moderate stenosis  Low LV stroke  volume There was no regurgitation  TRICUSPID VALVE: The valve structure was normal  There was normal leaflet separation  DOPPLER: The transtricuspid velocity was within the normal range  There was no evidence for stenosis  There was mild regurgitation  PULMONIC VALVE: Leaflets exhibited normal thickness, no calcification, and normal cuspal separation  DOPPLER: The transpulmonic velocity was within the normal range  There was no regurgitation  PERICARDIUM: There was no pericardial effusion  There was a left pleural effusion  The pericardium was normal in appearance  AORTA: The root exhibited normal size  SYSTEMIC VEINS: IVC: The inferior vena cava was not well visualized      MEASUREMENT TABLES    2D MEASUREMENTS  LVOT   (Reference normals)  Diam   20 mm   (--)    DOPPLER MEASUREMENTS  LVOT   (Reference normals)  Peak bari   50 cm/s   (--)  VTI   7 cm   (--)  R-R interval   566 ms   (--)  HR   106 bpm   (--)  Stroke vol   21 99 ml   (--)  Cardiac output   2 33 L/min   (--)  Cardiac index   1 14 L/min/mï¾²   (--)  Stroke index   0 13 ml/mï¾²   (--)  Aortic valve   (Reference normals)  Peak bari   170 cm/s   (--)  VTI   21 cm   (--)  Obstr index, VTI   0 33    (--)  Valve area, VTI   1 04 cmï¾²   (--)  Area index, VTI   0 51 cmï¾²/mï¾²   (--)  Obstr index, Vmax   0 29    (--)  Valve area, Vmax   0 91 cmï¾²   (--)  Area index, Vmax   0 44 cmï¾²/mï¾²   (--)    SYSTEM MEASUREMENT TABLES    2D  %FS: 15 4 %  Ao Diam: 3 6 cm  EDV(Teich): 133 78 ml  EF(Teich): 32 28 %  ESV(Teich): 90 59 ml  IVSd: 1 03 cm  LA Diam: 4 14 cm  LVIDd: 5 27 cm  LVIDs: 4 46 cm  LVPWd: 1 27 cm  SV(Teich): 43 19 ml    CW  TR MaxP 98 mmHg  TR Vmax: 2 91 m/s    MM  TAPSE: 1 32 cm    PW  LVOT Env  Ti: 244 52 ms  LVOT VTI: 5 9 cm  LVOT Vmax: 0 41 m/s  LVOT Vmean: 0 24 m/s  LVOT maxP 67 mmHg  LVOT meanP 28 mmHg  MV A [place]: 0 63 m/s  MV Dec Travis: 10 25 m/s2  MV DecT: 60 31 ms  MV E [place]: 0 62 m/s  MV E/A Ratio: 0 98  MV PHT: 17 49 ms  MVA By PHT: 12 58 cm2    [address] [facility]redited [facility]    Prepared and electronically signed by    Theodora Thornton DO  Signed 2020 16:49:07       No results found for this or any previous visit  No results found for this or any previous visit  No results found for this or any previous visit      Meds/Allergies   all current active meds have been reviewed  Medications Prior to Admission   Medication    aspirin 81 MG tablet    atorvastatin (LIPITOR) 80 mg tablet    levothyroxine 25 mcg tablet    mycophenolate (CELLCEPT) 250 mg capsule    nystatin (MYCOSTATIN) powder    Ostomy Supplies (PREMIER COLOSTOMY/ILEOSTOMY) KIT    potassium chloride (KLOR-CON) 20 mEq packet         heparin (porcine) 3-20 Units/kg/hr (Order-Specific) Last Rate: 18 Units/kg/hr (20 8910)     Assessment:  Principal Problem:    Acute on chronic combined systolic and diastolic heart failure (HCC)  Active Problems:    Multiple vessel coronary artery disease    Hyperlipidemia    Essential hypertension    Gastrocutaneous fistula    CKD (chronic kidney disease)    H/O myasthenia gravis    Right inguinal hernia    Urinary obstruction    LV (left ventricular) mural thrombus

## 2020-03-03 NOTE — ASSESSMENT & PLAN NOTE
· Currently on IV heparin bridge to coumadin  Coumadin started 2/29   I  · INR is subtherapeutic after 3 days of 5 mg, increased to 7 5 mg  · INR In AM, continue heparin bridge

## 2020-03-03 NOTE — PHYSICAL THERAPY NOTE
Physical Therapy Cancellation Note  Attempted to see patient this morning however, pt is refusing PT at this time as he reports "he needs to sleep and has a headache"  Educated on the importance of mobility but patient continues to refuse  Asked to be seen tomorrow afternoon for PT session  CM notified        Miya Nuñez PT, DPT

## 2020-03-03 NOTE — ASSESSMENT & PLAN NOTE
· Cardiac catheterization 02/26/2020 with multivessel disease  Transferred to [facility] for CT surgery evaluation however patient deemed to not be surgical candidate  · Continue aspirin, statin  Started on BB  · Possibly a candidate for high risk PCI with impella support as an outpatient after cardiac thrombus has resolved as an outpatient

## 2020-03-03 NOTE — ASSESSMENT & PLAN NOTE
· Urinary obstruction  · Secondary to large right inguinal hernia with hydronephrosis and urethral obstruction  · Plan for cameron exchange 3/12/2020 with urology in the office     · Continue chronic Cameron

## 2020-03-03 NOTE — ASSESSMENT & PLAN NOTE
Wt Readings from Last 3 Encounters:   03/02/20 75 5 kg (166 lb 7 2 oz)   02/27/20 83 3 kg (183 lb 10 3 oz)   02/11/20 78 6 kg (173 lb 4 5 oz)     · Patient admitted initially to the [facility] with shortness of breath and lower extremity edema as well as weight gain  He was diuresed with intravenous Lasix  He underwent cardiac catheterization which showed triple-vessel disease and was transferred to [facility] for CT surgery evaluation  · Cardiology continues to follow here  · Continue oral Lasix 40 mg p o  B i d, near his baseline weight  · Started on Isordil 10 mg p o  T i d 2/28 and Metoprolol Succinate 2/29  · EF 15%  May need LifeVest on D/C - refuses  · Will continue to monitor weights, I&Os    · Discharge planning to inpatient rehab  · Low-sodium diet  · Monitor volume status

## 2020-03-03 NOTE — PROGRESS NOTES
[facility] Progress Note  Patient: Michael Medina 75 y o  male   MRN: [id number]  PCP: Luis A Marie MD  Unit/Bed#: [facility] 702-01 Encounter: [id number]  Date Of Visit: 03/02/20    Assessment:    Principal Problem:  ·   Acute on chronic combined systolic and diastolic heart failure (HCC):Patient was transferred from [facility] where he was in the ICU being treated for low output heart failure   He had been on milrinone which was weaned off prior to transfer  Last EF 15% down from 50% in 2017  Continue Toprol XL , lisinopril, and lasix  Patient with significant triple vessel disease  Plan for probable impella assisted PCI not a Open Heart candidate  Plan to maximize goal directed therapy  Current recommendations are for ACE, aspirin, statin BB  Patient on heparin drip due to LV thrombus  Active Problems:  ·   Multiple vessel coronary artery disease: not open heart candidate  Plan for Goal directed medical therapy  ·   Hyperlipidemia: continue statin  ·   Essential hypertension: continue as above  ·   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  ·   CKD (chronic kidney disease)  ·   H/O myasthenia gravis: On cellcept  ·   ·   Right inguinal hernia: at baseline needs operative repair  ·   Urinary obstruction: cameron catheter in place    LV (left ventricular) mural thrombus: currently on heparin bridge to coumadin  VTE Pharmacologic Prophylaxis:   Pharmacologic: Heparin Drip  Mechanical VTE Prophylaxis in Place: Yes    Patient Centered Rounds: I have performed bedside rounds with nursing staff today  Discussions with Specialists or Other Care Team Provider: heart failure    Education and Discussions with Family / Patient: no family at bedside    Time Spent for Care: 30 minutes  More than 50% of total time spent on counseling and coordination of care as described above      Current Length of Stay: 4 day(s)    Current Patient Status: Inpatient   Certification Statement: The patient will continue to require additional inpatient hospital stay due to heparin drip bridge, titrating medications  Discharge Plan / Estimated Discharge Date: to be determined    Code Status: Level 1 - Full Code      Subjective:   Patient states he is doing ok  His ostomy is good , his cameron is good  He reports no shortness of breath  Objective:     Vitals:   Temp (24hrs), Av 8 °F (36 6 °C), Min:97 6 °F (36 4 °C), Max:98 °F (36 7 °C)    Temp:  [97 6 °F (36 4 °C)-98 °F (36 7 °C)] 97 6 °F (36 4 °C)  HR:  [93-95] 94  Resp:  [19-20] 20  BP: ()/(63-81) 110/76  SpO2:  [91 %-94 %] 91 %  Body mass index is 24 58 kg/m²  Input and Output Summary (last 24 hours): Intake/Output Summary (Last 24 hours) at 3/3/2020 0902  Last data filed at 3/3/2020 0001  Gross per 24 hour   Intake 300 ml   Output 250 ml   Net 50 ml       Physical Exam:     Physical Exam    Generally no acute distress , NC, AT PERRL, EOMI, anicteric, mmm   Chest decreased but clear anterior, crackles at bases  CVS; reg S1, S2 no murmur rub or gallop  Abdomen, Left sided ostomy deep pink, soft stool present  Ext 1+ edema  Neuro: awake alert oriented CN intact    Additional Data:     Labs:    Results from last 7 days   Lab Units 20  1208   WBC Thousand/uL 7 78   HEMOGLOBIN g/dL 12 6   HEMATOCRIT % 41 9   PLATELETS Thousands/uL 165     Results from last 7 days   Lab Units 20  0841   POTASSIUM mmol/L 4 1   CHLORIDE mmol/L 103   CO2 mmol/L 27   BUN mg/dL 31*   CREATININE mg/dL 1 19   CALCIUM mg/dL 8 6     Results from last 7 days   Lab Units 20  0841   INR  1 14       * I Have Reviewed All Lab Data Listed Above  * Additional Pertinent Lab Tests Reviewed:  All Labs Within Last 24 Hours Reviewed    Imaging:    Imaging Reports Reviewed Today Include: none  Imaging Personally Reviewed by Myself Includes:  none    Recent Cultures (last 7 days):           Last 24 Hours Medication List:     Current Facility-Administered Medications:  acetaminophen 650 mg Oral Q6H PRN Jesus Alberto Mejía, MD    aspirin 81 mg Oral Daily Sunny Pearce, MD    atorvastatin 80 mg Oral HS Jesus Alberto Mejía, MD    belladonna-opium 30 mg Rectal Q8H PRN Jesus Alberto Mejía, MD    docusate sodium 100 mg Oral BID Jesus Alberto Mejía, MD    furosemide 40 mg Oral BID (diuretic) Sunny Pearce MD    heparin (porcine) 3-20 Units/kg/hr (Order-Specific) Intravenous Titrated Boris Mohamud PA-C Last Rate: 18 Units/kg/hr (03/03/20 0835)   heparin (porcine) 2,000 Units Intravenous PRN Boris Mohamud PA-C    heparin (porcine) 4,000 Units Intravenous PRN Boris Mohamud PA-C    levothyroxine 50 mcg Oral Early Morning Jesus Alberto Mejía, MD    lisinopril 2 5 mg Oral Daily SIMONA Kulkarni    melatonin 3 mg Oral HS PRN LUIS A Faye    metoprolol succinate 50 mg Oral Daily Sunny Pearce MD    mycophenolate 750 mg Oral BID Jesus Alberto Mejía MD    nystatin  Topical BID Jesus Alberto Mejía MD    oxyCODONE 5 mg Oral Q6H PRN Jesus Alberto Mejía MD    polyethylene glycol 17 g Oral Daily PRN Jesus Alberto Mejía MD    warfarin 5 mg Oral Daily (warfarin) SIMONA Che         Today, Patient Was Seen By: Rachana Rao MD Pager : [phone]     ** Please Note: This note has been constructed using a voice recognition system   **

## 2020-03-04 NOTE — OCCUPATIONAL THERAPY NOTE
Occupational Therapy Treatment Note     03/04/20 1247   Restrictions/Precautions   Weight Bearing Precautions Per Order No   Other Precautions Fall Risk;Multiple lines;Pain   Lifestyle   Autonomy Pt reports being I with ADLs, IADLs, and functional mobility without use of DME PTA  Reciprocal Relationships wife can assist and is a retired RN  Daughter is an OT    Service to Others retired   Intrinsic Gratification sedentary   Pain Assessment   Pain Assessment 0-10   Pain Score No Pain   ADL   Where Assessed Edge of bed   LB Dressing Assistance 2  Maximal Assistance   LB Dressing Deficit Thread RLE into underwear; Thread LLE into underwear;Pull up over hips   Bed Mobility   Supine to Sit 4  Minimal assistance   Additional items Assist x 2   Transfers   Sit to Stand 4  Minimal assistance   Additional items Assist x 2   Stand to Sit 4  Minimal assistance   Additional items Assist x 2   Stand pivot 4  Minimal assistance   Additional items Assist x 2   Functional Mobility   Functional Mobility 4  Minimal assistance   Additional Comments pt required seated rest breaks often /recovered well    Additional items Rolling walker   Cognition   Overall Cognitive Status Impaired  (decreased comprehension)   Arousal/Participation Alert; Uncooperative   Attention Attends with cues to redirect   Orientation Level Oriented X4   Memory Decreased short term memory;Decreased recall of recent events;Decreased recall of precautions   Following Commands Follows one step commands without difficulty   Activity Tolerance   Activity Tolerance Patient tolerated treatment well   Assessment   Assessment Pt participated in occupational therapy with focus on activity tolerance, bed mob, unsupported sitting balance and tolerance for pt engagement in functional self-care task/LB self-care  Pt cleared by RN for pt participation in occupational therapy    Pt received HOB raised/supine pt sitting upright and agreeable to therapy following pt Identifiers confirmed  Pt reported his goal today to attend a family wedding  Pt family member/ pt wife Liyah Terry  present supportive throughout session  Pt requires  Assist for bed mob, LB dressing and functional transfers/mob with RW 2* pt decreased strength, coordination, balance and activity tolerance  Pt will require in-pt rehab to continue to address pt above noted deficits which currently impair pt ADL and functional mob   Pt chair alarm active post session all needs within reach   Plan   Treatment Interventions ADL retraining   Goal Expiration Date 03/09/20   OT Treatment Day 3   OT Frequency 3-5x/wk   Recommendation   OT Discharge Recommendation Short Term Rehab   Barthel Index   Feeding 10   Bathing 0   Grooming Score 5   Dressing Score 5   Bladder Score 10   Bowels Score 10   Toilet Use Score 5   Transfers (Bed/Chair) Score 5   Mobility (Level Surface) Score 0   Stairs Score 0   Barthel Index Score 50   Modified Powell Scale   Modified Powell Scale 4       Rajwinder ANTUNEZ/MELQUIADES

## 2020-03-04 NOTE — ASSESSMENT & PLAN NOTE
· Cardiac catheterization 02/26/2020 with multivessel disease  Transferred to [place] for CT surgery evaluation however patient deemed to not be surgical candidate  · Continue aspirin, atorvastatin, metoprolol  · Possibly a candidate for high risk PCI with impella support as an outpatient after cardiac thrombus has resolved as an outpatient

## 2020-03-04 NOTE — PROGRESS NOTES
Heart Failure/ Pulmonary Hypertension Progress Note - Toma Barnett 75 y o  male MRN: [id number]    [facility] 702[facility] Encounter: [id number]      Assessment:    Principal Problem:    Acute on chronic combined systolic and diastolic heart failure (HCC)  Active Problems:    Multiple vessel coronary artery disease    Hyperlipidemia    Essential hypertension    Gastrocutaneous fistula    CKD (chronic kidney disease)    H/O myasthenia gravis    Right inguinal hernia    Urinary obstruction    LV (left ventricular) mural thrombus      Subjective:   Patient seen and examined  No significant events overnight  Optimizing medical therapy  Does not want [facility] for discharge to rehab  Plan for possible future Impella assisted PCI with  rotational atherectomy  To be re-evaluated as outpatient  Objective: Intake/ output: 1243/1200/43-net even  Weight: 175 lbs  Tele: SR/ST    Plan: 1  Acute on chronic HFrEF, LVEF 15%, LVIDd 5 3 cm, NYHA Class II/III, ACC/AHA stage C: Etiology: iCM given multivessel CAD  Possible contributions from uncontrolled HTN over the years per notes  He is deemed a poor candidate for CABG given multiple comorbidities but may undergo future high risk PCI of the Left Main CA, will likely require Impella assistance  For now will continue to optimize HF medical therapy  Started on Lisinopril yesterday  Continue daily weights, strict intake and output charting, daily BMP  Clinically warm, volume status improved however still somewhat tachy and hypotensive so likely still borderline low output  MAP stable  Keep diuretic at same dose  Consider MRA if renal function remains stable as outpatient   Life Vest recommended on discharge but patient refusing       NT Pro BNP 80 K   -Nutrition consult for CHF diet teaching     TTE 2/20/20: LVEF 15%, LVIDd 5 3 cm, LV apical thrombus, moderate MR, moderate AS, mild TR, RV normal,   TTE 6/2017: LVEF 50%, LVIDd 4 1 cm, moderate MR, RV ok, trace TR, PASP 25 mmHtg,   TTE 2014, LVEF 65%     cMRI 2/26/20:  Impression:  1  Mildly dilated left ventricle with severely reduced left ventricular systolic function  2  Normal right ventricular size with mildly reduced right ventricular systolic function  3  Likely mild mitral regurgitation  4  Subendocardial scarring of the mid to distal lateral, inferior, and septal walls   This suggests moderate myocardial viability in these territories   The anterior wall is completely viable       [facility] 2/26/20:   --  LAD: The vessel was medium sized and heavily calcified  There are L to R collaterals to the distal RCA system  --  Proximal LAD: There was a tubular 50 % stenosis  The lesion was irregularly contoured  --  Mid LAD: There was a discrete 80 % stenosis  --  Distal LAD: There was a discrete 50 % stenosis  --  1st diagonal: The vessel was small sized  --  Circumflex: The vessel was medium sized and heavily calcified  --  1st obtuse marginal: There was a tubular 90 % stenosis in the proximal third of the vessel segment  --  RCA: The vessel was medium sized and heavily calcified  The vessel is 100 % occluded in the mid segment   There are R to R collaterals to the distal RCA      The PDA is a medium to large sized vessel which is graftable      IMPRESSIONS:  There is significant triple vessel coronary artery disease  Neurohormonal Blockade:  --Beta-Blocker: Toprol XL 50 mg daily  --ACEi, ARB or ARNi: lisinopril 2 5 mg twice daily  --Aldosterone Receptor Blocker: can start as outpatient  --Diuretic: furosemide 40 mg BID with 20 meq of Kdur      Sudden Cardiac Death Risk Reduction:  --ICD: indicated  Life Vest as bridge recommended but patient refusing at this time       Electrical Resynchronization:  Narrow QRS     Advanced Therapies (If appropriate): --Inotrope:  --LVAD/Transplant Candidacy:    2  Multivessel CAD  See above  Continue BB, ASA, statin  3  ANDREA on CKD  Resolved  4  HLD  5  HTN  6 LV apical thrombus  Currently on Heparin bridge  INR subtherapeutic  7 Urinary obstruction  2/2 large right inguinal hernia with hydronephrosis and urethral obstruction  Chronic indwelling cameron in situ   8 Gastrocutaneouis fistula  With history of diverticulitis complicated by perforation  S/P colostomy  [facility] (day, reason): Cameron catheter (day, reason):    Vitals: Blood pressure 105/71, pulse 75, temperature 97 5 °F (36 4 °C), temperature source Oral, resp  rate 14, height 5' 9" (1 753 m), weight 79 4 kg (175 lb), SpO2 97 %  , Body mass index is 25 84 kg/m² , I/O last 3 completed shifts: In: 1243 2 [I V :1243 2]  Out: 1450 [Urine:1200; Stool:250]  I/O this shift:  In: 240 [P O :240]  Out: -   Wt Readings from Last 3 Encounters:   03/04/20 79 4 kg (175 lb)   02/27/20 83 3 kg (183 lb 10 3 oz)   02/11/20 78 6 kg (173 lb 4 5 oz)       Intake/Output Summary (Last 24 hours) at 3/4/2020 1024  Last data filed at 3/4/2020 0800  Gross per 24 hour   Intake 240 ml   Output 425 ml   Net -185 ml     I/O last 3 completed shifts: In: 1243 2 [I V :1243 2]  Out: 1450 [Urine:1200; Stool:250]    SR, occ PVCs        Physical Exam:  Vitals:    03/03/20 2330 03/04/20 0309 03/04/20 0600 03/04/20 0803   BP: 96/67 102/61  105/71   BP Location:    Right arm   Pulse: 88   75   Resp:  14  14   Temp: 97 6 °F (36 4 °C) 97 5 °F (36 4 °C)  97 5 °F (36 4 °C)   TempSrc:    Oral   SpO2: 93%   97%   Weight:   79 4 kg (175 lb)    Height:           GEN: Haile Patron appears well, alert and oriented x 3, pleasant and cooperative   HEENT: pupils equal, round, and reactive to light; extraocular muscles intact  NECK: supple, no carotid bruits   HEART: regular rhythm, normal S1 and S2, no murmurs, clicks, gallops or rubs, JVP is  flat  LUNGS: clear to auscultation bilaterally; no wheezes, rales, or rhonchi   ABDOMEN: normal bowel sounds, soft, no tenderness, no distention  EXTREMITIES: warm, peripheral pulses normal; no clubbing, cyanosis, or edema  NEURO: no focal findings   SKIN: normal without suspicious lesions on exposed skin      Current Facility-Administered Medications:     acetaminophen (TYLENOL) tablet 650 mg, 650 mg, Oral, Q6H PRN, Maxi King MD, 650 mg at 03/03/20 2210    aspirin (ECOTRIN LOW STRENGTH) EC tablet 81 mg, 81 mg, Oral, Daily, Axel Vides MD, 81 mg at 03/04/20 0907    atorvastatin (LIPITOR) tablet 80 mg, 80 mg, Oral, HS, Maxi King MD, 80 mg at 03/03/20 2211    belladonna-opium (B&O SUPPOSITORY) 16 2-30 mg suppository 1 suppository, 30 mg, Rectal, Q8H PRN, Maxi King MD    docusate sodium (COLACE) capsule 100 mg, 100 mg, Oral, BID, Maxi King MD, 100 mg at 02/28/20 1719    furosemide (LASIX) tablet 40 mg, 40 mg, Oral, BID (diuretic), Axel Vides MD, 40 mg at 03/04/20 0907    heparin (porcine) 25,000 units in 250 mL infusion (premix), 3-20 Units/kg/hr (Order-Specific), Intravenous, Titrated, Fabienne Redd PA-C, Last Rate: 13 5 mL/hr at 03/04/20 0230, 18 Units/kg/hr at 03/04/20 0230    heparin (porcine) injection 2,000 Units, 2,000 Units, Intravenous, PRN, Fabienne Redd PA-C, 2,000 Units at 02/29/20 0249    heparin (porcine) injection 4,000 Units, 4,000 Units, Intravenous, PRN, Fabienne Redd PA-C, 4,000 Units at 03/01/20 0801    levothyroxine tablet 50 mcg, 50 mcg, Oral, Early Morning, Maxi King MD, 50 mcg at 03/04/20 0630    lisinopril (ZESTRIL) tablet 2 5 mg, 2 5 mg, Oral, BID, SIMONA Kulkarni, 2 5 mg at 03/04/20 0907    melatonin tablet 3 mg, 3 mg, Oral, HS PRN, Juliocesar Soriano PA-C, 3 mg at 03/03/20 2211    metoprolol succinate (TOPROL-XL) 24 hr tablet 50 mg, 50 mg, Oral, Daily, Axel Vides MD, 50 mg at 03/04/20 0907    mycophenolate (CELLCEPT) capsule 750 mg, 750 mg, Oral, BID, Maxi King MD, 750 mg at 03/04/20 0907    nystatin (MYCOSTATIN) powder, , Topical, BID, Maxi King MD    oxybutynin (DITROPAN) tablet 5 mg, 5 mg, Oral, TID, Daphney Walls PA-C, 5 mg at 03/04/20 0907    oxyCODONE (ROXICODONE) IR tablet 5 mg, 5 mg, Oral, Q6H PRN, Charlette Ziegler MD, 5 mg at 02/29/20 0702    polyethylene glycol (MIRALAX) packet 17 g, 17 g, Oral, Daily PRN, Charlette Ziegler MD    warfarin (COUMADIN) tablet 7 5 mg, 7 5 mg, Oral, Daily (warfarin), Lena Burnette MD, 7 5 mg at 03/03/20 1711      Labs & Results:        Results from last 7 days   Lab Units 02/28/20  1208 02/27/20  0406   WBC Thousand/uL 7 78 8 02   HEMOGLOBIN g/dL 12 6 13 0   HEMATOCRIT % 41 9 43 6   PLATELETS Thousands/uL 165 170         Results from last 7 days   Lab Units 03/04/20  0628 03/03/20  0903 03/02/20  0841   POTASSIUM mmol/L 3 5 3 9 4 1   CHLORIDE mmol/L 106 103 103   CO2 mmol/L 23 28 27   BUN mg/dL 32* 29* 31*   CREATININE mg/dL 1 17 1 25 1 19   CALCIUM mg/dL 8 8 8 7 8 6     Results from last 7 days   Lab Units 03/04/20  0628 03/03/20  0902 03/02/20  0841   INR  1 26* 1 14 1 14       Chest X-Ray is obtained; result - reviewed  Counseling / Coordination of Care  Total floor / unit time spent today 20 minutes  Greater than 50% of total time was spent with the patient and / or family counseling and / or coordination of care  A description of the counseling / coordination of care: Guillermina Simmons [phone]  [address], [address] [facility]  Thank you for the opportunity to participate in the care of this patient  Angeles VALADEZ    Director Heart Failure/ Medical Director [facility]

## 2020-03-04 NOTE — PLAN OF CARE
Problem: PHYSICAL THERAPY ADULT  Goal: Performs mobility at highest level of function for planned discharge setting  See evaluation for individualized goals  Description  Treatment/Interventions: LE strengthening/ROM, Functional transfer training, Therapeutic exercise, Elevations, Endurance training, Patient/family training, Equipment eval/education, Bed mobility, Gait training          See flowsheet documentation for full assessment, interventions and recommendations  Outcome: Progressing  Note:   Prognosis: Good  Problem List: Decreased strength, Impaired balance, Decreased endurance, Decreased mobility, Decreased cognition, Decreased safety awareness, Impaired judgement  Assessment: Patient seen for physical therapy session with a focus gait training, activity tolerance, functional transfers, there-ex, and education  Pt received supine in bed and agreeable to PT session  Pt's wife present  Improved activity tolerance as observed by increased ambulation distance  Pt ambulated approx 50 feet x 2 with RW, min A x 2, and chair follow  Required several rest breaks 2* fatigue and mild SOB  1 minor lateral LOB, required min A x 1 to recover LOB  Pt continues to required physical assistance for all functional and deficits in endurance and activity tolerance  Concluded session with patient seated OOB with chair alarm on  Recommend STR upon discharge once medically stable  Barriers to Discharge: Decreased caregiver support, Inaccessible home environment     Recommendation: Post acute IP rehab     PT - OK to Discharge: Yes(to rehab once medically stable)    See flowsheet documentation for full assessment

## 2020-03-04 NOTE — SOCIAL WORK
Met with pt and pt's wife to discuss dc plan  Pt prefers 1  [place], 2 [place] and 3  Counts include 234 beds at the [facility]  Awaiting confirmation from [facility] regarding bed availability and acceptance  CM updated pt and pt's wife on same

## 2020-03-04 NOTE — PLAN OF CARE
Problem: OCCUPATIONAL THERAPY ADULT  Goal: Performs self-care activities at highest level of function for planned discharge setting  See evaluation for individualized goals  Description  Treatment Interventions: ADL retraining, Functional transfer training, Endurance training, Patient/family training, Equipment evaluation/education, Compensatory technique education, Continued evaluation, Activityengagement, Energy conservation          See flowsheet documentation for full assessment, interventions and recommendations  Outcome: Progressing  Note:   Limitation: Decreased ADL status, Decreased UE strength, Decreased Safe judgement during ADL, Decreased cognition, Decreased endurance, Decreased self-care trans, Decreased high-level ADLs  Prognosis: Fair  Assessment: Pt participated in occupational therapy with focus on activity tolerance, bed mob, unsupported sitting balance and tolerance for pt engagement in functional self-care task/LB self-care  Pt cleared by RN for pt participation in occupational therapy  Pt received HOB raised/supine pt sitting upright and agreeable to therapy following pt Identifiers confirmed  Pt reported his goal today to attend a family wedding  Pt family member/ pt wife Clara Nikia  present supportive throughout session  Pt requires  Assist for bed mob, LB dressing and functional transfers/mob with RW 2* pt decreased strength, coordination, balance and activity tolerance  Pt will require in-pt rehab to continue to address pt above noted deficits which currently impair pt ADL and functional mob   Pt chair alarm active post session all needs within reach     OT Discharge Recommendation: Short Term Rehab  OT - OK to Discharge: (when medically cleared)

## 2020-03-04 NOTE — PROGRESS NOTES
03/04/20 1400   Clinical Encounter Type   Visited With Patient   [facility]ism Encounters   [place] Needs Prayer   Sacramental Encounters   Sacrament of Sick-Anointing Anointed

## 2020-03-04 NOTE — ASSESSMENT & PLAN NOTE
· Currently on IV heparin bridge to coumadin  Coumadin started 2/29   I  · INR is subtherapeutic after 3 days of 5 mg, increased to 7 5 mg  · INR subtherapeutic today  · INR In AM, continue heparin bridge

## 2020-03-04 NOTE — ASSESSMENT & PLAN NOTE
Wt Readings from Last 3 Encounters:   03/04/20 79 4 kg (175 lb)   02/27/20 83 3 kg (183 lb 10 3 oz)   02/11/20 78 6 kg (173 lb 4 5 oz)     · Patient admitted initially to the [facility] with shortness of breath and lower extremity edema as well as weight gain  He was diuresed with intravenous Lasix  He underwent cardiac catheterization which showed triple-vessel disease and was transferred to [facility] for CT surgery evaluation  · Cardiology continues to follow here  · Continue oral Lasix 40 mg p o  B i d  · Lisinopril 2 5 mg b i d   · Metoprolol Succinate 50 mg daily  · EF 15%  Needs LifeVest on D/C - refuses  · Will continue to monitor weights, I&Os    · Discharge planning to inpatient rehab  · Low-sodium diet  · Monitor volume status

## 2020-03-04 NOTE — PROGRESS NOTES
Cardiology Progress Note - Stef Cline 75 y o  male MRN: [id number]    Unit/Bed#: [facility] 702-01 Encounter: [id number]  Assessment and plan  1  Multivessel coronary disease  2  Ischemic cardiomyopathy LVEF 15%  3  Acute on chronic combined systolic and diastolic congestive heart failure  4  Acute kidney injury on CKD 3  5  LV apical thrombus  6  Hyperlipidemia    Recommendations:  Overall he is improved from a heart failure standpoint  Discussed the case with the interventionalist there are options including left main and LAD these are heavily calcified vessels will likely need rotational atherectomy and given severe LV dysfunction Impella assist     Remains euvolemic from a heart failure standpoint  Continue aggressive medical therapy and rehabilitation would revisit in the office in 4-6 weeks if he is doing better can consider PCI at that point time  Continue IV heparin to Coumadin  Subjective:    No significant events overnight  Denies chest pain, palpitations, lightheadedness, dizziness, shortness of breath, lower extremity edema  There has been no lightheadedness or dizziness  Review of Systems   Constitution: Positive for malaise/fatigue  HENT: Negative  Eyes: Negative  Cardiovascular: Negative  Respiratory: Negative  Endocrine: Negative  Hematologic/Lymphatic: Negative  Skin: Negative  Musculoskeletal: Negative  Gastrointestinal: Negative  Genitourinary: Negative  Neurological: Negative  Psychiatric/Behavioral: Negative  All other systems reviewed and are negative  Objective:   Vitals: Blood pressure 105/71, pulse 75, temperature 97 5 °F (36 4 °C), temperature source Oral, resp  rate 14, height 5' 9" (1 753 m), weight 79 4 kg (175 lb), SpO2 97 %  , Body mass index is 25 84 kg/m² ,   Orthostatic Blood Pressures      Most Recent Value   Blood Pressure  105/71 filed at 03/04/2020 0803   Patient Position - Orthostatic VS  Lying filed at 03/04/2020 1027 Systolic (10MRL), DOJ:885 , Min:96 , OTB:450     Diastolic (62VHN), PEL:71, Min:61, Max:72      Intake/Output Summary (Last 24 hours) at 3/4/2020 0946  Last data filed at 3/4/2020 0800  Gross per 24 hour   Intake 1483 22 ml   Output 1200 ml   Net 283 22 ml     Weight (last 2 days)     Date/Time   Weight    03/04/20 0600   79 4 (175)    03/02/20 0600   75 5 (166 45)                Telemetry Review: No significant arrhythmias seen on telemetry review  EKG personally reviewed by Jennifer Cheek DO  Physical Exam:  Vital signs reviewed  General:  Alert and cooperative, appears stated age, no acute distress  HEENT:  PERRLA, EOMI, no scleral icterus, no conjunctival pallor  Neck:  No lymphadenopathy, no thyromegaly, no carotid bruits, no elevated JVP  Heart:  Regular rate and rhythm, normal S1/S2, no S3/S4, no murmur, rubs or gallops  PMI nondisplaced  Lungs:  Clear to auscultation bilaterally, no wheezes rales or rhonchi  Abdomen:  Soft, non-tender, positive bowel sounds, no rebound or guarding,   no organomegaly   Extremities:  Normal range of motion    No clubbing, cyanosis or edema   Vascular:  2+ pedal pulses  Skin:  No rashes or lesions on exposed skin  Neurologic:  Cranial nerves II-XII grossly intact without focal deficits      Laboratory Results:        CBC with diff:   Results from last 7 days   Lab Units 02/28/20  1208 02/27/20  0406   WBC Thousand/uL 7 78 8 02   HEMOGLOBIN g/dL 12 6 13 0   HEMATOCRIT % 41 9 43 6   MCV fL 93 94   PLATELETS Thousands/uL 165 170   MCH pg 28 1 28 1   MCHC g/dL 30 1* 29 8*   RDW % 15 5* 15 6*   MPV fL 9 9 9 7         CMP:  Results from last 7 days   Lab Units 03/04/20  0628 03/03/20  0903 03/02/20  0841 03/01/20  0448 02/29/20  0756 02/28/20  0509 02/27/20  0406   POTASSIUM mmol/L 3 5 3 9 4 1 3 7 3 9 4 2 4 3   CHLORIDE mmol/L 106 103 103 102 101 102 101   CO2 mmol/L 23 28 27 30 28 31 34*   BUN mg/dL 32* 29* 31* 32* 30* 28* 25   CREATININE mg/dL 1 17 1 25 1 19 1 14 1  06 1 10 1 26   CALCIUM mg/dL 8 8 8 7 8 6 8 4 8 4 8 5 8 3   EGFR ml/min/1 73sq m 61 56 59 63 68 65 55         BMP:  Results from last 7 days   Lab Units 20  0628 20  0903 20  0841 20  0448 20  0756 20  0509 20  0406   POTASSIUM mmol/L 3 5 3 9 4 1 3 7 3 9 4 2 4 3   CHLORIDE mmol/L 106 103 103 102 101 102 101   CO2 mmol/L 23 28 27 30 28 31 34*   BUN mg/dL 32* 29* 31* 32* 30* 28* 25   CREATININE mg/dL 1 17 1 25 1 19 1 14 1 06 1 10 1 26   CALCIUM mg/dL 8 8 8 7 8 6 8 4 8 4 8 5 8 3       BNP: No results for input(s): BNP in the last 72 hours  Magnesium:   Results from last 7 days   Lab Units 20  0406   MAGNESIUM mg/dL 2 0       Coags:   Results from last 7 days   Lab Units 20  0628 20  0902 20  1505 20  0841 20  0110 20  1740 20  0449 20  0448  20  1208   PTT seconds 65* 62* 60* 89* 91* 90* 41*  --    < > 35   INR  1 26* 1 14  --  1 14  --   --   --  1 10  --  1 34*    < > = values in this interval not displayed  TSH:        Hemoglobin A1C       Lipid Profile:       Cardiac testing:   Results for orders placed during the hospital encounter of 20   Echo complete with contrast if indicated    Narrative [facility] [facility]  [phone]    Transthoracic Echocardiogram  2D, M-mode, Doppler, and Color Doppler    Study date:  2020    Patient: Denton Bland  MR number: [id number]  Account number: [de-identified]  : 1945  Age: 75 years  Gender: Male  Status: Inpatient  Location: Bedside  Height: 69 in  Weight: 196 lb  BP: 90/ 55 mmHg    Indications: Assess left ventricular function      Diagnoses: I50 9 - Heart failure, unspecified    Sonographer:  Florentin Mackenzie RDCS  Primary Physician:  Alex Sigala MD  Referring Physician:  Clara Brown DO  Group:  [id number]  Interpreting Physician:  Clara Brown, DO    SUMMARY    LEFT VENTRICLE:  Systolic function was severely reduced  Ejection fraction was estimated to be 15 %  There was severe diffuse hypokinesis with regional variations  Wall thickness was mildly increased  Doppler parameters were consistent with high ventricular filling pressure  There was a definite, medium-sized mass on the apical wall  It represents a thrombus  LEFT ATRIUM:  The atrium was mildly dilated  MITRAL VALVE:  There was moderate regurgitation  AORTIC VALVE:  The valve was trileaflet  Leaflets exhibited moderately increased thickness and moderately reduced cuspal separation  There was moderate stenosis  Low LV stroke volume  Estimated aortic valve area (by VTI) was 1 04 cmï¾²  Estimated aortic valve area (by Vmax) was 0 91 cmï¾²  Doppler stroke volume was 21 99 ml  Doppler cardiac output was 2 33 L/min, using LVOT flow data  Doppler cardiac index was 1 14 L/min/mï¾², using LVOT flow data  TRICUSPID VALVE:  There was mild regurgitation  PERICARDIUM:  There was a left pleural effusion  HISTORY: PRIOR HISTORY: CAD, hyperlipidemia    PROCEDURE: The procedure was performed at the bedside  This was a routine study  The transthoracic approach was used  The study included complete 2D imaging, M-mode, complete spectral Doppler, and color Doppler  The heart rate was 125 bpm,  at the start of the study  Images were obtained from the parasternal, apical, subcostal, and suprasternal notch acoustic windows  Intravenous contrast ( 0 4 ml definity in NSS) was administered to opacify the left ventricle  This was a  technically difficult study  LEFT VENTRICLE: Size was normal  Systolic function was severely reduced  Ejection fraction was estimated to be 15 %  There was severe diffuse hypokinesis with regional variations  Wall thickness was mildly increased  There was a definite,  medium-sized mass on the apical wall  It represents a thrombus   DOPPLER: Left ventricular diastolic function parameters were abnormal  Doppler parameters were consistent with high ventricular filling pressure  RIGHT VENTRICLE: The size was normal  Systolic function was normal  Wall thickness was normal     LEFT ATRIUM: The atrium was mildly dilated  RIGHT ATRIUM: Size was normal     MITRAL VALVE: Valve structure was normal  There was normal leaflet separation  DOPPLER: The transmitral velocity was within the normal range  There was no evidence for stenosis  There was moderate regurgitation  AORTIC VALVE: The valve was trileaflet  Leaflets exhibited moderately increased thickness and moderately reduced cuspal separation  DOPPLER: Transaortic velocity was within the normal range  There was moderate stenosis  Low LV stroke  volume There was no regurgitation  TRICUSPID VALVE: The valve structure was normal  There was normal leaflet separation  DOPPLER: The transtricuspid velocity was within the normal range  There was no evidence for stenosis  There was mild regurgitation  PULMONIC VALVE: Leaflets exhibited normal thickness, no calcification, and normal cuspal separation  DOPPLER: The transpulmonic velocity was within the normal range  There was no regurgitation  PERICARDIUM: There was no pericardial effusion  There was a left pleural effusion  The pericardium was normal in appearance  AORTA: The root exhibited normal size  SYSTEMIC VEINS: IVC: The inferior vena cava was not well visualized      MEASUREMENT TABLES    2D MEASUREMENTS  LVOT   (Reference normals)  Diam   20 mm   (--)    DOPPLER MEASUREMENTS  LVOT   (Reference normals)  Peak bari   50 cm/s   (--)  VTI   7 cm   (--)  R-R interval   566 ms   (--)  HR   106 bpm   (--)  Stroke vol   21 99 ml   (--)  Cardiac output   2 33 L/min   (--)  Cardiac index   1 14 L/min/mï¾²   (--)  Stroke index   0 13 ml/mï¾²   (--)  Aortic valve   (Reference normals)  Peak bari   170 cm/s   (--)  VTI   21 cm   (--)  Obstr index, VTI   0 33    (--)  Valve area, VTI   1 04 cmï¾²   (--)  Area index, VTI   0 51 cmï¾²/mï¾²   (--)  Obstr index, Vmax   0 29    (--)  Valve area, Vmax   0 91 cmï¾²   (--)  Area index, Vmax   0 44 cmï¾²/mï¾²   (--)    SYSTEM MEASUREMENT TABLES    2D  %FS: 15 4 %  Ao Diam: 3 6 cm  EDV(Teich): 133 78 ml  EF(Teich): 32 28 %  ESV(Teich): 90 59 ml  IVSd: 1 03 cm  LA Diam: 4 14 cm  LVIDd: 5 27 cm  LVIDs: 4 46 cm  LVPWd: 1 27 cm  SV(Teich): 43 19 ml    CW  TR MaxP 98 mmHg  TR Vmax: 2 91 m/s    MM  TAPSE: 1 32 cm    PW  LVOT Env  Ti: 244 52 ms  LVOT VTI: 5 9 cm  LVOT Vmax: 0 41 m/s  LVOT Vmean: 0 24 m/s  LVOT maxP 67 mmHg  LVOT meanP 28 mmHg  MV A [place]: 0 63 m/s  MV Dec [place]: 10 25 m/s2  MV DecT: 60 31 ms  MV E [place]: 0 62 m/s  MV E/A Ratio: 0 98  MV PHT: 17 49 ms  MVA By PHT: 12 58 cm2    [address] [facility]redited [facility]    Prepared and electronically signed by    Theodora Thornton DO  Signed 2020 16:49:07       No results found for this or any previous visit  No results found for this or any previous visit  No results found for this or any previous visit      Meds/Allergies   all current active meds have been reviewed  Medications Prior to Admission   Medication    aspirin 81 MG tablet    atorvastatin (LIPITOR) 80 mg tablet    levothyroxine 25 mcg tablet    mycophenolate (CELLCEPT) 250 mg capsule    nystatin (MYCOSTATIN) powder    Ostomy Supplies (PREMIER COLOSTOMY/ILEOSTOMY) KIT    potassium chloride (KLOR-CON) 20 mEq packet         heparin (porcine) 3-20 Units/kg/hr (Order-Specific) Last Rate: 18 Units/kg/hr (20 0230)     Assessment:  Principal Problem:    Acute on chronic combined systolic and diastolic heart failure (HCC)  Active Problems:    Multiple vessel coronary artery disease    Hyperlipidemia    Essential hypertension    Gastrocutaneous fistula    CKD (chronic kidney disease)    H/O myasthenia gravis    Right inguinal hernia    Urinary obstruction    LV (left ventricular) mural thrombus

## 2020-03-04 NOTE — QUICK NOTE
Coli pressures nurse to evaluate  Patient is complaining bladder spasms, chronic Lawrence catheter is showing cloudy urine  Given his symptoms of bladder pain with change in urine consistency suspect catheter associated UTI  Will check UA and culture    Time-out performed with patient's nurse at bedside

## 2020-03-04 NOTE — PHYSICAL THERAPY NOTE
Physical Therapy Treatment Note   Patient Name: Kwaku ALLEN Date: 3/4/2020     Problem List  Principal Problem:    Acute on chronic combined systolic and diastolic heart failure ([facility] [address] )  Active Problems:    Multiple vessel coronary artery disease    Hyperlipidemia    Essential hypertension    Gastrocutaneous fistula    CKD (chronic kidney disease)    H/O myasthenia gravis    Right inguinal hernia    Urinary obstruction    LV (left ventricular) mural thrombus       Past Medical History  Past Medical History:   Diagnosis Date    Cardiac disease     Carotid stenosis     CHF [facility]congestive heart failure) ([facility]     Compression fracture of lumbar vertebra ([facility])     Coronary artery disease     Disease of thyroid gland     Diverticulitis     Hernia, diaphragmatic, without obstruction     Hyperlipidemia     Hypertension     Inguinal hernia     Right    Ischemic cardiomyopathy     MI (myocardial infarction) ([facility] [address] )     Myasthenia gravis ([facility] )         Past Surgical History  Past Surgical History:   Procedure Laterality Date    ANGIOPLASTY / STENTING FEMORAL      CATARACT EXTRACTION      COLON SURGERY      colostomy    COLOSTOMY      ESOPHAGOGASTRODUODENOSCOPY N/A 3/29/2016    Procedure: ESOPHAGOGASTRODUODENOSCOPY (EGD); Surgeon: Chaka Lange MD;  [facility]; Service:     GASTROSTOMY TUBE PLACEMENT N/A 3/29/2016    Procedure: PEG CHANGE-LOW PROFILE TUBE ;  Surgeon: Chaka Lange MD;  [facility]; Service:     LAPAROTOMY N/A 5/17/2016    Procedure: LAPAROTOMY EXPLORATORY; RESSECTION OF GASTRO-CUTANEOUS FISTULA ;  Surgeon: Nam Bolanos DO;  [facility];  Service:     PEG TUBE PLACEMENT      PEG TUBE REMOVAL      NE ESOPHAGOGASTRODUODENOSCOPY TRANSORAL DIAGNOSTIC N/A 5/12/2016    Procedure: ESOPHAGOGASTRODUODENOSCOPY w 12-6 gc clip,anchor ;  Surgeon: Chaka Lange MD;  [facility];   Service: Gastroenterology 03/04/20 1250   Pain Assessment   Pain Assessment 0-10   Pain Score No Pain   Restrictions/Precautions   Weight Bearing Precautions Per Order No   Other Precautions Chair Alarm; Bed Alarm;Cognitive;Multiple lines; Fall Risk;Pain   Cognition   Overall Cognitive Status Impaired   Arousal/Participation Alert; Cooperative   Attention Attends with cues to redirect   Orientation Level Oriented X4   Memory Decreased recall of recent events;Decreased recall of precautions   Following Commands Follows one step commands without difficulty   Comments Pt pleasant today and improved participation in therapy today  Bed Mobility   Supine to Sit 4  Minimal assistance   Additional items Assist x 1; Increased time required;Verbal cues;LE management   Additional Comments Pt seated OOB with chair alarm on at the end of session   Transfers   Sit to Stand 4  Minimal assistance   Additional items Assist x 2; Increased time required;Verbal cues   Stand to Sit 4  Minimal assistance   Additional items Assist x 2; Increased time required;Verbal cues   Stand pivot 4  Minimal assistance   Additional items Assist x 2; Increased time required;Verbal cues   Additional Comments Performed with RW; required VC for safety and hand placement  Ambulation/Elevation   Gait pattern Step to;Short stride; Excessively slow; Foward flexed   Gait Assistance 4  Minimal assist   Additional items Assist x 2 VC required for increased step length and upright posture  (3rd person for chair follow)   Assistive Device Rolling walker   Distance 50 feet x 2; total of 6 seated rest breaks; 1 minor LOB, required min A x 1 to recover LOB      Balance   Static Sitting Fair +   Dynamic Sitting Fair +   Static Standing Poor +   Dynamic Standing Poor +   Ambulatory Poor +   Endurance Deficit   Endurance Deficit Yes   Endurance Deficit Description fatigue, weakness, SOB   Activity Tolerance   Activity Tolerance Patient limited by fatigue   Medical Staff Made Aware ANTUNEZ Nurse Made Aware Yes   Exercises   Hip Flexion Supine;10 reps;AROM; Right;Left  (Resisted SLR x10 each side)   Assessment   Prognosis Good   Problem List Decreased strength; Impaired balance;Decreased endurance;Decreased mobility; Decreased cognition;Decreased safety awareness; Impaired judgement   Assessment Patient seen for physical therapy session with a focus gait training, activity tolerance, functional transfers, there-ex, and education  Pt received supine in bed and agreeable to PT session  Pt's wife present  Improved activity tolerance as observed by increased ambulation distance  Pt ambulated approx 50 feet x 2 with RW, min A x 2, and chair follow  Required several rest breaks 2* fatigue and mild SOB  1 minor lateral LOB, required min A x 1 to recover LOB  Pt continues to required physical assistance for all functional and deficits in endurance and activity tolerance  Concluded session with patient seated OOB with chair alarm on  Recommend STR upon discharge once medically stable  Barriers to Discharge Decreased caregiver support; Inaccessible home environment   Goals   Patient Goals To return home   STG Expiration Date 03/14/20   PT Treatment Day 1   Plan   Treatment/Interventions Functional transfer training;LE strengthening/ROM; Therapeutic exercise; Endurance training;Patient/family training;Bed mobility;Gait training   Progress Progressing toward goals   PT Frequency   (3-5x/wk)   Recommendation   Recommendation Post acute IP rehab   Equipment Recommended Walker   PT - OK to Discharge Yes  (to rehab once medically stable)       Mendez Jones PT, DPT

## 2020-03-04 NOTE — PROGRESS NOTES
Progress Note Ronnell Lay 1945, 75 y o  male MRN: [id number]    Unit/Bed#: [facility] 702-01 Encounter: [id number]    Primary Care Provider: Carrillo Higuera MD   Date and time admitted to hospital: 2/27/2020  4:17 PM        * Acute on chronic combined systolic and diastolic heart failure [facility])  Assessment & Plan  Wt Readings from Last 3 Encounters:   03/04/20 79 4 kg (175 lb)   02/27/20 83 3 kg (183 lb 10 3 oz)   02/11/20 78 6 kg (173 lb 4 5 oz)     · Patient admitted initially to the [facility] with shortness of breath and lower extremity edema as well as weight gain  He was diuresed with intravenous Lasix  He underwent cardiac catheterization which showed triple-vessel disease and was transferred to [address] [place] [address] for CT surgery evaluation  · Cardiology continues to follow here  · Continue oral Lasix 40 mg p o  B i d  · Lisinopril 2 5 mg b i d   · Metoprolol Succinate 50 mg daily  · EF 15%  Needs LifeVest on D/C - refuses  · Will continue to monitor weights, I&Os  · Discharge planning to inpatient rehab  · Low-sodium diet  · Monitor volume status        Multiple vessel coronary artery disease  Assessment & Plan  · Cardiac catheterization 02/26/2020 with multivessel disease  Transferred to [facility] for CT surgery evaluation however patient deemed to not be surgical candidate  · Continue aspirin, atorvastatin, metoprolol  · Possibly a candidate for high risk PCI with impella support as an outpatient after cardiac thrombus has resolved as an outpatient  LV (left ventricular) mural thrombus  Assessment & Plan  · Currently on IV heparin bridge to coumadin  Coumadin started 2/29  I  · INR is subtherapeutic after 3 days of 5 mg, increased to 7 5 mg  · INR subtherapeutic today  · INR In AM, continue heparin bridge    Urinary obstruction  Assessment & Plan  · Urinary obstruction  · Secondary to large right inguinal hernia with hydronephrosis and urethral obstruction     · Plan for cameron exchange 3/12/2020 with urology in the office  · Continue chronic Lawrence      Right inguinal hernia  Assessment & Plan  · Right inguinal hernia  · Outpatient follow up, no current intervention        H/O myasthenia gravis  Assessment & Plan  · Stable on mycophenolate    CKD (chronic kidney disease)  Assessment & Plan  · Creatinine is stable  · Avoid nephrotoxins and hypotension  · Monitor closely    Gastrocutaneous fistula  Assessment & Plan  · Gastrocutaneous fistula  · Patient has history of diverticulitis complicated by perforation status post colostomy  · Outpatient follow up      Essential hypertension  Assessment & Plan  · Patient with documented history of hypertension however now with soft blood pressures  · Monitor closely    Hyperlipidemia  Assessment & Plan  · Continue statin        VTE Pharmacologic Prophylaxis:   Pharmacologic: Heparin Drip  Mechanical VTE Prophylaxis in Place: Yes    Patient Centered Rounds: I have performed bedside rounds with nursing staff today  Discussions with Specialists or Other Care Team Provider:  Cardiology    Education and Discussions with Family / Patient:  Patient, plan of care    Time Spent for Care: 20 minutes  More than 50% of total time spent on counseling and coordination of care as described above  Current Length of Stay: 6 day(s)    Current Patient Status: Inpatient   Certification Statement: The patient will continue to require additional inpatient hospital stay due to Awaiting therapeutic INR    Discharge Plan:  SNF    Code Status: Level 1 - Full Code      Subjective:   Reports that breathing is stable  Tolerating diet  Denies any acute complaints today    Objective:     Vitals:   Temp (24hrs), Av 6 °F (36 4 °C), Min:97 5 °F (36 4 °C), Max:97 8 °F (36 6 °C)    Temp:  [97 5 °F (36 4 °C)-97 8 °F (36 6 °C)] 97 5 °F (36 4 °C)  HR:  [75-97] 75  Resp:  [13-14] 14  BP: ()/(61-72) 105/71  SpO2:  [93 %-97 %] 97 %  Body mass index is 25 84 kg/m²       Input and Output Summary (last 24 hours): Intake/Output Summary (Last 24 hours) at 3/4/2020 1211  Last data filed at 3/4/2020 0800  Gross per 24 hour   Intake 240 ml   Output 425 ml   Net -185 ml       Physical Exam:     Physical Exam   Constitutional: He appears well-developed and well-nourished  HENT:   Head: Normocephalic and atraumatic  Eyes: Pupils are equal, round, and reactive to light  EOM are normal    Neck: Neck supple  Cardiovascular: Normal rate and regular rhythm  Pulmonary/Chest: Effort normal    Abdominal: Soft  Colostomy present   Genitourinary:   Genitourinary Comments: Chronic Lawrence catheter   Neurological: He is alert  Skin: Skin is warm and dry  Additional Data:     Labs:    Results from last 7 days   Lab Units 02/28/20  1208   WBC Thousand/uL 7 78   HEMOGLOBIN g/dL 12 6   HEMATOCRIT % 41 9   PLATELETS Thousands/uL 165     Results from last 7 days   Lab Units 03/04/20  0628   SODIUM mmol/L 136   POTASSIUM mmol/L 3 5   CHLORIDE mmol/L 106   CO2 mmol/L 23   BUN mg/dL 32*   CREATININE mg/dL 1 17   ANION GAP mmol/L 7   CALCIUM mg/dL 8 8   GLUCOSE RANDOM mg/dL 105     Results from last 7 days   Lab Units 03/04/20  0628   INR  1 26*     Results from last 7 days   Lab Units 03/01/20  1639   POC GLUCOSE mg/dl 137                   * I Have Reviewed All Lab Data Listed Above  * Additional Pertinent Lab Tests Reviewed:  All Labs Within Last 24 Hours Reviewed      Recent Cultures (last 7 days):           Last 24 Hours Medication List:     Current Facility-Administered Medications:  acetaminophen 650 mg Oral Q6H PRN Michele Bonilla MD    aspirin 81 mg Oral Daily Aye Pérez MD    atorvastatin 80 mg Oral HS Michele Bonilla MD    belladonna-opium 30 mg Rectal Q8H PRN Michele Bonilla MD    docusate sodium 100 mg Oral BID Michele Bonilla MD    furosemide 40 mg Oral BID (diuretic) Aye Pérez MD    heparin (porcine) 3-20 Units/kg/hr (Order-Specific) Intravenous Titrated Michael Ruiz LUIS A Last Rate: 18 Units/kg/hr (03/04/20 0230)   heparin (porcine) 2,000 Units Intravenous PRN Sylvester Carbo, PA-VINNIE    heparin (porcine) 4,000 Units Intravenous PRN Sylvesteresteban Hazel PA-C    levothyroxine 50 mcg Oral Early Morning Foreign Pina MD    lisinopril 2 5 mg Oral BID SIMONA Kulkarni    melatonin 3 mg Oral HS PRN Leonel Shepard PA-C    metoprolol succinate 50 mg Oral Daily Guru Coyle MD    mycophenolate 750 mg Oral BID Foreign Pina MD    nystatin  Topical BID Foreign Pina MD    oxybutynin 5 mg Oral TID Daphney Walls PA-C    oxyCODONE 5 mg Oral Q6H PRN Foreign Pina MD    polyethylene glycol 17 g Oral Daily PRN Foreign Pina MD    potassium chloride 20 mEq Oral Daily SIMONA Kulkarni    warfarin 7 5 mg Oral Daily (warfarin) Laney Lozoya MD         Today, Patient Was Seen By: Nilsa Neumann MD    ** Please Note: Dictation voice to text software may have been used in the creation of this document   **

## 2020-03-05 NOTE — PROGRESS NOTES
Heart Failure/ Pulmonary Hypertension Progress Note - [email or web address] [facility] 75 y o  male MRN: [id number]    Unit/Bed#: [facility] 702-01 Encounter: [id number]      Assessment:    Principal Problem:    Acute on chronic combined systolic and diastolic heart failure (HCC)  Active Problems:    Multiple vessel coronary artery disease    Hyperlipidemia    Essential hypertension    Gastrocutaneous fistula    CKD (chronic kidney disease)    H/O myasthenia gravis    Right inguinal hernia    Urinary obstruction    LV (left ventricular) mural thrombus      Subjective:   Patient seen and examined  Having bladder spasms and cloudy urine  Urine cultures +  Small creat bump as a result  Continuing to optimize HF GDMT  Does not want [facility] for discharge to rehab followed by evaluation for Impella assisted PCI with  rotational atherectomy as outpatient    Sustained runs of ventricular trigeminy noted on tele today  Objective: Intake/ output: 580/690/-110  Weight: unclear  Tele: SR/ST, PVCs    Plan: 1  Acute on chronic HFrEF, LVEF 15%, LVIDd 5 3 cm, NYHA Class II/III, ACC/AHA stage C: Etiology: iCM given multivessel CAD  Possible contributions from uncontrolled HTN over the years per notes  He is deemed a poor candidate for CABG given multiple comorbidities but may undergo future high risk PCI of the Left Main CA, will likely require Impella assistance  For now will continue to optimize HF medical therapy  Started on Lisinopril yesterday  Continue daily weights, strict intake and output charting, daily BMP  Clinically warm, volume status improved however still somewhat tachy and hypotensive so likely still borderline low output  MAP stable  Keep diuretic at same dose  Consider MRA if renal function remains stable as outpatient  Life Vest recommended on discharge but patient refusing   Bump in creat today likely 2/2 UTI       NT Pro BNP 80 K   -Nutrition consult for CHF diet teaching     TTE 2/20/20: LVEF 15%, LVIDd 5 3 cm, LV apical thrombus, moderate MR, moderate AS, mild TR, RV normal,   TTE 6/2017: LVEF 50%, LVIDd 4 1 cm, moderate MR, RV ok, trace TR, PASP 25 mmHtg,   TTE 2014, LVEF 65%     cMRI 2/26/20:  Impression:  1  Mildly dilated left ventricle with severely reduced left ventricular systolic function  2  Normal right ventricular size with mildly reduced right ventricular systolic function  3  Likely mild mitral regurgitation  4  Subendocardial scarring of the mid to distal lateral, inferior, and septal walls   This suggests moderate myocardial viability in these territories   The anterior wall is completely viable       [facility] 2/26/20:   --  LAD: The vessel was medium sized and heavily calcified  There are L to R collaterals to the distal RCA system  --  Proximal LAD: There was a tubular 50 % stenosis  The lesion was irregularly contoured  --  Mid LAD: There was a discrete 80 % stenosis  --  Distal LAD: There was a discrete 50 % stenosis  --  1st diagonal: The vessel was small sized  --  Circumflex: The vessel was medium sized and heavily calcified  --  1st obtuse marginal: There was a tubular 90 % stenosis in the proximal third of the vessel segment  --  RCA: The vessel was medium sized and heavily calcified  The vessel is 100 % occluded in the mid segment   There are R to R collaterals to the distal RCA      The PDA is a medium to large sized vessel which is graftable      IMPRESSIONS:  There is significant triple vessel coronary artery disease  Neurohormonal Blockade:  --Beta-Blocker: Toprol XL 50 mg daily  --ACEi, ARB or ARNi: lisinopril 2 5 mg twice daily  --Aldosterone Receptor Blocker: can start as outpatient  --Diuretic: furosemide 40 mg BID with 20 meq of Kdur      Sudden Cardiac Death Risk Reduction:  --ICD: indicated  Life Vest as bridge recommended but patient refusing at this time       Electrical Resynchronization:  Narrow QRS     Advanced Therapies (If appropriate):   --Inotrope:  --LVAD/Transplant Candidacy:    2  Multivessel CAD  See above  Continue BB, ASA, statin  3  ANDREA on CKD  Creat back up to 1 5 but in setting of UTI  4  HLD  5  HTN  6 LV apical thrombus  Currently on Heparin bridge  INR subtherapeutic  7 Urinary obstruction/Chronic indwelling cameron in situ    2/2 large right inguinal hernia with hydronephrosis and urethral obstruction  8 Gastrocutaneouis fistula  With history of diverticulitis complicated by perforation  S/P colostomy  9 CAUTI  With + urine cultures, 2/2 #7  [facility] [employer] (day, reason): Cameron catheter (day, reason):    Vitals: Blood pressure 105/59, pulse 56, temperature 97 8 °F (36 6 °C), resp  rate 14, height 5' 9" (1 753 m), weight 74 kg (163 lb 2 3 oz), SpO2 96 %  , Body mass index is 24 09 kg/m² , I/O last 3 completed shifts: In: 80 [P O :580]  Out: 690 [Urine:470; Stool:220]  No intake/output data recorded  Wt Readings from Last 3 Encounters:   03/05/20 74 kg (163 lb 2 3 oz)   02/27/20 83 3 kg (183 lb 10 3 oz)   02/11/20 78 6 kg (173 lb 4 5 oz)       Intake/Output Summary (Last 24 hours) at 3/5/2020 0845  Last data filed at 3/5/2020 0515  Gross per 24 hour   Intake 340 ml   Output 690 ml   Net -350 ml     I/O last 3 completed shifts: In: 80 [P O :580]  Out: 690 [Urine:470; Stool:220]    SR, occ-frequent PVCs        Physical Exam:  Vitals:    03/04/20 1911 03/04/20 2137 03/05/20 0600 03/05/20 0730   BP: 93/57 96/62  105/59   BP Location:       Pulse: 103 80  56   Resp:       Temp:  97 8 °F (36 6 °C)     TempSrc:       SpO2: 96% 97%  96%   Weight:   74 kg (163 lb 2 3 oz)    Height:           GEN: Lorenzo Gottlieb appears well, alert and oriented x 3, pleasant and cooperative   HEENT: pupils equal, round, and reactive to light; extraocular muscles intact  NECK: supple, no carotid bruits   HEART: regular rhythm, normal S1 and S2, no murmurs, clicks, gallops or rubs, JVP is  flat  LUNGS: clear to auscultation bilaterally; no wheezes, rales, or rhonchi   ABDOMEN: normal bowel sounds, soft, no tenderness, no distention  EXTREMITIES: warm, peripheral pulses normal; no clubbing, cyanosis, or edema  NEURO: no focal findings   SKIN: normal without suspicious lesions on exposed skin      Current Facility-Administered Medications:     acetaminophen (TYLENOL) tablet 650 mg, 650 mg, Oral, Q6H PRN, Jonathan Huber MD, 650 mg at 03/03/20 2210    aspirin (ECOTRIN LOW STRENGTH) EC tablet 81 mg, 81 mg, Oral, Daily, Audrey John MD, 81 mg at 03/05/20 0814    atorvastatin (LIPITOR) tablet 80 mg, 80 mg, Oral, HS, Jonathan Huber MD, 80 mg at 03/04/20 2108    belladonna-opium (B&O SUPPOSITORY) 16 2-30 mg suppository 1 suppository, 30 mg, Rectal, Q8H PRN, Jonathan Huber MD    cefTRIAXone (ROCEPHIN) 1,000 mg in dextrose 5 % 50 mL IVPB, 1,000 mg, Intravenous, Q24H, Jamila Kang MD    docusate sodium (COLACE) capsule 100 mg, 100 mg, Oral, BID, Jonathan Huber MD, 100 mg at 02/28/20 1719    furosemide (LASIX) tablet 40 mg, 40 mg, Oral, BID (diuretic), Audrey John MD, 40 mg at 03/05/20 0730    heparin (porcine) 25,000 units in 250 mL infusion (premix), 3-20 Units/kg/hr (Order-Specific), Intravenous, Titrated, Vesna Smith PA-C, Last Rate: 13 5 mL/hr at 03/04/20 1840, 18 Units/kg/hr at 03/04/20 1840    heparin (porcine) injection 2,000 Units, 2,000 Units, Intravenous, PRN, JONY Pop-C, 2,000 Units at 02/29/20 0249    heparin (porcine) injection 4,000 Units, 4,000 Units, Intravenous, PRN, JONY Pop-C, 4,000 Units at 03/01/20 0801    levothyroxine tablet 50 mcg, 50 mcg, Oral, Early Morning, Jonathan Huber MD, 50 mcg at 03/05/20 0621    lisinopril (ZESTRIL) tablet 2 5 mg, 2 5 mg, Oral, BID, SIMONA Kulkarni, 2 5 mg at 03/05/20 0813    melatonin tablet 3 mg, 3 mg, Oral, HS PRN, Roe Guadarrama PA-C, 3 mg at 03/03/20 2211    metoprolol succinate (TOPROL-XL) 24 hr tablet 50 mg, 50 mg, Oral, Daily, Audrey John MD, 50 mg at 03/05/20 9701    mycophenolate (CELLCEPT) capsule 750 mg, 750 mg, Oral, BID, Alonzo Hernandez MD, 750 mg at 03/05/20 0815    nystatin (MYCOSTATIN) powder, , Topical, BID, Alonzo Hernandez MD, 1 application at 83/12/91 0815    oxybutynin (DITROPAN) tablet 5 mg, 5 mg, Oral, TID, Daphney Walls PA-C, 5 mg at 03/05/20 3787    oxyCODONE (ROXICODONE) IR tablet 5 mg, 5 mg, Oral, Q6H PRN, Alonzo Hernandez MD, 5 mg at 02/29/20 0702    polyethylene glycol (MIRALAX) packet 17 g, 17 g, Oral, Daily PRN, Alonzo Hernandez MD    potassium chloride (K-DUR,KLOR-CON) CR tablet 20 mEq, 20 mEq, Oral, Daily, Libby Paredes, LATONIANP, 20 mEq at 03/05/20 7662    warfarin (COUMADIN) tablet 7 5 mg, 7 5 mg, Oral, Daily (warfarin), Jacob Gaona MD, 7 5 mg at 03/04/20 1728      Labs & Results:        Results from last 7 days   Lab Units 02/28/20  1208   WBC Thousand/uL 7 78   HEMOGLOBIN g/dL 12 6   HEMATOCRIT % 41 9   PLATELETS Thousands/uL 165         Results from last 7 days   Lab Units 03/05/20  0443 03/04/20  0628 03/03/20  0903   POTASSIUM mmol/L 3 7 3 5 3 9   CHLORIDE mmol/L 107 106 103   CO2 mmol/L 25 23 28   BUN mg/dL 36* 32* 29*   CREATININE mg/dL 1 43* 1 17 1 25   CALCIUM mg/dL 8 7 8 8 8 7     Results from last 7 days   Lab Units 03/05/20  0443 03/04/20  0628 03/03/20  0902   INR  1 35* 1 26* 1 14       Chest X-Ray is obtained; result - reviewed  Counseling / Coordination of Care  Total floor / unit time spent today 20 minutes  Greater than 50% of total time was spent with the patient and / or family counseling and / or coordination of care  A description of the counseling / coordination of care: Guillermina Simmons [phone], [address]  Thank you for the opportunity to participate in the care of this patient  Eddye [place] D O    Director Heart Failure/ Medical Director [phone] [facility]

## 2020-03-05 NOTE — PROGRESS NOTES
Cardiology Progress Note - Marcus Gutierrez 75 y o  male MRN: [id number]    Unit/Bed#: [facility] 702-01 Encounter: [id number]    Assessment and plan  1  Multivessel coronary disease  2  Ischemic cardiomyopathy LVEF 15%  3  Acute on chronic combined systolic and diastolic congestive heart failure  4  Acute kidney injury on CKD 3  5  LV apical thrombus  6  Hyperlipidemia    Recommendations:  Multivessel coronary disease stable without active anginal symptoms  Will plan eventual Impella assisted PCI of the left main LAD  Needs to recover from a heart failure standpoint for doing so also needs resolution of LV thrombus  Continue anticoagulation goal INR 2-3  Appreciate heart failures assistant continue goal-directed medical therapy  He is currently euvolemic  Patient refusing a life vest   Blood pressure is controlled  Continue high-intensity statin  Will need extensive rehab  Subjective:    No significant events overnight  Resting comfortably shortness of breath has significantly improved  Denies any anginal sounding chest pain  There has been no events on telemetry other than frequent PVCs  Denies lower extremity edema, PND, orthopnea  Review of Systems   Constitution: Negative  HENT: Negative  Eyes: Negative  Cardiovascular: Negative  Respiratory: Negative  Endocrine: Negative  Hematologic/Lymphatic: Negative  Skin: Negative  Musculoskeletal: Negative  Gastrointestinal: Negative  Genitourinary: Negative  Neurological: Negative  Psychiatric/Behavioral: Negative  All other systems reviewed and are negative  Objective:   Vitals: Blood pressure 105/59, pulse 56, temperature 97 8 °F (36 6 °C), resp  rate 16, height 5' 9" (1 753 m), weight 74 kg (163 lb 2 3 oz), SpO2 96 %  , Body mass index is 24 09 kg/m² , Orthostatic Blood Pressures      Most Recent Value   Blood Pressure  105/59 filed at 03/05/2020 0730   Patient Position - Orthostatic VS  Lying filed at 03/05/2020 8617         Systolic (51BXX), RDL:95 , Min:81 , VAV:714     Diastolic (63VOG), BGY:41, Min:48, Max:62      Intake/Output Summary (Last 24 hours) at 3/5/2020 0933  Last data filed at 3/5/2020 0918  Gross per 24 hour   Intake 978 33 ml   Output 690 ml   Net 288 33 ml     Weight (last 2 days)     Date/Time   Weight    03/05/20 0600   74 (163 14) unable to get patient out of bed     Weight: unable to get patient out of bed  at 03/05/20 0600    03/04/20 0600   79 4 (175)                Telemetry Review: No significant arrhythmias seen on telemetry review  EKG personally reviewed by Bronwyn Callejas DO  Physical Exam   Constitutional: He is oriented to person, place, and time  He appears well-developed and well-nourished  No distress  HENT:   Head: Normocephalic and atraumatic  Eyes: Pupils are equal, round, and reactive to light  Conjunctivae are normal    Neck: Neck supple  Cardiovascular: Normal rate, regular rhythm and normal heart sounds  Exam reveals no friction rub  No murmur heard  Pulmonary/Chest: Effort normal and breath sounds normal  No respiratory distress  He has no wheezes  He has no rales  Abdominal: Soft  Bowel sounds are normal  He exhibits no distension  There is no tenderness  There is no rebound  Musculoskeletal: Normal range of motion  He exhibits no edema, tenderness or deformity  Neurological: He is alert and oriented to person, place, and time  No cranial nerve deficit  Skin: Skin is warm and dry  No erythema  Psychiatric: He has a normal mood and affect  Nursing note and vitals reviewed          Laboratory Results:        CBC with diff: Results from last 7 days   Lab Units 02/28/20  1208   WBC Thousand/uL 7 78   HEMOGLOBIN g/dL 12 6   HEMATOCRIT % 41 9   MCV fL 93   PLATELETS Thousands/uL 165   MCH pg 28 1   MCHC g/dL 30 1*   RDW % 15 5*   MPV fL 9 9         CMP:  Results from last 7 days   Lab Units 03/05/20  0443 03/04/20  2100 03/03/20  4161 03/02/20  9439 20  0448 20  0756 20  0509   POTASSIUM mmol/L 3 7 3 5 3 9 4 1 3 7 3 9 4 2   CHLORIDE mmol/L 107 106 103 103 102 101 102   CO2 mmol/L  30 28 31   BUN mg/dL 36* 32* 29* 31* 32* 30* 28*   CREATININE mg/dL 1 43* 1 17 1 25 1 19 1 14 1 06 1 10   CALCIUM mg/dL 8 7 8 8 8 7 8 6 8 4 8 4 8 5   EGFR ml/min/1 73sq m 48 61 56 59 63 68 65         BMP:  Results from last 7 days   Lab Units 20  0443 20  0628 20  0903 20  0841 20  0448 20  0756 20  0509   POTASSIUM mmol/L 3 7 3 5 3 9 4 1 3 7 3 9 4 2   CHLORIDE mmol/L 107 106 103 103 102 101 102   CO2 mmol/L  28 31   BUN mg/dL 36* 32* 29* 31* 32* 30* 28*   CREATININE mg/dL 1 43* 1 17 1 25 1 19 1 14 1 06 1 10   CALCIUM mg/dL 8 7 8 8 8 7 8 6 8 4 8 4 8 5       BNP: No results for input(s): BNP in the last 72 hours  Magnesium:       Coags:   Results from last 7 days   Lab Units 20  0443 20  0628 20  0902 20  1505 20  0841 20  0110 20  1740  20  0448  20  1208   PTT seconds 79* 65* 62* 60* 89* 91* 90*   < >  --    < > 35   INR  1 35* 1 26* 1 14  --  1 14  --   --   --  1 10  --  1 34*    < > = values in this interval not displayed  TSH:        Hemoglobin A1C       Lipid Profile:       Cardiac testing:   Results for orders placed during the hospital encounter of 20   Echo complete with contrast if indicated    Narrative [facility] [address], [address] [facility]  [phone]    Transthoracic Echocardiogram  2D, M-mode, Doppler, and Color Doppler    Study date:  2020    Patient: Mil Cornejo  MR number: [id number]  Account number: [de-identified]  : 1945  Age: 75 years  Gender: Male  Status: Inpatient  Location: Bedside  Height: 69 in  Weight: 196 lb  BP: 90/ 55 mmHg    Indications: Assess left ventricular function      Diagnoses: I50 9 - Heart failure, unspecified    Sonographer:  Josse Paez, RUST  Primary Physician:  Jose Cerda MD  Referring Physician:  Tiffanie Martinez DO  Group:  [facility]  Interpreting Physician:  Tiffanie Martinez DO    SUMMARY    LEFT VENTRICLE:  Systolic function was severely reduced  Ejection fraction was estimated to be 15 %  There was severe diffuse hypokinesis with regional variations  Wall thickness was mildly increased  Doppler parameters were consistent with high ventricular filling pressure  There was a definite, medium-sized mass on the apical wall  It represents a thrombus  LEFT ATRIUM:  The atrium was mildly dilated  MITRAL VALVE:  There was moderate regurgitation  AORTIC VALVE:  The valve was trileaflet  Leaflets exhibited moderately increased thickness and moderately reduced cuspal separation  There was moderate stenosis  Low LV stroke volume  Estimated aortic valve area (by VTI) was 1 04 cmï¾²  Estimated aortic valve area (by Vmax) was 0 91 cmï¾²  Doppler stroke volume was 21 99 ml  Doppler cardiac output was 2 33 L/min, using LVOT flow data  Doppler cardiac index was 1 14 L/min/mï¾², using LVOT flow data  TRICUSPID VALVE:  There was mild regurgitation  PERICARDIUM:  There was a left pleural effusion  HISTORY: PRIOR HISTORY: CAD, hyperlipidemia    PROCEDURE: The procedure was performed at the bedside  This was a routine study  The transthoracic approach was used  The study included complete 2D imaging, M-mode, complete spectral Doppler, and color Doppler  The heart rate was 125 bpm,  at the start of the study  Images were obtained from the parasternal, apical, subcostal, and suprasternal notch acoustic windows  Intravenous contrast ( 0 4 ml definity in NSS) was administered to opacify the left ventricle  This was a  technically difficult study  LEFT VENTRICLE: Size was normal  Systolic function was severely reduced  Ejection fraction was estimated to be 15 %   There was severe diffuse hypokinesis with regional variations  Wall thickness was mildly increased  There was a definite,  medium-sized mass on the apical wall  It represents a thrombus  DOPPLER: Left ventricular diastolic function parameters were abnormal  Doppler parameters were consistent with high ventricular filling pressure  RIGHT VENTRICLE: The size was normal  Systolic function was normal  Wall thickness was normal     LEFT ATRIUM: The atrium was mildly dilated  RIGHT ATRIUM: Size was normal     MITRAL VALVE: Valve structure was normal  There was normal leaflet separation  DOPPLER: The transmitral velocity was within the normal range  There was no evidence for stenosis  There was moderate regurgitation  AORTIC VALVE: The valve was trileaflet  Leaflets exhibited moderately increased thickness and moderately reduced cuspal separation  DOPPLER: Transaortic velocity was within the normal range  There was moderate stenosis  Low LV stroke  volume There was no regurgitation  TRICUSPID VALVE: The valve structure was normal  There was normal leaflet separation  DOPPLER: The transtricuspid velocity was within the normal range  There was no evidence for stenosis  There was mild regurgitation  PULMONIC VALVE: Leaflets exhibited normal thickness, no calcification, and normal cuspal separation  DOPPLER: The transpulmonic velocity was within the normal range  There was no regurgitation  PERICARDIUM: There was no pericardial effusion  There was a left pleural effusion  The pericardium was normal in appearance  AORTA: The root exhibited normal size  SYSTEMIC VEINS: IVC: The inferior vena cava was not well visualized      MEASUREMENT TABLES    2D MEASUREMENTS  LVOT   (Reference normals)  Diam   20 mm   (--)    DOPPLER MEASUREMENTS  LVOT   (Reference normals)  Peak bari   50 cm/s   (--)  VTI   7 cm   (--)  R-R interval   566 ms   (--)  HR   106 bpm   (--)  Stroke vol   21 99 ml   (--)  Cardiac output   2 33 L/min   (--)  Cardiac index   1 14 L/min/mï¾²   (--)  Stroke index   0 13 ml/mï¾²   (--)  Aortic valve   (Reference normals)  Peak zaire   170 cm/s   (--)  VTI   21 cm   (--)  Obstr index, VTI   0 33    (--)  Valve area, VTI   1 04 cmï¾²   (--)  Area index, VTI   0 51 cmï¾²/mï¾²   (--)  Obstr index, Vmax   0 29    (--)  Valve area, Vmax   0 91 cmï¾²   (--)  Area index, Vmax   0 44 cmï¾²/mï¾²   (--)    SYSTEM MEASUREMENT TABLES    2D  %FS: 15 4 %  Ao Diam: 3 6 cm  EDV(Teich): 133 78 ml  EF(Teich): 32 28 %  ESV(Teich): 90 59 ml  IVSd: 1 03 cm  LA Diam: 4 14 cm  LVIDd: 5 27 cm  LVIDs: 4 46 cm  LVPWd: 1 27 cm  SV(Teich): 43 19 ml    CW  TR MaxP 98 mmHg  TR Vmax: 2 91 m/s    MM  TAPSE: 1 32 cm    PW  LVOT Env  Ti: 244 52 ms  LVOT VTI: 5 9 cm  LVOT Vmax: 0 41 m/s  LVOT Vmean: 0 24 m/s  LVOT maxP 67 mmHg  LVOT meanP 28 mmHg  MV A Azire: 0 63 m/s  MV Dec Wythe: 10 25 m/s2  MV DecT: 60 31 ms  MV E Zaire: 0 62 m/s  MV E/A Ratio: 0 98  MV PHT: 17 49 ms  MVA By PHT: 12 58 cm2    [address] [facility]redited Ech[facility]    Prepared and electronically signed by    Brooke Levi DO  Signed 2020 16:49:07       No results found for this or any previous visit  No results found for this or any previous visit  No results found for this or any previous visit      Meds/Allergies   all current active meds have been reviewed  Medications Prior to Admission   Medication    aspirin 81 MG tablet    atorvastatin (LIPITOR) 80 mg tablet    levothyroxine 25 mcg tablet    mycophenolate (CELLCEPT) 250 mg capsule    nystatin (MYCOSTATIN) powder    Ostomy Supplies (PREMIER COLOSTOMY/ILEOSTOMY) KIT    potassium chloride (KLOR-CON) 20 mEq packet         heparin (porcine) 3-20 Units/kg/hr (Order-Specific) Last Rate: 18 Units/kg/hr (20 1840)     Assessment:  Principal Problem:    Acute on chronic combined systolic and diastolic heart failure (HCC)  Active Problems:    Multiple vessel coronary artery disease    Hyperlipidemia Essential hypertension    Gastrocutaneous fistula    CKD (chronic kidney disease)    H/O myasthenia gravis    Right inguinal hernia    Urinary obstruction    LV (left ventricular) mural thrombus

## 2020-03-05 NOTE — CONSULTS
Consult - Urology   Sejal Paris 1945, 75 y o  male MRN: [id number]    Unit/Bed#: [facility] 702-01 Encounter: [id number]    Urinary obstruction  Assessment & Plan  Likely multifactorial secondary to large scrotal inguinal hernia on the right, BPH  Maintain urethral Cameron  At some point, if his cardiac status stabilizes consideration could be made for general surgical repair of this hernia  This would be very high risk given his comorbidities  Bedside rounds performed with RN  Discussed with Dr Lydia Parsons  Urology will sign off but remain available for any further inpatient needs  Please feel free to call with questions or concerns  Maintain cameron as above  Subjective/Objective     Subjective:   CC: "I have pain in my bladder "  HPI:  Sejal Paris this maintained chronic urethral Cameron secondary large scrotal inguinal hernia and retention secondary to BPH  Urologic consultation requested in light of possible urinary tract infection with change in urine appearance, pain  Urinalysis consistent with infection  Urine culture pending  Id prescribing antibiotics were this time  Patient denies any bladder spasms issues the Cameron catheter other than recent appearance change of the urine  ROS:  Review of Systems   Constitutional: Negative for activity change and appetite change  HENT: Negative for congestion and ear pain  Eyes: Negative for pain  Respiratory: Negative for cough and shortness of breath  Cardiovascular: Negative for chest pain and palpitations  Gastrointestinal: Negative for abdominal distention, abdominal pain, blood in stool, constipation, diarrhea and nausea  Genitourinary: Negative for difficulty urinating, dysuria and hematuria  Musculoskeletal: Negative for arthralgias and myalgias  Skin: Negative for rash  Allergic/Immunologic: Negative for immunocompromised state  Neurological: Negative for dizziness and headaches     Hematological: Negative for adenopathy  Does not bruise/bleed easily  Psychiatric/Behavioral: Negative for agitation  The patient is not nervous/anxious  Objective:  Vitals: Blood pressure (!) 88/50, pulse 61, temperature 98 5 °F (36 9 °C), resp  rate 20, height 5' 9" (1 753 m), weight 74 kg (163 lb 2 3 oz), SpO2 94 %  ,Body mass index is 24 09 kg/m²  Intake/Output Summary (Last 24 hours) at 3/5/2020 1546  Last data filed at 3/5/2020 1421  Gross per 24 hour   Intake 988 51 ml   Output 990 ml   Net -1 49 ml       Invasive Devices     Peripheral Intravenous Line            Long-Dwell Peripheral IV (Midline) 26/98/08 Left Basilic 13 days          Drain            Colostomy  -- days    Colostomy LLQ 1388 days    Urethral Catheter Latex 18 Fr  21 days                Physical Exam   Constitutional: He is oriented to person, place, and time  He appears well-developed and well-nourished  He is cooperative  He does not appear ill  No distress  74-year-old male, sitting upright in bed  Visiting with family  No acute distress  HENT:   Head: Normocephalic and atraumatic  Moist mucous membranes  Eyes: Conjunctivae and EOM are normal    Neck: Normal range of motion  Neck supple  No tracheal deviation present  Cardiovascular: Normal rate, regular rhythm and normal heart sounds  No murmur heard  Pulmonary/Chest: Effort normal and breath sounds normal  No respiratory distress  He has no wheezes  Good airflow bilaterally on deep inspiration  Abdominal: Soft  Bowel sounds are normal  He exhibits no distension and no mass  There is no tenderness  Abdomen is soft and benign with left-sided ostomy with gas and liquid in the bag  Genitourinary:   Genitourinary Comments: Significant and impressive right-sided hernia with left displacement of the uncircumcised penis  Eighteen [place] Joyce Lawrence catheter removed without event  New 18 Greek Lawrence catheter placed with 10 cc in the balloon    100 cc of turbid yellow urine drained upon placement  Musculoskeletal: Normal range of motion  He exhibits no edema  Neurological: He is alert and oriented to person, place, and time  Skin: Skin is warm and dry  No rash noted  He is not diaphoretic  No erythema  No pallor  Psychiatric: He has a normal mood and affect  His behavior is normal  Judgment and thought content normal    Nursing note and vitals reviewed  History:    Past Medical History:   Diagnosis Date    Cardiac disease     Carotid stenosis     [facility]     Compression fracture of lumbar vertebra ([facility])     Coronary artery disease     Disease of thyroid gland     Diverticulitis     Hernia, diaphragmatic, without obstruction     Hyperlipidemia     Hypertension     Inguinal hernia     Right    Ischemic cardiomyopathy     [facility]ocardial infarction) [facility]     Myasthenia gravis ([facility] )      Past Surgical History:   Procedure Laterality Date    ANGIOPLASTY / STENTING FEMORAL      CATARACT EXTRACTION      COLON SURGERY      colostomy    COLOSTOMY      ESOPHAGOGASTRODUODENOSCOPY N/A 3/29/2016    Procedure: ESOPHAGOGASTRODUODENOSCOPY (EGD); Surgeon: Cristóbal Long MD;  [facility]; Service:     GASTROSTOMY TUBE PLACEMENT N/A 3/29/2016    Procedure: PEG CHANGE-LOW PROFILE TUBE ;  Surgeon: Cristóbal Long MD;  [facility]; Service:     LAPAROTOMY N/A 5/17/2016    Procedure: LAPAROTOMY EXPLORATORY; RESSECTION OF GASTRO-CUTANEOUS FISTULA ;  Surgeon: Geena Izquierdo DO;  [facility];  Service:     PEG TUBE PLACEMENT      PEG TUBE REMOVAL      KS ESOPHAGOGASTRODUODENOSCOPY TRANSORAL DIAGNOSTIC N/A 5/12/2016    Procedure: ESOPHAGOGASTRODUODENOSCOPY w 12-6 gc clip,anchor ;  Surgeon: Cristóbal Long MD;  [facility]: [facility];   Service: Gastroenterology     Family History   Problem Relation Age of Onset    Heart disease Mother     Hypertension Mother      Social History     Socioeconomic History    Marital status: /Civil Union     Spouse name: Not on file    Number of children: Not on file    Years of education: Not on file    Highest education level: Not on file   Occupational History    Not on file   Social Needs    Financial resource strain: Not on file    Food insecurity:     Worry: Not on file     Inability: Not on file    Transportation needs:     Medical: Not on file     Non-medical: Not on file   Tobacco Use    Smoking status: Former Smoker     Last attempt to quit: 1973     Years since quittin 3    Smokeless tobacco: Never Used   Substance and Sexual Activity    Alcohol use: Yes     Comment: 1 per month    Drug use: No    Sexual activity: Not on file   Lifestyle    Physical activity:     Days per week: Not on file     Minutes per session: Not on file    Stress: Not on file   Relationships    Social connections:     Talks on phone: Not on file     Gets together: Not on file     Attends Christianity service: Not on file     Active member of club or organization: Not on file     Attends meetings of clubs or organizations: Not on file     Relationship status: Not on file    Intimate partner violence:     Fear of current or ex partner: Not on file     Emotionally abused: Not on file     Physically abused: Not on file     Forced sexual activity: Not on file   Other Topics Concern    Not on file   Social History Narrative    Not on file     Lab Results:  I have personally reviewed pertinent labs    Results from last 7 days   Lab Units 20  1018 20  1208   WBC Thousand/uL 7 91 7 78   HEMOGLOBIN g/dL 11 9* 12 6   PLATELETS Thousands/uL 212 165     Results from last 7 days   Lab Units 20  0443 20  0628 20  0903   SODIUM mmol/L 142 136 138   POTASSIUM mmol/L 3 7 3 5 3 9   CHLORIDE mmol/L 107 106 103   CO2 mmol/L 25 23 28   BUN mg/dL 36* 32* 29*   CREATININE mg/dL 1 43* 1 17 1 25   EGFR ml/min/1 73sq m 48 61 56   CALCIUM mg/dL 8 7 8 8 8 7               Parag Hyde PA-C  Date: 3/5/2020 Time: 3:46 PM

## 2020-03-05 NOTE — ASSESSMENT & PLAN NOTE
Likely multifactorial secondary to large scrotal inguinal hernia on the right, BPH  Maintain urethral Lawrence  At some point, if his cardiac status stabilizes consideration could be made for general surgical repair of this hernia  This would be very high risk given his comorbidities

## 2020-03-05 NOTE — CONSULTS
Consultation - Infectious Disease   Paige Jimenez 75 y o  male MRN: [id number]  Unit/Bed#: [facility] 702-01 Encounter: [id number]      IMPRESSION & RECOMMENDATIONS:   Impression/Recommendations:  1  Recurrent Catheter Associated UTI  · Change in urine color, dysuria yesterday, UA showed innumerable bacteria and WBCs, culture pending  · Treated recently with 10 days of CTX --> cefpodoxime from 2/11-2/20 for proteus and E  Coli  · Start empiric coverage with Ceftriaxone 1g q24 hrs, if no symptomatic improvement consider Levofloxacin or Bactrim   · Await culture results    2  Urinary Obstruction 2/2 large R inguinal hernia vs BPH  · Patient follows with urology  · Has chronic cameron catheter, recent CAUTI on 2/11/2020  · Patient now with dysuria and change in urine color + UA positive for WBCs and bacteria  · Change cameron catheter  · Start antibiotics as above    3  CKD  · Cr now up to 1 43 from baseline 1 1-1 2  · Ultrasound showed no hydronephrosis on 2/20    4  Acute on Chronic combined systolic and diastolic CHF  · Cardiology Following    5  Myasthenia Gravis  · Patient stable on Mycophenolate Mofetil  · Patient follow with outpatient neurology  · Monitor patients for symptoms as we are starting antibiotics      Thank you for this consultation  We will follow along with you  HISTORY OF PRESENT ILLNESS:  Reason for Consult: Catheter associated UTI    HPI: Paige Jimenez is a 75 y o  male with PMH of Acute on chornic systolic and diastolic CHF, CAD, urinary obstruction 2/2 large R sided inguinal hernia or BPH, 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, CKD, and myasthenia gravis stable on mycophenolate mofetil  Patient has a chronic catheter due to his urinary obstruction and is supposed to follow with urology outpatient  Patient was admitted [facility] for CHF exacerbation  Pt was transferred to [place] on 2/27 for CABG evaluation   Patient is awaiting PCI after rehab outside the hospital  Last night patient developed bladder cramping , dysuria, and turbid urine and UA came back positive for bacteria, protein, and WBCs  Culture was sent and is pending  Patient was recently treated for CAUTI and sepsis on 2/11/2020 with ceftriaxone and then transitioned to cefpodoxime for a total of a 10 day course  Cultures at that time grew proteus and E  Coli  In performing this consult, I have reviewed prior admission and outpatient visit records in detail  REVIEW OF SYSTEMS:  Denies abdominal pain, chest pain  Pain with urination and muscle cramping in bladder  Lawrence catheter in place  Denies any fever or chills  A complete system-based review of systems is otherwise negative  PAST MEDICAL HISTORY:  Past Medical History:   Diagnosis Date    Cardiac disease     Carotid stenosis     [facility]     Compression fracture of lumbar vertebra ([facility])     Coronary artery disease     Disease of thyroid gland     Diverticulitis     Hernia, diaphragmatic, without obstruction     Hyperlipidemia     Hypertension     Inguinal hernia     Right    Ischemic cardiomyopathy     [facility]) [facility]     Myasthenia gravis ([facility] )      Past Surgical History:   Procedure Laterality Date    ANGIOPLASTY / STENTING FEMORAL      CATARACT EXTRACTION      COLON SURGERY      colostomy    COLOSTOMY      ESOPHAGOGASTRODUODENOSCOPY N/A 3/29/2016    Procedure: ESOPHAGOGASTRODUODENOSCOPY (EGD); Surgeon: Cam Boateng MD;  [facility]; Service:     GASTROSTOMY TUBE PLACEMENT N/A 3/29/2016    Procedure: PEG CHANGE-LOW PROFILE TUBE ;  Surgeon: Cam Boateng MD;  [facility];   Service:     LAPAROTOMY N/A 5/17/2016    Procedure: LAPAROTOMY EXPLORATORY; RESSECTION OF GASTRO-CUTANEOUS FISTULA ;  Surgeon: Asya Crawford DO;  Location[facility];  Service:     PEG TUBE PLACEMENT      PEG TUBE REMOVAL      [place] ESOPHAGOGASTRODUODENOSCOPY TRANSORAL DIAGNOSTIC N/A 5/12/2016 Procedure: ESOPHAGOGASTRODUODENOSCOPY w 12-6 gc clip,anchor ;  Surgeon: Maria Teresa Blair MD;  [facility]: [facility]; Service: Gastroenterology       FAMILY HISTORY:  Non-contributory    SOCIAL HISTORY:  Social History     Substance and Sexual Activity   Alcohol Use Yes    Comment: 1 per month     Social History     Substance and Sexual Activity   Drug Use No     Social History     Tobacco Use   Smoking Status Former Smoker    Last attempt to quit: 1973    Years since quittin 1   Smokeless Tobacco Never Used       ALLERGIES:  No Known Allergies    MEDICATIONS:  All current active medications have been reviewed  PHYSICAL EXAM:  Vitals:  Temp:  [97 8 °F (36 6 °C)] 97 8 °F (36 6 °C)  HR:  [] 56  Resp:  [16] 16  BP: ()/(48-62) 105/59  SpO2:  [93 %-97 %] 96 %  Temp (24hrs), Av 8 °F (36 6 °C), Min:97 8 °F (36 6 °C), Max:97 8 °F (36 6 °C)  Current: Temperature: 97 8 °F (36 6 °C)     Physical Exam:  General:  Well-nourished, well-developed, in no acute distress, examined at bedside with wife  Eyes:  Conjunctive clear with no hemorrhages or effusions  Oropharynx:  No ulcers, no lesions, moist mucous membranes  Neck:  Supple, no lymphadenopathy  Lungs:  Clear to auscultation bilaterally, no accessory muscle use  Cardiac:  Regular rate and rhythm, no murmurs  Abdomen:  Soft, non-tender, non-distended, colostomy in L side of abdomen  Extremities:  No peripheral cyanosis, clubbing, or edema  Skin:  No rashes, no ulcers, skin warm and dry  Neurological:  Moves all four extremities spontaneously, sensation grossly intact, alert and oriented    LABS, IMAGING, & OTHER STUDIES:  Lab Results:  I have personally reviewed pertinent labs    Results from last 7 days   Lab Units 20  0443 20  0628 20  0903   POTASSIUM mmol/L 3 7 3 5 3 9   CHLORIDE mmol/L 107 106 103   CO2 mmol/L 25 23 28   BUN mg/dL 36* 32* 29*   CREATININE mg/dL 1 43* 1 17 1 25   EGFR ml/min/1 73sq m 48 61 56   CALCIUM mg/dL 8 7 8 8 8 7     Results from last 7 days   Lab Units 03/05/20  1018 02/28/20  1208   WBC Thousand/uL 7 91 7 78   HEMOGLOBIN g/dL 11 9* 12 6   PLATELETS Thousands/uL 212 165         Urine: Innumerable bacteria, innumerable WBCs, 10-20 RBCs, 1+ protein  Imaging Studies:   I have personally reviewed pertinent imaging study reports and images in PACS  US Kidney and Bladder 2/20/2020: No hydronephrosis, previous pelvocaliectasis resolved in R kidney  Cardiac MRI 2/26: EF 9%    EKG, Pathology, and Other Studies:   I have personally reviewed pertinent reports      [address]

## 2020-03-06 NOTE — ASSESSMENT & PLAN NOTE
· Stable on mycophenolate  · As discussed with neurology yesterday he has limited options for treatment, continue mycophenolate

## 2020-03-06 NOTE — PROGRESS NOTES
Patient currently running frequent PVC's on the tele monitor  Pauline Cruz with slim notified  No new orders at this time  Will continue to monitor pt's rhythm

## 2020-03-06 NOTE — PROGRESS NOTES
Cardiology Progress Note - Stef Cline 75 y o  male MRN: [id number]    Unit/Bed#: [facility] 702-01 Encounter: [id number]    Assessment and plan  1  Multivessel coronary disease  2  Ischemic cardiomyopathy LVEF 15%  3  Acute on chronic combined systolic and diastolic congestive heart failure  4  Acute kidney injury on CKD 3  5  LV apical thrombus  6  Hyperlipidemia    Recommendations:  Multivessel coronary disease stable without active anginal symptoms  Will plan eventual Impella assisted PCI of the left main LAD  Needs to recover from a heart failure standpoint for doing so also needs resolution of LV thrombus  Creatinine went up slightly last 36 hours  Will hold dose of Lasix today as well as ACE-inhibitor  Overall doing well needs therapeutic INR to go to rehab  Continue heparin to Coumadin goal INR 2-3  Coronary disease stable with no angina  The patient has turned down life vest   Appreciate input from Heart failure Service  Subjective:    No significant events overnight  Overall doing well  Denies any chest pain, shortness of breath, palpitations, lightheadedness, dizziness, or syncope  Telemetry personally reviewed shows frequent PVCs  Review of Systems   Constitution: Negative  HENT: Negative  Eyes: Negative  Cardiovascular: Negative  Respiratory: Negative  Endocrine: Negative  Hematologic/Lymphatic: Negative  Skin: Negative  Musculoskeletal: Negative  Gastrointestinal: Negative  Genitourinary: Negative  Neurological: Negative  Psychiatric/Behavioral: Negative  All other systems reviewed and are negative  Objective:   Vitals: Blood pressure 99/72, pulse (!) 51, temperature 98 °F (36 7 °C), resp  rate 16, height 5' 9" (1 753 m), weight 74 kg (163 lb 2 3 oz), SpO2 95 %  , Body mass index is 24 09 kg/m² ,   Orthostatic Blood Pressures      Most Recent Value   Blood Pressure  99/72 filed at 03/06/2020 1349   Patient Position - Orthostatic VS  Lying filed at 2020 1339         Systolic (08FJD), TLV:19 , Min:88 , NZ     Diastolic (89PEN), IQK:26, Min:50, Max:72      Intake/Output Summary (Last 24 hours) at 3/6/2020 0943  Last data filed at 3/6/2020 0630  Gross per 24 hour   Intake 690 18 ml   Output 1025 ml   Net -334 82 ml     Weight (last 2 days)     Date/Time   Weight    20 0600   74 (163 14) unable to get patient out of bed     Weight: unable to get patient out of bed  at 20 0600    20 0600   79 4 (175)                Telemetry Review: No significant arrhythmias seen on telemetry review  EKG personally reviewed by Tanvi Philippe DO  Physical Exam:  Vital signs reviewed  General:  Alert and cooperative, appears stated age, no acute distress  HEENT:  PERRLA, EOMI, no scleral icterus, no conjunctival pallor  Neck:  No lymphadenopathy, no thyromegaly, no carotid bruits, no elevated JVP  Heart:  Regular rate and rhythm, normal S1/S2, no S3/S4, no murmur, rubs or gallops  PMI nondisplaced  Lungs:  Clear to auscultation bilaterally, no wheezes rales or rhonchi  Abdomen:  Soft, non-tender, positive bowel sounds, no rebound or guarding,   no organomegaly   Extremities:  Normal range of motion    No clubbing, cyanosis or edema   Vascular:  2+ pedal pulses  Skin:  No rashes or lesions on exposed skin  Neurologic:  Cranial nerves II-XII grossly intact without focal deficits      Laboratory Results:        CBC with diff:   Results from last 7 days   Lab Units 20  0521 20  1018 20  1208   WBC Thousand/uL 8 18 7 91 7 78   HEMOGLOBIN g/dL 11 2* 11 9* 12 6   HEMATOCRIT % 36 4* 39 5 41 9   MCV fL 94 94 93   PLATELETS Thousands/uL 214 212 165   MCH pg 28 8 28 2 28 1   MCHC g/dL 30 8* 30 1* 30 1*   RDW % 16 3* 16 6* 15 5*   MPV fL 10 4 10 4 9 9   NRBC AUTO /100 WBCs 0 0  --          CMP:  Results from last 7 days   Lab Units 20  6651 20  2091 20  7338 20  5824 20  0841 20  0448 20  0756   POTASSIUM mmol/L 3 7 3 7 3 5 3 9 4 1 3 7 3 9   CHLORIDE mmol/L 107 107 106 103 103 102 101   CO2 mmol/L  23 28 27 30 28   BUN mg/dL 39* 36* 32* 29* 31* 32* 30*   CREATININE mg/dL 1 47* 1 43* 1 17 1 25 1 19 1 14 1 06   CALCIUM mg/dL 8 3 8 7 8 8 8 7 8 6 8 4 8 4   EGFR ml/min/1 73sq m 46 48 61 56 59 63 68         BMP:  Results from last 7 days   Lab Units 20  0614 20  0443 20  0628 20  0903 20  0841 20  0448 20  0756   POTASSIUM mmol/L 3 7 3 7 3 5 3 9 4 1 3 7 3 9   CHLORIDE mmol/L 107 107 106 103 103 102 101   CO2 mmol/L  28  30 28   BUN mg/dL 39* 36* 32* 29* 31* 32* 30*   CREATININE mg/dL 1 47* 1 43* 1 17 1 25 1 19 1 14 1 06   CALCIUM mg/dL 8 3 8 7 8 8 8 7 8 6 8 4 8 4       BNP: No results for input(s): BNP in the last 72 hours  Magnesium:   Results from last 7 days   Lab Units 20  0443   MAGNESIUM mg/dL 2 5       Coags:   Results from last 7 days   Lab Units 20  0736 20  0443 20  0628 20  0902 20  1505 20  0841 20  0110  20  0448  20  1208   PTT seconds 72* 79* 65* 62* 60* 89* 91*   < >  --    < > 35   INR   --  1 35* 1 26* 1 14  --  1 14  --   --  1 10  --  1 34*    < > = values in this interval not displayed  TSH:        Hemoglobin A1C       Lipid Profile:       Cardiac testing:   Results for orders placed during the hospital encounter of 20   Echo complete with contrast if indicated    Narrative [facility], [address] [facility]  [phone]    Transthoracic Echocardiogram  2D, M-mode, Doppler, and Color Doppler    Study date:  2020    Patient: Kylie Ziegler  MR number: [id number]  Account number: [de-identified]  : 1945  Age: 75 years  Gender: Male  Status: Inpatient  Location: Bedside  Height: 69 in  Weight: 196 lb  BP: 90/ 55 mmHg    Indications: Assess left ventricular function      Diagnoses: I50 9 - Heart failure, unspecified    Sonographer:  Zak Meadows RDCS  Primary Physician:  Alison Valle MD  Referring Physician:  Laya Gutierrez DO  Group:  [facility]  Interpreting Physician:  Laya Gutierrez DO    SUMMARY    LEFT VENTRICLE:  Systolic function was severely reduced  Ejection fraction was estimated to be 15 %  There was severe diffuse hypokinesis with regional variations  Wall thickness was mildly increased  Doppler parameters were consistent with high ventricular filling pressure  There was a definite, medium-sized mass on the apical wall  It represents a thrombus  LEFT ATRIUM:  The atrium was mildly dilated  MITRAL VALVE:  There was moderate regurgitation  AORTIC VALVE:  The valve was trileaflet  Leaflets exhibited moderately increased thickness and moderately reduced cuspal separation  There was moderate stenosis  Low LV stroke volume  Estimated aortic valve area (by VTI) was 1 04 cmï¾²  Estimated aortic valve area (by Vmax) was 0 91 cmï¾²  Doppler stroke volume was 21 99 ml  Doppler cardiac output was 2 33 L/min, using LVOT flow data  Doppler cardiac index was 1 14 L/min/mï¾², using LVOT flow data  TRICUSPID VALVE:  There was mild regurgitation  PERICARDIUM:  There was a left pleural effusion  HISTORY: PRIOR HISTORY: CAD, hyperlipidemia    PROCEDURE: The procedure was performed at the bedside  This was a routine study  The transthoracic approach was used  The study included complete 2D imaging, M-mode, complete spectral Doppler, and color Doppler  The heart rate was 125 bpm,  at the start of the study  Images were obtained from the parasternal, apical, subcostal, and suprasternal notch acoustic windows  Intravenous contrast ( 0 4 ml definity in NSS) was administered to opacify the left ventricle  This was a  technically difficult study  LEFT VENTRICLE: Size was normal  Systolic function was severely reduced  Ejection fraction was estimated to be 15 %  There was severe diffuse hypokinesis with regional variations  Wall thickness was mildly increased  There was a definite,  medium-sized mass on the apical wall  It represents a thrombus  DOPPLER: Left ventricular diastolic function parameters were abnormal  Doppler parameters were consistent with high ventricular filling pressure  RIGHT VENTRICLE: The size was normal  Systolic function was normal  Wall thickness was normal     LEFT ATRIUM: The atrium was mildly dilated  RIGHT ATRIUM: Size was normal     MITRAL VALVE: Valve structure was normal  There was normal leaflet separation  DOPPLER: The transmitral velocity was within the normal range  There was no evidence for stenosis  There was moderate regurgitation  AORTIC VALVE: The valve was trileaflet  Leaflets exhibited moderately increased thickness and moderately reduced cuspal separation  DOPPLER: Transaortic velocity was within the normal range  There was moderate stenosis  Low LV stroke  volume There was no regurgitation  TRICUSPID VALVE: The valve structure was normal  There was normal leaflet separation  DOPPLER: The transtricuspid velocity was within the normal range  There was no evidence for stenosis  There was mild regurgitation  PULMONIC VALVE: Leaflets exhibited normal thickness, no calcification, and normal cuspal separation  DOPPLER: The transpulmonic velocity was within the normal range  There was no regurgitation  PERICARDIUM: There was no pericardial effusion  There was a left pleural effusion  The pericardium was normal in appearance  AORTA: The root exhibited normal size  SYSTEMIC VEINS: IVC: The inferior vena cava was not well visualized      MEASUREMENT TABLES    2D MEASUREMENTS  LVOT   (Reference normals)  Diam   20 mm   (--)    DOPPLER MEASUREMENTS  LVOT   (Reference normals)  Peak bari   50 cm/s   (--)  VTI   7 cm   (--)  R-R interval   566 ms   (--)  HR   106 bpm   (--)  Stroke vol   21 99 ml   (--)  Cardiac output 2 33 L/min   (--)  Cardiac index   1 14 L/min/mï¾²   (--)  Stroke index   0 13 ml/mï¾²   (--)  Aortic valve   (Reference normals)  Peak zaire   170 cm/s   (--)  VTI   21 cm   (--)  Obstr index, VTI   0 33    (--)  Valve area, VTI   1 04 cmï¾²   (--)  Area index, VTI   0 51 cmï¾²/mï¾²   (--)  Obstr index, Vmax   0 29    (--)  Valve area, Vmax   0 91 cmï¾²   (--)  Area index, Vmax   0 44 cmï¾²/mï¾²   (--)    SYSTEM MEASUREMENT TABLES    2D  %FS: 15 4 %  Ao Diam: 3 6 cm  EDV(Teich): 133 78 ml  EF(Teich): 32 28 %  ESV(Teich): 90 59 ml  IVSd: 1 03 cm  LA Diam: 4 14 cm  LVIDd: 5 27 cm  LVIDs: 4 46 cm  LVPWd: 1 27 cm  SV(Teich): 43 19 ml    CW  TR MaxP 98 mmHg  TR Vmax: 2 91 m/s    MM  TAPSE: 1 32 cm    PW  LVOT Env  Ti: 244 52 ms  LVOT VTI: 5 9 cm  LVOT Vmax: 0 41 m/s  LVOT Vmean: 0 24 m/s  LVOT maxP 67 mmHg  LVOT meanP 28 mmHg  MV A [place]: 0 63 m/s  MV Dec [place]: 10 25 m/s2  MV DecT: 60 31 ms  MV E [place]: 0 62 m/s  MV E/A Ratio: 0 98  MV PHT: 17 49 ms  MVA By PHT: 12 58 cm2    [address] [facility]redited [facility]    Prepared and electronically signed by    Kali Becerra DO  Signed 2020 16:49:07       No results found for this or any previous visit  No results found for this or any previous visit  No results found for this or any previous visit      Meds/Allergies   all current active meds have been reviewed  Medications Prior to Admission   Medication    aspirin 81 MG tablet    atorvastatin (LIPITOR) 80 mg tablet    levothyroxine 25 mcg tablet    mycophenolate (CELLCEPT) 250 mg capsule    nystatin (MYCOSTATIN) powder    Ostomy Supplies (PREMIER COLOSTOMY/ILEOSTOMY) KIT    potassium chloride (KLOR-CON) 20 mEq packet         heparin (porcine) 3-20 Units/kg/hr (Order-Specific) Last Rate: 18 Units/kg/hr (20 0847)     Assessment:  Principal Problem:    Acute on chronic combined systolic and diastolic heart failure (HCC)  Active Problems:    Multiple vessel coronary

## 2020-03-06 NOTE — ASSESSMENT & PLAN NOTE
· Currently on IV heparin bridge to coumadin  Coumadin started 2/29   I  · INR is subtherapeutic after 3 doses of warfarin 7 5 mg, increased to 10 mg  · Patient and wife counseled on low vitamin K diet  · INR In AM, continue heparin bridge

## 2020-03-06 NOTE — PROGRESS NOTES
Spoke with Dr Francie Toribio  Stated ok to not give lasix or lisinopril d/t patient is possibly dry and Cr has increased

## 2020-03-06 NOTE — ASSESSMENT & PLAN NOTE
· Currently on IV heparin bridge to coumadin  Coumadin started 2/29   I  · INR is subtherapeutic continue warfarin 7 5 mg today  · Patient and wife counseled on low vitamin K diet  · INR In AM, continue heparin bridge

## 2020-03-06 NOTE — ASSESSMENT & PLAN NOTE
Wt Readings from Last 3 Encounters:   03/05/20 74 kg (163 lb 2 3 oz)   02/27/20 83 3 kg (183 lb 10 3 oz)   02/11/20 78 6 kg (173 lb 4 5 oz)     · Patient admitted initially to the [facility] with shortness of breath and lower extremity edema as well as weight gain  He was diuresed with intravenous Lasix  He underwent cardiac catheterization which showed triple-vessel disease and was transferred to [facility] for [facility] surgery evaluation  · Cardiology continues to follow here  · Continue oral Lasix 40 mg p o  B i d  · Lisinopril 2 5 mg b i d   · Metoprolol Succinate 50 mg daily  · EF 15%  Needs LifeVest on D/C - refuses  · Will continue to monitor weights, I&Os    · Discharge planning to inpatient rehab  · Low-sodium diet  · Monitor volume status

## 2020-03-06 NOTE — ASSESSMENT & PLAN NOTE
· Cardiac catheterization 02/26/2020 with multivessel disease  Transferred to [place] for CT surgery evaluation however patient deemed to not be surgical candidate  · Continue aspirin, atorvastatin, metoprolol  · Possibly a candidate for high risk PCI with impella support as an outpatient after cardiac thrombus has resolved as an outpatient

## 2020-03-06 NOTE — ASSESSMENT & PLAN NOTE
· Cardiac catheterization 02/26/2020 with multivessel disease  Transferred to [place] for CT surgery evaluation however patient deemed to not be surgical candidate  · Continue aspirin, atorvastatin, metoprolol  · Possibly a candidate for high risk PCI with impella support as an outpatient after cardiac thrombus has resolved as an outpatient

## 2020-03-06 NOTE — DISCHARGE INSTRUCTIONS
Heart Failure   WHAT YOU NEED TO KNOW:   Heart failure (HF) is a condition that does not allow your heart to fill or pump properly  Not enough oxygen in your blood gets to your organs and tissues  HF can occur in the right side, the left side, or both lower chambers of your heart  HF is often caused by damage or injury to your heart  The damage may be caused by heart attack, other heart conditions, or high blood pressure  HF is a long-term condition that tends to get worse over time  It is important to manage your health to improve your quality of life  HF can be worsened by heavy alcohol use, smoking, diabetes that is not controlled, or obesity  DISCHARGE INSTRUCTIONS:   Call 911 if:   · You have any of the following signs of a heart attack:      ¨ Squeezing, pressure, or pain in your chest that lasts longer than 5 minutes or returns    ¨ Discomfort or pain in your back, neck, jaw, stomach, or arm     ¨ Trouble breathing    ¨ Nausea or vomiting    ¨ Lightheadedness or a sudden cold sweat, especially with chest pain or trouble breathing    Seek care immediately if:   · You gain 3 or more pounds (1 4 kg) in a day, or more than your healthcare provider says you should  · Your heartbeat is fast, slow, or uneven all the time  Contact your healthcare provider if:   · You have symptoms of worsening HF:      ¨ Shortness of breath at rest, at night, or that is getting worse in any way     ¨ Weight gain of 5 or more pounds (2 2 kg) in a week     ¨ More swelling in your legs or ankles     ¨ Abdominal pain or swelling     ¨ More coughing     ¨ Loss of appetite     ¨ Feeling tired all the time    · You feel hopeless or depressed, or you have lost interest in things you used to enjoy  · You often feel worried or afraid  · You have questions or concerns about your condition or care  Medicines: You may  need any of the following:  · Medicines  may be given to help regulate your heart rhythm   You may also need medicines to lower your blood pressure, and to get rid of extra fluids  · Take your medicine as directed  Contact your healthcare provider if you think your medicine is not helping or if you have side effects  Tell him or her if you are allergic to any medicine  Keep a list of the medicines, vitamins, and herbs you take  Include the amounts, and when and why you take them  Bring the list or the pill bottles to follow-up visits  Carry your medicine list with you in case of an emergency  Follow up with your healthcare provider or cardiologist as directed: You may need to return for other tests  You may need home health care  A healthcare provider will monitor your vital signs, weight, and make sure your medicines are working  Write down your questions so you remember to ask them during your visits  Go to cardiac rehab as directed:  Cardiac rehab is a program run by specialists who will help you safely strengthen your heart  The program includes exercise, relaxation, stress management, and heart-healthy nutrition  Healthcare providers will also make sure your medicines are helping to reduce your symptoms  Manage your HF:  · Do not smoke  Nicotine and other chemicals in cigarettes and cigars can cause lung damage and make HF difficult to manage  Ask your healthcare provider for information if you currently smoke and need help to quit  E-cigarettes or smokeless tobacco still contain nicotine  Talk to your healthcare provider before you use these products  · Do not drink alcohol or take illegal drugs  Alcohol and drugs can worsen your symptoms quickly  · Weigh yourself every morning  Use the same scale, in the same spot  Do this after you use the bathroom, but before you eat or drink anything  Wear the same type of clothing  Do not wear shoes  Record your weight each day so you will notice any sudden weight gain  Swelling and weight gain are signs of fluid retention   If you are overweight, ask how to lose weight safely  · Check your blood pressure and heart rate every day  Ask for more information about how to measure your blood pressure and heart rate correctly  Ask what these numbers should be for you  · Manage any chronic health conditions you have  These include high blood pressure, diabetes, obesity, high cholesterol, metabolic syndrome, and COPD  You will have fewer symptoms if you manage these health conditions  Follow your healthcare provider's recommendations and follow up with him or her regularly  · Eat heart-healthy foods and limit sodium (salt)  An easy way to do this is to eat more fresh fruits and vegetables and fewer canned and processed foods  Replace butter and margarine with heart-healthy oils such as olive oil and canola oil  Other heart-healthy foods include walnuts, whole-grain breads, low-fat dairy products, beans, and lean meats  Fatty fish such as salmon and tuna are also heart healthy  Ask how much salt you can eat each day  Do not use salt substitutes  · Drink liquids as directed  You may need to limit the amount of liquids you drink if you retain fluid  Ask how much liquid to drink each day and which liquids are best for you  · Stay active  If you are not active, your symptoms are likely to worsen quickly  Walking, bicycling, and other types of physical activity help maintain your strength and improve your mood  Physical activity also helps you manage your weight  Work with your healthcare provider to create an exercise plan that is right for you  · Get vaccines as directed  Get a flu shot every year  You may also need the pneumonia vaccine  The flu and pneumonia can be severe for a person who has HF  Vaccines protect you from these infections  Join a support group:  Living with HF can be difficult  It may be helpful to talk with others who have HF  You may learn how to better manage your condition or get emotional support   For more information: · [address]  [place] , [place]   Phone: [phone]  Web Address: https://www strong com/  org   © 2017 [address] [employer] Information is for End User's use only and may not be sold, redistributed or otherwise used for commercial purposes  All illustrations and images included in CareNotes® are the copyrighted property of [employer] , [employer]  or Kevin Zarco  The above information is an  only  It is not intended as medical advice for individual conditions or treatments  Talk to your doctor, nurse or pharmacist before following any medical regimen to see if it is safe and effective for you

## 2020-03-06 NOTE — PROGRESS NOTES
Heart Failure/ Pulmonary Hypertension Progress Note - Paige Jimenez 75 y o  male MRN: [id number]    Unit/Bed#: [facility] 702-01 Encounter: [id number]      Assessment:    Principal Problem:    Acute on chronic combined systolic and diastolic heart failure (HCC)  Active Problems:    Multiple vessel coronary artery disease    Hyperlipidemia    Essential hypertension    Gastrocutaneous fistula    CKD (chronic kidney disease)    H/O myasthenia gravis    Right inguinal hernia    Urinary obstruction    LV (left ventricular) mural thrombus      Subjective:   Patient seen and examined   Creat up a little so holding diuretic and ACEi today  Continuing optimization of HF GDMT  Does not want [facility] for discharge to rehab followed by evaluation for Impella assisted PCI with  rotational atherectomy as outpatient    Sustained runs of ventricular bigeminy noted on tele today with several runs of NSVT  Lytes ok  Objective: Intake/ output: 1508/59380/+480  Weight: unclear  Tele: SR/ST, frequent PVCs    Plan: 1  Acute on chronic HFrEF, LVEF 15%, LVIDd 5 3 cm, NYHA Class II/III, ACC/AHA stage C: Etiology: iCM given multivessel CAD  Possible contributions from uncontrolled HTN over the years per notes  He is deemed a poor candidate for CABG given multiple comorbidities but may undergo future high risk PCI of the Left Main CA, will likely require Impella assistance  For now will continue to optimize HF medical therapy  Started on Lisinopril yesterday  Continue daily weights, strict intake and output charting, daily BMP  Clinically warm, volume status improved however still somewhat tachy and hypotensive so likely still borderline low output  MAP stable  Keep diuretic at same dose  Consider MRA if renal function remains stable as outpatient  Life Vest recommended on discharge but patient refusing  Bump in creat today likely 2/2 UTI   Recheck BMP in AM     NT Pro BNP 80 K   -Nutrition consult for CHF diet teaching     TTE 2/20/20: LVEF 15%, LVIDd 5 3 cm, LV apical thrombus, moderate MR, moderate AS, mild TR, RV normal,   TTE 6/2017: LVEF 50%, LVIDd 4 1 cm, moderate MR, RV ok, trace TR, PASP 25 mmHtg,   TTE 2014, LVEF 65%     cMRI 2/26/20:  Impression:  1  Mildly dilated left ventricle with severely reduced left ventricular systolic function  2  Normal right ventricular size with mildly reduced right ventricular systolic function  3  Likely mild mitral regurgitation  4  Subendocardial scarring of the mid to distal lateral, inferior, and septal walls   This suggests moderate myocardial viability in these territories   The anterior wall is completely viable       [facility] 2/26/20:   --  LAD: The vessel was medium sized and heavily calcified  There are L to R collaterals to the distal RCA system  --  Proximal LAD: There was a tubular 50 % stenosis  The lesion was irregularly contoured  --  Mid LAD: There was a discrete 80 % stenosis  --  Distal LAD: There was a discrete 50 % stenosis  --  1st diagonal: The vessel was small sized  --  Circumflex: The vessel was medium sized and heavily calcified  --  1st obtuse marginal: There was a tubular 90 % stenosis in the proximal third of the vessel segment  --  RCA: The vessel was medium sized and heavily calcified  The vessel is 100 % occluded in the mid segment   There are R to R collaterals to the distal RCA      The PDA is a medium to large sized vessel which is graftable      IMPRESSIONS:  There is significant triple vessel coronary artery disease  Neurohormonal Blockade:  --Beta-Blocker: Toprol XL 50 mg in the AM, 25 mg in the PM  --ACEi, ARB or ARNi: lisinopril 2 5 mg twice daily  --Aldosterone Receptor Blocker: can start as outpatient  --Diuretic: furosemide 40 mg BID with 20 meq of Kdur      Sudden Cardiac Death Risk Reduction:  --ICD: indicated   Life Vest as bridge recommended but patient refusing at this time       Electrical Resynchronization:  Narrow QRS     Advanced Therapies (If appropriate): --Inotrope:  --LVAD/Transplant Candidacy:    2  Multivessel CAD  See above  Continue BB, ASA, statin  3  ANDREA on CKD  Creat back up to 1 5 but in setting of UTI  Holding ACEi and diuretic today  4  HLD  5  HTN  6 LV apical thrombus  Currently on Heparin bridge  INR subtherapeutic  7 Urinary obstruction/Chronic indwelling cameron in situ    2/2 large right inguinal hernia with hydronephrosis and urethral obstruction  8 Gastrocutaneouis fistula  With history of diverticulitis complicated by perforation  S/P colostomy  9 CAUTI  With + urine cultures, 2/2 #7 management per primary team    10  Frequent PVCs/NSVT  Adding Metoprolol succinate 25 mg in the PM  Recheck lytes in AM    [employer] (day, reason): Cameron catheter (day, reason):    Vitals: Blood pressure 99/72, pulse (!) 51, temperature 98 °F (36 7 °C), resp  rate 16, height 5' 9" (1 753 m), weight 74 kg (163 lb 2 3 oz), SpO2 95 %  , Body mass index is 24 09 kg/m² , I/O last 3 completed shifts: In: 1508 5 [P O :820; I V :688 5]  Out: 1715 [Urine:1345; Stool:370]  No intake/output data recorded  Wt Readings from Last 3 Encounters:   03/05/20 74 kg (163 lb 2 3 oz)   02/27/20 83 3 kg (183 lb 10 3 oz)   02/11/20 78 6 kg (173 lb 4 5 oz)       Intake/Output Summary (Last 24 hours) at 3/6/2020 1010  Last data filed at 3/6/2020 0630  Gross per 24 hour   Intake 690 18 ml   Output 1025 ml   Net -334 82 ml     I/O last 3 completed shifts: In: 1508 5 [P O :820; I V :688 5]  Out: 1715 [Urine:1345; Stool:370]    SR, occ-frequent PVCs        Physical Exam:  Vitals:    03/05/20 2208 03/06/20 0232 03/06/20 0507 03/06/20 0508   BP: 102/61 99/72     BP Location:       Pulse: 66 (!) 49 (!) 51    Resp: 20 16     Temp: 98 5 °F (36 9 °C) (!) 97 °F (36 1 °C)  98 °F (36 7 °C)   TempSrc:       SpO2: 98% 95%     Weight:       Height:           GEN: Kerwin Salter appears well, alert and oriented x 3, pleasant and cooperative   HEENT: pupils equal, round, and reactive to light; extraocular muscles intact  NECK: supple, no carotid bruits   HEART: regular rhythm, normal S1 and S2, no murmurs, clicks, gallops or rubs, JVP is  flat  LUNGS: clear to auscultation bilaterally; no wheezes, rales, or rhonchi   ABDOMEN: normal bowel sounds, soft, no tenderness, no distention  EXTREMITIES: warm, peripheral pulses normal; no clubbing, cyanosis, or edema  NEURO: no focal findings   SKIN: normal without suspicious lesions on exposed skin      Current Facility-Administered Medications:     acetaminophen (TYLENOL) tablet 650 mg, 650 mg, Oral, Q6H PRN, Tyree Up MD, 650 mg at 03/03/20 2210    aspirin (ECOTRIN LOW STRENGTH) EC tablet 81 mg, 81 mg, Oral, Daily, Mey Howard MD, 81 mg at 03/06/20 0856    atorvastatin (LIPITOR) tablet 80 mg, 80 mg, Oral, HS, Tyree Up MD, 80 mg at 03/05/20 2154    belladonna-opium (B&O SUPPOSITORY) 16 2-30 mg suppository 1 suppository, 30 mg, Rectal, Q8H PRN, Tyree Up MD    docusate sodium (COLACE) capsule 100 mg, 100 mg, Oral, BID, Tyree Up MD, 100 mg at 03/05/20 1824    furosemide (LASIX) tablet 40 mg, 40 mg, Oral, BID (diuretic), Mey Howard MD, 40 mg at 03/05/20 1710    heparin (porcine) 25,000 units in 250 mL infusion (premix), 3-20 Units/kg/hr (Order-Specific), Intravenous, Titrated, Glenn Tirado PA-C, Last Rate: 13 5 mL/hr at 03/06/20 0847, 18 Units/kg/hr at 03/06/20 0847    heparin (porcine) injection 2,000 Units, 2,000 Units, Intravenous, PRN, Glenn Tirado PA-C, 2,000 Units at 02/29/20 0249    heparin (porcine) injection 4,000 Units, 4,000 Units, Intravenous, PRN, Glenn Tirado PA-C, 4,000 Units at 03/01/20 0801    levothyroxine tablet 50 mcg, 50 mcg, Oral, Early Morning, Tyree Up MD, 50 mcg at 03/06/20 0506    lisinopril (ZESTRIL) tablet 2 5 mg, 2 5 mg, Oral, BID, SIMONA Kulkarni, 2 5 mg at 03/05/20 1824    melatonin tablet 3 mg, 3 mg, Oral, HS PRN, Sarbjit Kaufman PA-C, 3 mg at 03/03/20 2211    metoprolol succinate (TOPROL-XL) 24 hr tablet 50 mg, 50 mg, Oral, Daily, Servando Kam MD, 50 mg at 03/06/20 0855    mycophenolate (CELLCEPT) capsule 750 mg, 750 mg, Oral, BID, Teo Cherry MD, 750 mg at 03/06/20 0855    nystatin (MYCOSTATIN) powder, , Topical, BID, Teo Cherry MD    oxybutynin (DITROPAN) tablet 5 mg, 5 mg, Oral, TID, Daphney Walls PA-C, 5 mg at 03/06/20 0855    oxyCODONE (ROXICODONE) IR tablet 5 mg, 5 mg, Oral, Q6H PRN, Teo Cherry MD, 5 mg at 02/29/20 0702    polyethylene glycol (MIRALAX) packet 17 g, 17 g, Oral, Daily PRN, Teo Cehrry MD    potassium chloride (K-DUR,KLOR-CON) CR tablet 20 mEq, 20 mEq, Oral, Daily, SIMONA Kulkarni, 20 mEq at 03/06/20 0855    warfarin (COUMADIN) tablet 7 5 mg, 7 5 mg, Oral, Daily (warfarin), Dae Roebrson MD, 7 5 mg at 03/05/20 1824      Labs & Results:        Results from last 7 days   Lab Units 03/06/20  0521 03/05/20  1018 02/28/20  1208   WBC Thousand/uL 8 18 7 91 7 78   HEMOGLOBIN g/dL 11 2* 11 9* 12 6   HEMATOCRIT % 36 4* 39 5 41 9   PLATELETS Thousands/uL 214 212 165         Results from last 7 days   Lab Units 03/06/20  0614 03/05/20  0443 03/04/20  0628   POTASSIUM mmol/L 3 7 3 7 3 5   CHLORIDE mmol/L 107 107 106   CO2 mmol/L 26 25 23   BUN mg/dL 39* 36* 32*   CREATININE mg/dL 1 47* 1 43* 1 17   CALCIUM mg/dL 8 3 8 7 8 8     Results from last 7 days   Lab Units 03/05/20  0443 03/04/20  0628 03/03/20  0902   INR  1 35* 1 26* 1 14       Chest X-Ray is obtained; result - reviewed  Counseling / Coordination of Care  Total floor / unit time spent today 20 minutes  Greater than 50% of total time was spent with the patient and / or family counseling and / or coordination of care  A description of the counseling / coordination of care: Guillermina Simmons [phone]  [place], [address]  Thank you for the opportunity to participate in the care of this patient  Renetta VALADEZ    Director Heart Failure/ Medical Director [address] [facility]

## 2020-03-06 NOTE — ASSESSMENT & PLAN NOTE
· Creatinine slightly up today    · Holding diuretics today  · Lawrence catheter draining adequately  · Avoid nephrotoxins and hypotension  · Repeat BMP in

## 2020-03-06 NOTE — PROGRESS NOTES
Progress Note Kiarra Spencer 1945, 75 y o  male MRN: [id number]    [facility] Encounter: [id number]    Primary Care Provider: Shruti Acevedo MD   Date and time admitted to hospital: 2/27/2020  4:17 PM        * Acute on chronic combined systolic and diastolic heart failure [facility])  Assessment & Plan  Wt Readings from Last 3 Encounters:   03/05/20 74 kg (163 lb 2 3 oz)   02/27/20 83 3 kg (183 lb 10 3 oz)   02/11/20 78 6 kg (173 lb 4 5 oz)     · Patient admitted initially to the [facility] with shortness of breath and lower extremity edema as well as weight gain  He was diuresed with intravenous Lasix  He underwent cardiac catheterization which showed triple-vessel disease and was transferred to [facility] for CT surgery evaluation  · Cardiology continues to follow here  · Metoprolol Succinate 50 mg daily  · EF 15%  Needs LifeVest on D/C - refuses  · Will continue to monitor weights, I&Os  · Discharge planning to inpatient rehab  · Low-sodium diet  · Monitor volume status  · Due to elevated creatinine holding Lasix and lisinopril today, will reschedule for tomorrow after BMP    Multiple vessel coronary artery disease  Assessment & Plan  · Cardiac catheterization 02/26/2020 with multivessel disease  Transferred to [facility] for CT surgery evaluation however patient deemed to not be surgical candidate  · Continue aspirin, atorvastatin, metoprolol  · Possibly a candidate for high risk PCI with impella support as an outpatient after cardiac thrombus has resolved as an outpatient  LV (left ventricular) mural thrombus  Assessment & Plan  · Currently on IV heparin bridge to coumadin  Coumadin started 2/29   I  · INR is subtherapeutic after 3 doses of warfarin 7 5 mg, increased to 10 mg  · Patient and wife counseled on low vitamin K diet  · INR In AM, continue heparin bridge    Urinary obstruction  Assessment & Plan  · Urinary obstruction  · Secondary to large right inguinal hernia with hydronephrosis and urethral obstruction  · Plan for cameron exchange 3/12/2020 with urology in the office  · Continue chronic Cameron      Right inguinal hernia  Assessment & Plan  · Right inguinal hernia  · Outpatient follow up, no current intervention        H/O myasthenia gravis  Assessment & Plan  · Stable on mycophenolate  · As discussed with neurology yesterday he has limited options for treatment, continue mycophenolate    CKD (chronic kidney disease)  Assessment & Plan  · Creatinine slightly up today  · Holding diuretics today  · Cameron catheter draining adequately  · Avoid nephrotoxins and hypotension  · Repeat BMP in    Gastrocutaneous fistula  Assessment & Plan  · Gastrocutaneous fistula  · Patient has history of diverticulitis complicated by perforation status post colostomy  · Outpatient follow up      Essential hypertension  Assessment & Plan  · Patient with documented history of hypertension however now with soft blood pressures  · Monitor closely    Hyperlipidemia  Assessment & Plan  · Continue statin        VTE Pharmacologic Prophylaxis:   Pharmacologic: Heparin Drip  Mechanical VTE Prophylaxis in Place: Yes    Patient Centered Rounds: I have performed bedside rounds with nursing staff today  Education and Discussions with Family / Patient:  Patient and wife, plan of care    Time Spent for Care: 20 minutes  More than 50% of total time spent on counseling and coordination of care as described above  Current Length of Stay: 8 day(s)    Current Patient Status: Inpatient   Certification Statement: The patient will continue to require additional inpatient hospital stay due to Heparin to warfarin bridge    Discharge Plan:  Inpatient rehab possibly tomorrow    Code Status: Level 1 - Full Code      Subjective:   Denies any acute complaints  Breathing is stable      Objective:     Vitals:   Temp (24hrs), Av 8 °F (36 6 °C), Min:97 °F (36 1 °C), Max:98 5 °F (36 9 °C)    Temp:  [97 °F (36 1 °C)-98 5 °F (36 9 °C)] 98 °F (36 7 °C)  HR:  [47-89] 47  Resp:  [16-22] 16  BP: ()/(55-72) 100/55  SpO2:  [95 %-98 %] 96 %  Body mass index is 24 09 kg/m²  Input and Output Summary (last 24 hours): Intake/Output Summary (Last 24 hours) at 3/6/2020 1534  Last data filed at 3/6/2020 1400  Gross per 24 hour   Intake 1337 28 ml   Output 725 ml   Net 612 28 ml       Physical Exam:     Physical Exam   Constitutional: He is oriented to person, place, and time  He appears well-developed and well-nourished  HENT:   Head: Normocephalic and atraumatic  Eyes: Pupils are equal, round, and reactive to light  EOM are normal    Cardiovascular: Normal rate and regular rhythm  Pulmonary/Chest: Effort normal    Trace minute crackles at the lung bases bilaterally   Abdominal: Soft  Colostomy present   Genitourinary:   Genitourinary Comments: Lawrence catheter with clear yellow urine   Musculoskeletal: He exhibits no edema  Neurological: He is alert and oriented to person, place, and time  Additional Data:     Labs:    Results from last 7 days   Lab Units 03/06/20  0521   WBC Thousand/uL 8 18   HEMOGLOBIN g/dL 11 2*   HEMATOCRIT % 36 4*   PLATELETS Thousands/uL 214   NEUTROS PCT % 73   LYMPHS PCT % 18   MONOS PCT % 8   EOS PCT % 1     Results from last 7 days   Lab Units 03/06/20  0614   SODIUM mmol/L 140   POTASSIUM mmol/L 3 7   CHLORIDE mmol/L 107   CO2 mmol/L 26   BUN mg/dL 39*   CREATININE mg/dL 1 47*   ANION GAP mmol/L 7   CALCIUM mg/dL 8 3   GLUCOSE RANDOM mg/dL 109     Results from last 7 days   Lab Units 03/06/20  0736   INR  1 64*     Results from last 7 days   Lab Units 03/01/20  1639   POC GLUCOSE mg/dl 137                   * I Have Reviewed All Lab Data Listed Above  * Additional Pertinent Lab Tests Reviewed:  All Labs Within Last 24 Hours Reviewed        Recent Cultures (last 7 days):     Results from last 7 days   Lab Units 03/04/20  1621   URINE CULTURE  >100,000 cfu/ml Enterococcus faecalis*  >100,000 cfu/ml Escherichia coli*       Last 24 Hours Medication List:     Current Facility-Administered Medications:  acetaminophen 650 mg Oral Q6H PRN Michele Bonilla MD    aspirin 81 mg Oral Daily Aye Pérez MD    atorvastatin 80 mg Oral HS Michele Bonilla MD    belladonna-opium 30 mg Rectal Q8H PRN Michele Bonilla MD    docusate sodium 100 mg Oral BID Michele Bonilla MD    [START ON 3/7/2020] furosemide 20 mg Oral BID (diuretic) Roselynn Angelucci, MD    heparin (porcine) 3-20 Units/kg/hr (Order-Specific) Intravenous Titrated Fredna Liegallito PA-C Last Rate: 18 Units/kg/hr (03/06/20 0847)   heparin (porcine) 2,000 Units Intravenous PRN Fredna Liegallito PA-C    heparin (porcine) 4,000 Units Intravenous PRN Fremathew Ruiz PA-C    levothyroxine 50 mcg Oral Early Morning Michele Bonilla MD    [START ON 3/7/2020] lisinopril 2 5 mg Oral BID Roselynn Angelucci, MD    melatonin 3 mg Oral HS PRN Vermichael Schmitz PA-C    metoprolol succinate 25 mg Oral Daily SIMONA Kulkarni    metoprolol succinate 50 mg Oral Daily Aye Pérez MD    mycophenolate 750 mg Oral BID Michele Bonilla MD    nystatin  Topical BID Michele Bonilla MD    oxybutynin 5 mg Oral TID Daphney Walls PA-C    oxyCODONE 5 mg Oral Q6H PRN Michele Bonilla MD    polyethylene glycol 17 g Oral Daily PRN Michele Bonilla MD    potassium chloride 20 mEq Oral Daily SIMONA Kulkarni    warfarin 10 mg Oral Daily (warfarin) Roselynn Angelucci, MD         Today, Patient Was Seen By: Luis Urbina MD    ** Please Note: Dictation voice to text software may have been used in the creation of this document   **

## 2020-03-06 NOTE — PROGRESS NOTES
Progress Note [facility] 1945, 75 y o  male MRN: [id number]    [facility] Encounter: [id number]    Primary Care Provider: Chapito Williamson MD   Date and time admitted to hospital: 2/27/2020  4:17 PM        * Acute on chronic combined systolic and diastolic heart failure [facility])  Assessment & Plan  Wt Readings from Last 3 Encounters:   03/05/20 74 kg (163 lb 2 3 oz)   02/27/20 83 3 kg (183 lb 10 3 oz)   02/11/20 78 6 kg (173 lb 4 5 oz)     · Patient admitted initially to the [facility] with shortness of breath and lower extremity edema as well as weight gain  He was diuresed with intravenous Lasix  He underwent cardiac catheterization which showed triple-vessel disease and was transferred to [facility] for CT surgery evaluation  · Cardiology continues to follow here  · Continue oral Lasix 40 mg p o  B i d  · Lisinopril 2 5 mg b i d   · Metoprolol Succinate 50 mg daily  · EF 15%  Needs LifeVest on D/C - refuses  · Will continue to monitor weights, I&Os  · Discharge planning to inpatient rehab  · Low-sodium diet  · Monitor volume status    Multiple vessel coronary artery disease  Assessment & Plan  · Cardiac catheterization 02/26/2020 with multivessel disease  Transferred to [place] for CT surgery evaluation however patient deemed to not be surgical candidate  · Continue aspirin, atorvastatin, metoprolol  · Possibly a candidate for high risk PCI with impella support as an outpatient after cardiac thrombus has resolved as an outpatient  LV (left ventricular) mural thrombus  Assessment & Plan  · Currently on IV heparin bridge to coumadin  Coumadin started 2/29  I  · INR is subtherapeutic continue warfarin 7 5 mg today  · Patient and wife counseled on low vitamin K diet  · INR In AM, continue heparin bridge    Urinary obstruction  Assessment & Plan  · Urinary obstruction  · Secondary to large right inguinal hernia with hydronephrosis and urethral obstruction     · Plan for cameron exchange 3/12/2020 with urology in the office  · Continue chronic Cameron      Right inguinal hernia  Assessment & Plan  · Right inguinal hernia  · Outpatient follow up, no current intervention        H/O myasthenia gravis  Assessment & Plan  · Stable on mycophenolate  · Due to increased creatinine will check mycophenolate level in a m  CKD (chronic kidney disease)  Assessment & Plan  · Creatinine slightly up today  · Per Cardiology will maintain current diuretics  · Cameron catheter draining adequately  · Avoid nephrotoxins and hypotension  · Repeat BMP in    Gastrocutaneous fistula  Assessment & Plan  · Gastrocutaneous fistula  · Patient has history of diverticulitis complicated by perforation status post colostomy  · Outpatient follow up      Essential hypertension  Assessment & Plan  · Patient with documented history of hypertension however now with soft blood pressures  · Monitor closely    Hyperlipidemia  Assessment & Plan  · Continue statin      VTE Pharmacologic Prophylaxis:   Pharmacologic: Heparin  Mechanical VTE Prophylaxis in Place: Yes    Patient Centered Rounds: I have performed bedside rounds with nursing staff today  Discussions with Specialists or Other Care Team Provider:  Neurology, Infectious Disease    Education and Discussions with Family / Patient:  Patient and wife, plan of care    Time Spent for Care: 20 minutes  More than 50% of total time spent on counseling and coordination of care as described above  Current Length of Stay: 7 day(s)    Current Patient Status: Inpatient   Certification Statement: The patient will continue to require additional inpatient hospital stay due to Heparin to warfarin bridge    Discharge Plan:  Rehab    Code Status: Level 1 - Full Code      Subjective:   Reports occasional bladder spasms  Denies any fever, chills  Reports that shortness of breath is stable      Objective:     Vitals:   Temp (24hrs), Av 2 °F (36 8 °C), Min:97 8 °F (36 6 °C), Max:98 5 °F (36 9 °C)    Temp:  [97 8 °F (36 6 °C)-98 5 °F (36 9 °C)] 98 5 °F (36 9 °C)  HR:  [] 61  Resp:  [16-20] 20  BP: ()/(50-62) 88/50  SpO2:  [94 %-97 %] 94 %  Body mass index is 24 09 kg/m²  Input and Output Summary (last 24 hours): Intake/Output Summary (Last 24 hours) at 3/5/2020 1905  Last data filed at 3/5/2020 1845  Gross per 24 hour   Intake 988 51 ml   Output 1315 ml   Net -326 49 ml       Physical Exam:     Physical Exam   Constitutional: He is oriented to person, place, and time  He appears well-developed and well-nourished  HENT:   Head: Normocephalic and atraumatic  Eyes: Pupils are equal, round, and reactive to light  EOM are normal    Neck: Neck supple  Cardiovascular: Normal rate and regular rhythm  Pulmonary/Chest: Effort normal  He has no wheezes  He has no rales  Abdominal: Soft  Genitourinary:   Genitourinary Comments: Lawrence catheter in place   Neurological: He is alert and oriented to person, place, and time  Skin: Skin is warm and dry  Additional Data:     Labs:    Results from last 7 days   Lab Units 03/05/20  1018   WBC Thousand/uL 7 91   HEMOGLOBIN g/dL 11 9*   HEMATOCRIT % 39 5   PLATELETS Thousands/uL 212   NEUTROS PCT % 76*   LYMPHS PCT % 17   MONOS PCT % 6   EOS PCT % 1     Results from last 7 days   Lab Units 03/05/20  0443   SODIUM mmol/L 142   POTASSIUM mmol/L 3 7   CHLORIDE mmol/L 107   CO2 mmol/L 25   BUN mg/dL 36*   CREATININE mg/dL 1 43*   ANION GAP mmol/L 10   CALCIUM mg/dL 8 7   GLUCOSE RANDOM mg/dL 110     Results from last 7 days   Lab Units 03/05/20  0443   INR  1 35*     Results from last 7 days   Lab Units 03/01/20  1639   POC GLUCOSE mg/dl 137                   * I Have Reviewed All Lab Data Listed Above  * Additional Pertinent Lab Tests Reviewed:  All Labs Within Last 24 Hours Reviewed      Recent Cultures (last 7 days):           Last 24 Hours Medication List:     Current Facility-Administered Medications:  acetaminophen 650 mg Oral Q6H PRN Jonathan Huber MD    aspirin 81 mg Oral Daily Audrey John MD    atorvastatin 80 mg Oral HS Jonathan Huber MD    belladonna-opium 30 mg Rectal Q8H PRN Jonathan Huber MD    docusate sodium 100 mg Oral BID Jonathan Huber MD    furosemide 40 mg Oral BID (diuretic) Audrey John MD    heparin (porcine) 3-20 Units/kg/hr (Order-Specific) Intravenous Titrated JONY Pop-C Last Rate: 18 Units/kg/hr (03/05/20 1215)   heparin (porcine) 2,000 Units Intravenous PRN Vesna [place], PA-C    heparin (porcine) 4,000 Units Intravenous PRN Vesna Smith, PA-C    levothyroxine 50 mcg Oral Early Morning Jonathan Huber MD    lisinopril 2 5 mg Oral BID SIMONA Kulkarni    melatonin 3 mg Oral HS PRN Roe Guadarrama PA-C    metoprolol succinate 50 mg Oral Daily Audrey John MD    mycophenolate 750 mg Oral BID Jonathan Huber MD    nystatin  Topical BID Jonathan Huber MD    oxybutynin 5 mg Oral TID Daphney Walls PA-C    oxyCODONE 5 mg Oral Q6H PRN Jonathan Huber MD    polyethylene glycol 17 g Oral Daily PRN Jonathan Huber MD    potassium chloride 20 mEq Oral Daily SIMONA Kulkarni    warfarin 7 5 mg Oral Daily (warfarin) Jamila Kang MD         Today, Patient Was Seen By: Valeda Halsted, MD    ** Please Note: Dictation voice to text software may have been used in the creation of this document   **

## 2020-03-06 NOTE — SOCIAL WORK
Late note from 3/9: [facility] accepted  CM spoke to pt and pt's wife; [facility] is first choice  Plan for dc to [facility] when medically stable

## 2020-03-06 NOTE — ASSESSMENT & PLAN NOTE
· Creatinine slightly up today  · Per Cardiology will maintain current diuretics    · Lawrence catheter draining adequately  · Avoid nephrotoxins and hypotension  · Repeat BMP in

## 2020-03-06 NOTE — SOCIAL WORK
Patient discussed in care rounds  Pt will be medically clear to d/c to rehab tomorrow  Patient is accepted at [facility]  CM met with patient and wife David Mcdermott to discuss d/c plan  Patient and wife requesting CM arrange [address] transport and understand that patient is financially responsible for transport costs  [address] [facility] transport arranged for 1:30 pm      CM notified Pt and wife

## 2020-03-06 NOTE — PROGRESS NOTES
Progress - Infectious Disease   Ace Cruz 75 y o  male MRN: [id number]  Unit/Bed#: [facility] 702-01 Encounter: [id number]      IMPRESSION & RECOMMENDATIONS:   Impression/Recommendations:  1  Bladder Spasm related to cameron catheter  · Patient has had bladder spasm since placement of his cameron catheter during his last hospitalization  Bladder spasms have been slowly improving from when the catheter was placed but still sometimes cause him pain  Bladder spasm was unaffected by previous treatment for UTI  · Patient says spasm today is minimal, has continued to improve without antibiotics  · Continue to monitor spasm  · Plan for catheter exchange on 3/12 with urology    2  Asymptomatic Bacteriuria  · Patient has a chronic indwelling cameron catheter  · No fever, leukocytosis or other signs of acute UTI  · Urine Cx grew E  Coli and Enteric-like Gram negative rods, similar to previous   · Monitor temperature and WBC count  · Can continue to follow urine Cx    3  Myasthenia Gravis  · Stable on mycophenolate mofetil  · Although patient is immunosuppressed, UTI management going forward should not be affected      SUBJECTIVE:  Patient feels well today, has had minimal bladder spasms  Has a good appetite and wants to leave the hospital      REVIEW OF SYSTEMS:  Denies fever, chills, abdominal pain, dysuria  Minimal muscle spasm in bladder  A complete system-based review of systems is otherwise negative      PAST MEDICAL HISTORY:  Past Medical History:   Diagnosis Date    Cardiac disease     Carotid stenosis     [facility]     Compression fracture of lumbar vertebra [facility])     Coronary artery disease     Disease of thyroid gland     Diverticulitis     Hernia, diaphragmatic, without obstruction     Hyperlipidemia     Hypertension     Inguinal hernia     Right    Ischemic cardiomyopathy     [facility]     Myasthenia gravis ([facility]      Past Surgical History:   Procedure Laterality Date    ANGIOPLASTY / STENTING FEMORAL      CATARACT EXTRACTION      COLON SURGERY      colostomy    COLOSTOMY      ESOPHAGOGASTRODUODENOSCOPY N/A 3/29/2016    Procedure: ESOPHAGOGASTRODUODENOSCOPY (EGD); Surgeon: Alena Wiggins MD;  [facility]; Service:     GASTROSTOMY TUBE PLACEMENT N/A 3/29/2016    Procedure: PEG CHANGE-LOW PROFILE TUBE ;  Surgeon: Alena Wiggins MD;  [facility]; Service:     LAPAROTOMY N/A 2016    Procedure: LAPAROTOMY EXPLORATORY; RESSECTION OF GASTRO-CUTANEOUS FISTULA ;  Surgeon: Paco Olivares DO;  [facility];  Service:     PEG TUBE PLACEMENT      PEG TUBE REMOVAL      DC ESOPHAGOGASTRODUODENOSCOPY TRANSORAL DIAGNOSTIC N/A 2016    Procedure: ESOPHAGOGASTRODUODENOSCOPY w 12-6 gc clip,anchor ;  Surgeon: Alena Wiggins MD;  [facility]; Service: Gastroenterology       FAMILY HISTORY:  Non-contributory    SOCIAL HISTORY:  Social History     Substance and Sexual Activity   Alcohol Use Yes    Comment: 1 per month     Social History     Substance and Sexual Activity   Drug Use No     Social History     Tobacco Use   Smoking Status Former Smoker    Last attempt to quit: 1973    Years since quittin 1   Smokeless Tobacco Never Used       ALLERGIES:  No Known Allergies    MEDICATIONS:  All current active medications have been reviewed      PHYSICAL EXAM:  Vitals:  Temp:  [97 °F (36 1 °C)-98 5 °F (36 9 °C)] 98 °F (36 7 °C)  HR:  [49-89] 51  Resp:  [16-22] 16  BP: ()/(50-72) 99/72  SpO2:  [94 %-98 %] 95 %  Temp (24hrs), Av 9 °F (36 6 °C), Min:97 °F (36 1 °C), Max:98 5 °F (36 9 °C)  Current: Temperature: 98 °F (36 7 °C)     Physical Exam:  General:  Well-nourished, well-developed, in no acute distress  Eyes:  Conjunctive clear with no hemorrhages or effusions  Oropharynx:  No ulcers, no lesions, moist mucous membranes  Neck:  Supple, no lymphadenopathy  Lungs:  Clear to auscultation bilaterally, no accessory muscle use  Cardiac:  Regular rate and rhythm, no murmurs  Abdomen:  Soft, non-tender, non-distended, colostomy in place on L side  Extremities:  No peripheral cyanosis, clubbing, or edema  Skin:  No rashes, no ulcers  Neurological:  Moves all four extremities spontaneously, sensation grossly intact    LABS, IMAGING, & OTHER STUDIES:  Lab Results:  I have personally reviewed pertinent labs  Results from last 7 days   Lab Units 03/06/20  0614 03/05/20  0443 03/04/20  0628   POTASSIUM mmol/L 3 7 3 7 3 5   CHLORIDE mmol/L 107 107 106   CO2 mmol/L 26 25 23   BUN mg/dL 39* 36* 32*   CREATININE mg/dL 1 47* 1 43* 1 17   EGFR ml/min/1 73sq m 46 48 61   CALCIUM mg/dL 8 3 8 7 8 8     Results from last 7 days   Lab Units 03/06/20  0521 03/05/20  1018 02/28/20  1208   WBC Thousand/uL 8 18 7 91 7 78   HEMOGLOBIN g/dL 11 2* 11 9* 12 6   PLATELETS Thousands/uL 214 212 165     Results from last 7 days   Lab Units 03/04/20  1621   URINE CULTURE  >100,000 cfu/ml Enterococcus species*  >100,000 cfu/ml Gram Negative Deandre Enteric Like*       Imaging Studies:   I have personally reviewed pertinent imaging study reports and images in PACS  EKG, Pathology, and Other Studies:   I have personally reviewed pertinent reports      [address] [place] [address]

## 2020-03-06 NOTE — ASSESSMENT & PLAN NOTE
Wt Readings from Last 3 Encounters:   03/05/20 74 kg (163 lb 2 3 oz)   02/27/20 83 3 kg (183 lb 10 3 oz)   02/11/20 78 6 kg (173 lb 4 5 oz)     · Patient admitted initially to the [facility] with shortness of breath and lower extremity edema as well as weight gain  He was diuresed with intravenous Lasix  He underwent cardiac catheterization which showed triple-vessel disease and was transferred to [facility] for CT surgery evaluation  · Cardiology continues to follow here  · Metoprolol Succinate 50 mg daily  · EF 15%  Needs LifeVest on D/C - refuses  · Will continue to monitor weights, I&Os    · Discharge planning to inpatient rehab  · Low-sodium diet  · Monitor volume status  · Due to elevated creatinine holding Lasix and lisinopril today, will reschedule for tomorrow after BMP

## 2020-03-07 NOTE — PLAN OF CARE
Problem: Potential for Falls  Goal: Patient will remain free of falls  Description  INTERVENTIONS:  - Assess patient frequently for physical needs  -  Identify cognitive and physical deficits and behaviors that affect risk of falls    -  [place] fall precautions as indicated by assessment   - Educate patient/family on patient safety including physical limitations  - Instruct patient to call for assistance with activity based on assessment  - Modify environment to reduce risk of injury  - Consider OT/PT consult to assist with strengthening/mobility  Outcome: Progressing     Problem: PAIN - ADULT  Goal: Verbalizes/displays adequate comfort level or baseline comfort level  Description  Interventions:  - Encourage patient to monitor pain and request assistance  - Assess pain using appropriate pain scale  - Administer analgesics based on type and severity of pain and evaluate response  - Implement non-pharmacological measures as appropriate and evaluate response  - Consider cultural and social influences on pain and pain management  - Notify physician/advanced practitioner if interventions unsuccessful or patient reports new pain  Outcome: Progressing     Problem: INFECTION - ADULT  Goal: Absence or prevention of progression during hospitalization  Description  INTERVENTIONS:  - Assess and monitor for signs and symptoms of infection  - Monitor lab/diagnostic results  - Monitor all insertion sites, i e  indwelling lines, tubes, and drains  - [place] appropriate cooling/warming therapies per order  - Administer medications as ordered  - Instruct and encourage patient and family to use good hand hygiene technique  - Identify and instruct in appropriate isolation precautions for identified infection/condition   Outcome: Progressing     Problem: SAFETY ADULT  Goal: Patient will remain free of falls  Description  INTERVENTIONS:  - Assess patient frequently for physical needs  -  Identify cognitive and physical deficits and behaviors that affect risk of falls  -  [place] fall precautions as indicated by assessment   - Educate patient/family on patient safety including physical limitations  - Instruct patient to call for assistance with activity based on assessment  - Modify environment to reduce risk of injury  - Consider OT/PT consult to assist with strengthening/mobility  Outcome: Progressing     Problem: DISCHARGE PLANNING  Goal: Discharge to home or other facility with appropriate resources  Description  INTERVENTIONS:  - Identify barriers to discharge w/patient and caregiver  - Arrange for needed discharge resources and transportation as appropriate  - Identify discharge learning needs (meds, wound care, etc )  - Arrange for interpretive services to assist at discharge as needed  - Refer to Case Management Department for coordinating discharge planning if the patient needs post-hospital services based on physician/advanced practitioner order or complex needs related to functional status, cognitive ability, or social support system  Outcome: Progressing     Problem: Knowledge Deficit  Goal: Patient/family/caregiver demonstrates understanding of disease process, treatment plan, medications, and discharge instructions  Description  Complete learning assessment and assess knowledge base    Interventions:  - Provide teaching at level of understanding  - Provide teaching via preferred learning methods  Outcome: Progressing     Problem: CARDIOVASCULAR - ADULT  Goal: Maintains optimal cardiac output and hemodynamic stability  Description  INTERVENTIONS:  - Monitor I/O, vital signs and rhythm  - Monitor for S/S and trends of decreased cardiac output  - Administer and titrate ordered vasoactive medications to optimize hemodynamic stability  - Assess quality of pulses, skin color and temperature  - Assess for signs of decreased coronary artery perfusion  - Instruct patient to report change in severity of symptoms  Outcome: Progressing  Goal: Absence of cardiac dysrhythmias or at baseline rhythm  Description  INTERVENTIONS:  - Continuous cardiac monitoring, vital signs, obtain 12 lead EKG if ordered  - Administer antiarrhythmic and heart rate control medications as ordered  - Monitor electrolytes and administer replacement therapy as ordered  Outcome: Progressing     Problem: GASTROINTESTINAL - ADULT  Goal: Establish and maintain optimal ostomy function  Description  INTERVENTIONS:  - Assess bowel function  - Encourage oral fluids to ensure adequate hydration  - Administer IV fluids if ordered to ensure adequate hydration   - Administer ordered medications as needed  - Encourage mobilization and activity  - Nutrition services referral to assist patient with appropriate food choices  - Assess stoma site  - Consider wound care consult   Outcome: Progressing     Problem: GENITOURINARY - ADULT  Goal: Urinary catheter remains patent  Description  INTERVENTIONS:  - Assess patency of urinary catheter  - If patient has a chronic cameron, consider changing catheter if non-functioning  - Follow guidelines for intermittent irrigation of non-functioning urinary catheter  Outcome: Progressing     Problem: SKIN/TISSUE INTEGRITY - ADULT  Goal: Skin integrity remains intact  Description  INTERVENTIONS  - Identify patients at risk for skin breakdown  - Assess and monitor skin integrity  - Assess and monitor nutrition and hydration status  - Monitor labs (i e  albumin)  - Assess for incontinence   - Turn and reposition patient  - Assist with mobility/ambulation  - Relieve pressure over bony prominences  - Avoid friction and shearing  - Provide appropriate hygiene as needed including keeping skin clean and dry  - Evaluate need for skin moisturizer/barrier cream  - Collaborate with interdisciplinary team (i e  Nutrition, Rehabilitation, etc )   - Patient/family teaching  Outcome: Progressing     Problem: RESPIRATORY - ADULT  Goal: Achieves optimal ventilation and oxygenation  Description  INTERVENTIONS:  - Assess for changes in respiratory status  - Assess for changes in mentation and behavior  - Position to facilitate oxygenation and minimize respiratory effort  - Oxygen administered by appropriate delivery if ordered  - Encourage broncho-pulmonary hygiene including cough, deep breathe, Incentive Spirometry  - Assess and instruct to report SOB or any respiratory difficulty  - Respiratory Therapy support as indicated   Outcome: Progressing     Problem: Prexisting or High Potential for Compromised Skin Integrity  Goal: Skin integrity is maintained or improved  Description  INTERVENTIONS:  - Identify patients at risk for skin breakdown  - Assess and monitor skin integrity  - Assess and monitor nutrition and hydration status  - Monitor labs   - Assess for incontinence   - Turn and reposition patient  - Assist with mobility/ambulation  - Relieve pressure over bony prominences  - Avoid friction and shearing  - Provide appropriate hygiene as needed including keeping skin clean and dry  - Evaluate need for skin moisturizer/barrier cream  - Collaborate with interdisciplinary team   - Patient/family teaching  - Consider wound care consult   Outcome: Progressing     Problem: Nutrition/Hydration-ADULT  Goal: Nutrient/Hydration intake appropriate for improving, restoring or maintaining nutritional needs  Description  Monitor and assess patient's nutrition/hydration status for malnutrition  Collaborate with interdisciplinary team and initiate plan and interventions as ordered  Monitor patient's weight and dietary intake as ordered or per policy  Utilize nutrition screening tool and intervene as necessary  Determine patient's food preferences and provide high-protein, high-caloric foods as appropriate       INTERVENTIONS:  - Monitor oral intake, urinary output, labs, and treatment plans  - Assess nutrition and hydration status and recommend course of action  - Evaluate amount of meals eaten  - Assist patient with eating if necessary   - Allow adequate time for meals  - Recommend/ encourage appropriate diets, oral nutritional supplements, and vitamin/mineral supplements  - Order, calculate, and assess calorie counts as needed  - Recommend, monitor, and adjust tube feedings and TPN/PPN based on assessed needs  - Assess need for intravenous fluids  - Provide specific nutrition/hydration education as appropriate  - Include patient/family/caregiver in decisions related to nutrition  Outcome: Progressing

## 2020-03-07 NOTE — PROGRESS NOTES
Pt was discharged to [facility]  Put [address] accidently instead of [address] when took pt out of system  Charge RN notified

## 2020-03-07 NOTE — DISCHARGE SUMMARY
Transition of Care Discharge Summary - [facility]    Patient Information: Haile Bernard 75 y o  male MRN: [id number]  Unit/Bed#: [facility] 139-05 Encounter: [id number]    Discharging Physician / Practitioner: Jose Martin Dumont MD  PCP: Shruti Acevedo MD  Admission Date: 2/27/2020  Discharge Date: 03/07/20    Disposition:      Short Term Rehab or SNF at [facility]    For Discharges to   [address] [facility]:   · [place] to reach physician and no message left  Reason for Admission: shortness of breath    Discharge Diagnoses:     Principal Problem:    Acute on chronic combined systolic and diastolic heart failure (HCC)  Active Problems:    Multiple vessel coronary artery disease    LV (left ventricular) mural thrombus    Hyperlipidemia    Essential hypertension    Gastrocutaneous fistula    CKD (chronic kidney disease)    H/O myasthenia gravis    Right inguinal hernia    Urinary obstruction  Resolved Problems:    * No resolved hospital problems  *      Consultations During Hospital Stay:  · Cardiology  · Cardiac surgery  · Urology  · Infectious disease    Procedures Performed:     · As above    Medication Adjustments and Discharge Medications:  · Summary of Medication Adjustments made as a result of this hospitalization: see med reconciliation  · Medication Dosing Tapers - Please refer to Discharge Medication List for details on any medication dosing tapers (if applicable to patient)  · Medications being temporarily held (include recommended restart time): None  · Discharge Medication List: See after visit summary for reconciled discharge medications  Wound Care Recommendations:  When applicable, please see wound care section of After Visit Summary  Diet Recommendations at Discharge:  Diet -        Diet Orders   (From admission, onward)             Start     Ordered    03/04/20 1057  Diet Cardiovascular; Sodium 2 GM;  Fluid Restriction 1800 ML  Diet effective now Question Answer Comment   Diet Type Cardiovascular    Cardiac Sodium 2 GM    Other Restriction(s): Fluid Restriction 1800 ML    RD to adjust diet per protocol? Yes        03/04/20 1057    02/27/20 1629  Room Service  Once     Question:  Type of Service  Answer:  Room Service - Appropriate with Assistance    02/27/20 1629              Fluid Restriction - Continue Fluid Restriction as Listed Above at Discharge  Instructions for any Catheters / Lines Present at Discharge (including removal date, if applicable): None    Significant Findings / Test Results:     Cardiac MRI: 1  Mildly dilated left ventricle with severely reduced left ventricular systolic function  2  Normal right ventricular size with mildly reduced right ventricular systolic function  3  Likely mild mitral regurgitation  4  Subendocardial scarring of the mid to distal lateral, inferior, and septal walls  This suggests moderate myocardial viability in these territories  The anterior wall is completely viable  Echocardiogram: LEFT VENTRICLE:  Systolic function was severely reduced  Ejection fraction was estimated to be 15 %  There was severe diffuse hypokinesis with regional variations  Wall thickness was mildly increased  Doppler parameters were consistent with high ventricular filling pressure  There was a definite, medium-sized mass on the apical wall  It represents a thrombus      LEFT ATRIUM:  The atrium was mildly dilated      MITRAL VALVE:  There was moderate regurgitation      AORTIC VALVE:  The valve was trileaflet  Leaflets exhibited moderately increased thickness and moderately reduced cuspal separation  There was moderate stenosis  Low LV stroke volume  Estimated aortic valve area (by VTI) was 1 04 cmï¾²  Estimated aortic valve area (by Vmax) was 0 91 cmï¾²  Doppler stroke volume was 21 99 ml  Doppler cardiac output was 2 33 L/min, using LVOT flow data    Doppler cardiac index was 1 14 L/min/mï¾², using LVOT flow data      TRICUSPID VALVE:  There was mild regurgitation      PERICARDIUM:  There was a left pleural effusion  Cardiac catheterization: There is significant triple vessel coronary artery disease  Incidental Findings:   · None    Test Results Pending at Discharge (will require follow up): · None     Outpatient Tests Requested:  · BMP and INR in 2-3 days    Complications:  None    Hospital Course:     Ifrah Maria is a 75 y o  male patient who originally presented to the hospital on 2/27/2020 due to shortness of breath  He was evaluated at [facility] for acute CHF  Echocardiogram was remarkable for a cardiac thrombus  Cardiac catheterization was remarkable for significant triple-vessel disease  He was transferred to [facility] for cardiac surgery evaluation  Due to his multiple medical comorbidities he was deemed not a good surgical candidate  After evaluation by Cardiology a plan was developed to have the patient be optimized medically as an inpatient and outpatient and follow-up for elective PCI  While inpatient he was diuresed with significant improvement to shortness of breath  For his cardiac thrombus he was placed on IV heparin and warfarin until warfarin was therapeutic  During hospital course he did have some urinary sediment with bladder spasms, this was cultured but after evaluation by infectious disease determined not to be a catheter associated UTI and only asymptomatic bacteriuria  Antibiotics were discontinued  He was discharged to inpatient rehabilitation and will follow-up with cardiology in approximately 2 months after his cardiac thrombus has been successfully treated for elective PCI  Please check BMP in INR in 2-3 days post discharge while in rehabilitation      Condition at Discharge: stable     Discharge Day Visit / Exam:     Subjective:  Denies any acute complaints today  Vitals: Blood Pressure: 101/73 (03/07/20 0741)  Pulse: 81 (03/07/20 0741)  Temperature: 98 1 °F (36 7 °C) (03/07/20 0741)  Temp Source: Oral (03/04/20 0803)  Respirations: 17 (03/06/20 2202)  Height: 5' 9" (175 3 cm) (02/27/20 1627)  Weight - Scale: 75 4 kg (166 lb 3 6 oz) (03/07/20 0600)  SpO2: 96 % (03/07/20 0741)  Exam:   Physical Exam   Constitutional: He is oriented to person, place, and time  He appears well-developed and well-nourished  HENT:   Head: Normocephalic and atraumatic  Eyes: EOM are normal    Neck: Neck supple  Cardiovascular: Normal rate and regular rhythm  Pulmonary/Chest: He has no wheezes  Abdominal:   colostomy   Musculoskeletal: He exhibits no edema  Neurological: He is alert and oriented to person, place, and time  Skin: Skin is warm and dry  Goals of Care Discussions:  · Code Status at Discharge: Level 1 - Full Code  · Were there any Goals of Care Discussions during Hospitalization?: No  · Results of any General Goals of Care Discussions: N/A   · POLST Completed: No   · If POLST Completed, Summary of POLST Agreement Provided Here: N/A   · OK to Rehospitalize if Needed? Yes    Discharge instructions/Information to patient and family:   See after visit summary section titled Discharge Instructions for information provided to patient and family  Planned Readmission: None      Discharge Statement:  I spent 45 minutes discharging the patient  This time was spent on the day of discharge  I had direct contact with the patient on the day of discharge  Greater than 50% of the total time was spent examining patient, answering all patient questions, arranging and discussing plan of care with patient as well as directly providing post-discharge instructions  Additional time then spent on discharge activities      ** Please Note: This note has been constructed using a voice recognition system **

## 2020-03-07 NOTE — PROGRESS NOTES
Progress Note - Infectious Disease   Hemant Allison 75 y o  male MRN: [id number]  Unit/Bed#: [facility]QSI 402-12 Encounter: [id number]      Impression/Recommendations:  1  Asymptomatic bacteriuria  Likely bladder colonization  Patient remains without fever or leukocytosis  Observe off antibiotic  Monitor temperature/WBC  2  Urinary retention, with chronic indwelling Lawrence catheter  Urology follow up  Consider suprapubic catheterization if plan is for prolonged bladder colonization  3  Myasthenia, on mycophenolate  4  Acute on chronic CHF, clinically much improved  Discussed with patient and his wife in detail regarding above plan  Plan for discharge today noted  Antibiotics:  None    Subjective:  Patient was seen earlier  He is comfortable  No abdominal pain  No urinary symptoms  Temperature stays down  No chills  No diarrhea  Objective:  Vitals:  Temp:  [98 1 °F (36 7 °C)] 98 1 °F (36 7 °C)  HR:  [49-93] 81  Resp:  [17] 17  BP: ()/(57-73) 101/73  SpO2:  [95 %-97 %] 96 %  Temp (24hrs), Av 1 °F (36 7 °C), Min:98 1 °F (36 7 °C), Max:98 1 °F (36 7 °C)  Current: Temperature: 98 1 °F (36 7 °C)    Physical Exam:     General: Awake, alert, cooperative, no distress  Neck:  Supple  No mass  No lymphadenopathy  Lungs: Expansion symmetric, no rales, no wheezing, respirations unlabored  Heart:  Regular rate and rhythm, S1 and S2 normal, no murmur  Abdomen: Soft, nondistended, non-tender, bowel sounds active all four quadrants, no masses, no organomegaly  Extremities: No edema  No erythema/warmth  No ulcer  Nontender to palpation  Skin:  No rash  Neuro: Moves all extremities  Invasive Devices     Drain            Colostomy  -- days    Colostomy LLQ 1390 days    Urethral Catheter Latex 18 Fr  23 days                Labs studies:   I have personally reviewed pertinent labs    Results from last 7 days   Lab Units 20  0540 20  0614 20  0443   POTASSIUM mmol/L 4 2 3 7 3 7   CHLORIDE mmol/L 108 107 107   CO2 mmol/L 23 26 25   BUN mg/dL 38* 39* 36*   CREATININE mg/dL 1 26 1 47* 1 43*   EGFR ml/min/1 73sq m 55 46 48   CALCIUM mg/dL 8 5 8 3 8 7     Results from last 7 days   Lab Units 03/06/20  0521 03/05/20  1018   WBC Thousand/uL 8 18 7 91   HEMOGLOBIN g/dL 11 2* 11 9*   PLATELETS Thousands/uL 214 212     Results from last 7 days   Lab Units 03/04/20  1621   URINE CULTURE  >100,000 cfu/ml Enterococcus faecalis*  >100,000 cfu/ml Escherichia coli*       Imaging Studies:   I have personally reviewed pertinent imaging study reports and images in PACS  EKG, Pathology, and Other Studies:   I have personally reviewed pertinent reports

## 2020-03-10 NOTE — TELEPHONE ENCOUNTER
Patient seen while inpatient for urinary obstruction secondary to large right inguinal hernia with urethral obstruction and bilateral hydronephrosis  He has a cameron catheter in place  Called and spoke with wife  She reports catheter recently changed on 3/5/2020 and patient discharged to a rehab facility  She is questioning if the rehab facility can do a void trial   I reviewed that previous order stated "Patient cannot have void trial unless hernia is repaired  Chronic Cameron until then "  Reviewed this with wife and she verbalized understanding  She did request provider FU to discuss longterm options and plan  Rescheduled catheter change to 4/9/2020 with nursing for cameron change and with physician assistant Alexis Purvis for hospital FU and discussion of long term plan  Primitivo Troy verbalized understanding of appt details

## 2020-03-10 NOTE — TELEPHONE ENCOUNTER
Patient seen at [facility] by diagnostic Nurse    Wife calling to reschedule catheter change    Patients catheter was changed on 3/5 while admitted to hospital     Patients wife, Charity Elise, can be reached at [phone]

## 2020-03-12 NOTE — PROGRESS NOTES
Advanced Heart Failure / Pulmonary Hypertension Outpatient Progress Note    Lorenzo Gottlieb 75 y o  male   MRN: [id number]  Encounter: [id number]    Assessment / Plan:  Patient Active Problem List    Diagnosis Date Noted    LV (left ventricular) mural thrombus 02/24/2020    Acute on chronic combined systolic and diastolic heart failure ([facility] [address] ) 02/20/2020    Urinary obstruction 02/20/2020    Elevated troponin I level 02/20/2020    Right inguinal hernia 02/13/2020    H/O myasthenia gravis 02/12/2020    Hydronephrosis 02/12/2020    CKD (chronic kidney disease) 07/07/2016    Gastrocutaneous fistula 05/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    Multiple vessel coronary artery disease 05/17/2016    Hyperlipidemia 05/17/2016    Essential hypertension 05/17/2016     Newly diagnosed HFrEF; LVEF 15 %; LVIDd 5 27 cm; NYHA III; ACC/AHA Stage C              Etiology: ischemic +/- recent admission for severe sepsis from UTI in early February 2020  TTE from 06/01/2017: LVEF 50%  LVIDd 4 14 cm  Grade 1 DD  Mild to moderate MR  No AS  Trace TR  PASP 25 mmHg  TTE from 02/20/2020: LVEF 15%  LVIDd 5 27 cm  "Definite medium-sized mass on [LV] apical wall " Normal RV size and RVSF  Moderate MR  Moderate AS with low SV  Mild TR  LHC from 02/26/2020: 80% stenosis at ostium of left main  50% tubular stenosis of proximal LAD  80% stenosis of mid LAD  50% stenosis of distal LAD  90% tubular stenosis of OM1  100% occlusion of mid RCA with R-to-R collaterals to distal RCA  cMRI from 02/26/2020: "Mildly dilated left ventricle with severely reduced left ventricular systolic function  Normal right ventricular size with mildly reduced right ventricular systolic function  Subendocardial scarring of the mid to distal lateral, inferior, and septal walls  This suggests moderate myocardial viability in these territories   Anterior wall is completely viable "               Reviewed importance of low sodium diet and fluid restriction  Weight of 166 lbs on 03/07 (day of discharge)  Today, weighs 158 lbs  Most recent BMP from 03/12/2020: sodium 143; potassium 4 6; BUN 28; creatinine 1 12; eGFR 64  BP limits uptitration of medications      Neurohormonal Blockade:  --Beta-Blocker: metoprolol succinate 50 mg qAM and 25 mg qPM    --ACEi, ARB or ARNi: lisinopril 2 5 mg BID  --Aldosterone Receptor Blocker: N/A  --Diuretic: Lasix 20 mg BID with potassium 20 mEq daily       Sudden Cardiac Death Risk Reduction:  --LVEF 15%  Refused LifeVest   --Plan for limited TTE in mid May 2020 to reassess LVEF      Electrical Resynchronization:  --Candidacy for BiV device: narrow QRS     Advanced Therapies: Will continue to monitor      Left ventricular mural thrombus              TTE from 02/20/2020: "definite medium-sized mass on apical wall "               Anticoagulation on Coumadin  Goal INR of 2-3  Plan for repeat limited TTE in mid May to assess LV size  Will reach out to reading cardiologist if thrombus can be assessed on patient's cMRI       Coronary artery disease              Triple vessel CAD first diagnosed in 2013  S/p stenting to unspecified vessels over 20 years ago  [facility] from 02/26/2020: 80% stenosis at ostium of left main  50% tubular stenosis of proximal LAD  80% stenosis of mid LAD  50% stenosis of distal LAD  90% tubular stenosis of OM1  100% occlusion of mid RCA with R-to-R collaterals to distal RCA  cMRI from 02/26/2020: "Mildly dilated left ventricle with severely reduced left ventricular systolic function  Normal right ventricular size with mildly reduced right ventricular systolic function  Subendocardial scarring of the mid to distal lateral, inferior, and septal walls  This suggests moderate myocardial viability in these territories  Anterior wall is completely viable "    Plan is for staged PCI likely with Impella  Will need LE vascular studies completed prior to procedure  Continue on aspirin, statin, and BB as above       Aortic stenosis, moderate  Hypertension              BP of 86/50 mmHg in office today  Continue on medications as above       Hyperlipidemia              Most recent lipid panel from 02/21/2020: cholesterol 95; TG 97; HDL 23; direct LDL 53  Continue on atorvastatin 80 mg daily       Chronic kidney disease, stage III              Baseline creatinine of 1 1-1 3  BMP from 03/07/2020: sodium 138; potassium 4 2; BUN 38; creatinine 1 26; eGFR 55  Most recent BMP from 03/12/2020: sodium 143; potassium 4 6; BUN 28; creatinine 1 12; eGFR 64      Urinary retention with bilateral hydronephrosis   Secondary to large inguinal hernia pressure, per notes  Lawrence catheter present  Diverticulitis: s/p Padmini procedure with end colostomy complicated by gastrocutaneous fistula  Myasthenia gravis: follows with Dr Hailee Sheriff as outpatient  Carotid stenosis    HPI:   Kwaku Corbett is a 18-year-old male with PMH as above who presents to the office for hospital follow-up  Admitted to [facility] from 02/11 to 02/17/2020 for severe sepsis stemming from UTI  Found to have enlarged prostate and large right inuginal hernia which contributed to urinary retention and Lawrence catheter was placed  Seen by cardiology for chest pain due to his history of CAD; no interventions/testing ordered due to sepsis  General surgery deferred hernia repair to comorbidities  Unfortunately presented to [facility] on 02/20 with SOB and 17 lbs weight gain over a few days (PO Lasix was discontinued during prior admission)  Found to be decompensated and in cardiogenic shock and was transferred to ICU and started on Lasix drip  TTE revealed LVEF 15% and LV apical thrombus (IV heparin started)  Was started on milrinone 0 13 mcg/kg/min on 02/21  With milrinone, patient diuresed well  Milrinone discontinued on 02/24   Underwent LHC on 02/26 which revealed severe multivessel CAD  cMRI was completed and showed moderate viability of lateral, inferior, and septal wall and complete viability of anterior wall  Patient was then transferred to [facility] on 02/27 for CT surgery evaluation  Seen by CT surgery and noted that "given his cormorbidities and his poor functional capacity, he is not a surgical candidate " Patient's case was then reviewed with interventional cardiologist who recommended "several weeks to allow some recovery of LV function and resolution of LV clot" followed by rotational atherectomy with Impella assist  GDMT was optimized  Discharged to rehab facility on 03/07  Lost 31 lbs during this admission  03/12/2020: Patient presents with his wife for hospital follow-up  Patient currently at [facility] for rehab  Reports walking about 200-250 feet at a time before noting SOB  Is being weighed daily there and reports being on salt and fluid restriction  No other complaints  Reports good urinary response to Lasix  BP in office noted; denies any symptoms with this  RTC in 3 weeks  Past Medical History:   Diagnosis Date    Cardiac disease     Carotid stenosis     [facility]     Compression fracture of lumbar vertebra ([facility])     Coronary artery disease     Disease of thyroid gland     Diverticulitis     Hernia, diaphragmatic, without obstruction     Hyperlipidemia     Hypertension     Inguinal hernia     Right    Ischemic cardiomyopathy     [facility]ocardial infarction) [facility] [address] [facility]     Myasthenia gravis ([facility] [address] )      Review of Systems   Constitutional: Negative for activity change, chills, diaphoresis, fatigue, fever and unexpected weight change  HENT: Negative for congestion, postnasal drip, rhinorrhea, sneezing and sore throat  Eyes: Negative  Respiratory: Positive for shortness of breath  Negative for cough and chest tightness  Cardiovascular: Negative for chest pain, palpitations and leg swelling  Gastrointestinal: Negative for abdominal distention, abdominal pain, constipation, diarrhea, nausea and vomiting  Endocrine: Negative  Genitourinary: Negative for decreased urine volume, difficulty urinating and dysuria  Musculoskeletal: Negative  Negative for arthralgias and myalgias  Skin: Negative  Allergic/Immunologic: Negative  Neurological: Negative for dizziness, tremors, syncope, weakness, light-headedness and headaches  Hematological: Negative  Psychiatric/Behavioral: Negative for agitation and confusion  The patient is not nervous/anxious  14-point ROS completed and negative except as stated above and/or in the HPI  No Known Allergies    Current Outpatient Medications:     aspirin 81 MG tablet, Take 81 mg by mouth daily  , Disp: , Rfl:     atorvastatin (LIPITOR) 80 mg tablet, Take 80 mg by mouth daily at bedtime , Disp: , Rfl:     furosemide (LASIX) 20 mg tablet, Take 1 tablet (20 mg total) by mouth 2 (two) times a day, Disp: , Rfl: 0    levothyroxine 50 mcg tablet, Take 1 tablet (50 mcg total) by mouth daily, Disp: , Rfl:     lisinopril (ZESTRIL) 2 5 mg tablet, Take 1 tablet (2 5 mg total) by mouth 2 (two) times a day, Disp: , Rfl: 0    melatonin 3 mg, Take 1 tablet (3 mg total) by mouth daily at bedtime as needed (sleep), Disp: , Rfl: 0    metoprolol succinate (TOPROL-XL) 25 mg 24 hr tablet, Take 1 tablet (25 mg total) by mouth daily, Disp: , Rfl: 0    metoprolol succinate (TOPROL-XL) 50 mg 24 hr tablet, Take 1 tablet (50 mg total) by mouth daily, Disp: , Rfl: 0    mycophenolate (CELLCEPT) 250 mg capsule, TAKE 3 CAPSULES TWICE A DAY, Disp: 540 capsule, Rfl: 1    oxybutynin (DITROPAN) 5 mg tablet, Take 1 tablet (5 mg total) by mouth 3 (three) times a day, Disp: , Rfl: 0    potassium chloride (K-DUR,KLOR-CON) 20 mEq tablet, Take 1 tablet (20 mEq total) by mouth daily, Disp: , Rfl: 0    warfarin (COUMADIN) 7 5 mg tablet, Take 1 tablet (7 5 mg total) by mouth daily, Disp: , Rfl: 0    docusate sodium (COLACE) 100 mg capsule, Take 1 capsule (100 mg total) by mouth 2 (two) times a day (Patient not taking: Reported on 3/12/2020), Disp: 10 capsule, Rfl: 0    nystatin (MYCOSTATIN) powder, Apply topically 2 (two) times a day for 12 days, Disp: 15 g, Rfl: 0    Ostomy Supplies (PREMIER COLOSTOMY/ILEOSTOMY) KIT, Change as needed  Sensura 2 piece coloplast  2   Box of 5 (Patient not taking: Reported on 3/12/2020), Disp: 1 kit, Rfl: 4    Social History     Socioeconomic History    Marital status: /Civil Union     Spouse name: Not on file    Number of children: Not on file    Years of education: Not on file    Highest education level: Not on file   Occupational History    Not on file   Social Needs    Financial resource strain: Not on file    Food insecurity:     Worry: Not on file     Inability: Not on file    Transportation needs:     Medical: Not on file     Non-medical: Not on file   Tobacco Use    Smoking status: Former Smoker     Last attempt to quit: 1973     Years since quittin 1    Smokeless tobacco: Never Used   Substance and Sexual Activity    Alcohol use: Yes     Comment: 1 per month    Drug use: No    Sexual activity: Not on file   Lifestyle    Physical activity:     Days per week: Not on file     Minutes per session: Not on file    Stress: Not on file   Relationships    Social connections:     Talks on phone: Not on file     Gets together: Not on file     Attends [employer] service: Not on file     Active member of club or organization: Not on file     Attends meetings of clubs or organizations: Not on file     Relationship status: Not on file    Intimate partner violence:     Fear of current or ex partner: Not on file     Emotionally abused: Not on file     Physically abused: Not on file     Forced sexual activity: Not on file   Other Topics Concern    Not on file   Social History Narrative    Not on file     Family History   Problem Relation Age of Onset    Heart disease Mother     Hypertension Mother      Vitals:   Blood pressure (!) 86/50, pulse 76, height 5' 9" (1 753 m), weight 71 7 kg (158 lb), SpO2 97 %  Body mass index is 23 33 kg/m²  Wt Readings from Last 3 Encounters:   03/12/20 71 7 kg (158 lb)   03/07/20 75 4 kg (166 lb 3 6 oz)   02/27/20 83 3 kg (183 lb 10 3 oz)     Vitals:    03/12/20 1253   BP: (!) 86/50   BP Location: Right arm   Patient Position: Sitting   Cuff Size: Standard   Pulse: 76   SpO2: 97%   Weight: 71 7 kg (158 lb)   Height: 5' 9" (1 753 m)     Physical Exam   Constitutional: He is oriented to person, place, and time  He appears well-developed and well-nourished  HENT:   Head: Normocephalic and atraumatic  Eyes: Pupils are equal, round, and reactive to light  EOM are normal    Neck: Neck supple  No JVD present  No tracheal deviation present  Cardiovascular: Normal rate, regular rhythm and intact distal pulses  Murmur heard  Pulmonary/Chest: Effort normal and breath sounds normal  No respiratory distress  He has no wheezes  Abdominal: Soft  Bowel sounds are normal  He exhibits no distension  A hernia is present  Colostomy present   Genitourinary:   Genitourinary Comments: Lawrence catheter present   Musculoskeletal: He exhibits no edema or tenderness  Neurological: He is alert and oriented to person, place, and time  Skin: Skin is warm and dry  Psychiatric: He has a normal mood and affect  His behavior is normal    Vitals reviewed      Labs & Results:  Lab Results   Component Value Date    WBC 8 18 03/06/2020    HGB 11 2 (L) 03/06/2020    HCT 36 4 (L) 03/06/2020    MCV 94 03/06/2020     03/06/2020     Lab Results   Component Value Date    SODIUM 138 03/07/2020    K 4 2 03/07/2020     03/07/2020    CO2 23 03/07/2020    BUN 38 (H) 03/07/2020    CREATININE 1 26 03/07/2020    GLUC 101 03/07/2020    CALCIUM 8 5 03/07/2020     Lab Results   Component Value Date    INR 2 00 (H) 03/07/2020 INR 1 64 (H) 03/06/2020    INR 1 35 (H) 03/05/2020    PROTIME 22 2 (H) 03/07/2020    PROTIME 19 0 (H) 03/06/2020    PROTIME 16 2 (H) 03/05/2020     Lab Results   Component Value Date    NTBNP 80,770 (H) 02/20/2020      EKG personally reviewed by Lorenzo Figueroa PA-C  Counseling / Coordination of Care: Today, 40 minutes were spent with patient and his wife during which greater than 50% of this time was spent in counseling/coordination of care regarding low sodium diet, fluid restriction, daily weights, and importance of medication compliance  Thank you for the opportunity to participate in the care of this patient      James Crowder PA-C

## 2020-03-12 NOTE — PATIENT INSTRUCTIONS
Schedule echo  Please weigh yourself every day, and contact the Heart Failure program at [phone] if you gain 3 lbs overnight or 5 lbs in 5-7 days  Limit daily sodium/salt intake to 5665-6720 mg daily to prevent fluid retention  Avoid canned foods, [place] food, and processed meat (hot dogs, lunch meat, and sausage etc )  Limit daily fluid intake to 2000 mL or 2L (about 60 ounces) daily  Bring complete list of medications to your follow-up appointment

## 2020-04-20 PROBLEM — R65.10 SIRS (SYSTEMIC INFLAMMATORY RESPONSE SYNDROME) (HCC): Chronic | Status: ACTIVE | Noted: 2020-01-01

## 2020-04-20 PROBLEM — E87.2 METABOLIC ACIDOSIS WITH NORMAL ANION GAP AND BICARBONATE LOSSES: Chronic | Status: ACTIVE | Noted: 2020-01-01

## 2020-04-20 PROBLEM — N17.9 ACUTE ON CHRONIC KIDNEY FAILURE (HCC): Status: ACTIVE | Noted: 2020-01-01

## 2020-04-20 PROBLEM — R79.1 ELEVATED INR (INTERNATIONAL NORMALIZED RATIO) DUE TO PRIOR ANTICOAGULANT MEDICATION INGESTION: Chronic | Status: ACTIVE | Noted: 2020-01-01

## 2020-04-20 PROBLEM — D68.9 COAGULOPATHY (HCC): Chronic | Status: ACTIVE | Noted: 2020-01-01

## 2020-04-20 PROBLEM — N18.9 ACUTE ON CHRONIC KIDNEY FAILURE (HCC): Status: ACTIVE | Noted: 2020-01-01

## 2020-04-20 PROBLEM — R79.1 ELEVATED INR (INTERNATIONAL NORMALIZED RATIO) DUE TO PRIOR ANTICOAGULANT MEDICATION INGESTION: Status: ACTIVE | Noted: 2020-01-01

## 2020-04-20 PROBLEM — N17.9 ACUTE ON CHRONIC KIDNEY FAILURE (HCC): Chronic | Status: ACTIVE | Noted: 2020-01-01

## 2020-04-20 PROBLEM — R65.10 SIRS (SYSTEMIC INFLAMMATORY RESPONSE SYNDROME) (HCC): Status: ACTIVE | Noted: 2020-01-01

## 2020-04-20 PROBLEM — N18.9 ACUTE ON CHRONIC KIDNEY FAILURE (HCC): Chronic | Status: ACTIVE | Noted: 2020-01-01

## 2020-04-20 PROBLEM — E87.2 METABOLIC ACIDOSIS WITH NORMAL ANION GAP AND BICARBONATE LOSSES: Status: ACTIVE | Noted: 2020-01-01

## 2020-04-20 PROBLEM — D68.9 COAGULOPATHY (HCC): Status: ACTIVE | Noted: 2020-01-01

## 2020-04-24 PROBLEM — R65.10 SIRS (SYSTEMIC INFLAMMATORY RESPONSE SYNDROME) (HCC): Chronic | Status: RESOLVED | Noted: 2020-01-01 | Resolved: 2020-01-01

## 2020-04-24 PROBLEM — E87.2 METABOLIC ACIDOSIS WITH NORMAL ANION GAP AND BICARBONATE LOSSES: Chronic | Status: RESOLVED | Noted: 2020-01-01 | Resolved: 2020-01-01

## 2020-04-25 PROBLEM — I50.22 CHRONIC SYSTOLIC CONGESTIVE HEART FAILURE (HCC): Status: ACTIVE | Noted: 2020-01-01

## 2020-04-25 PROBLEM — N18.30 ACUTE RENAL FAILURE SUPERIMPOSED ON STAGE 3 CHRONIC KIDNEY DISEASE (HCC): Status: ACTIVE | Noted: 2020-01-01

## 2020-04-26 PROBLEM — A41.9 SEPSIS (HCC): Status: RESOLVED | Noted: 2020-01-01 | Resolved: 2020-01-01

## 2020-04-26 PROBLEM — E87.5 HYPERKALEMIA: Status: ACTIVE | Noted: 2020-01-01

## 2020-04-27 PROBLEM — E87.5 HYPERKALEMIA: Status: RESOLVED | Noted: 2020-01-01 | Resolved: 2020-01-01

## 2020-04-28 PROBLEM — Z86.73 OLD CEREBROVASCULAR ACCIDENT (CVA) WITHOUT LATE EFFECT: Status: ACTIVE | Noted: 2020-01-01

## 2020-04-28 PROBLEM — D69.6 THROMBOCYTOPENIA (HCC): Status: ACTIVE | Noted: 2020-01-01

## 2020-04-28 PROBLEM — D64.9 ANEMIA: Status: ACTIVE | Noted: 2020-01-01

## 2020-04-30 PROBLEM — R79.1 ELEVATED INR (INTERNATIONAL NORMALIZED RATIO) DUE TO PRIOR ANTICOAGULANT MEDICATION INGESTION: Status: RESOLVED | Noted: 2020-01-01 | Resolved: 2020-01-01

## 2020-04-30 PROBLEM — I50.43 ACUTE ON CHRONIC COMBINED SYSTOLIC AND DIASTOLIC HEART FAILURE (HCC): Status: RESOLVED | Noted: 2020-01-01 | Resolved: 2020-01-01

## 2020-04-30 PROBLEM — D68.9 COAGULOPATHY (HCC): Chronic | Status: RESOLVED | Noted: 2020-01-01 | Resolved: 2020-01-01

## 2020-06-07 PROBLEM — E87.2 LACTIC ACID ACIDOSIS: Status: ACTIVE | Noted: 2020-01-01

## 2020-06-07 PROBLEM — I25.5 ISCHEMIC CARDIOMYOPATHY: Status: ACTIVE | Noted: 2020-01-01

## 2020-06-07 PROBLEM — R57.9 SHOCK (HCC): Status: ACTIVE | Noted: 2020-01-01

## 2020-06-07 PROBLEM — D68.32 WARFARIN-INDUCED COAGULOPATHY (HCC): Chronic | Status: ACTIVE | Noted: 2020-01-01

## 2020-06-07 PROBLEM — T68.XXXA HYPOTHERMIA: Status: ACTIVE | Noted: 2020-01-01

## 2020-06-07 PROBLEM — T45.515A WARFARIN-INDUCED COAGULOPATHY (HCC): Chronic | Status: ACTIVE | Noted: 2020-01-01

## 2020-06-07 PROBLEM — E87.2 HIGH ANION GAP METABOLIC ACIDOSIS: Status: ACTIVE | Noted: 2020-01-01

## 2020-06-08 PROBLEM — I35.0 MODERATE AORTIC STENOSIS: Chronic | Status: ACTIVE | Noted: 2020-01-01

## 2020-06-08 PROBLEM — R73.9 HYPERGLYCEMIA: Status: ACTIVE | Noted: 2020-01-01

## 2020-06-08 PROBLEM — S90.829A BLISTER OF FOOT: Status: ACTIVE | Noted: 2020-01-01

## 2020-06-08 PROBLEM — E03.9 HYPOTHYROIDISM: Status: ACTIVE | Noted: 2020-01-01

## 2020-06-08 PROBLEM — D64.9 ANEMIA: Chronic | Status: ACTIVE | Noted: 2020-01-01

## 2020-06-08 PROBLEM — R74.01 ELEVATED AST (SGOT): Status: ACTIVE | Noted: 2020-01-01

## 2020-06-08 PROBLEM — E03.9 HYPOTHYROIDISM: Chronic | Status: ACTIVE | Noted: 2020-01-01

## 2020-06-08 PROBLEM — E87.2 HIGH ANION GAP METABOLIC ACIDOSIS: Status: RESOLVED | Noted: 2020-01-01 | Resolved: 2020-01-01

## 2020-06-08 PROBLEM — E87.2 LACTIC ACID ACIDOSIS: Status: RESOLVED | Noted: 2020-01-01 | Resolved: 2020-01-01

## 2020-06-08 PROBLEM — K40.90 RIGHT INGUINAL HERNIA: Chronic | Status: ACTIVE | Noted: 2020-01-01

## 2020-06-08 PROBLEM — I25.5 ISCHEMIC CARDIOMYOPATHY: Chronic | Status: ACTIVE | Noted: 2020-01-01

## 2020-06-08 PROBLEM — I51.3 LV (LEFT VENTRICULAR) MURAL THROMBUS: Chronic | Status: ACTIVE | Noted: 2020-01-01

## 2020-06-08 PROBLEM — E83.39 HYPERPHOSPHATEMIA: Status: ACTIVE | Noted: 2020-01-01

## 2020-06-08 PROBLEM — T68.XXXA HYPOTHERMIA: Status: RESOLVED | Noted: 2020-01-01 | Resolved: 2020-01-01

## 2020-06-08 PROBLEM — I35.0 MODERATE AORTIC STENOSIS: Status: ACTIVE | Noted: 2020-01-01

## 2020-06-08 NOTE — TELEPHONE ENCOUNTER
Coni Castro is a 25-year-old male known to our service for urinary retention, failing multiple void trials, seen in consultation at [address] for sepsis of  etiology and difficult catheterization requiring cystoscopic placement by Dr Noemi Cogan while on call  Per Dr Loco Mathur is recommendation, patient is to remain with chronic catheter and monthly exchanges  Catheter last exchanged with difficulty 6/7  Please contact patient for Lawrence catheter placement in approximately 4--5 weeks  Recommend scheduling while someone is available in office for cystoscopy, if needed  Thank you

## 2020-06-09 PROBLEM — I50.22 CHRONIC SYSTOLIC CONGESTIVE HEART FAILURE (HCC): Chronic | Status: ACTIVE | Noted: 2020-01-01

## 2020-06-13 LAB
BACTERIA BLD CULT: NORMAL
BACTERIA BLD CULT: NORMAL

## 2020-06-17 ENCOUNTER — ANTICOAG VISIT (OUTPATIENT)
Dept: CARDIOLOGY CLINIC | Facility: CLINIC | Age: 75
End: 2020-06-17

## 2020-06-17 DIAGNOSIS — I51.3 LV (LEFT VENTRICULAR) MURAL THROMBUS: ICD-10-CM

## 2023-01-03 NOTE — PROGRESS NOTES
Cardiology Progress Note - Janae Correa 75 y o  male MRN: [id number]    Unit/Bed#: ICU 10 Encounter: [id number]      Assessment:  Principal Problem:    Acute on chronic combined systolic and diastolic heart failure (HCC)  Active Problems:    Coronary artery disease    Hyperlipidemia    Essential hypertension    Gastrocutaneous fistula    Acute renal failure (HCC)    H/O myasthenia gravis    Gram-negative bacteremia    Right inguinal hernia    Urinary obstruction    Elevated troponin I level    Hypervolemia      Plan:    1  Cardiogenic shock and acute on chronic combined systolic/diastolic CHF - Associated with an ischemic cardiomyopathy and a severely reduced ejection fraction around 20%  Had a very good response to IV Milrinone increasing IV Lasix  Continue IV Milrinone today, but suggest decreasing IV Lasix drip and transition to intermittent IV Lasix by tomorrow  Follow mixed venous blood gases and labs daily  Follow urine output  2   Multivessel CAD - This was diagnosed back in 2017 but never went through with CABG  Continue medical management as prescribed otherwise  3   LV thrombus - IV heparin to Coumadin  4   ANDREA on CKD - Cr now improving, and associated with cardiorenal in cardiogenic shock  Nephrology following  Continue IV Milrinone as stated above  Subjective:   Patient seen and examined  No significant events overnight  Improving with IV Milrinone  Improved urine output, creatinine improving as his mixed venous blood gas  Objective:     Vitals: Blood pressure 115/71, pulse 95, temperature (!) 97 3 °F (36 3 °C), temperature source Oral, resp  rate (!) 25, height 5' 9" (1 753 m), weight 87 2 kg (192 lb 3 9 oz), SpO2 98 %  , Body mass index is 28 39 kg/m² ,   Orthostatic Blood Pressures      Most Recent Value   Blood Pressure  115/71 filed at 02/22/2020 0700   Patient Position - Orthostatic VS  Lying filed at 02/22/2020 0022            Intake/Output Summary (Last 24 hours) at 2/22/2020 0825  Last data filed at 2/22/2020 0600  Gross per 24 hour   Intake 1045 81 ml   Output 6050 ml   Net -5004 19 ml       No significant arrhythmias seen on telemetry review  Sinus rhythm with PVCs and PACs  Physical Exam:    GEN: Eloina Grumbmara appears well, alert and oriented x 3, pleasant and cooperative   HEENT: pupils equal, round, and reactive to light; extraocular muscles intact  NECK: supple, no carotid bruits  +JVD   HEART: regular rhythm, distant S1 and S2, no murmurs, clicks, gallops or rubs   LUNGS:  Diminished bilaterally; no wheezes, rales, or rhonchi   ABDOMEN: normal bowel sounds, soft, no tenderness, no distention  EXTREMITIES: peripheral pulses normal; no clubbing, cyanosis   + edema bilaterally  NEURO: no focal findings   SKIN: normal without suspicious lesions on exposed skin    Medications:      Current Facility-Administered Medications:     acetaminophen (TYLENOL) tablet 650 mg, 650 mg, Oral, Q6H PRN, Miracle Park MD    aspirin chewable tablet 81 mg, 81 mg, Oral, Daily, Miracle Park MD, 81 mg at 02/22/20 7782    atorvastatin (LIPITOR) tablet 80 mg, 80 mg, Oral, HS, Miracle Park MD, 80 mg at 02/21/20 2213    docusate sodium (COLACE) capsule 100 mg, 100 mg, Oral, BID, Miracle Park MD, 100 mg at 02/20/20 1803    furosemide (LASIX) 500 mg infusion 50 mL, 20 mg/hr, Intravenous, Continuous, Josr Enriquez MD, Last Rate: 2 mL/hr at 02/21/20 1258, 20 mg/hr at 02/21/20 1258    heparin (porcine) 25,000 units in 250 mL infusion (premix), 3-20 Units/kg/hr (Order-Specific), Intravenous, Titrated, SIMONA Peñaloza Mt, Last Rate: 13 4 mL/hr at 02/21/20 1257, 15 8 Units/kg/hr at 02/21/20 1257    heparin (porcine) injection 2,000 Units, 2,000 Units, Intravenous, Q1H PRN, Savanna Armijo PA-C    heparin (porcine) injection 4,000 Units, 4,000 Units, Intravenous, Q1H PRN, Savanna Armijo PA-C, 4,000 Units at 02/21/20 2761    levothyroxine tablet 50 mcg, 50 mcg, Oral, Early Morning, Kalyan Anguiano MD, 50 mcg at 02/22/20 0500    milrinone (PRIMACOR) 20 mg in 100 mL infusion (premix), 0 13 mcg/kg/min, Intravenous, Continuous, SIMONA Spann, Last Rate: 3 5 mL/hr at 02/22/20 0458, 0 13 mcg/kg/min at 02/22/20 0458    mycophenolate (CELLCEPT) capsule 750 mg, 750 mg, Oral, BID, Kalyan Anguiano MD, 750 mg at 02/22/20 7302    nystatin (MYCOSTATIN) powder, , Topical, BID, Kalyan Anguiano MD, 1 application at 80/50/71 8167    polyethylene glycol (MIRALAX) packet 17 g, 17 g, Oral, Daily PRN, Kalyan Anguiano MD    potassium chloride (K-DUR,KLOR-CON) CR tablet 20 mEq, 20 mEq, Oral, TID With Meals, SIMONA Spann, 20 mEq at 02/22/20 0742     Labs & Results:    Results from last 7 days   Lab Units 02/20/20 2003 02/20/20  1646 02/20/20  1059   TROPONIN I ng/mL 15 54* 16 09* 14 15*     Results from last 7 days   Lab Units 02/22/20  0517 02/21/20  0541 02/20/20  1059   WBC Thousand/uL 11 44* 13 80* 15 45*   HEMOGLOBIN g/dL 12 6 13 3 13 6   HEMATOCRIT % 40 2 42 9 44 9   PLATELETS Thousands/uL 202 236 253     Results from last 7 days   Lab Units 02/21/20  0541   TRIGLYCERIDES mg/dL 97   HDL mg/dL 23*     Results from last 7 days   Lab Units 02/22/20  0458 02/21/20  2218 02/21/20  1643   POTASSIUM mmol/L 3 5 3 5 3 7   CHLORIDE mmol/L 103 104 102   CO2 mmol/L 31 27 24   BUN mg/dL 59* 60* 61*   CREATININE mg/dL 1 83* 2 01* 2 04*   CALCIUM mg/dL 7 4* 7 1* 7 6*     Results from last 7 days   Lab Units 02/22/20  0458 02/21/20  1833 02/21/20  1245  02/20/20  1059   INR   --   --   --   --  1 39*   PTT seconds 62* 61* 86*   < > 29    < > = values in this interval not displayed  Results from last 7 days   Lab Units 02/22/20  0458 02/21/20  2218 02/21/20  1643   MAGNESIUM mg/dL 2 0 2 1 2 2         EKG personally reviewed by Matilda Beth MD   Sinus tachycardia, prior anterior infarct and nonspecific ST changes      Counseling / Coordination of Care  Total floor / unit time spent today 30 minutes  Greater than 50% of total time was spent with the patient and / or family counseling and / or coordination of care  Adjacent Tissue Transfer Text: Because of the full-thickness nature of the wound and in order to displace lines around important structures, a Burow’s advancement flap was planned. After prep and local anesthesia, a Burow’s triangle was excised adjacent to the defect.  The other Burow's triangle was displaced to hide incisions within skin relaxation lines. Two flaps were created by undermining in the deep subcutaneous plane. After hemostasis, the flaps were advanced and closed in a layered fashion.